# Patient Record
Sex: FEMALE | Race: WHITE | Employment: OTHER | URBAN - METROPOLITAN AREA
[De-identification: names, ages, dates, MRNs, and addresses within clinical notes are randomized per-mention and may not be internally consistent; named-entity substitution may affect disease eponyms.]

---

## 2022-11-09 ENCOUNTER — APPOINTMENT (OUTPATIENT)
Dept: NUCLEAR MEDICINE | Age: 72
DRG: 291 | End: 2022-11-09
Payer: MEDICARE

## 2022-11-09 ENCOUNTER — HOSPITAL ENCOUNTER (INPATIENT)
Age: 72
LOS: 6 days | Discharge: HOME | DRG: 291 | End: 2022-11-15
Attending: EMERGENCY MEDICINE | Admitting: HOSPITALIST
Payer: MEDICARE

## 2022-11-09 ENCOUNTER — OFFICE VISIT (OUTPATIENT)
Dept: FAMILY MEDICINE CLINIC | Age: 72
End: 2022-11-09
Payer: MEDICARE

## 2022-11-09 ENCOUNTER — APPOINTMENT (OUTPATIENT)
Dept: GENERAL RADIOLOGY | Age: 72
DRG: 291 | End: 2022-11-09
Payer: MEDICARE

## 2022-11-09 VITALS
OXYGEN SATURATION: 91 % | HEIGHT: 67 IN | SYSTOLIC BLOOD PRESSURE: 134 MMHG | DIASTOLIC BLOOD PRESSURE: 86 MMHG | WEIGHT: 235.6 LBS | BODY MASS INDEX: 36.98 KG/M2 | HEART RATE: 69 BPM | RESPIRATION RATE: 16 BRPM

## 2022-11-09 DIAGNOSIS — Z12.31 ENCOUNTER FOR SCREENING MAMMOGRAM FOR BREAST CANCER: ICD-10-CM

## 2022-11-09 DIAGNOSIS — E78.2 MIXED HYPERLIPIDEMIA: ICD-10-CM

## 2022-11-09 DIAGNOSIS — Z23 FLU VACCINE NEED: ICD-10-CM

## 2022-11-09 DIAGNOSIS — Z13.1 ENCOUNTER FOR SCREENING FOR DIABETES MELLITUS: ICD-10-CM

## 2022-11-09 DIAGNOSIS — I10 ESSENTIAL HYPERTENSION: Primary | ICD-10-CM

## 2022-11-09 DIAGNOSIS — Z12.11 COLON CANCER SCREENING: ICD-10-CM

## 2022-11-09 DIAGNOSIS — Z95.810 ICD (IMPLANTABLE CARDIOVERTER-DEFIBRILLATOR), DUAL, IN SITU: ICD-10-CM

## 2022-11-09 DIAGNOSIS — R79.89 ELEVATED D-DIMER: ICD-10-CM

## 2022-11-09 DIAGNOSIS — I49.1 PREMATURE ATRIAL CONTRACTION: ICD-10-CM

## 2022-11-09 DIAGNOSIS — I49.3 PREMATURE VENTRICULAR CONTRACTION: ICD-10-CM

## 2022-11-09 DIAGNOSIS — R79.89 ELEVATED BRAIN NATRIURETIC PEPTIDE (BNP) LEVEL: Primary | ICD-10-CM

## 2022-11-09 DIAGNOSIS — J44.9 CHRONIC OBSTRUCTIVE PULMONARY DISEASE, UNSPECIFIED COPD TYPE (HCC): ICD-10-CM

## 2022-11-09 DIAGNOSIS — I50.23 ACUTE ON CHRONIC SYSTOLIC CONGESTIVE HEART FAILURE (HCC): ICD-10-CM

## 2022-11-09 DIAGNOSIS — I48.11 LONGSTANDING PERSISTENT ATRIAL FIBRILLATION (HCC): ICD-10-CM

## 2022-11-09 PROBLEM — N95.1 SYMPTOMATIC MENOPAUSAL OR FEMALE CLIMACTERIC STATES: Status: ACTIVE | Noted: 2022-11-09

## 2022-11-09 PROBLEM — L81.4 SENILE LENTIGO: Status: ACTIVE | Noted: 2022-11-09

## 2022-11-09 PROBLEM — K20.90 ESOPHAGITIS: Status: ACTIVE | Noted: 2022-11-09

## 2022-11-09 PROBLEM — M77.8 FLEXOR HALLUCIS LONGUS TENDINITIS: Status: ACTIVE | Noted: 2022-11-09

## 2022-11-09 PROBLEM — J32.9 SINUSITIS: Status: ACTIVE | Noted: 2022-11-09

## 2022-11-09 PROBLEM — T75.3XXA MOTION SICKNESS: Status: ACTIVE | Noted: 2022-11-09

## 2022-11-09 PROBLEM — E78.5 HYPERLIPIDEMIA: Status: ACTIVE | Noted: 2022-11-09

## 2022-11-09 PROBLEM — J45.20 MILD INTERMITTENT ASTHMA: Status: ACTIVE | Noted: 2022-11-09

## 2022-11-09 PROBLEM — H52.209 ASTIGMATISM: Status: ACTIVE | Noted: 2022-11-09

## 2022-11-09 PROBLEM — J30.9 ALLERGIC RHINITIS: Status: ACTIVE | Noted: 2022-11-09

## 2022-11-09 PROBLEM — R00.2 PALPITATIONS: Status: ACTIVE | Noted: 2022-11-09

## 2022-11-09 PROBLEM — E66.3 OVERWEIGHT: Status: ACTIVE | Noted: 2022-11-09

## 2022-11-09 PROBLEM — H02.829 CYST OF EYELID: Status: ACTIVE | Noted: 2022-11-09

## 2022-11-09 PROBLEM — M77.10 LATERAL EPICONDYLITIS: Status: ACTIVE | Noted: 2022-11-09

## 2022-11-09 PROBLEM — Z87.891 FORMER SMOKER: Status: ACTIVE | Noted: 2022-11-09

## 2022-11-09 PROBLEM — F17.200 NICOTINE DEPENDENCE: Status: ACTIVE | Noted: 2022-11-09

## 2022-11-09 PROBLEM — R09.89 PULMONARY VASCULAR CONGESTION: Status: ACTIVE | Noted: 2022-11-09

## 2022-11-09 PROBLEM — L82.1 OTHER SEBORRHEIC KERATOSIS: Status: ACTIVE | Noted: 2022-11-09

## 2022-11-09 PROBLEM — M75.50 SUBACROMIAL BURSITIS: Status: ACTIVE | Noted: 2022-11-09

## 2022-11-09 PROBLEM — M94.0 COSTOCHONDRITIS: Status: ACTIVE | Noted: 2022-11-09

## 2022-11-09 PROBLEM — R05.9 COUGH: Status: ACTIVE | Noted: 2022-11-09

## 2022-11-09 PROBLEM — Z90.710 STATUS POST HYSTERECTOMY: Status: ACTIVE | Noted: 2022-11-09

## 2022-11-09 PROBLEM — I48.0 PAF (PAROXYSMAL ATRIAL FIBRILLATION) (HCC): Status: ACTIVE | Noted: 2022-11-09

## 2022-11-09 PROBLEM — I50.9 ACUTE DECOMPENSATED HEART FAILURE (HCC): Status: ACTIVE | Noted: 2022-11-09

## 2022-11-09 PROBLEM — F32.A DEPRESSION: Status: ACTIVE | Noted: 2022-11-09

## 2022-11-09 PROBLEM — M54.6 PAIN IN THORACIC SPINE: Status: ACTIVE | Noted: 2022-11-09

## 2022-11-09 PROBLEM — E66.9 OBESITY: Status: ACTIVE | Noted: 2022-11-09

## 2022-11-09 PROBLEM — M54.2 CERVICALGIA: Status: ACTIVE | Noted: 2022-11-09

## 2022-11-09 PROBLEM — H52.4 PRESBYOPIA: Status: ACTIVE | Noted: 2022-11-09

## 2022-11-09 PROBLEM — R10.12 LEFT UPPER QUADRANT ABDOMINAL PAIN: Status: ACTIVE | Noted: 2022-11-09

## 2022-11-09 PROBLEM — G47.00 PERSISTENT INSOMNIA: Status: ACTIVE | Noted: 2022-11-09

## 2022-11-09 PROBLEM — M25.569 ARTHRALGIA OF KNEE: Status: ACTIVE | Noted: 2022-11-09

## 2022-11-09 PROBLEM — R31.9 HEMATURIA: Status: ACTIVE | Noted: 2022-11-09

## 2022-11-09 PROBLEM — M54.9 BACKACHE: Status: ACTIVE | Noted: 2022-11-09

## 2022-11-09 LAB
A/G RATIO: 1.4 (ref 1.1–2.2)
ALBUMIN SERPL-MCNC: 4.1 G/DL (ref 3.4–5)
ALP BLD-CCNC: 107 U/L (ref 40–129)
ALT SERPL-CCNC: 24 U/L (ref 10–40)
ANION GAP SERPL CALCULATED.3IONS-SCNC: 12 MMOL/L (ref 3–16)
AST SERPL-CCNC: 30 U/L (ref 15–37)
BASE EXCESS VENOUS: 0.1 MMOL/L (ref -3–3)
BASOPHILS ABSOLUTE: 0.1 K/UL (ref 0–0.2)
BASOPHILS RELATIVE PERCENT: 0.7 %
BILIRUB SERPL-MCNC: 0.9 MG/DL (ref 0–1)
BUN BLDV-MCNC: 8 MG/DL (ref 7–20)
CALCIUM SERPL-MCNC: 9.1 MG/DL (ref 8.3–10.6)
CARBOXYHEMOGLOBIN: 1.5 % (ref 0–1.5)
CHLORIDE BLD-SCNC: 104 MMOL/L (ref 99–110)
CO2: 24 MMOL/L (ref 21–32)
CREAT SERPL-MCNC: 0.8 MG/DL (ref 0.6–1.2)
D DIMER: 1.51 UG/ML FEU (ref 0–0.6)
EKG ATRIAL RATE: 65 BPM
EKG DIAGNOSIS: NORMAL
EKG P-R INTERVAL: 160 MS
EKG Q-T INTERVAL: 434 MS
EKG QRS DURATION: 132 MS
EKG QTC CALCULATION (BAZETT): 509 MS
EKG R AXIS: -46 DEGREES
EKG T AXIS: 121 DEGREES
EKG VENTRICULAR RATE: 83 BPM
EOSINOPHILS ABSOLUTE: 0.1 K/UL (ref 0–0.6)
EOSINOPHILS RELATIVE PERCENT: 0.8 %
GFR SERPL CREATININE-BSD FRML MDRD: >60 ML/MIN/{1.73_M2}
GLUCOSE BLD-MCNC: 96 MG/DL (ref 70–99)
HCO3 VENOUS: 23.2 MMOL/L (ref 23–29)
HCT VFR BLD CALC: 38.9 % (ref 36–48)
HEMOGLOBIN: 12.8 G/DL (ref 12–16)
LYMPHOCYTES ABSOLUTE: 1.9 K/UL (ref 1–5.1)
LYMPHOCYTES RELATIVE PERCENT: 23.5 %
MCH RBC QN AUTO: 30.9 PG (ref 26–34)
MCHC RBC AUTO-ENTMCNC: 33 G/DL (ref 31–36)
MCV RBC AUTO: 93.7 FL (ref 80–100)
METHEMOGLOBIN VENOUS: 0.2 %
MONOCYTES ABSOLUTE: 0.6 K/UL (ref 0–1.3)
MONOCYTES RELATIVE PERCENT: 7.4 %
NEUTROPHILS ABSOLUTE: 5.5 K/UL (ref 1.7–7.7)
NEUTROPHILS RELATIVE PERCENT: 67.6 %
O2 SAT, VEN: 95 %
O2 THERAPY: ABNORMAL
PCO2, VEN: 33.1 MMHG (ref 40–50)
PDW BLD-RTO: 14 % (ref 12.4–15.4)
PH VENOUS: 7.46 (ref 7.35–7.45)
PLATELET # BLD: 237 K/UL (ref 135–450)
PMV BLD AUTO: 7.1 FL (ref 5–10.5)
PO2, VEN: 73.2 MMHG (ref 25–40)
POTASSIUM SERPL-SCNC: 3.9 MMOL/L (ref 3.5–5.1)
PRO-BNP: 7255 PG/ML (ref 0–124)
RBC # BLD: 4.16 M/UL (ref 4–5.2)
SARS-COV-2, NAAT: NOT DETECTED
SODIUM BLD-SCNC: 140 MMOL/L (ref 136–145)
TCO2 CALC VENOUS: 24 MMOL/L
TOTAL PROTEIN: 7.1 G/DL (ref 6.4–8.2)
TROPONIN: <0.01 NG/ML
TROPONIN: <0.01 NG/ML
WBC # BLD: 8.1 K/UL (ref 4–11)

## 2022-11-09 PROCEDURE — G8484 FLU IMMUNIZE NO ADMIN: HCPCS

## 2022-11-09 PROCEDURE — 6360000002 HC RX W HCPCS: Performed by: HOSPITALIST

## 2022-11-09 PROCEDURE — 6370000000 HC RX 637 (ALT 250 FOR IP): Performed by: HOSPITALIST

## 2022-11-09 PROCEDURE — 1090F PRES/ABSN URINE INCON ASSESS: CPT

## 2022-11-09 PROCEDURE — 84484 ASSAY OF TROPONIN QUANT: CPT

## 2022-11-09 PROCEDURE — 82803 BLOOD GASES ANY COMBINATION: CPT

## 2022-11-09 PROCEDURE — G8427 DOCREV CUR MEDS BY ELIG CLIN: HCPCS

## 2022-11-09 PROCEDURE — G8400 PT W/DXA NO RESULTS DOC: HCPCS

## 2022-11-09 PROCEDURE — 1200000000 HC SEMI PRIVATE

## 2022-11-09 PROCEDURE — 78582 LUNG VENTILAT&PERFUS IMAGING: CPT

## 2022-11-09 PROCEDURE — 93000 ELECTROCARDIOGRAM COMPLETE: CPT

## 2022-11-09 PROCEDURE — 3075F SYST BP GE 130 - 139MM HG: CPT

## 2022-11-09 PROCEDURE — 87635 SARS-COV-2 COVID-19 AMP PRB: CPT

## 2022-11-09 PROCEDURE — 2580000003 HC RX 258: Performed by: HOSPITALIST

## 2022-11-09 PROCEDURE — 93005 ELECTROCARDIOGRAM TRACING: CPT | Performed by: EMERGENCY MEDICINE

## 2022-11-09 PROCEDURE — 90694 VACC AIIV4 NO PRSRV 0.5ML IM: CPT

## 2022-11-09 PROCEDURE — A9558 XE133 XENON 10MCI: HCPCS | Performed by: HOSPITALIST

## 2022-11-09 PROCEDURE — 94640 AIRWAY INHALATION TREATMENT: CPT

## 2022-11-09 PROCEDURE — 99285 EMERGENCY DEPT VISIT HI MDM: CPT

## 2022-11-09 PROCEDURE — 3430000000 HC RX DIAGNOSTIC RADIOPHARMACEUTICAL: Performed by: HOSPITALIST

## 2022-11-09 PROCEDURE — 71045 X-RAY EXAM CHEST 1 VIEW: CPT

## 2022-11-09 PROCEDURE — 93010 ELECTROCARDIOGRAM REPORT: CPT | Performed by: INTERNAL MEDICINE

## 2022-11-09 PROCEDURE — 1123F ACP DISCUSS/DSCN MKR DOCD: CPT

## 2022-11-09 PROCEDURE — G0008 ADMIN INFLUENZA VIRUS VAC: HCPCS

## 2022-11-09 PROCEDURE — 85379 FIBRIN DEGRADATION QUANT: CPT

## 2022-11-09 PROCEDURE — 99204 OFFICE O/P NEW MOD 45 MIN: CPT

## 2022-11-09 PROCEDURE — 3079F DIAST BP 80-89 MM HG: CPT

## 2022-11-09 PROCEDURE — 3017F COLORECTAL CA SCREEN DOC REV: CPT

## 2022-11-09 PROCEDURE — 83880 ASSAY OF NATRIURETIC PEPTIDE: CPT

## 2022-11-09 PROCEDURE — 36415 COLL VENOUS BLD VENIPUNCTURE: CPT

## 2022-11-09 PROCEDURE — A9540 TC99M MAA: HCPCS | Performed by: HOSPITALIST

## 2022-11-09 PROCEDURE — 1036F TOBACCO NON-USER: CPT

## 2022-11-09 PROCEDURE — 96374 THER/PROPH/DIAG INJ IV PUSH: CPT

## 2022-11-09 PROCEDURE — 80053 COMPREHEN METABOLIC PANEL: CPT

## 2022-11-09 PROCEDURE — 3023F SPIROM DOC REV: CPT

## 2022-11-09 PROCEDURE — 85025 COMPLETE CBC W/AUTO DIFF WBC: CPT

## 2022-11-09 PROCEDURE — 6360000002 HC RX W HCPCS: Performed by: NURSE PRACTITIONER

## 2022-11-09 PROCEDURE — G8417 CALC BMI ABV UP PARAM F/U: HCPCS

## 2022-11-09 RX ORDER — LISINOPRIL 20 MG/1
TABLET ORAL
Status: ON HOLD | COMMUNITY
Start: 2021-06-08 | End: 2022-11-15 | Stop reason: HOSPADM

## 2022-11-09 RX ORDER — PANTOPRAZOLE SODIUM 40 MG/1
TABLET, DELAYED RELEASE ORAL
COMMUNITY
Start: 2021-02-04

## 2022-11-09 RX ORDER — BUPROPION HYDROCHLORIDE 300 MG/1
TABLET ORAL
COMMUNITY
Start: 2021-08-09 | End: 2022-11-09

## 2022-11-09 RX ORDER — POTASSIUM CHLORIDE 7.45 MG/ML
10 INJECTION INTRAVENOUS PRN
Status: DISCONTINUED | OUTPATIENT
Start: 2022-11-09 | End: 2022-11-10

## 2022-11-09 RX ORDER — LISINOPRIL 20 MG/1
20 TABLET ORAL DAILY
Status: DISCONTINUED | OUTPATIENT
Start: 2022-11-10 | End: 2022-11-11

## 2022-11-09 RX ORDER — ALBUTEROL SULFATE 2.5 MG/.5ML
SOLUTION RESPIRATORY (INHALATION)
COMMUNITY
Start: 2021-12-08 | End: 2022-12-08

## 2022-11-09 RX ORDER — ROPINIROLE 1 MG/1
TABLET, FILM COATED ORAL
COMMUNITY
Start: 2021-08-09 | End: 2022-11-09

## 2022-11-09 RX ORDER — ALBUTEROL SULFATE 2.5 MG/3ML
2.5 SOLUTION RESPIRATORY (INHALATION) EVERY 6 HOURS PRN
Status: DISCONTINUED | OUTPATIENT
Start: 2022-11-09 | End: 2022-11-15 | Stop reason: HOSPADM

## 2022-11-09 RX ORDER — ONDANSETRON 2 MG/ML
4 INJECTION INTRAMUSCULAR; INTRAVENOUS EVERY 6 HOURS PRN
Status: DISCONTINUED | OUTPATIENT
Start: 2022-11-09 | End: 2022-11-15 | Stop reason: HOSPADM

## 2022-11-09 RX ORDER — CARVEDILOL 25 MG/1
25 TABLET ORAL 2 TIMES DAILY
Status: DISCONTINUED | OUTPATIENT
Start: 2022-11-09 | End: 2022-11-13

## 2022-11-09 RX ORDER — FUROSEMIDE 40 MG/1
TABLET ORAL
COMMUNITY
Start: 2021-08-09 | End: 2022-11-09

## 2022-11-09 RX ORDER — OXYBUTYNIN CHLORIDE 5 MG/1
5 TABLET ORAL 2 TIMES DAILY
COMMUNITY

## 2022-11-09 RX ORDER — PANTOPRAZOLE SODIUM 40 MG/1
40 TABLET, DELAYED RELEASE ORAL
Status: DISCONTINUED | OUTPATIENT
Start: 2022-11-10 | End: 2022-11-15 | Stop reason: HOSPADM

## 2022-11-09 RX ORDER — SODIUM CHLORIDE 9 MG/ML
INJECTION, SOLUTION INTRAVENOUS PRN
Status: DISCONTINUED | OUTPATIENT
Start: 2022-11-09 | End: 2022-11-15 | Stop reason: HOSPADM

## 2022-11-09 RX ORDER — SODIUM CHLORIDE 0.9 % (FLUSH) 0.9 %
10 SYRINGE (ML) INJECTION PRN
Status: DISCONTINUED | OUTPATIENT
Start: 2022-11-09 | End: 2022-11-15 | Stop reason: HOSPADM

## 2022-11-09 RX ORDER — ALBUTEROL SULFATE 90 UG/1
2 AEROSOL, METERED RESPIRATORY (INHALATION) 4 TIMES DAILY PRN
Qty: 54 G | Refills: 1 | Status: SHIPPED | OUTPATIENT
Start: 2022-11-09

## 2022-11-09 RX ORDER — LISINOPRIL 10 MG/1
TABLET ORAL
COMMUNITY
Start: 2021-02-04 | End: 2022-11-09

## 2022-11-09 RX ORDER — ONDANSETRON 4 MG/1
4 TABLET, ORALLY DISINTEGRATING ORAL EVERY 8 HOURS PRN
Status: DISCONTINUED | OUTPATIENT
Start: 2022-11-09 | End: 2022-11-15 | Stop reason: HOSPADM

## 2022-11-09 RX ORDER — BUDESONIDE, GLYCOPYRROLATE, AND FORMOTEROL FUMARATE 160; 9; 4.8 UG/1; UG/1; UG/1
2 AEROSOL, METERED RESPIRATORY (INHALATION) DAILY
Qty: 1 EACH | Refills: 0 | Status: SHIPPED | OUTPATIENT
Start: 2022-11-09 | End: 2022-12-09

## 2022-11-09 RX ORDER — PREGABALIN 50 MG/1
50 CAPSULE ORAL 2 TIMES DAILY
COMMUNITY
End: 2022-11-09

## 2022-11-09 RX ORDER — LORATADINE 10 MG/1
10 TABLET ORAL DAILY
Qty: 30 TABLET | Refills: 0 | Status: SHIPPED | OUTPATIENT
Start: 2022-11-09 | End: 2022-12-09

## 2022-11-09 RX ORDER — ACETAMINOPHEN 650 MG/1
650 SUPPOSITORY RECTAL EVERY 6 HOURS PRN
Status: DISCONTINUED | OUTPATIENT
Start: 2022-11-09 | End: 2022-11-15 | Stop reason: HOSPADM

## 2022-11-09 RX ORDER — XENON XE-133 10 MCI/1
16 GAS RESPIRATORY (INHALATION)
Status: COMPLETED | OUTPATIENT
Start: 2022-11-09 | End: 2022-11-09

## 2022-11-09 RX ORDER — FLUTICASONE PROPIONATE AND SALMETEROL 250; 50 UG/1; UG/1
POWDER RESPIRATORY (INHALATION)
COMMUNITY
Start: 2021-12-08 | End: 2022-11-09

## 2022-11-09 RX ORDER — MAGNESIUM SULFATE IN WATER 40 MG/ML
2000 INJECTION, SOLUTION INTRAVENOUS PRN
Status: DISCONTINUED | OUTPATIENT
Start: 2022-11-09 | End: 2022-11-10

## 2022-11-09 RX ORDER — SODIUM CHLORIDE 0.9 % (FLUSH) 0.9 %
10 SYRINGE (ML) INJECTION EVERY 12 HOURS SCHEDULED
Status: DISCONTINUED | OUTPATIENT
Start: 2022-11-09 | End: 2022-11-15 | Stop reason: HOSPADM

## 2022-11-09 RX ORDER — HYDROXYZINE HYDROCHLORIDE 25 MG/1
TABLET, FILM COATED ORAL
COMMUNITY
Start: 2021-08-09

## 2022-11-09 RX ORDER — BUPROPION HYDROCHLORIDE 150 MG/1
300 TABLET ORAL
COMMUNITY
Start: 2022-03-01 | End: 2022-11-09

## 2022-11-09 RX ORDER — LISINOPRIL 20 MG/1
20 TABLET ORAL ONCE
Status: COMPLETED | OUTPATIENT
Start: 2022-11-09 | End: 2022-11-09

## 2022-11-09 RX ORDER — POTASSIUM CHLORIDE 20 MEQ/1
40 TABLET, EXTENDED RELEASE ORAL PRN
Status: DISCONTINUED | OUTPATIENT
Start: 2022-11-09 | End: 2022-11-10

## 2022-11-09 RX ORDER — CETIRIZINE HYDROCHLORIDE 10 MG/1
5 TABLET ORAL DAILY
Status: DISCONTINUED | OUTPATIENT
Start: 2022-11-09 | End: 2022-11-15 | Stop reason: HOSPADM

## 2022-11-09 RX ORDER — FUROSEMIDE 10 MG/ML
40 INJECTION INTRAMUSCULAR; INTRAVENOUS ONCE
Status: COMPLETED | OUTPATIENT
Start: 2022-11-09 | End: 2022-11-09

## 2022-11-09 RX ORDER — CARVEDILOL 25 MG/1
25 TABLET ORAL 2 TIMES DAILY
Status: ON HOLD | COMMUNITY
Start: 2022-03-01 | End: 2022-11-15 | Stop reason: HOSPADM

## 2022-11-09 RX ORDER — SENNA PLUS 8.6 MG/1
1 TABLET ORAL DAILY PRN
Status: DISCONTINUED | OUTPATIENT
Start: 2022-11-09 | End: 2022-11-15 | Stop reason: HOSPADM

## 2022-11-09 RX ORDER — ACETAMINOPHEN 325 MG/1
650 TABLET ORAL EVERY 6 HOURS PRN
Status: DISCONTINUED | OUTPATIENT
Start: 2022-11-09 | End: 2022-11-15 | Stop reason: HOSPADM

## 2022-11-09 RX ADMIN — Medication 6 MILLICURIE: at 17:26

## 2022-11-09 RX ADMIN — CARVEDILOL 25 MG: 25 TABLET, FILM COATED ORAL at 20:00

## 2022-11-09 RX ADMIN — SODIUM CHLORIDE, PRESERVATIVE FREE 10 ML: 5 INJECTION INTRAVENOUS at 20:02

## 2022-11-09 RX ADMIN — XENON XE-133 14 MILLICURIE: 10 GAS RESPIRATORY (INHALATION) at 17:26

## 2022-11-09 RX ADMIN — APIXABAN 2.5 MG: 2.5 TABLET, FILM COATED ORAL at 20:00

## 2022-11-09 RX ADMIN — CETIRIZINE HYDROCHLORIDE 5 MG: 5 TABLET ORAL at 20:00

## 2022-11-09 RX ADMIN — FUROSEMIDE 40 MG: 10 INJECTION, SOLUTION INTRAMUSCULAR; INTRAVENOUS at 13:40

## 2022-11-09 RX ADMIN — Medication 2 PUFF: at 19:41

## 2022-11-09 RX ADMIN — FUROSEMIDE 4 MG/HR: 10 INJECTION, SOLUTION INTRAMUSCULAR; INTRAVENOUS at 20:05

## 2022-11-09 RX ADMIN — LISINOPRIL 20 MG: 20 TABLET ORAL at 20:09

## 2022-11-09 ASSESSMENT — PAIN - FUNCTIONAL ASSESSMENT
PAIN_FUNCTIONAL_ASSESSMENT: NONE - DENIES PAIN
PAIN_FUNCTIONAL_ASSESSMENT: NONE - DENIES PAIN

## 2022-11-09 ASSESSMENT — PATIENT HEALTH QUESTIONNAIRE - PHQ9
6. FEELING BAD ABOUT YOURSELF - OR THAT YOU ARE A FAILURE OR HAVE LET YOURSELF OR YOUR FAMILY DOWN: 0
SUM OF ALL RESPONSES TO PHQ9 QUESTIONS 1 & 2: 0
2. FEELING DOWN, DEPRESSED OR HOPELESS: 0
10. IF YOU CHECKED OFF ANY PROBLEMS, HOW DIFFICULT HAVE THESE PROBLEMS MADE IT FOR YOU TO DO YOUR WORK, TAKE CARE OF THINGS AT HOME, OR GET ALONG WITH OTHER PEOPLE: 0
5. POOR APPETITE OR OVEREATING: 0
SUM OF ALL RESPONSES TO PHQ QUESTIONS 1-9: 0
4. FEELING TIRED OR HAVING LITTLE ENERGY: 0
SUM OF ALL RESPONSES TO PHQ QUESTIONS 1-9: 0
9. THOUGHTS THAT YOU WOULD BE BETTER OFF DEAD, OR OF HURTING YOURSELF: 0
7. TROUBLE CONCENTRATING ON THINGS, SUCH AS READING THE NEWSPAPER OR WATCHING TELEVISION: 0
8. MOVING OR SPEAKING SO SLOWLY THAT OTHER PEOPLE COULD HAVE NOTICED. OR THE OPPOSITE, BEING SO FIGETY OR RESTLESS THAT YOU HAVE BEEN MOVING AROUND A LOT MORE THAN USUAL: 0
3. TROUBLE FALLING OR STAYING ASLEEP: 0
SUM OF ALL RESPONSES TO PHQ QUESTIONS 1-9: 0
SUM OF ALL RESPONSES TO PHQ QUESTIONS 1-9: 0
1. LITTLE INTEREST OR PLEASURE IN DOING THINGS: 0

## 2022-11-09 ASSESSMENT — ENCOUNTER SYMPTOMS
SORE THROAT: 0
DIARRHEA: 0
BLOOD IN STOOL: 0
COUGH: 0
COLOR CHANGE: 0
SHORTNESS OF BREATH: 1
WHEEZING: 0
CONSTIPATION: 0
ABDOMINAL PAIN: 0

## 2022-11-09 ASSESSMENT — LIFESTYLE VARIABLES
HOW MANY STANDARD DRINKS CONTAINING ALCOHOL DO YOU HAVE ON A TYPICAL DAY: 1 OR 2
HOW OFTEN DO YOU HAVE A DRINK CONTAINING ALCOHOL: MONTHLY OR LESS

## 2022-11-09 NOTE — ED PROVIDER NOTES
Emergency Department Attending Provider Note  Location: [address] [place] [address]  ED  11/9/2022     Patient Identification  Sherine Mccord is a 67 y.o. female      HPI:Mary Walker was evaluated in the Emergency Department for shortness of breath. Patient reports previous history of atrial fibrillation. Patient states that she and her  recently traveled from [place]. She reports shortness of breath without fevers, chills, or sweats. No cough or congestion reported. No lower extremity swelling or edema. . Although initial history and physical exam information was obtained by SALIMA/NPP/MD/ (who also dictated a record of this visit), I personally saw the patient and performed a substantive portion of the visit including all aspects of the medical decision making. PHYSICAL EXAM:  No acute distress. Head: Normocephalic/atraumatic. Heart: Irregular rate and rhythm. No rubs, gallops, or murmurs. Lungs: Clear to auscultation bilaterally. No wheezes, rales, or rhonchi. Extremities: No swelling or edema. No calf tenderness. EKG Interpretation  Normal sinus rhythm with frequent PACs/PVCs with a rate of 83. Left axis deviation. Left bundle branch block noted. No previous EKG. Patient seen and evaluated. Relevant records reviewed. Patient will be admitted to hospitalist staff for what appears to be a CHF exacerbation. Given patient's recent travel from [place], D-dimer was obtained and was found to be elevated. VQ scan is pending at time of admission. CLINICAL IMPRESSION  1. Elevated brain natriuretic peptide (BNP) level    2. Elevated d-dimer    3. Premature atrial contraction    4. Premature ventricular contraction          Andrzej BRUNO DO, am the primary clinician of record. I personally saw the patient and independently provided 15 minutes of non-concurrent critical care out of the total shared critical care time provided.     This chart was generated in part by using [employer] system and may contain errors related to that system including errors in grammar, punctuation, and spelling, as well as words and phrases that may be inappropriate. If there are any questions or concerns please feel free to contact the dictating provider for clarification.      Trey Lamar, DO   [employer]       Trey Lamar, DO  11/09/22 5623 [facility], DO  11/10/22 7744

## 2022-11-09 NOTE — PLAN OF CARE
Patient's EF (Ejection Fraction) is UNKNOWN,per patient last 50%    Heart Failure Medications:  Diuretics[de-identified] lasix    (One of the following REQUIRED for EF </= 40%/SYSTOLIC FAILURE but MAY be used in EF% >40%/DIASTOLIC FAILURE)        ACE[de-identified] Lisinopril        ARB[de-identified] None         ARNI[de-identified] None    (Beta Blockers)  NON- Evidenced Based Beta Blocker (for EF% >40%/DIASTOLIC FAILURE):  carvedilol    Evidenced Based Beta Blocker::(REQUIRED for EF% <40%/SYSTOLIC FAILURE) Carvedilol- Coreg  . .................................................................................................................................................. Patient's weights and intake/output reviewed: Yes    Patient's Last Weight: 232 lbs obtained by   Intake/Output Summary (Last 24 hours) at 11/9/2022 1826  Last data filed at 11/9/2022 1506  Gross per 24 hour   Intake --   Output 1000 ml   Net -1000 ml       Education Booklet Provided: yes    Comorbidities Reviewed Yes    Patient has a past medical history of Atrial fibrillation ([facility] [address] >>For CHF and Comorbidity documentation on Education Time and Topics, please see Education Tab    Progressive Mobility Assessment:  What is this patient's Current Level of Mobility?: Ambulatory-Up Ad [place]i  How was this patient Mobilized today?: Edge of Bed, Up to Chair, Bedside Commode,  Up to Toilet/Shower, Up in Room, Up in [place], Unable to Mobilize, and Patient Refuses to Mobilize, ambulated 5   ft                 With Whom?  transport                 Level of Difficulty/Assistance: Independent     Pt resting in bed at this time on room air. Pt denies shortness of breath. Pt without lower extremity edema.      Patient and/or Family's stated Goal of Care this Admission: reduce shortness of breath, increase activity tolerance, better understand heart failure and disease management, be more comfortable, and reduce lower extremity edema prior to discharge        :

## 2022-11-09 NOTE — H&P
HOSPITALISTS HISTORY AND PHYSICAL    11/9/2022 4:37 PM    Patient Information:  Aby Grady is a 67 y.o. female [id number]  PCP:  DEMI Ochoa CNP (Tel: [phone] )    Chief complaint:    Chief Complaint   Patient presents with    Shortness of Breath     Patient states she has had difficulty breathing for the last two weeks. States she saw her PCP today who sent her to the ED for abnormal EKG. History of Present Illness:  Edwige Greco is a 67 y.o. female who presented to the ED to be evaluated for increased dyspnea ongoing for 2 weeks PTA. She recently relocated from [place], and endorses recent prolonged travel. Patient visited her newly assigned PCP today, who recommended urgent ED evaluation. She endorses concomitant intermittent chest pressure, nonproductive cough, and lightheadedness. Patient has an underlying history of CHF, however no echo results are available in [facility]. She reports that she has ran out of her Coreg 2 days PTA, but states that she has been compliant with her Eliquis anticoagulation. Patient is a former smoker. Upon arrival to the ED EKG was obtained revealing undetermined rhythm, LAD with LBBB, and QTC prolongation; no previous EKGs available for comparison STAT CXR demonstrates moderate cardiomegaly and pulmonary vascular congestion; left atrial appendage closure device noted. Patient with severe contrast allergy therefore NM VQ scan ordered and noted to be low probability for PE. Rapid COVID-19 testing performed and noted to be negative. Notable labs include: BNP 7255, elevated D-dimer 1.51, and serial troponin negative x3. Patient received a one-time bolus dosage of Lasix 40 mg per ED provider, prior to request for admission.     History obtained from patient and review of [facility] chart     REVIEW OF SYSTEMS:   Constitutional: Negative for fever,chills; positive generalized weakness  ENT: Negative for headache, rhinorrhea, and sore throat. Respiratory: Acute dyspnea without wheezing or cough; former smoker  Cardiovascular: Positive chest pressure with palpitations; mild BLE peripheral edema with orthopnea  Gastrointestinal: Negative for N/V/; acute diarrhea today; no hematemesis, hematochezia, or melena; no anorexia  Genitourinary: Negative for dysuria, frequency, retention; no incontinence  Hematologic/Lymphatic: Negative for bleeding tendency/excessive bruising  Musculoskeletal: Negative for myalgias and arthalgias; able to ambulate without difficulty  Neurologic: Negative for LOC, seizure activity, paresthesias, dysarthria, vertigo, and gait disturbance  Skin: Negative for itching,rash, decubitus  Psychiatric: Negative for depression,anxiety, and agitation; no hallucinations; denies SI/HI  Endocrine: Negative for polyuria/polydipsia/polyphagia; no heat/cold intolerance    Past Medical History:   has a past medical history of Atrial fibrillation ([facility] [address] Past Surgical History:   has a past surgical history that includes pacemaker placement; Hysterectomy, vaginal; and Stimulator Surgery. Medications:  No current facility-administered medications on file prior to encounter.      Current Outpatient Medications on File Prior to Encounter   Medication Sig Dispense Refill    albuterol (PROVENTIL) 2.5 MG/0.5ML NEBU nebulizer solution   See Rx Instructions, 0, # 375 mL, 5 total refill(s), Hard Stop, GERALD [Federal Rx: #375 last filled 04/04/22]      carvedilol (COREG) 25 MG tablet Take 25 mg by mouth 2 times daily      apixaban (ELIQUIS) 2.5 MG TABS tablet apixaban 2.5 mg oral tablet  Start Date: 8/9/21  Status: Ordered      lisinopril (PRINIVIL;ZESTRIL) 20 MG tablet lisinopril 20 mg oral tablet  Start Date: 6/8/21  Status: Ordered      pantoprazole (PROTONIX) 40 MG tablet pantoprazole 40 mg oral delayed release tablet  Start Date: 2/4/21  Status: Ordered hydrOXYzine HCl (ATARAX) 25 MG tablet hydrOXYzine hydrochloride 25 mg oral tablet  Start Date: 8/9/21  Status: Ordered      oxybutynin (DITROPAN) 5 MG tablet Take 5 mg by mouth 2 times daily      Budeson-Glycopyrrol-Formoterol (BREZTRI AEROSPHERE) 160-9-4.8 MCG/ACT AERO Inhale 2 puffs into the lungs daily 1 each 0    albuterol sulfate HFA (VENTOLIN HFA) 108 (90 Base) MCG/ACT inhaler Inhale 2 puffs into the lungs 4 times daily as needed for Wheezing 54 g 1    loratadine (CLARITIN) 10 MG tablet Take 1 tablet by mouth daily 30 tablet 0       Allergies: Allergies   Allergen Reactions    Iv Contrast [Iodides] Anaphylaxis     2002    Codeine Other (See Comments)    Penicillins Other (See Comments)    Sulfa Antibiotics      Other reaction(s): Unknown        Social History:   reports that she quit smoking about 3 years ago. Her smoking use included cigarettes. She has never used smokeless tobacco. She reports current alcohol use of about 2.0 - 3.0 standard drinks per week. She reports that she does not use drugs. Family History:  family history includes Heart Attack in her brother.      Physical Exam:  /78   Pulse 79   Temp 97.9 °F (36.6 °C) (Oral)   Resp 19   Wt 232 lb (105.2 kg)   SpO2 97%   BMI 36.34 kg/m²     General appearance: Pleasant elderly female resting comfortably in bed with  by her side  Eyes: Sclera clear without conjunctival injection; PERRLA; EOMI  ENT: Mucous membranes moist without thrush; normal dentition  Neck: Supple without meningismus; no goiter; no carotid bruit bilaterally  Cardiovascular: Regular rhythm with frequent ectopy; normal S1-S2 with no murmurs; 1+ BLE peripheral edema; no JVD  Respiratory: Mild tachypnea; diminished breath sounds with faint bibasilar rales   gastrointestinal: Abdomen soft, non-tender, not distended; bowel sounds normal; no masses/organomegaly appreciated  Musculoskeletal: FROM spine and extremities x4; no gross deformity  Neurology: A&O x3; cranial nerves 2-12 grossly intact; motor 5/5  BUE/BLE; no seizure activity  Psychiatry: Well-groomed with good eye contact; appropriate affect; no visual/auditory hallucination  Skin: Warm, dry, normal turgor, no rash  PV: 2/4 radial and dorsalis pedis bilaterally; brisk capillary refill; negative Homans' sign bilaterally    Labs:  CBC:   Lab Results   Component Value Date/Time    WBC 8.1 11/09/2022 12:16 PM    RBC 4.16 11/09/2022 12:16 PM    HGB 12.8 11/09/2022 12:16 PM    HCT 38.9 11/09/2022 12:16 PM    MCV 93.7 11/09/2022 12:16 PM    MCH 30.9 11/09/2022 12:16 PM    MCHC 33.0 11/09/2022 12:16 PM    RDW 14.0 11/09/2022 12:16 PM     11/09/2022 12:16 PM    MPV 7.1 11/09/2022 12:16 PM     BMP:    Lab Results   Component Value Date/Time     11/09/2022 12:16 PM    K 3.9 11/09/2022 12:16 PM     11/09/2022 12:16 PM    CO2 24 11/09/2022 12:16 PM    BUN 8 11/09/2022 12:16 PM    CREATININE 0.8 11/09/2022 12:16 PM    CALCIUM 9.1 11/09/2022 12:16 PM    LABGLOM >60 11/09/2022 12:16 PM    GLUCOSE 96 11/09/2022 12:16 PM     XR CHEST PORTABLE   Final Result      NM LUNG VENT/PERFUSION (VQ)    (Results Pending)         EKG:    Ventricular Rate 83 BPM QTc Calculation (Bazett) 509 ms   Atrial Rate 65 BPM R Axis -46 degrees   P-R Interval 160 ms T Axis 121 degrees   QRS Duration 132 ms Diagnosis Undetermined rhythmLeft axis deviationLeft bundle branch blockAbnormal      I visualized CXR images and EKG strips personally and agree with documented interpretation    Discussed case  with ED provider    Problem List:  Principal Problem:    Acute decompensated heart failure ([address]  Active Problems:    Pulmonary vascular congestion    Essential hypertension    Hyperlipidemia    Former smoker    COPD (chronic obstructive pulmonary disease) ([facility])    PAF (paroxysmal atrial fibrillation) ([facility])  Resolved Problems:    * No resolved hospital problems.  *        Consults:  IP CONSULT TO CARDIOLOGY  IP CONSULT TO CARDIOLOGY  IP CONSULT TO HEART FAILURE NURSE/COORDINATOR      Assessment/Plan:     Acute decompensated CHF with pulmonary edema  -Admit to floor for continuous telemetry monitoring and strict I's & O's; pure wick in place  -IV Lasix 40 mg x 1, followed by continuous infusion at 4 mg/kg overnight x15 hours  -Continue home doses of lisinopril and Coreg  -ECHO scheduled to further assess cardiac structure and function  -2G Na and fluid restriction dietary modifications in place  -Consult placed to Cardiology for further recommendations     COPD in former smoker  -Patient complains of severe dyspnea s/p recent travel therefore VQ scan ordered to rule out PE (contrast allergy); results interpreted as low probability  -Continuous pulse oximetry monitoring initiated with PRN supplemental O2   -Continue home maintenance MDIs/nebulizer treatment including Breztri  -Encourage aggressive pulmonary toilet including incentive spirometry every 4H while awake  -Albuterol nebs scheduled every 6 hours as needed    PAF  -EKG with undetermined rhythm, cardiology suggested PACs rather than A. Fib  -Patient s/p left atrial appendage clipping  -Continue beta-blocker for rate control  -Continue twice daily Eliquis anticoagulation      DVT prophylaxis-continue Eliquis twice daily  Code status-full code  Diet-cardiac 2 g sodium with 1800 cc fluid restriction  IV access-PIV established in ED      Admit as inpatient. I anticipate hospitalization spanning more than two midnights for investigation and treatment of the above medically necessary diagnoses. Comment: Please note this report has been produced using speech recognition software and may contain errors related to that system including errors in grammar, punctuation, and spelling, as well as words and phrases that may be inappropriate. If there are any questions or concerns please feel free to contact the dictating provider for clarification.          Pauly Kelsey MD    11/9/2022 4:37 PM

## 2022-11-09 NOTE — ED NOTES
Patient allergy list reviewed. Pt states she has severe allergy to IV contrast.  Pt states, \"last time, I went down and they had to bring in the crash carts\". TERESA Fraire notified. CT w contrast discontinued, Lung Scan ordered.      Juan Carlos Hardy RN  11/09/22 3540

## 2022-11-09 NOTE — ED NOTES
Attempted to call nuclear medicine to notify them of negative covid test. No answer.      Anna Ye RN  11/09/22 143       Anna Ye RN  11/09/22 1442

## 2022-11-09 NOTE — ED NOTES
[facility] cardiology consult @1106  Re: [email or web address]     Reford Gave Naegele  11/09/22 2915

## 2022-11-09 NOTE — ED NOTES
RN spoke to Selvin Dover in Nuclear Medicine who states patient needs a negative rapid COVID prior to having VQ scan.      Leobardo Bernal RN  11/09/22 0976

## 2022-11-09 NOTE — ED PROVIDER NOTES
[address] [place] [address]  ED  [address] [address]  Dept: [phone]  Dept Fax: [phone]  Loc: [facility]    I have seen and evaluated this patient with my supervising physician, Dr. Aurea Howell. CHIEF COMPLAINT    Chief Complaint   Patient presents with    Shortness of Breath     Patient states she has had difficulty breathing for the last two weeks. States she saw her PCP today who sent her to the ED for abnormal EKG. HPI    Avani Barragan is a 67 y.o. nontoxic, well-appearing, mildly distressed female who presents to the emergency department sent by her new PCP whom she saw today with shortness of breath. Onset was 10 days ago. The duration has been constant since the onset. The context was that the symptoms started after the patient had a recent car travel consisting of a 21-hour drive from [facility]. The patient has had associated lightheadedness, dry cough, and diarrhea x2 today. Denies nausea, vomiting, presyncope, sweats, change in ability to smell/taste, hemoptysis, leg/calf pain or swelling, headache, body aches, abdominal pain, urinary symptoms/retention, other concerns. No history of blood clots. No alleviating factors. Review of Systems   Constitutional:  Negative for chills, diaphoresis, fatigue and fever. HENT:  Negative for congestion and sore throat. Eyes:  Negative for pain and visual disturbance. Respiratory:  Positive for shortness of breath. Negative for cough. Cardiovascular:  Negative for chest pain and leg swelling. Gastrointestinal:  Positive for diarrhea. Negative for abdominal pain, anal bleeding, nausea and vomiting. Genitourinary:  Negative for difficulty urinating, dysuria, frequency and urgency. Musculoskeletal:  Negative for back pain and neck pain. Skin:  Negative for rash and wound. Neurological:  Positive for light-headedness. Negative for dizziness. PAST MEDICAL & SURGICAL HISTORY    Past Medical History:   Diagnosis Date    Atrial fibrillation [facility])      Past Surgical History:   Procedure Laterality Date    HYSTERECTOMY, VAGINAL      PACEMAKER PLACEMENT      STIMULATOR SURGERY         CURRENT MEDICATIONS  (may include discharge medications prescribed in the ED)  Current Outpatient Rx   Medication Sig Dispense Refill    albuterol (PROVENTIL) 2.5 MG/0.5ML NEBU nebulizer solution   See Rx Instructions, 0, # 375 mL, 5 total refill(s), Hard Stop, GERALD [Federal Rx: #375 last filled 04/04/22]      carvedilol (COREG) 25 MG tablet Take 25 mg by mouth 2 times daily      apixaban (ELIQUIS) 2.5 MG TABS tablet apixaban 2.5 mg oral tablet  Start Date: 8/9/21  Status: Ordered      lisinopril (PRINIVIL;ZESTRIL) 20 MG tablet lisinopril 20 mg oral tablet  Start Date: 6/8/21  Status: Ordered      pantoprazole (PROTONIX) 40 MG tablet pantoprazole 40 mg oral delayed release tablet  Start Date: 2/4/21  Status: Ordered      hydrOXYzine HCl (ATARAX) 25 MG tablet hydrOXYzine hydrochloride 25 mg oral tablet  Start Date: 8/9/21  Status: Ordered      oxybutynin (DITROPAN) 5 MG tablet Take 5 mg by mouth 2 times daily      Budeson-Glycopyrrol-Formoterol (BREZTRI AEROSPHERE) 160-9-4.8 MCG/ACT AERO Inhale 2 puffs into the lungs daily 1 each 0    albuterol sulfate HFA (VENTOLIN HFA) 108 (90 Base) MCG/ACT inhaler Inhale 2 puffs into the lungs 4 times daily as needed for Wheezing 54 g 1    loratadine (CLARITIN) 10 MG tablet Take 1 tablet by mouth daily 30 tablet 0       ALLERGIES    Allergies   Allergen Reactions    Codeine Other (See Comments)    Penicillins Other (See Comments)    Sulfa Antibiotics      Other reaction(s): Unknown       SOCIAL & FAMILY HISTORY    Social History     Socioeconomic History    Marital status:      Spouse name: None    Number of children: None    Years of education: None    Highest education level: None   Tobacco Use    Smoking status: Former     Years: 50. 00     Types: Cigarettes     Quit date: 10/28/2019     Years since quitting: 3.0    Smokeless tobacco: Never   Vaping Use    Vaping Use: Former   Substance and Sexual Activity    Alcohol use: Yes     Alcohol/week: 2.0 - 3.0 standard drinks     Types: 2 - 3 Glasses of wine per week    Drug use: Never     Family History   Problem Relation Age of Onset    Heart Attack Brother        PHYSICAL EXAM    VITAL SIGNS: BP (!) 162/88   Pulse 84   Temp 97.9 °F (36.6 °C) (Oral)   Resp 24   Wt 232 lb (105.2 kg)   SpO2 98%   BMI 36.34 kg/m²    Constitutional:  Well developed, well nourished, appearing in mild acute distress   HENT:  Atraumatic, moist mucus membranes  Neck: supple, no JVD   Respiratory:  Lungs CTAB, no wheezes, rales, rhonchi. No retractions. [email or web address] bpm  Cardiovascular: Regular rate, no murmurs  Vascular: Radial and DP pulses 2+ and equal bilaterally  GI:  Soft, nontender, normal bowel sounds  Musculoskeletal:no lower extremity edema, no lower extremity asymmetry, no calf tenderness, no thigh tenderness, no acute deformities  Integument:  Skin is warm and dry, no petechiae   Neurologic:  Alert & oriented, no slurred speech  Psych: Pleasant affect, no hallucinations    EKG looks  Please see the physician note for EKG interpretation. RADIOLOGY/PROCEDURES    No orders to display       ED COURSE & MEDICAL DECISION MAKING    Pertinent Labs & Imaging studies reviewed and interpreted. (See chart for details)    See chart for details of medications given during the ED stay. Vitals:    11/09/22 1205 11/09/22 1208   BP: (!) 162/88 (!) 162/88   Pulse:  84   Resp: 24    Temp:  97.9 °F (36.6 °C)   TempSrc:  Oral   SpO2:  98%   Weight: 232 lb (105.2 kg)        Differential Diagnosis: Acute Coronary Syndrome, Congestive Heart Failure, Myocardial Infarction, Pulmonary Embolus, Pneumonia, Pneumothorax, other        Patient presents to the emergency department sent by her new PCP.   She has new PCP as she just moved back to [place] from [facility] 10 days ago. This coincides with the patient's symptoms onset status post she and her  40 years drove back to [place] 21 hours by car. She does have a history of atrial fibrillation. Therefore we will obtain basic cardiac work-up including CBC, CMP, D-dimer, VBG, troponin, BNP, EKG, and CXR. Pending. Patient saturating at 90% on room air. She has no history of blood clots.     Labs reviewed:  I have reviewed and interpreted all of the currently available lab results from this visit:  Results for orders placed or performed during the hospital encounter of 11/09/22   COVID-19, Rapid    Specimen: Nasopharyngeal Swab   Result Value Ref Range    SARS-CoV-2, NAAT Not Detected Not Detected   CBC with Auto Differential   Result Value Ref Range    WBC 8.1 4.0 - 11.0 K/uL    RBC 4.16 4.00 - 5.20 M/uL    Hemoglobin 12.8 12.0 - 16.0 g/dL    Hematocrit 38.9 36.0 - 48.0 %    MCV 93.7 80.0 - 100.0 fL    MCH 30.9 26.0 - 34.0 pg    MCHC 33.0 31.0 - 36.0 g/dL    RDW 14.0 12.4 - 15.4 %    Platelets 914 560 - 478 K/uL    MPV 7.1 5.0 - 10.5 fL    Neutrophils % 67.6 %    Lymphocytes % 23.5 %    Monocytes % 7.4 %    Eosinophils % 0.8 %    Basophils % 0.7 %    Neutrophils Absolute 5.5 1.7 - 7.7 K/uL    Lymphocytes Absolute 1.9 1.0 - 5.1 K/uL    Monocytes Absolute 0.6 0.0 - 1.3 K/uL    Eosinophils Absolute 0.1 0.0 - 0.6 K/uL    Basophils Absolute 0.1 0.0 - 0.2 K/uL   Comprehensive Metabolic Panel   Result Value Ref Range    Sodium 140 136 - 145 mmol/L    Potassium 3.9 3.5 - 5.1 mmol/L    Chloride 104 99 - 110 mmol/L    CO2 24 21 - 32 mmol/L    Anion Gap 12 3 - 16    Glucose 96 70 - 99 mg/dL    BUN 8 7 - 20 mg/dL    Creatinine 0.8 0.6 - 1.2 mg/dL    Est, Glom Filt Rate >60 >60    Calcium 9.1 8.3 - 10.6 mg/dL    Total Protein 7.1 6.4 - 8.2 g/dL    Albumin 4.1 3.4 - 5.0 g/dL    Albumin/Globulin Ratio 1.4 1.1 - 2.2    Total Bilirubin 0.9 0.0 - 1.0 mg/dL    Alkaline Phosphatase 107 40 - 129 U/L ALT 24 10 - 40 U/L    AST 30 15 - 37 U/L   D-Dimer, Quantitative   Result Value Ref Range    D-Dimer, Quant 1.51 (H) 0.00 - 0.60 ug/mL FEU   Blood gas, venous   Result Value Ref Range    pH, Stevenson 7.464 (H) 7.350 - 7.450    pCO2, Stevenson 33.1 (L) 40.0 - 50.0 mmHg    pO2, Stevenson 73.2 (H) 25.0 - 40.0 mmHg    HCO3, Venous 23.2 23.0 - 29.0 mmol/L    Base Excess, Stevenson 0.1 -3.0 - 3.0 mmol/L    O2 Sat, Stevenson 95 Not Established %    Carboxyhemoglobin 1.5 0.0 - 1.5 %    MetHgb, Stevenson 0.2 <1.5 %    TC02 (Calc), Stevenson 24 Not Established mmol/L    O2 Therapy Unknown    Troponin   Result Value Ref Range    Troponin <0.01 <0.01 ng/mL   Brain Natriuretic Peptide   Result Value Ref Range    Pro-BNP 7,255 (H) 0 - 124 pg/mL   Troponin   Result Value Ref Range    Troponin <0.01 <0.01 ng/mL   EKG 12 Lead   Result Value Ref Range    Ventricular Rate 83 BPM    Atrial Rate 65 BPM    P-R Interval 160 ms    QRS Duration 132 ms    Q-T Interval 434 ms    QTc Calculation (Bazett) 509 ms    R Axis -46 degrees    T Axis 121 degrees    Diagnosis       Undetermined rhythmLeft axis deviationLeft bundle branch blockAbnormal ECGNo previous ECGs availableConfirmed by [address] [place] [address] ([facility]) on 11/9/2022 2:08:26 PM     Given that the patient has an elevated [email or web address] we will obtain CT PE study to rule out pulmonary embolism. I am informed that the patient has an elevated reaction to contrast dye. Therefore imaging was changed to VQ scan. Imaging reviewed:  NM LUNG VENT/PERFUSION (VQ)    Result Date: 11/9/2022  Low probability for pulmonary embolus.         Work-up reveals:  COVID-19: Not detected  VSK:2016  Troponin: <2.57  Metabolic panel negative for electrolyte derangement, renal dysfunction, elevation LFTs  D-dimer: [email or web address] as noted above and therefore we will obtain CT  VBG: pH elevated 7.46, CO2 reduced at 30.1, O2 elevated 70.2 but otherwise unremarkable  CBC: Negative for leukocytosis or anemia  EKG: As interpreted by EMD, see note for details  NM lung vent/perfusion: As noted above identified low probability for pulmonary embolus  Therefore, patient be admitted to the hospital for further evaluation treatment including diuresis. Consultation with hospitalist: I contacted Dr. Debi Salcedo via [employer], and the patient was accepted for admission. FINAL IMPRESSION      ICD-10-CM    1. Elevated brain natriuretic peptide (BNP) level  R79.89     @ 7255      2. Elevated d-dimer  R79.89       3. Premature atrial contraction  I49.1       4.  Premature ventricular contraction  I49.3             PLAN  Admission to the hospital    (Please note that this note was completed with a voice recognition program.  Every attempt was made to edit the dictations, but inevitably there remain words that are mis-transcribed.)        DEMI Mclain - CNP  11/10/22 8500

## 2022-11-09 NOTE — ED NOTES
Unable to round/obtain vital signs. Patient remains in nuclear medicine.       Matty Pereira RN  11/09/22 5048

## 2022-11-09 NOTE — PROGRESS NOTES
Laura Weathers (:  1950) is a 67 y.o. female,New patient, here for evaluation of the following chief complaint(s):  Establish Care (Pt is here to establish care, medication refills, having a hard time breathing this past week, )         ASSESSMENT/PLAN:  1. Essential hypertension  -     CBC; Future  -     Comprehensive Metabolic Panel; Future  -Patient's blood pressure in office is fairly well managed. Patient is currently on lisinopril 20 mg along with carvedilol 25 mg.  2. Mixed hyperlipidemia  -     Lipid Panel; Future  -     TSH with Reflex; Future  -We will plan to evaluate patient's mixed hyperlipidemia. Patient is currently not on any statin therapy. 3. Longstanding persistent atrial fibrillation (HCC)  -     Virginie Pandya MD, Cardiology, [facility]  -     EKG 12 Lead - Clinic Performed  -In office EKG did not show any A. fib however did show a right bundle dominick block. This can be contributing some of the patient's shortness of breath. Educated patient that if her shortness of breath persist or gets worse that she needs to be seen in the ER for further evaluation. Patient states that she recently had an echocardiogram that did show return of function of her EF. Patient had no physical exams findings of heart failure but this is a possibility given her history and her recent EKG finding. 4. Chronic obstructive pulmonary disease, unspecified COPD type [facility])  -     [facility]  -     Budeson-Glycopyrrol-Formoterol (BREZTRI AEROSPHERE) 160-9-4.8 MCG/ACT AERO; Inhale 2 puffs into the lungs daily, Disp-1 each, R-0Normal  -     albuterol sulfate HFA (VENTOLIN HFA) 108 (90 Base) MCG/ACT inhaler; Inhale 2 puffs into the lungs 4 times daily as needed for Wheezing, Disp-54 g, R-1Normal  -     loratadine (CLARITIN) 10 MG tablet; Take 1 tablet by mouth daily, Disp-30 tablet, R-0Normal  -Patient's lung sounds in office were fairly clear there was no audible wheezing.   Patient's medications were refilled at this time. Did place patient on Claritin given recent move to [place] to help reduce COPD exacerbations. Shortness of breath is not likely related to COPD exacerbation at this time. 5. Encounter for screening mammogram for breast cancer  -     SIXTO DIGITAL SCREENING AUGMENTED BILATERAL; Future  6. Colon cancer screening  -     Fecal DNA Colorectal cancer screening (Cologuard)  7. Flu vaccine need  -     Influenza, FLUAD, (age 72 y+), IM, Preservative Free, 0.5 mL  -Patient tolerated flu shot in office today with no concerns. 8. Encounter for screening for diabetes mellitus  -     Hemoglobin A1C; Future      Return in about 3 months (around 2/9/2023) for Hypertension. Subjective   SUBJECTIVE/OBJECTIVE:  HPI  Patient presents to the office today as a new patient here to establish care. Patient states that she recently moved to the area approximately 2 weeks ago. Patient states that she was leaving [facility] prior to moving to [place]. Patient states that she has lived in [place] but she has been living in [place] for the last 20 years. Patient states that she has had a number of chronic health issues. Patient does endorse having COPD. Patient also has a lengthy cardiac history patient states that she has had multiple cardiac procedures including an ablation for A. fib. Patient also states that she has a pacemaker. Patient's main complaint today at office is ongoing shortness of breath. Patient endorses having shortness of breath with any exertion. Patient does endorse that she wears oxygen at night. Patient states that she sometimes will wear it during the day. Patient states that she was following with a cardiologist and pulmonologist in [place]. Patient states that she does not endorse having any wheezing. Patient endorses that her heart feels like it does when she was in A. fib before the ablation.   Patient states that she recently had an echo which has returned to part utilizing [employer] speech recognition software. Occasionally, words are mistranscribed and despite editing, the text may contain inaccuracies due to incorrect word recognition. If further clarification is needed please contact the office at [phone]. An electronic signature was used to authenticate this note.     --DEMI Bob - CNP

## 2022-11-10 ENCOUNTER — TELEPHONE (OUTPATIENT)
Dept: CARDIOLOGY CLINIC | Age: 72
End: 2022-11-10

## 2022-11-10 PROBLEM — I49.1 PREMATURE ATRIAL CONTRACTION: Status: ACTIVE | Noted: 2022-11-10

## 2022-11-10 PROBLEM — R79.89 ELEVATED BRAIN NATRIURETIC PEPTIDE (BNP) LEVEL: Status: ACTIVE | Noted: 2022-11-10

## 2022-11-10 PROBLEM — E87.6 HYPOKALEMIA: Status: ACTIVE | Noted: 2022-11-10

## 2022-11-10 PROBLEM — I50.23 ACUTE ON CHRONIC SYSTOLIC CONGESTIVE HEART FAILURE (HCC): Status: ACTIVE | Noted: 2022-11-10

## 2022-11-10 LAB
ANION GAP SERPL CALCULATED.3IONS-SCNC: 11 MMOL/L (ref 3–16)
ANISOCYTOSIS: ABNORMAL
ATYPICAL LYMPHOCYTE RELATIVE PERCENT: 2 % (ref 0–6)
BANDED NEUTROPHILS RELATIVE PERCENT: 3 % (ref 0–7)
BASOPHILS ABSOLUTE: 0.1 K/UL (ref 0–0.2)
BASOPHILS RELATIVE PERCENT: 1 %
BUN BLDV-MCNC: 7 MG/DL (ref 7–20)
CALCIUM SERPL-MCNC: 8.6 MG/DL (ref 8.3–10.6)
CHLORIDE BLD-SCNC: 102 MMOL/L (ref 99–110)
CHOLESTEROL, TOTAL: 149 MG/DL (ref 0–199)
CO2: 28 MMOL/L (ref 21–32)
CREAT SERPL-MCNC: 0.9 MG/DL (ref 0.6–1.2)
EOSINOPHILS ABSOLUTE: 0 K/UL (ref 0–0.6)
EOSINOPHILS RELATIVE PERCENT: 0 %
GFR SERPL CREATININE-BSD FRML MDRD: >60 ML/MIN/{1.73_M2}
GLUCOSE BLD-MCNC: 102 MG/DL (ref 70–99)
HCT VFR BLD CALC: 37.7 % (ref 36–48)
HDLC SERPL-MCNC: 44 MG/DL (ref 40–60)
HEMOGLOBIN: 12.3 G/DL (ref 12–16)
LDL CHOLESTEROL CALCULATED: 85 MG/DL
LYMPHOCYTES ABSOLUTE: 1.2 K/UL (ref 1–5.1)
LYMPHOCYTES RELATIVE PERCENT: 15 %
MAGNESIUM: 2 MG/DL (ref 1.8–2.4)
MCH RBC QN AUTO: 30.6 PG (ref 26–34)
MCHC RBC AUTO-ENTMCNC: 32.6 G/DL (ref 31–36)
MCV RBC AUTO: 93.9 FL (ref 80–100)
MONOCYTES ABSOLUTE: 0.4 K/UL (ref 0–1.3)
MONOCYTES RELATIVE PERCENT: 6 %
NEUTROPHILS ABSOLUTE: 5.2 K/UL (ref 1.7–7.7)
NEUTROPHILS RELATIVE PERCENT: 73 %
PDW BLD-RTO: 14.2 % (ref 12.4–15.4)
PLATELET # BLD: 227 K/UL (ref 135–450)
PMV BLD AUTO: 8.3 FL (ref 5–10.5)
POTASSIUM SERPL-SCNC: 3.3 MMOL/L (ref 3.5–5.1)
RBC # BLD: 4.01 M/UL (ref 4–5.2)
SODIUM BLD-SCNC: 141 MMOL/L (ref 136–145)
TOXIC GRANULATION: PRESENT
TRIGL SERPL-MCNC: 98 MG/DL (ref 0–150)
VLDLC SERPL CALC-MCNC: 20 MG/DL
WBC # BLD: 6.8 K/UL (ref 4–11)

## 2022-11-10 PROCEDURE — 97110 THERAPEUTIC EXERCISES: CPT

## 2022-11-10 PROCEDURE — 80048 BASIC METABOLIC PNL TOTAL CA: CPT

## 2022-11-10 PROCEDURE — 99222 1ST HOSP IP/OBS MODERATE 55: CPT | Performed by: INTERNAL MEDICINE

## 2022-11-10 PROCEDURE — 80061 LIPID PANEL: CPT

## 2022-11-10 PROCEDURE — 6360000002 HC RX W HCPCS: Performed by: HOSPITALIST

## 2022-11-10 PROCEDURE — 83735 ASSAY OF MAGNESIUM: CPT

## 2022-11-10 PROCEDURE — 2580000003 HC RX 258: Performed by: INTERNAL MEDICINE

## 2022-11-10 PROCEDURE — 97535 SELF CARE MNGMENT TRAINING: CPT

## 2022-11-10 PROCEDURE — 36415 COLL VENOUS BLD VENIPUNCTURE: CPT

## 2022-11-10 PROCEDURE — 94761 N-INVAS EAR/PLS OXIMETRY MLT: CPT

## 2022-11-10 PROCEDURE — 6370000000 HC RX 637 (ALT 250 FOR IP): Performed by: HOSPITALIST

## 2022-11-10 PROCEDURE — 6360000002 HC RX W HCPCS: Performed by: INTERNAL MEDICINE

## 2022-11-10 PROCEDURE — 2580000003 HC RX 258: Performed by: HOSPITALIST

## 2022-11-10 PROCEDURE — 2700000000 HC OXYGEN THERAPY PER DAY

## 2022-11-10 PROCEDURE — 6370000000 HC RX 637 (ALT 250 FOR IP): Performed by: PHYSICIAN ASSISTANT

## 2022-11-10 PROCEDURE — 94640 AIRWAY INHALATION TREATMENT: CPT

## 2022-11-10 PROCEDURE — 85025 COMPLETE CBC W/AUTO DIFF WBC: CPT

## 2022-11-10 PROCEDURE — 1200000000 HC SEMI PRIVATE

## 2022-11-10 PROCEDURE — 97530 THERAPEUTIC ACTIVITIES: CPT

## 2022-11-10 PROCEDURE — 97166 OT EVAL MOD COMPLEX 45 MIN: CPT

## 2022-11-10 PROCEDURE — 93005 ELECTROCARDIOGRAM TRACING: CPT | Performed by: HOSPITALIST

## 2022-11-10 PROCEDURE — 97116 GAIT TRAINING THERAPY: CPT

## 2022-11-10 PROCEDURE — 97162 PT EVAL MOD COMPLEX 30 MIN: CPT

## 2022-11-10 RX ORDER — POTASSIUM CHLORIDE 750 MG/1
10 TABLET, EXTENDED RELEASE ORAL 2 TIMES DAILY
Status: DISCONTINUED | OUTPATIENT
Start: 2022-11-10 | End: 2022-11-11

## 2022-11-10 RX ADMIN — Medication 2 PUFF: at 08:18

## 2022-11-10 RX ADMIN — FUROSEMIDE 4 MG/HR: 10 INJECTION, SOLUTION INTRAMUSCULAR; INTRAVENOUS at 05:47

## 2022-11-10 RX ADMIN — LISINOPRIL 20 MG: 20 TABLET ORAL at 08:34

## 2022-11-10 RX ADMIN — Medication 2 PUFF: at 20:13

## 2022-11-10 RX ADMIN — CETIRIZINE HYDROCHLORIDE 5 MG: 5 TABLET ORAL at 08:34

## 2022-11-10 RX ADMIN — CARVEDILOL 25 MG: 25 TABLET, FILM COATED ORAL at 08:34

## 2022-11-10 RX ADMIN — CARVEDILOL 25 MG: 25 TABLET, FILM COATED ORAL at 20:53

## 2022-11-10 RX ADMIN — POTASSIUM CHLORIDE 10 MEQ: 750 TABLET, EXTENDED RELEASE ORAL at 20:53

## 2022-11-10 RX ADMIN — APIXABAN 2.5 MG: 2.5 TABLET, FILM COATED ORAL at 20:53

## 2022-11-10 RX ADMIN — FUROSEMIDE 4 MG/HR: 10 INJECTION, SOLUTION INTRAMUSCULAR; INTRAVENOUS at 20:56

## 2022-11-10 RX ADMIN — TIOTROPIUM BROMIDE INHALATION SPRAY 2 PUFF: 3.12 SPRAY, METERED RESPIRATORY (INHALATION) at 08:19

## 2022-11-10 RX ADMIN — POTASSIUM CHLORIDE 10 MEQ: 750 TABLET, EXTENDED RELEASE ORAL at 12:56

## 2022-11-10 ASSESSMENT — ENCOUNTER SYMPTOMS
SHORTNESS OF BREATH: 1
ABDOMINAL PAIN: 0
COUGH: 0
NAUSEA: 0
ANAL BLEEDING: 0
VOMITING: 0
BACK PAIN: 0
DIARRHEA: 1
EYE PAIN: 0
SORE THROAT: 0

## 2022-11-10 NOTE — PLAN OF CARE
Problem: Chronic Conditions and Co-morbidities  Goal: Patient's chronic conditions and co-morbidity symptoms are monitored and maintained or improved  Outcome: Progressing     Problem: Safety - Adult  Goal: Free from fall injury  11/10/2022 1125 by Mukesh Smith RN  Outcome: Progressing     Problem: Skin/Tissue Integrity  Goal: Absence of new skin breakdown  Description: 1. Monitor for areas of redness and/or skin breakdown  2. Assess vascular access sites hourly  3. Every 4-6 hours minimum:  Change oxygen saturation probe site  4. Every 4-6 hours:  If on nasal continuous positive airway pressure, respiratory therapy assess nares and determine need for appliance change or resting period.   11/10/2022 1125 by Mukesh Smith RN  Outcome: Progressing     Problem: Pain  Goal: Verbalizes/displays adequate comfort level or baseline comfort level  11/10/2022 1125 by Mukesh Smith RN  Outcome: Progressing

## 2022-11-10 NOTE — CONSULTS
[address] FAILURE PROGRAM      Dion Lord 1950    History:  Past Medical History:   Diagnosis Date    Atrial fibrillation ([address]        ECHO: ordered here, pt states EF in [place] most recent was 50%  HgA1C:   Iron Saturation:  Ferritin:     Sleep evaluation:none to my knowlege  Pulmonologist:  Cpap/bipap:    ACE/ARB/ARNI: Lisinopril 20mg daily  BB:  Carvedilol 25mg BID   Spironolactone:  SGLT2:    Last Hospital Admission: none to note  Discharge plans: return home with spouse    Family Present: spouse  Advanced Directives: patient  pt states full code  Patient's stated goal of care: be more comfortable prior to discharge; long term goals include better understanding of CHF  Lifestyle goal discussed: diet and fluid restrictions, daily weights, s/s of CHF    Patient resting in bed at this time on room air. Pt denies shortness of breath; no complaints of chest pain. Pt states she lives at home with spouse. Mentioned her diet largely consists of home cooked foods. Pt states she drinks unknown of fluid per day. States she takes all her home medications as prescribed. Patient has a scale at home. Patient denies concerns paying for medications. Spoke to patient at bedside for CHF education. Pt states she has recently moved here from [place] to be closer to family. States she has been told about CHF however to her knowledge has not been treated for it. Pt went on to state that at one point her EF was 25% but is now 50%. New Echo ordered for updates while here. Discussed diet and fluid restrictions as well as monitoring for s/s of CHF. PT able to verbalize understanding and teach back.  only concern is making sure she is able to ambulate without shortness of breath at discharge. Pt did states she wears 2 liters oxygen at night while sleeps. None during day time. Provided CHF education booklet.       Patient recent weights and intake/output reviewed:    Patient Vitals for the past 96 hrs (Last 3 readings):   Weight   11/10/22 0542 225 lb 6.4 oz (102.2 kg)   11/09/22 2313 232 lb (105.2 kg)   11/09/22 1205 232 lb (105.2 kg)         Intake/Output Summary (Last 24 hours) at 11/10/2022 1046  Last data filed at 11/10/2022 0603  Gross per 24 hour   Intake 240 ml   Output 1850 ml   Net -1610 ml       Notified patient of scheduled hospital follow-up appointment with DEMI Meyer CNP for follow up at 1030am on 11/14/22. Notified patient to call the doctor post discharge if she experiences shortness of breath, chest pain, swelling, cough, or weight gain of 2-3 pounds in a day / 5 pounds in a week. Also notified patient to call the doctor if she feels dizzy, increased fatigue, decreased or difficulty urinating. Reviewed the red, yellow, green zones of heart failure self management. Encouraged patient to call the MD with early signs of an acute heart failure exacerbation or when in the yellow zone. Patient provided with both written and verbal education on CHF signs/symptoms, causes, discharge medications, daily weights, low sodium diet, activity, fluid restriction, and follow-up. Pt verbalized understanding. No additional questions at this time. Stated she will alert her nurse with any questions. PATIENT/CAREGIVER TEACHING:    Level of patient/caregiver understanding able to:   [ [place] Else Verbalize understanding [ ] Demonstrate understanding [ ] Teach back   [ ] Needs reinforcement [ ] Other:     Education Time: time spent 10 min    Recommendations:   1. Encourage follow-up appointment compliance. Next appointment: 11/14/22  2. Educate further on fluid restriction 48 oz - 64 oz during inpatient stay so he can understand how to measure intake at home. 3. Review sodium restrictions. Encourage patient to not add table salt to her foods and avoid foods that are high in sodium. 4. Continue to educate on S/S.  Stress the importance of calling the MD with the earliest signs of an acute exacerbation. 5. Emphasize daily weights - instruct patient to call the MD if she gains 2-3 lb in a day or 5 lb in a week. 6. Provided patient with CHF Resource Line for questions and concerns.        Quintin Hernandes RN   11/10/2022 10:46 AM

## 2022-11-10 NOTE — PROGRESS NOTES
Physical Therapy  Facility/Department: [facility] A2 CARD TELEMETRY  Physical Therapy Initial Assessment/Discharge Summary (1x only)    Name: John Paul Duran  : 1950  MRN: [id number]  Date of Service: 11/10/2022    Discharge Recommendations:  Home with assist PRN   PT Equipment Recommendations  Equipment Needed: No      Patient Diagnosis(es): The primary encounter diagnosis was Elevated brain natriuretic peptide (BNP) level. Diagnoses of Elevated d-dimer, Premature atrial contraction, and Premature ventricular contraction were also pertinent to this visit. Past Medical History:  has a past medical history of Atrial fibrillation ([facility] [address] Past Surgical History:  has a past surgical history that includes pacemaker placement; Hysterectomy, vaginal; and Stimulator Surgery. Assessment   Assessment: Pt referred for PT evaluation during current hospital stay with dx of acute decompensated CHF. Pt currently functioning at her baseline from a functional standpoint, performing all functional mobility tasks with supervision/(I) without AD. Pt educated on all PT-related CHF items with good understanding noted. Pt able to perform HEP for UE/LE ROM/strengthening without assist.  Recommend pt return home with assist PRN from family. No further acute PT needs at this time. Therapy Prognosis: Good  Decision Making: Medium Complexity  No Skilled PT: Independent with functional mobility   Requires PT Follow-Up: No  Activity Tolerance: Patient tolerated evaluation without incident;Patient tolerated treatment well     Plan   General Plan: Discharge with evaluation only  Safety Devices:  All fall risk precautions in place, Call light within reach, Gait belt, Left in chair, Nurse notified (chair alarm not needed per A2 RN)     Restrictions  Restrictions/Precautions  Restrictions/Precautions: General Precautions  Position Activity Restriction  Other position/activity restrictions: Low fall risk per nursing assessment, elevate LE's Subjective   Chart Reviewed: Yes  Patient assessed for rehabilitation services?: Yes  Family / Caregiver Present: No  Referring Practitioner: Tom Cruz MD  Referral Date : 11/09/22  Diagnosis: Acute decompensated CHF with pulmonary edema  Follows Commands: Within Functional Limits  General Comment: Pt resting in bed upon entry of therapy staff  Subjective: Pt agreeable to work with PT this morning. Pain: States she's just having a little \"aggravating\" HA at rest - rates pain 2/10. Social/Functional History  Social/Functional History  Lives With: Spouse ()  Type of Home: House  Home Layout: One level  Home Access: Stairs to enter without rails  Entrance Stairs - Number of Steps: 1 BONIFACIO (threshold)  Bathroom Shower/Tub: Walk-in shower  Bathroom Toilet: Standard  Bathroom Equipment: Grab bars in shower, Shower chair  Home Equipment:  (no home DME)  Has the patient had two or more falls in the past year or any fall with injury in the past year?: No  ADL Assistance: Independent  Homemaking Assistance: Independent  Ambulation Assistance: Independent (without AD)  Transfer Assistance: Independent  Active : Yes  Occupation: Retired  Type of Occupation:   Leisure & Hobbies: Reading, thi painting    Vision/Hearing  Vision  Vision: Impaired  Vision Exceptions: Wears glasses for reading  Hearing  Hearing: Within functional limits      Cognition   Orientation  Overall Orientation Status: Within Normal Limits     Objective   Vitals  Heart Rate: 62  Heart Rate Source: Monitor  BP: 110/68  BP Location: Right upper arm  BP Method: Automatic  Patient Position: Semi fowlers  MAP (Calculated): 82  Resp: 18  SpO2: 98 %  O2 Device: None (Room air)    Observation/Palpation  Posture: Good    Gross Assessment  AROM: Within functional limits  Strength:  Within functional limits  Coordination: Within functional limits  Tone: Normal     Bed Mobility Training  Supine to Sit: Independent  Scooting: Independent    Balance  Sitting: Intact  Standing: Intact    Transfer Training  Sit to Stand: Independent  Stand to Sit: Independent  Bed to Chair: Independent    Gait Training  Overall Level of Assistance: Independent  Gait Abnormalities:  (no major gait deviations noted, pt amb with steady krishna and step-through pattern with good standing balance, mild c/o SOB toward very end of amb)  Distance (ft): 200 Feet  Assistive Device: Gait belt; Other (comment) (no AD)    Stair Training  Rail Use: Right (ascending)  Stairs - Level of Assistance: Supervision (pt amb up/down stairs safely using step-over-step pattern)  Number of Stairs Trained: 3    Exercise Treatment: x 10 reps: Ankle pumps, LAQ, seated marching, clamshells, diaphragmatic breathing, shoulder shrugs, elbow flex/ext, finger flex/ext. Issued CHF HEP handout for UE/LE/breathing exercises with pt demonstrating understanding. AM-PAC Score  AM-PAC Inpatient Mobility Raw Score : 24 (11/10/22 0859)  AM-PAC Inpatient T-Scale Score : 61.14 (11/10/22 0859)  Mobility Inpatient CMS 0-100% Score: 0 (11/10/22 0859)  Mobility Inpatient CMS G-Code Modifier : [address] (11/10/22 0859)    Goals  Short Term Goals  Time Frame for Short Term Goals: 1 visit - 11/10/22  Short Term Goal 1: Pt will transfer supine <-> sit and sit <-> stand with (I) - MET 11/10/22  Short Term Goal 2: Pt will ambulate x 150 feet without AD with (I) - MET 11/10/22  Short Term Goal 3: Pt will ambulate up/down 1 step using handrail(s) PRN with supervision or less - MET 11/10/22  Short Term Goal 4: Pt will meet at least 3/5 CHF-related PT goals with teach back noted - MET 11/10/22  Patient Goals   Patient Goals :  \"To get better & go home\"    Education  Patient Education  Education Given To: Patient  Education Provided: Role of Therapy;Plan of Care;Home Exercise Program;Precautions;Transfer Training;Energy Conservation  Education Provided Comments: Educated pt on PT-related CHF areas including red/yellow/green CHF zones, Ramon scale, daily weights, and UE/LE exercises. Pt demonstrates & verbalizes understanding. Education Method: Demonstration;Verbal;Teach Back;Printed Information/Hand-outs (CHF PT packet with HEP provided to pt)  Barriers to Learning: None  Education Outcome: Verbalized understanding;Demonstrated understanding     PHYSICAL THERAPY HEART FAILURE EDUCATION:  PHYSICAL THERAPY HEART FAILURE EDUCATION GOALS:  1. Identifying HF Activity Zone with the Self Check Plan prior to exercises or walking    Patient educated in and demonstrated/verbalized understanding Identifying HF activity zone with the Self Check Plan referencing Red/Yellow/Green Light Symbol prior to exercises or walking  to appropriately determine daily activity level. 2.Rating self on RAMON scale of perceived exertion   Patient educated in and demonstrated and/or verbalized understanding Rating self on RAMON scale of perceived exertion  3. HF Therapeutic Exercises  Pt educated in and completed Therapeutic exercises (Supine, Seated ,Standing, walking) as indicated below for 1 set) to promote circulation and prevent complications of bedrest with patient verbalizes understanding of employing green zone, yellow zone and red zone to seek provider input and evaluation. 4. Daily Weight check/Stepping on Scale  Pt educated in importance of checking body weight daily. Barriers to completion of Daily weight check are identified with recommendations made or Patient demonstrates and/or verbalizes safety in:stepping up to weight self and complete downward gaze/head nod to read scale on standard scale  and safety stepping off scale.    5. Teach back of Elements of PT HF Education  Pt voices and demonstrates appropriate teach back of elements of Physical Therapy Heart Failure Education Program                        1)Heart Failure Zones Self Check Plan referencing Red/Yellow/Green Light Symbol with:  []Green Zone/All Clear: physical activity is normal for you,   No new swelling in feet, ankles, legs or stomach,   No weight gain of more than 2-3 pounds,   No chest pain or worsening of shortness of breath. (Continue daily: weight check, meds as directed, low salt eating, monitoring of fluid intake, balance activity, follow up visits)  [x]Yellow Zone/Caution:   Increased cough or shortness of breath with activity  Increased swelling in your feet, ankles, legs or stomach from baseline  Weight gain or loss of more than 2-3 pounds in 1 day. Increase in the number of pillows needed while sleeping  (Check In!: You need to contact your doctor or provider as soon as possible)  []Red Zone/Medical Alert:  Unrelieved chest pain or shortness of breath, especially while resting  Increased Discomfort or swelling in the abdomen or lower body  Sudden weight gain of more than 5 pounds in a week  Increased cough with bubbly and/or pink sputum  (Warning: You need to be seen right away . If you cannot reach your physician, call 911)    2)Ramon Rating of Perceived Exertion (RPE) Scale     The Ramon rating scale ranges from 6 to 20, where 6 means \"no exertion at all\" and 20 means \"maximal exertion. \" The patient chooses the number that best describes their level of exertion. This will objectify the intensity level of the patient's activity, and the therapist can use this information to accelerate or decelerate activity levels to reach the desired range. The patient should appraise their feeling of exertion as honestly as possible, without thinking about what the actual physical load is. Their feeling of effort and exertion is important, and it should not be compared to the level of others. Patient's should target exercises for very light to moderate (9-11/20) for this phase of their rehab.      Ramon RPE Scale    Activity Completed:  Ambulation x 200 feet without AD    []6  No exertion at all  [x]7  Extremely light (at start of amb)  []8  [] 9  Very light (For a healthy person, it is like walking slowly at his or her own pace for some minutes)  []10  []11  Light  [x]12 (at end of amb)  []13  Somewhat hard (exercise, but it still feels OK to continue)  []14  []15  Hard (heavy)  []16  []17  Very hard (can still go on, but really has to push himself. It feels very heavy, and the person is very tired. )[]18  []19  Extremely hard (For most people this is the most strenuous exercise they have ever experienced.)  []20  Maximal exertion. 3) Pt educated in and completed Therapeutic exercise (as indicated below for 1 set) to promote circulation and prevent complications of bedrest with patient verbalizes understanding of employing green zone, yellow zone and red zone to seek provider input and evaluation. Educated patient in :  []Supine    Level 1: Bed Exercise 10-15 reps, 2x/day  []Ankle Pumps  []Heel Slides  []Hip Abduction  []Buttocks Squeeze  []Diaphragmatic Breathing with TA set  []Shoulder Shrugs  []Bicep Curl  []Hands open/close                   [x]Sitting    Level 2: Seated Exercises 10-15 reps, 2x/day  [x]Toe raise/heel raise  [x]Long Arc Quad  [x]Seated March  [x]Seated Clamshell  [x]Diaphragmatic Breathing with TA set and BUE ER and IR  [x]Shoulder Shrugs  [x]Bicep Curls  [x]Hands open/close                         []Standing   Level 3: Standing Exercises 10-15 reps, 2x/day     []Sit to/from stand  []Standing march  []Standing side steps  []Standing bilateral heel raise  []Diaphragmatic breathing with TA set and BUE flex/ext  []Shoulder Shrugs  []Bicep Curls  []Hands open/close                        [x]WalkingLevel 1: Educated patient in initiating walking when in the Green zone and to Start with 2-5 minutes daily and work up to 10 minutes per day. Walk at a slow, comfortable pace with your exertion level Very Light (ANDREW 9) to Light (ANDREW 11)   You should be able to comfortably hold a conversation while walking.       4)Daily Weight check/Stepping on Scale  [x]Pt educated in importance of checking body weight daily and [x]demonstrate/[x]verbalizes safety in:stepping up to weigh self and complete downward gaze/head nod to read scale on standard scale  and safety stepping off scale. Current weight: 224.7 lbs  []Barriers to completion of Daily weight check:       5)Pt voices and demonstrates appropriate teach back of Heart Failure Education Program with : use of the   [x]1. Identifying Appropriate Zone from HF Zone Self-Check Plan                          [x]2. Rating self on ANDREW. [x]3. Therapeutic exercise/walking program      [x]4.  Importance of taking daily weight measurement             [x]5. Safely stepping on and off scale    []Pt will benefit from reinforcement of education due to   []Readiness to learn                               []Decreased cognition  []Language barrier                                  []Decreased vision/hearing  []First introduction to new information    []Requires intermittent cues  []Other:    Education provided via:  [x]Oral instruction  [x]Demonstration  [x]Written handout    Therapy Time   Individual Concurrent Group Co-treatment   Time In 0815         Time Out 0855         Minutes 40         Timed Code Treatment Minutes: [address] [place], [place], [address]

## 2022-11-10 NOTE — PLAN OF CARE
Patient's EF (Ejection Fraction) is unknown    Heart Failure Medications:  Diuretics[de-identified] Furosemide    (One of the following REQUIRED for EF </= 40%/SYSTOLIC FAILURE but MAY be used in EF% >40%/DIASTOLIC FAILURE)        ACE[de-identified] Lisinopril        ARB[de-identified] None         ARNI[de-identified] None    (Beta Blockers)  NON- Evidenced Based Beta Blocker (for EF% >40%/DIASTOLIC FAILURE): None    Evidenced Based Beta Blocker::(REQUIRED for EF% <40%/SYSTOLIC FAILURE) Carvedilol- Coreg  . .................................................................................................................................................. Patient's weights and intake/output reviewed: Yes    Patient's Last Weight: 225 lbs obtained by standing scale. Difference of 7 lbs less than last documented weight. Intake/Output Summary (Last 24 hours) at 11/10/2022 1540  Last data filed at 11/10/2022 1122  Gross per 24 hour   Intake 240 ml   Output 1150 ml   Net -910 ml       Education Booklet Provided: yes    Comorbidities Reviewed Yes    Patient has a past medical history of Atrial fibrillation ([facility] [address] >>For CHF and Comorbidity documentation on Education Time and Topics, please see Education Tab    Progressive Mobility Assessment:  What is this patient's Current Level of Mobility?: Ambulatory-Up [place]  How was this patient Mobilized today?: Edge of Bed, Up to Chair,  Up to Toilet/Shower, Up in Room, and Up in Hallway, ambulated 50 ft                 With Whom? PT, OT, and Self                 Level of Difficulty/Assistance: Independent     Pt resting in bed at this time on  2 L O2. Pt denies shortness of breath. Pt without lower extremity edema.      Patient and/or Family's stated Goal of Care this Admission: reduce shortness of breath, increase activity tolerance, better understand heart failure and disease management, be more comfortable, and reduce lower extremity edema prior to discharge        :

## 2022-11-10 NOTE — RT PROTOCOL NOTE
RT Inhaler-Nebulizer Bronchodilator Protocol Note    There is a bronchodilator order in the chart from a provider indicating to follow the RT Bronchodilator Protocol and there is an Initiate RT Inhaler-Nebulizer Bronchodilator Protocol order as well (see protocol at bottom of note). CXR Findings:  No results found. The findings from the last RT Protocol Assessment were as follows:   History Pulmonary Disease: Chronic pulmonary disease  Respiratory Pattern: Regular pattern and RR 12-20 bpm  Breath Sounds: Slightly diminished and/or crackles  Cough: Strong, spontaneous, non-productive  Indication for Bronchodilator Therapy: On home bronchodilators, Decreased or absent breath sounds  Bronchodilator Assessment Score: 4    Aerosolized bronchodilator medication orders have been revised according to the RT Inhaler-Nebulizer Bronchodilator Protocol below. Respiratory Therapist to perform RT Therapy Protocol Assessment initially then follow the protocol. Repeat RT Therapy Protocol Assessment PRN for score 0-3 or on second treatment, BID, and PRN for scores above 3. No Indications - adjust the frequency to every 6 hours PRN wheezing or bronchospasm, if no treatments needed after 48 hours then discontinue using Per Protocol order mode. If indication present, adjust the RT bronchodilator orders based on the Bronchodilator Assessment Score as indicated below. Use Inhaler orders unless patient has one or more of the following: on home nebulizer, not able to hold breath for 10 seconds, is not alert and oriented, cannot activate and use MDI correctly, or respiratory rate 25 breaths per minute or more, then use the equivalent nebulizer order(s) with same Frequency and PRN reasons based on the score. If a patient is on this medication at home then do not decrease Frequency below that used at home.     0-3 - enter or revise RT bronchodilator order(s) to equivalent RT Bronchodilator order with Frequency of every 4 hours PRN for wheezing or increased work of breathing using Per Protocol order mode. 4-6 - enter or revise RT Bronchodilator order(s) to two equivalent RT bronchodilator orders with one order with BID Frequency and one order with Frequency of every 4 hours PRN wheezing or increased work of breathing using Per Protocol order mode. 7-10 - enter or revise RT Bronchodilator order(s) to two equivalent RT bronchodilator orders with one order with TID Frequency and one order with Frequency of every 4 hours PRN wheezing or increased work of breathing using Per Protocol order mode. 11-13 - enter or revise RT Bronchodilator order(s) to one equivalent RT bronchodilator order with QID Frequency and an Albuterol order with Frequency of every 4 hours PRN wheezing or increased work of breathing using Per Protocol order mode. Greater than 13 - enter or revise RT Bronchodilator order(s) to one equivalent RT bronchodilator order with every 4 hours Frequency and an Albuterol order with Frequency of every 2 hours PRN wheezing or increased work of breathing using Per Protocol order mode. RT to enter RT Home Evaluation for COPD & MDI Assessment order using Per Protocol order mode.     Electronically signed by Isabella Galvez RCP on 11/9/2022 at 8:50 PM

## 2022-11-10 NOTE — PROGRESS NOTES
RN call to the cardiology office to see which physician would see patient today. Information given, awaiting call back.

## 2022-11-10 NOTE — PLAN OF CARE
Problem: Discharge Planning  Goal: Discharge to home or other facility with appropriate resources  Outcome: Progressing     Problem: Safety - Adult  Goal: Free from fall injury  Outcome: Progressing     Problem: Skin/Tissue Integrity  Goal: Absence of new skin breakdown  Description: 1. Monitor for areas of redness and/or skin breakdown  2. Assess vascular access sites hourly  3. Every 4-6 hours minimum:  Change oxygen saturation probe site  4. Every 4-6 hours:  If on nasal continuous positive airway pressure, respiratory therapy assess nares and determine need for appliance change or resting period. Outcome: Progressing     Problem: Pain  Goal: Verbalizes/displays adequate comfort level or baseline comfort level  Outcome: Progressing       Patient's EF (Ejection Fraction) is unknown. Heart Failure Medications:  Diuretics[de-identified] Furosemide    (One of the following REQUIRED for EF </= 40%/SYSTOLIC FAILURE but MAY be used in EF% >40%/DIASTOLIC FAILURE)        ACE[de-identified] Lisinopril        ARB[de-identified] None         ARNI[de-identified] None    (Beta Blockers)  NON- Evidenced Based Beta Blocker (for EF% >40%/DIASTOLIC FAILURE): None    Evidenced Based Beta Blocker::(REQUIRED for EF% <40%/SYSTOLIC FAILURE) Carvedilol- Coreg  . .................................................................................................................................................. Patient's weights and intake/output reviewed: Yes    Patient's Last Weight: 232  lbs  unknown . Difference of 0 lbs less than last documented weight. Intake/Output Summary (Last 24 hours) at 11/9/2022 2330  Last data filed at 11/9/2022 1945  Gross per 24 hour   Intake 240 ml   Output 1000 ml   Net -760 ml       Education Booklet Provided: yes    Comorbidities Reviewed Yes    Patient has a past medical history of Atrial fibrillation ([address]      >>For CHF and Comorbidity documentation on Education Time and Topics, please see Education Tab    Progressive Mobility Assessment:  What is this patient's Current Level of Mobility?: Ambulatory-Up [place]  How was this patient Mobilized today?: Edge of Bed, Up to Chair,  Up to Toilet/Shower, and Up in Room, ambulated 20 ft                 With Whom? Nurse and PCA                 Level of Difficulty/Assistance: Independent     Pt resting in bed at this time on  2 L O2. Pt with complaints of shortness of breath. Pt with nonpitting lower extremity edema.      Patient and/or Family's stated Goal of Care this Admission: reduce shortness of breath, increase activity tolerance, better understand heart failure and disease management, be more comfortable, and reduce lower extremity edema prior to discharge        :

## 2022-11-10 NOTE — PROGRESS NOTES
Occupational Therapy  Facility/Department: [facility] A2 CARD TELEMETRY  Occupational Therapy Initial Assessment, Treatment and Discharge    Name: Dion Lord  : 1950  MRN: [id number]  Date of Service: 11/10/2022    Discharge Recommendations:  Home with assist PRN  OT Equipment Recommendations  Equipment Needed: No     Patient Diagnosis(es): The primary encounter diagnosis was Elevated brain natriuretic peptide (BNP) level. Diagnoses of Elevated d-dimer, Premature atrial contraction, and Premature ventricular contraction were also pertinent to this visit. Past Medical History:  has a past medical history of Atrial fibrillation ([address] Past Surgical History:  has a past surgical history that includes pacemaker placement; Hysterectomy, vaginal; and Stimulator Surgery. Assessment   Performance deficits / Impairments:  (pt at baseline functional status)  Assessment: Pt is a 68 yo F who presented to [facility] for dyspenea and chest pressure. Pt recently relocated from [place], lives w/  and IND w/ amb and ADLs at baseline. Upon eval, pt IND for func mob/transfers and SPV for ADLs. Pt denies any concerns w/ returning home at d/c; pt does not display any acute deficits to be addressed by OT at this time. Rec home w/ PRN assist to maximize pt's functional status and safety upon return to home envrionment.   Prognosis: Good  Decision Making: Low Complexity  No Skilled OT: At baseline function  REQUIRES OT FOLLOW-UP: No  Activity Tolerance  Activity Tolerance: Patient Tolerated treatment well        AM-PAC Daily Activity Inpatient   How much help for putting on and taking off regular lower body clothing?: None  How much help for Bathing?: None  How much help for Toileting?: None  How much help for putting on and taking off regular upper body clothing?: None  How much help for taking care of personal grooming?: None  How much help for eating meals?: None  AM-PAC Inpatient Daily Activity Raw Score: 24  AM-PAC Inpatient ADL T-Scale Score : 57.54  ADL Inpatient CMS 0-100% Score: 0  ADL Inpatient CMS G-Code Modifier : [facility]     Plan   Occupational Therapy Plan  Times Per Week: 1x visit     Restrictions  Restrictions/Precautions  Restrictions/Precautions: General Precautions  Position Activity Restriction  Other position/activity restrictions: Low fall risk per nursing assessment, elevate LE's    Subjective   General  Chart Reviewed: Yes, Orders, Progress Notes, History and Physical  Patient assessed for rehabilitation services?: Yes  Subjective  Subjective: pt resting in bed upon arrival, agreeable to OT eval  Pain: pt states she has no pain    Social/Functional History  Social/Functional History  Lives With: Spouse ()  Type of Home: House  Home Layout: One level  Home Access: Stairs to enter without rails  Entrance Stairs - Number of Steps: 1 BONIFACIO (threshold)  Bathroom Shower/Tub: Walk-in shower, Shower chair without back  Bathroom Toilet: Standard  Bathroom Equipment: Grab bars in shower, Shower chair, Grab bars around toilet  Home Equipment: Oxygen (2L O2 nightly)  Has the patient had two or more falls in the past year or any fall with injury in the past year?: No  ADL Assistance: Independent  Homemaking Assistance: Independent  Ambulation Assistance: Independent (without AD)  Transfer Assistance: Independent  Active : Yes  Occupation: Retired  Type of Occupation:   Leisure & Hobbies: Reading, thi painting     Objective   Heart Rate: 74  [address]: Monitor  BP: 138/69  BP Location: Right upper arm  BP Method: Automatic  Patient Position: Semi fowlers  MAP (Calculated): 92  Resp: 18  SpO2: 93 %  O2 Device: None (Room air)       Safety Devices  Type of Devices:  All fall risk precautions in place;Call light within reach;Gait belt;Left in chair;Nurse notified (chair alarm not needed per A2 RN)  Restraints  Restraints Initially in Place: No    Bed Mobility Training  Bed Mobility Training: Yes  Supine to Sit: Independent  Sit to Supine: Independent  Scooting: Independent  Balance  Sitting: Intact  Standing: Intact  Transfer Training  Transfer Training: Yes  Sit to Stand: Independent  Stand to Sit: Independent  Toilet Transfer: Independent  Gait  Overall Level of Assistance: Independent     AROM: Within functional limits  PROM: Within functional limits  Strength: Within functional limits  Coordination: Within functional limits    ADL  Feeding: Independent; Beverage management  UE Dressing Skilled Clinical Factors: don socks  LE Dressing: Supervision (seated at EOB)  Toileting: Supervision (seated at toilet)      Cognition  Overall Cognitive Status: WNL  Orientation  Overall Orientation Status: Within Normal Limits  Orientation Level: Oriented X4      Education Given To: Patient  Education Provided: Role of Therapy;Plan of Care;Precautions;IADL Safety;Transfer Training;ADL Adaptive Strategies; Fall Prevention Strategies  Education Provided Comments: disease specific: OT CHF ed (see below)  Education Method: Demonstration;Verbal  Barriers to Learning: None  Education Outcome: Verbalized understanding;Demonstrated understanding    OCCUPATIONAL THERAPY HEART FAILURE EDUCATION    OT Heart Failure Education Goals    Patient educated and demonstrates /verbalizes appropriate teach back with ability to self rate on ANDREW and identify HF activity zone, prior to initiation of ADLs to appropriately determine need for daily activity level/ energy conservation/pacing. [x] Goal Met, [] Goal Not Met    Patient demonstrates /CDOSCLKWAT understanding of appropriate employment of Energy Conservation with every day activity. [x] Goal Met, [] Goal Not Met    Patient demonstrates/verbalizes ability to correctly identify mL to cups conversion, what constitutes FLUID in diet, and knowledge of when to communicate changes in weight to physician consistent with patient orders and HF education.      [] Goal Met, [] Goal Not Met, [x] Goal not appropriate for pt     Pt voices and demonstrates appropriate teach back of Heart Failure Education Program with the use of:  [x] Energy Conservation techniques                              [x] Choosing daily activity level  [x] Importance of fluid restriction               Pt will benefit from reinforcement of education due to   [] Readiness to learn                               [] Decreased cognition  [] Language barrier                                  [] Decreased vision/hearing  [] First introduction to new information      [] Current Living (LTC, SNF, etc)  []Other: pt met CHF goals this date    Education provided via:  [x] Oral instruction  [x] Demonstration  [x] Written handout  ------------------------------------------------------------------------------------------------------------------------------------                    1)Heart Failure Zones Self Check Plan referencing Red/Yellow/Green Light Symbol with:  [x] Green Zone/All Clear:   physical activity is normal for you,   No new swelling in feet, ankles, legs or stomach,   No weight gain of more than 2-3 pounds,   No chest pain or worsening of shortness of breath. (Continue daily: weight check, meds as directed, low salt eating, monitoring of fluid intake, balance activity, follow up visits)  [] Yellow Zone/Caution:   Increased cough or shortness of breath with activity  Increased swelling in your feet, ankles, legs or stomach from baseline  Weight gain or loss of more than 2-3 pounds in 1 day. Increase in the number of pillows needed while sleeping  (Check In!: You need to contact your doctor or provider as soon as possible)  [] Red Zone/Medical Alert:  Unrelieved chest pain or shortness of breath, especially while resting  Increased Discomfort or swelling in the abdomen or lower body  Sudden weight gain of more than 5 pounds in a week  Increased cough with bubbly and/or pink sputum  (Warning: You need to be seen right away .   If you cannot reach your physician, call 911)    Patient educated in and [x]demonstrated/[x]verbalized understanding of identifying HF activity zone with the Self Check Plan referencing Red/Yellow/Green Light Symbol. *prior to ADL's/activity  to appropriately determine daily activity level*  ------------------------------------------------------------------------------------------------------------------------------------         2)Ramon Rating of Perceived Exertion (RPE) Scale     The Ramon rating scale ranges from 6 to 20, where 6 means \"no exertion at all\" and 20 means \"maximal exertion. \" The patient chooses the number that best describes their level of exertion. This will objectify the intensity level of the patient's activity, and the therapist can use this information to accelerate or decelerate activity levels to reach the desired range. The patient should appraise their feeling of exertion as honestly as possible, without thinking about what the actual physical load is. Their feeling of effort and exertion is important, and it should not be compared to the level of others. Ramon RPE Scale  Activity Completed: toileting and ambulation    [] 6  No exertion at all  [] 7  Extremely light  [] 8  [] 9  Very light (For a healthy person, it is like walking slowly at his or her own pace for some minutes)  []10  [x]11  Light  []12  []13  Somewhat hard (exercise, but it still feels OK to continue)  []14  []15  Hard (heavy)  []16  []17  Very hard (can still go on, but really has to push himself. It feels very heavy, and the person is very tired.)  []18  []19  Extremely hard (For most people this is the most strenuous exercise they have ever experienced.)  []20  Maximal exertion.     Patient educated in and [x]demonstrated/[x]verbalized understanding [x]teach back  [x]Rating self on RAMON and   [x]Identifying HF activity zone with the Self Check Plan referencing Red/Yellow/Green Light Symbol *:prior to ADls/activity to appropriately determine daily activity level.    ------------------------------------------------------------------------------------------------------------------------------------         3) 5 P's of Energy Conservation    Pt was educated on the following aspects of Energy Conservation techniques. Prioritize/Plan: Decide what needs to be done today, and what can wait for a later date, write to do lists, Plan ahead to avoid extra trips, Gather supplies and equipment needed before starting an activity. Position: Avoid tiring and awkward posture that may impair breathing  Pace: Slow and steady pace, never rushing! Pursed lip breathing. Pursed Lip Breathing: \"Smell the roses, blow out the candles\"    Patient [x]demonstrates / [x]verbalizes understanding of appropriate employment of Energy Conservation with every day activity.    ------------------------------------------------------------------------------------------------------------------------------------         5) Fluid intake tracking    Patient  []demonstrates/[]verbalizes ability to correctly identify mL to cups conversion, what constitutes FLUID in diet, and  knowledge of when to  communicate changes in weight to physician consistent with patient orders and HF education.       Pt participated in the following fluid tracking management   [] Identifying 4, 8, and 12 ounces of fluid size  [] Identify mL to cup conversion  [] Understanding Jello, watermelon and Ice cream contributing to fluid intake   [] Acknowledge current fluid restriction limits/orders  Pt required assistance or correction of error in the following: N/A    [x] Not appropriate for patient  ------------------------------------------------------------------------------------------------------------------------------------            Goals  Short Term Goals  Time Frame for Short Term Goals: 1x visit  Short Term Goal 1: Pt will complete func mob/transfers w/ IND - goal met 11/10  Short Term Goal 2: Pt will complete LB dressing w/ SPV - goal met 11/10  Short Term Goal 3: Pt will complete toileting w/ SPV - goal met 11/10  Short Term Goal 4: Pt will meet 2/3 OT CHF goals - goal met 11/10  Patient Goals   Patient goals : \"I want to breathe better\"       Therapy Time   Individual Concurrent Group Co-treatment   Time In 1342         Time Out 1415         Minutes 33         Timed Code Treatment Minutes: 23 Minutes (10 min eval)     If pt is unable to be seen after this session, please let this note serve as discharge summary. Please see case management note for discharge disposition. Thank you.      Michelle Tanner OTR/L

## 2022-11-10 NOTE — CONSULTS
[address] [facility]  [phone]      Attending Physician: Imani Cr MD  Reason for Consultation/Chief Complaint: SOB    Subjective   History of Present Illness:  Babar Contreras is a 67 y.o. patient who presented to the hospital with complaints of SOB. Stated SOB for last 7-days. Moved in [facility], and moved to [place] oct 2022 (move back permanently). States progressive -edema. SOb at rest and worse with activity. Could not sleep due to SOb for last 7 days, +orthopnea, sleeps in chair and props self up in bed. + PND. No CP/angina. States her heart was racing like afib. Afib dx in 2019- on Eliquis for that. Pt seen in care [facility] at [facility], update \"unsuccessful\" no CAD ? CABG hx. + afib ablation 6/2019 and no known afib since. States LVEF 25-->50%, no valve issues         Past Medical History:   has a past medical history of Atrial fibrillation ([address] Surgical History:   has a past surgical history that includes pacemaker placement; Hysterectomy, vaginal; and Stimulator Surgery. Social History:   reports that she quit smoking about 3 years ago. Her smoking use included cigarettes. She has never used smokeless tobacco. She reports current alcohol use of about 2.0 - 3.0 standard drinks per week. She reports that she does not use drugs. Family History:  family history includes Heart Attack in her brother. Home Medications:  Were reviewed and are listed in nursing record and/or below  Prior to Admission medications    Medication Sig Start Date End Date Taking?  Authorizing Provider   albuterol (PROVENTIL) 2.5 MG/0.5ML NEBU nebulizer solution   See Rx Instructions, 0, # 375 mL, 5 total refill(s), Hard Stop, GERALD [Federal Rx: #375 last filled 04/04/22] 12/8/21 12/8/22  Historical Provider, MD   carvedilol (COREG) 25 MG tablet Take 25 mg by mouth 2 times daily 3/1/22 3/1/23  Historical Provider, MD   apixaban (ELIQUIS) 2.5 MG TABS tablet apixaban 2.5 mg oral tablet  Start Date: 8/9/21  Status: Ordered 8/9/21   Historical Provider, MD   lisinopril (PRINIVIL;ZESTRIL) 20 MG tablet lisinopril 20 mg oral tablet  Start Date: 6/8/21  Status: Ordered 6/8/21   Historical Provider, MD   pantoprazole (PROTONIX) 40 MG tablet pantoprazole 40 mg oral delayed release tablet  Start Date: 2/4/21  Status: Ordered 2/4/21   Historical Provider, MD   hydrOXYzine HCl (ATARAX) 25 MG tablet hydrOXYzine hydrochloride 25 mg oral tablet  Start Date: 8/9/21  Status: Ordered 8/9/21   Historical Provider, MD   oxybutynin (DITROPAN) 5 MG tablet Take 5 mg by mouth 2 times daily    Historical Provider, MD Brandon-Glycopyrrol-Formoterol (BREZTRI AEROSPHERE) 160-9-4.8 MCG/ACT AERO Inhale 2 puffs into the lungs daily 11/9/22 12/9/22  DEMI Levy CNP   albuterol sulfate HFA (VENTOLIN HFA) 108 (90 Base) MCG/ACT inhaler Inhale 2 puffs into the lungs 4 times daily as needed for Wheezing 11/9/22   DEMI Levy CNP   loratadine (CLARITIN) 10 MG tablet Take 1 tablet by mouth daily 11/9/22 12/9/22  DEMI Levy CNP        CURRENT Medications:  potassium chloride (KLOR-CON M) extended release tablet 10 mEq, BID  carvedilol (COREG) tablet 25 mg, BID  lisinopril (PRINIVIL;ZESTRIL) tablet 20 mg, Daily  cetirizine (ZYRTEC) tablet 5 mg, Daily  pantoprazole (PROTONIX) tablet 40 mg, QAM AC  apixaban (ELIQUIS) tablet 2.5 mg, BID  albuterol (PROVENTIL) nebulizer solution 2.5 mg, Q6H PRN  perflutren lipid microspheres (DEFINITY) injection 1.5 mL, ONCE PRN  sodium chloride flush 0.9 % injection 10 mL, 2 times per day  sodium chloride flush 0.9 % injection 10 mL, PRN  0.9 % sodium chloride infusion, PRN  senna (SENOKOT) tablet 8.6 mg, Daily PRN  acetaminophen (TYLENOL) tablet 650 mg, Q6H PRN   Or  acetaminophen (TYLENOL) suppository 650 mg, Q6H PRN  ondansetron (ZOFRAN-ODT) disintegrating tablet 4 mg, Q8H PRN   Or  ondansetron (ZOFRAN) injection 4 mg, Q6H PRN  mometasone-formoterol (DULERA) 200-5 MCG/ACT inhaler 2 puff, BID  tiotropium (SPIRIVA RESPIMAT) 2.5 MCG/ACT inhaler 2 puff, Daily        Allergies: Iv contrast [iodides], Codeine, Penicillins, and Sulfa antibiotics     Review of Systems:   A 14 point review of symptoms completed. Pertinent positives identified in the HPI, all other review of symptoms negative as below.       Objective   PHYSICAL EXAM:    Vitals:    11/10/22 1219   BP: 104/71   Pulse: 81   Resp: 18   Temp: 97.7 °F (36.5 °C)   SpO2: 92%    Weight: 225 lb 6.4 oz (102.2 kg)    Vitals:    11/10/22 0802 11/10/22 0818 11/10/22 0827 11/10/22 1219   BP: 121/78  110/68 104/71   Pulse: 67  62 81   Resp: 18   18   Temp: 98.4 °F (36.9 °C)   97.7 °F (36.5 °C)   TempSrc: Oral   Oral   SpO2: 94% 98% 98% 92%   Weight:       Height:              General Appearance:  Alert, cooperative, no distress, appears stated age   Head:  Normocephalic, without obvious abnormality, atraumatic   Eyes:  PERRL, conjunctiva/corneas clear   Nose: Nares normal, no drainage or sinus tenderness   Throat: Lips, mucosa, and tongue normal   Neck: Supple, symmetrical, trachea midline, no adenopathy, thyroid: not enlarged, symmetric, no tenderness/mass/nodules, no carotid bruit or +8-10cmJVD   Lungs:   Clear to auscultation bilaterally, respirations unlabored   Chest Wall:  No deformity or tenderness   Heart:  Regular rate and rhythm, S1, S2 normal, no murmur, rub or gallop   Abdomen:   Soft, non-tender, bowel sounds active all four quadrants,  no masses, no organomegaly   Extremities: Extremities normal, atraumatic, no cyanosis or edema   Pulses: 2+ and symmetric   Skin: Skin color, texture, turgor normal, no rashes or lesions   Pysch: Normal mood and affect   Neurologic: Normal gross motor and sensory exam.         Labs   CBC:   Lab Results   Component Value Date/Time    WBC 6.8 11/10/2022 05:46 AM    RBC 4.01 11/10/2022 05:46 AM    HGB 12.3 11/10/2022 05:46 AM    HCT 37.7 11/10/2022 05:46 AM    MCV 93.9 11/10/2022 05:46 AM    RDW 14.2 11/10/2022 05:46 AM     11/10/2022 05:46 AM     CMP:  Lab Results   Component Value Date/Time     11/10/2022 05:46 AM    K 3.3 11/10/2022 05:46 AM     11/10/2022 05:46 AM    CO2 28 11/10/2022 05:46 AM    BUN 7 11/10/2022 05:46 AM    CREATININE 0.9 11/10/2022 05:46 AM    AGRATIO 1.4 2022 12:16 PM    LABGLOM >60 11/10/2022 05:46 AM    GLUCOSE 102 11/10/2022 05:46 AM    PROT 7.1 2022 12:16 PM    CALCIUM 8.6 11/10/2022 05:46 AM    BILITOT 0.9 2022 12:16 PM    ALKPHOS 107 2022 12:16 PM    AST 30 2022 12:16 PM    ALT 24 2022 12:16 PM     PT/INR:  No results found for: PTINR  HgBA1c:No results found for: LABA1C  Lab Results   Component Value Date    TROPONINI <0.01 2022         Cardiac Data     Last EK2022 at 1048 hrs. Normal sinus rhythm with borderline first-degree heart block and left bundle branch block. PVC. Left axis deviation  11/10/2022. 1019 possible atrial ventricular pacing    Echo:    Stress Test:    Cath:    Studies:   FINDINGS:   Moderate cardiomegaly. A left atrial appendage closure device noted. Mild-to-moderate congestive changes       Assessment and Plan      1. Acute on chronic systolic CHF  -1.8U net negativePACs   - s/p dual chamber medtronic ICD  2. PAF  3. PACs  4. Hypokalemia      PLAN  1. PT r/o for AMI  2. Information from [facility] but [facility] keeps showing unsuccessful update.   -May need to attempt to call and get faxed  3. Continue cautious diuresis. Does not appear to be grossly fluid overloaded on exam   -Potassium being repeated  4. Cont coreg, eliquis  5. Interrogate ICD, Medtronic.   Further recommendations once records are obtained from [place]        Patient Active Problem List   Diagnosis    Allergic rhinitis    Arthralgia of knee    Astigmatism    Backache    Cervicalgia    Costochondritis    Cough    Cyst of eyelid    Depression    Esophagitis    Flexor hallucis longus tendinitis    Essential hypertension    Hyperlipidemia    Hematuria    Lateral epicondylitis    Left upper quadrant abdominal pain    Mild intermittent asthma    Motion sickness    Obesity    Nicotine dependence    Other seborrheic keratosis    Overweight    Pain in thoracic spine    Palpitations    Persistent insomnia    Sinusitis    Senile lentigo    Presbyopia    Symptomatic menopausal or female climacteric states    Status post hysterectomy    Subacromial bursitis    Former smoker    Acute decompensated heart failure (HCC)    COPD (chronic obstructive pulmonary disease) (HCC)    PAF (paroxysmal atrial fibrillation) ([facility] [address]    Pulmonary vascular congestion           Thank you for allowing us to participate in the care of [employer]. Please call me with any questions [phone]. Mihaela Fitch MD, [address] Cardiologist  [place] [address]  [phone] [facility]  [phone] [address] [place] [address]  11/10/2022 3:28 PM    I will address the patient's cardiac risk factors and adjusted pharmacologic treatment as needed. In addition, I have reinforced the need for patient directed risk factor modification. All questions and concerns were addressed to the patient/family. Alternatives to my treatment were discussed. The note was completed using EMR. Every effort was made to ensure accuracy; however, inadvertent computerized transcription errors may be present.

## 2022-11-10 NOTE — PROGRESS NOTES
11/09/22 2049   RT Protocol   History Pulmonary Disease 2   Respiratory pattern 0   Breath sounds 2   Cough 0   Indications for Bronchodilator Therapy On home bronchodilators;Decreased or absent breath sounds   Bronchodilator Assessment Score 4

## 2022-11-10 NOTE — CARE COORDINATION
Spoke with patient at bedside. She lives in a house with her . She is independent at home. She denies any services at home. She does have oxygen for night use but does not remember who provides it. Likely no needs from CM.

## 2022-11-10 NOTE — PROGRESS NOTES
Hospitalist Progress Note      PCP: DEMI Mancera CNP    Date of Admission: 11/9/2022    Chief Complaint:   Shortness of breath    Hospital Course:   Floresita Pickard is a 67 y.o. female who presented to the ED to be evaluated for increased dyspnea ongoing for 2 weeks PTA. She recently relocated from [place], and endorses recent prolonged travel. Patient visited her newly assigned PCP today, who recommended urgent ED evaluation. She endorses concomitant intermittent chest pressure, nonproductive cough, and lightheadedness. Patient has an underlying history of CHF, however no echo results are available in [facility]. She reports that she has ran out of her Coreg 2 days PTA, but states that she has been compliant with her Eliquis anticoagulation. Patient is a former smoker. Upon arrival to the ED EKG was obtained revealing undetermined rhythm, LAD with LBBB, and QTC prolongation; no previous EKGs available for comparison STAT CXR demonstrates moderate cardiomegaly and pulmonary vascular congestion; left atrial appendage closure device noted. Patient with severe contrast allergy therefore NM VQ scan ordered and noted to be low probability for PE. Rapid COVID-19 testing performed and noted to be negative. Notable labs include: BNP 7255, elevated D-dimer 1.51, and serial troponin negative x3. Patient received a one-time bolus dosage of Lasix 40 mg per ED provider, prior to request for admission. Subjective:   Seen resting in chair at bedside with  present, states breathing much improved today, no other complaints at this time.       Medications:  Reviewed    Infusion Medications    sodium chloride       Scheduled Medications    potassium chloride  10 mEq Oral BID    carvedilol  25 mg Oral BID    lisinopril  20 mg Oral Daily    cetirizine  5 mg Oral Daily    pantoprazole  40 mg Oral QAM AC    apixaban  2.5 mg Oral BID    sodium chloride flush  10 mL IntraVENous 2 times per day mometasone-formoterol  2 puff Inhalation BID    tiotropium  2 puff Inhalation Daily     PRN Meds: perflutren lipid microspheres, albuterol, perflutren lipid microspheres, sodium chloride flush, sodium chloride, senna, acetaminophen **OR** acetaminophen, ondansetron **OR** ondansetron      Intake/Output Summary (Last 24 hours) at 11/10/2022 1815  Last data filed at 11/10/2022 1543  Gross per 24 hour   Intake 240 ml   Output 1150 ml   Net -910 ml       Physical Exam Performed:    /76   Pulse 62   Temp 97.4 °F (36.3 °C) (Oral)   Resp 18   Ht 5' 7\" (1.702 m)   Wt 225 lb 6.4 oz (102.2 kg)   SpO2 94%   BMI 35.30 kg/m²     General appearance: No apparent distress, appears stated age and cooperative. HEENT: Pupils equal, round, and reactive to light. Conjunctivae/corneas clear. Neck: Supple, with full range of motion. No jugular venous distention. Trachea midline. Respiratory:  Normal respiratory effort. Clear to auscultation, bilaterally without Rales/Wheezes/Rhonchi. Cardiovascular: Regular rate and rhythm with normal S1/S2 without murmurs, rubs or gallops. Abdomen: Soft, non-tender, non-distended with normal bowel sounds. Musculoskeletal: No clubbing, cyanosis or edema bilaterally. Full range of motion without deformity. Skin: Skin color, texture, turgor normal.  No rashes or lesions. Neurologic:  Neurovascularly intact without any focal sensory/motor deficits.  Cranial nerves: II-XII intact, grossly non-focal.  Psychiatric: Alert and oriented, thought content appropriate, normal insight  Capillary Refill: Brisk, 3 seconds, normal   Peripheral Pulses: +2 palpable, equal bilaterally       Labs:   Recent Labs     11/09/22  1216 11/10/22  0546   WBC 8.1 6.8   HGB 12.8 12.3   HCT 38.9 37.7    227     Recent Labs     11/09/22  1216 11/10/22  0546    141   K 3.9 3.3*    102   CO2 24 28   BUN 8 7   CREATININE 0.8 0.9   CALCIUM 9.1 8.6     Recent Labs     11/09/22  1216   AST 30   ALT 24 BILITOT 0.9   ALKPHOS 107     No results for input(s): INR in the last 72 hours. Recent Labs     11/09/22  1216 11/09/22  1939   TROPONINI <0.01 <0.01       Urinalysis:    No results found for: Darene [place], BACTERIA, RBCUA, BLOODU, SPECGRAV, GLUCOSEU    Radiology:  NM LUNG VENT/PERFUSION (VQ)   Final Result   Low probability for pulmonary embolus.          XR CHEST PORTABLE   Final Result              Assessment/Plan:    Active Hospital Problems    Diagnosis     Elevated brain natriuretic peptide (BNP) level [R79.89]      Priority: Medium    Premature atrial contraction [I49.1]      Priority: Medium    Acute on chronic systolic congestive heart failure (HCC) [I50.23]      Priority: Medium    Hypokalemia [E87.6]      Priority: Medium    Essential hypertension [I10]      Priority: Medium    Hyperlipidemia [E78.5]      Priority: Medium    Former smoker [Z87.891]      Priority: Medium    Acute decompensated heart failure (HCC) [I50.9]      Priority: Medium    COPD (chronic obstructive pulmonary disease) ([facility] [J44.9]      Priority: Medium    PAF (paroxysmal atrial fibrillation) ([facility] [I48.0]      Priority: Medium    Pulmonary vascular congestion [R09.89]      Priority: Medium        Acute decompensated CHF with pulmonary edema  -Admit to floor for continuous telemetry monitoring and strict I's & O's; pure wick in place  -IV Lasix 40 mg x 1, followed by continuous infusion at 4 mg/kg for 15 hours, completed  -Continue home doses of lisinopril and Coreg  -ECHO scheduled to further assess cardiac structure and function  -2G Na and fluid restriction dietary modifications in place  - Cardiology following     COPD in former smoker  -Patient complains of severe dyspnea s/p recent travel therefore VQ scan ordered to rule out PE (contrast allergy); results interpreted as low probability  -Continuous pulse oximetry monitoring initiated with PRN supplemental O2   -Continue home maintenance MDIs/nebulizer treatment including Breztri  -Encourage aggressive pulmonary toilet including incentive spirometry every 4H while awake  -Albuterol nebs scheduled every 6 hours as needed     PAF  -EKG with undetermined rhythm, cardiology suggested PACs rather than A. Fib  -Patient s/p left atrial appendage clipping  -Continue beta-blocker for rate control  -Continue twice daily Eliquis anticoagulation    DVT Prophylaxis: Eliquis  Diet: ADULT DIET;  Regular; Low Fat/Low Chol/High Fiber/ISIS  Code Status: Full Code  PT/OT Eval Status: Recommending Home with assist as needed    Dispo -1 to 2 days pending clinical course, echocardiogram    Appropriate for A1 Discharge Unit: PAVITHRA Capellan

## 2022-11-11 ENCOUNTER — NURSE ONLY (OUTPATIENT)
Dept: CARDIOLOGY CLINIC | Age: 72
End: 2022-11-11

## 2022-11-11 PROBLEM — Z95.810 ICD (IMPLANTABLE CARDIOVERTER-DEFIBRILLATOR), DUAL, IN SITU: Status: ACTIVE | Noted: 2022-11-11

## 2022-11-11 LAB
ANION GAP SERPL CALCULATED.3IONS-SCNC: 12 MMOL/L (ref 3–16)
BUN BLDV-MCNC: 11 MG/DL (ref 7–20)
CALCIUM SERPL-MCNC: 8.7 MG/DL (ref 8.3–10.6)
CHLORIDE BLD-SCNC: 102 MMOL/L (ref 99–110)
CO2: 27 MMOL/L (ref 21–32)
CREAT SERPL-MCNC: 1.2 MG/DL (ref 0.6–1.2)
GFR SERPL CREATININE-BSD FRML MDRD: 48 ML/MIN/{1.73_M2}
GLUCOSE BLD-MCNC: 88 MG/DL (ref 70–99)
LV EF: 25 %
LVEF MODALITY: NORMAL
MAGNESIUM: 1.9 MG/DL (ref 1.8–2.4)
POTASSIUM SERPL-SCNC: 3.6 MMOL/L (ref 3.5–5.1)
SODIUM BLD-SCNC: 141 MMOL/L (ref 136–145)

## 2022-11-11 PROCEDURE — 36415 COLL VENOUS BLD VENIPUNCTURE: CPT

## 2022-11-11 PROCEDURE — 94640 AIRWAY INHALATION TREATMENT: CPT

## 2022-11-11 PROCEDURE — 99233 SBSQ HOSP IP/OBS HIGH 50: CPT | Performed by: NURSE PRACTITIONER

## 2022-11-11 PROCEDURE — 1200000000 HC SEMI PRIVATE

## 2022-11-11 PROCEDURE — 93290 INTERROG DEV EVAL ICPMS IP: CPT | Performed by: NURSE PRACTITIONER

## 2022-11-11 PROCEDURE — 6370000000 HC RX 637 (ALT 250 FOR IP): Performed by: PHYSICIAN ASSISTANT

## 2022-11-11 PROCEDURE — 2580000003 HC RX 258: Performed by: HOSPITALIST

## 2022-11-11 PROCEDURE — 6360000004 HC RX CONTRAST MEDICATION: Performed by: INTERNAL MEDICINE

## 2022-11-11 PROCEDURE — 6370000000 HC RX 637 (ALT 250 FOR IP): Performed by: NURSE PRACTITIONER

## 2022-11-11 PROCEDURE — 93283 PRGRMG EVAL IMPLANTABLE DFB: CPT | Performed by: INTERNAL MEDICINE

## 2022-11-11 PROCEDURE — 80048 BASIC METABOLIC PNL TOTAL CA: CPT

## 2022-11-11 PROCEDURE — 83735 ASSAY OF MAGNESIUM: CPT

## 2022-11-11 PROCEDURE — 6360000002 HC RX W HCPCS: Performed by: HOSPITALIST

## 2022-11-11 PROCEDURE — 6370000000 HC RX 637 (ALT 250 FOR IP): Performed by: HOSPITALIST

## 2022-11-11 PROCEDURE — 6360000002 HC RX W HCPCS: Performed by: INTERNAL MEDICINE

## 2022-11-11 PROCEDURE — C8929 TTE W OR WO FOL WCON,DOPPLER: HCPCS

## 2022-11-11 PROCEDURE — 2580000003 HC RX 258: Performed by: INTERNAL MEDICINE

## 2022-11-11 RX ORDER — SPIRONOLACTONE 25 MG/1
25 TABLET ORAL DAILY
Status: DISCONTINUED | OUTPATIENT
Start: 2022-11-11 | End: 2022-11-15 | Stop reason: HOSPADM

## 2022-11-11 RX ADMIN — APIXABAN 2.5 MG: 2.5 TABLET, FILM COATED ORAL at 09:26

## 2022-11-11 RX ADMIN — POTASSIUM CHLORIDE 10 MEQ: 750 TABLET, EXTENDED RELEASE ORAL at 09:26

## 2022-11-11 RX ADMIN — ONDANSETRON 4 MG: 2 INJECTION INTRAMUSCULAR; INTRAVENOUS at 11:44

## 2022-11-11 RX ADMIN — APIXABAN 5 MG: 5 TABLET, FILM COATED ORAL at 22:03

## 2022-11-11 RX ADMIN — CETIRIZINE HYDROCHLORIDE 5 MG: 5 TABLET ORAL at 09:26

## 2022-11-11 RX ADMIN — CARVEDILOL 25 MG: 25 TABLET, FILM COATED ORAL at 09:26

## 2022-11-11 RX ADMIN — FUROSEMIDE 4 MG/HR: 10 INJECTION, SOLUTION INTRAMUSCULAR; INTRAVENOUS at 06:22

## 2022-11-11 RX ADMIN — TIOTROPIUM BROMIDE INHALATION SPRAY 2 PUFF: 3.12 SPRAY, METERED RESPIRATORY (INHALATION) at 08:38

## 2022-11-11 RX ADMIN — PANTOPRAZOLE SODIUM 40 MG: 40 TABLET, DELAYED RELEASE ORAL at 06:21

## 2022-11-11 RX ADMIN — SODIUM CHLORIDE, PRESERVATIVE FREE 10 ML: 5 INJECTION INTRAVENOUS at 22:10

## 2022-11-11 RX ADMIN — Medication 2 PUFF: at 08:38

## 2022-11-11 RX ADMIN — Medication 2 PUFF: at 21:03

## 2022-11-11 RX ADMIN — PERFLUTREN 1.5 ML: 6.52 INJECTION, SUSPENSION INTRAVENOUS at 11:43

## 2022-11-11 RX ADMIN — SPIRONOLACTONE 25 MG: 25 TABLET ORAL at 16:32

## 2022-11-11 RX ADMIN — LISINOPRIL 20 MG: 20 TABLET ORAL at 09:26

## 2022-11-11 NOTE — PROGRESS NOTES
Hospitalist Progress Note      PCP: DEMI Greer - CNP    Date of Admission: 11/9/2022    Chief Complaint:   Shortness of breath    Hospital Course:   Saturnino Garcia is a 67 y.o. female who presented to the ED to be evaluated for increased dyspnea ongoing for 2 weeks PTA. She recently relocated from [place], and endorses recent prolonged travel. Patient visited her newly assigned PCP today, who recommended urgent ED evaluation. She endorses concomitant intermittent chest pressure, nonproductive cough, and lightheadedness. Patient has an underlying history of CHF, however no echo results are available in [facility]. She reports that she has ran out of her Coreg 2 days PTA, but states that she has been compliant with her Eliquis anticoagulation. Patient is a former smoker. Upon arrival to the ED EKG was obtained revealing undetermined rhythm, LAD with LBBB, and QTC prolongation; no previous EKGs available for comparison STAT CXR demonstrates moderate cardiomegaly and pulmonary vascular congestion; left atrial appendage closure device noted. Patient with severe contrast allergy therefore NM VQ scan ordered and noted to be low probability for PE. Rapid COVID-19 testing performed and noted to be negative. Notable labs include: BNP 7255, elevated D-dimer 1.51, and serial troponin negative x3. Patient received a one-time bolus dosage of Lasix 40 mg per ED provider, prior to request for admission.      Subjective:   Seen resting in bed, reports spasmodic abdominal pain, reports 1 episode of emesis, updated on plan of care      Medications:  Reviewed    Infusion Medications    sodium chloride       Scheduled Medications    GI cocktail   Oral Once    apixaban  5 mg Oral BID    spironolactone  25 mg Oral Daily    carvedilol  25 mg Oral BID    cetirizine  5 mg Oral Daily    pantoprazole  40 mg Oral QAM AC    sodium chloride flush  10 mL IntraVENous 2 times per day    mometasone-formoterol  2 puff Inhalation BID    tiotropium  2 puff Inhalation Daily     PRN Meds: albuterol, perflutren lipid microspheres, sodium chloride flush, sodium chloride, senna, acetaminophen **OR** acetaminophen, ondansetron **OR** ondansetron      Intake/Output Summary (Last 24 hours) at 11/11/2022 1716  Last data filed at 11/11/2022 6143  Gross per 24 hour   Intake 600 ml   Output 800 ml   Net -200 ml       Physical Exam Performed:    /61   Pulse 72   Temp 98.4 °F (36.9 °C) (Oral)   Resp 18   Ht 5' 7\" (1.702 m)   Wt 223 lb 1.6 oz (101.2 kg)   SpO2 96%   BMI 34.94 kg/m²     General appearance: No apparent distress, appears stated age and cooperative. HEENT: Pupils equal, round, and reactive to light. Conjunctivae/corneas clear. Neck: Supple, with full range of motion. No jugular venous distention. Trachea midline. Respiratory:  Normal respiratory effort. Clear to auscultation, bilaterally without Rales/Wheezes/Rhonchi. Cardiovascular: Regular rate and rhythm with normal S1/S2 without murmurs, rubs or gallops. Abdomen: Soft, non-tender, non-distended with normal bowel sounds. Musculoskeletal: No clubbing, cyanosis or edema bilaterally. Full range of motion without deformity. Skin: Skin color, texture, turgor normal.  No rashes or lesions. Neurologic:  Neurovascularly intact without any focal sensory/motor deficits.  Cranial nerves: II-XII intact, grossly non-focal.  Psychiatric: Alert and oriented, thought content appropriate, normal insight  Capillary Refill: Brisk, 3 seconds, normal   Peripheral Pulses: +2 palpable, equal bilaterally       Labs:   Recent Labs     11/09/22  1216 11/10/22  0546   WBC 8.1 6.8   HGB 12.8 12.3   HCT 38.9 37.7    227     Recent Labs     11/09/22  1216 11/10/22  0546 11/11/22  0752    141 141   K 3.9 3.3* 3.6    102 102   CO2 24 28 27   BUN 8 7 11   CREATININE 0.8 0.9 1.2   CALCIUM 9.1 8.6 8.7     Recent Labs     11/09/22  1216   AST 30   ALT 24   BILITOT 0.9 ALKPHOS 107     No results for input(s): INR in the last 72 hours. Recent Labs     11/09/22  1216 11/09/22  1939   TROPONINI <0.01 <0.01       Urinalysis:    No results found for: Leet [place], BACTERIA, RBCUA, BLOODU, SPECGRAV, GLUCOSEU    Radiology:  NM LUNG VENT/PERFUSION (VQ)   Final Result   Low probability for pulmonary embolus.          XR CHEST PORTABLE   Final Result              Assessment/Plan:    Active Hospital Problems    Diagnosis     Elevated brain natriuretic peptide (BNP) level [R79.89]      Priority: Medium    Premature atrial contraction [I49.1]      Priority: Medium    Acute on chronic systolic congestive heart failure (HCC) [I50.23]      Priority: Medium    Hypokalemia [E87.6]      Priority: Medium    Essential hypertension [I10]      Priority: Medium    Hyperlipidemia [E78.5]      Priority: Medium    Former smoker [Z87.891]      Priority: Medium    Acute decompensated heart failure (HCC) [I50.9]      Priority: Medium    COPD (chronic obstructive pulmonary disease) ([facility] [address] [facility] [J44.9]      Priority: Medium    PAF (paroxysmal atrial fibrillation) ([facility] [address] [facility] [I48.0]      Priority: Medium    Pulmonary vascular congestion [R09.89]      Priority: Medium        Acute decompensated CHF with pulmonary edema  - continuous telemetry monitoring and strict I's & O's; pure wick in place  - Continue Lasix drip for another 24 hours per cardiology  -Continue home Coreg  - Echo 11/11/2022 demonstrates LVEF 25%  -2G Na and fluid restriction dietary modifications in place  - Cardiology following, planning for Entresto given low EF, spironolactone 25 mg added as well     COPD in former smoker  -Patient complains of severe dyspnea s/p recent travel therefore VQ scan ordered to rule out PE (contrast allergy); results interpreted as low probability  -Continuous pulse oximetry monitoring initiated with PRN supplemental O2   -Continue home maintenance MDIs/nebulizer treatment including Breztri  -Encourage aggressive pulmonary toilet including incentive spirometry every 4H while awake  -Albuterol nebs scheduled every 6 hours as needed     PAF  -EKG with undetermined rhythm, cardiology suggested PACs rather than A. Fib  -Patient s/p left atrial appendage clipping  -Continue beta-blocker for rate control  -Continue twice daily Eliquis anticoagulation    DVT Prophylaxis: Eliquis  Diet: ADULT DIET;  Regular; Low Fat/Low Chol/High Fiber/ISIS  Code Status: Full Code  PT/OT Eval Status: Recommending Home with assist as needed    Dispo - possibly tomorrow pending clinical course, subspecialty recommendations    Appropriate for A1 Discharge Unit: PAVITHRA Carney

## 2022-11-11 NOTE — PROGRESS NOTES
[address]   Daily Progress Note    Admit Date:  11/9/2022  HPI:    Chief Complaint   Patient presents with    Shortness of Breath     Patient states she has had difficulty breathing for the last two weeks. States she saw her PCP today who sent her to the ED for abnormal EKG. Interval history: Duarte Burns is being followed for CHF. Moved from [place] to [place], [place] in the last 1 week. Records from [place] not available for review at the time of this note. S/P Dual chamber MEdtronic ICD implanted 6/26/2019 by Dr. Sergio Goyal [facility]); S/P ablation 2019 and TONY clipping-  details unknown. She had progressively worsening shortness of breath. Describes it as when she 1st went into afib. She reports a history of HFrEF 25% with improved EF up to 50% on recent echo about 1 month ago- report not available. Subjective:  Ms. Deedee Quan is not feeling well this afternoon. Episode of chest pain with nausea and emesis, and jaw pain; which is now resolved. Remains on lasix infusion   No edema. Family at the bedside.    Objective:   /75   Pulse 64   Temp 97.7 °F (36.5 °C)   Resp 18   Ht 5' 7\" (1.702 m)   Wt 223 lb 1.6 oz (101.2 kg)   SpO2 94%   BMI 34.94 kg/m²     Intake/Output Summary (Last 24 hours) at 11/11/2022 1310  Last data filed at 11/11/2022 1511  Gross per 24 hour   Intake 600 ml   Output 800 ml   Net -200 ml       NYHA: IV    Physical Exam:  General:  Awake, alert, NAD, pale, ill appearing   Skin:  Warm and dry  Neck:  JVD<8  Chest:  Clear to auscultation, no wheezes/rhonchi/rales  Telemetry: paced, few beats NSVT  Cardiovascular:  RRR S1S2, no m/r/g   Abdomen:  Soft, nontender, +bowel sounds  Extremities:  no bilateral lower extremity edema    Medications:    GI cocktail   Oral Once    potassium chloride  10 mEq Oral BID    carvedilol  25 mg Oral BID    lisinopril  20 mg Oral Daily    cetirizine  5 mg Oral Daily    pantoprazole  40 mg Oral QAM AC    apixaban  2.5 mg Oral BID    sodium chloride flush  10 mL IntraVENous 2 times per day    mometasone-formoterol  2 puff Inhalation BID    tiotropium  2 puff Inhalation Daily      sodium chloride         Lab Data:  CBC:   Recent Labs     11/09/22  1216 11/10/22  0546   WBC 8.1 6.8   HGB 12.8 12.3    227     BMP:    Recent Labs     11/09/22  1216 11/10/22  0546 11/11/22  0752    141 141   K 3.9 3.3* 3.6   CO2 24 28 27   BUN 8 7 11   CREATININE 0.8 0.9 1.2     INR:  No results for input(s): INR in the last 72 hours. BNP:    Recent Labs     11/09/22  1216   PROBNP 7,255*     No results found for: LVEF, LVEFMODE    Testing:    Echo 11/11/22   Summary   The left ventricular systolic function is severely reduced with an ejection   fraction of 25%. Left ventricular cavity size is moderately dilated with mild concentric left   ventricular hypertrophy. Grade I diastolic dysfunction with normal filling pressure. Mild mitral and pulmonic regurgitation. The left atrium is mildly dilated. Bi-atrial enlargement. Moderate aortic regurgitation. The aortic root is mildly dilated. The ascending aorta is moderately dilated. The right ventricle is moderately enlarged. Right ventricular systolic function is moderately reduced . Tricuspid valve is structurally normal.   Trivial tricuspid regurgitation. Systolic pulmonic artery pressure (SPAP) is normal estimated at 30 mmHg   (Right atrial pressure of 3 mmHg). The right atrium is moderately dilated. Principal Problem:    Acute decompensated heart failure (HCC)  Active Problems:    Essential hypertension    Hyperlipidemia    Former smoker    COPD (chronic obstructive pulmonary disease) (HCC)    PAF (paroxysmal atrial fibrillation) ([facility])    Pulmonary vascular congestion    Elevated brain natriuretic peptide (BNP) level    Premature atrial contraction    Acute on chronic systolic congestive heart failure (HCC)    Hypokalemia  Resolved Problems:    * No resolved hospital problems.  * 11/11/22 0600 223 lb 1.6 oz (101.2 kg) Standing scale     11/10/22 0542 225 lb 6.4 oz (102.2 kg) Standing scale     11/09/22 2313 232 lb (105.2 kg) --     11/09/22 1205 232 lb (105.2 kg) --         Assessment:  Acute on chronic systolic heart failure, with right heart failure   -Reported by Patient was 20%-->50%   - current echo showed LVEF 25%  PAF, TONY clip, s/p ablation 2019   -patient reports clip not in right position for complete closure- remains on eliquis  S/P dual chamber ICD 2019  Moderate aortic regurg   NSVT  HTN  Hypokalemia- due to diuresis  improved     Plan: Chelsea Ahsan shows decreasing thoracic impedence consistent with increased fluid index.      Requesting prior cardiac medical records- currently not available    Eliquis is under dosed based on her age, weight and renal function; increase to 5mg twice a day  Coreg 25mg BID  Lasix infusion 4mg/hr for the next 24 hours (she was not on any diuretic prior to admission)   Hold lisinopril while diuresing and would recommend starting Entresto in 36 hours given LVEF 25%  Add Spironolactone (aldactone) 25mg     Repeat EKG and troponin with re-occurance of chest pain- discussed with nursing     Core measures for HFrEF (EF <40%):  EF: 25%   ICD: Implanted 2019  ACEi/ARB/ARNI: holding while diuresing  BB: Coreg- max dose   Frank: 25 mg Spironolactone (aldactone)  SGLT2: consider adding at discharge     Total time spent coordinating care 30 minutes (also called to get records)    DEMI Whatley - TERESA,  11/11/2022, 2:45 PM

## 2022-11-11 NOTE — DISCHARGE INSTRUCTIONS
FOLLOW-UP APPOINTMENTS    [place] OFFICE - Follow-up appointment on December 23rd at 10:30am with Florentin Mckeon CNP. Please arrive 15 minutes early to complete necessary paperwork. [facility] Office [address]:  [phone]. If you are unable to make this appointment, please call to reschedule [phone]. To get to the office go to the side of the hospital at [facility], enter at the [facility], entrance that faces [address]. Take the elevator to the 3rd floor turn right off elevator then take hallway to the right. Go down the nguyen and office will be on your right [address]. [place] OFFICE - Follow-up appointment on February 1st at [facility] with Roshan Pedroza MD, electrophysiologist, [address]. [facility],  [facility], [address] [place] [address] [address], [address] [facility], [address] [place] [address]. Office #: [phone]. If you are unable to make this appointment, please call to reschedule. Directions to [facility]  [address] towards [place]. [address] [facility] exit. Right off exit. Cross over [employer]. Right on [address] Left into hospital. Follow the signs to the emergency room ( turn left toward the Emergency room). Go right at the first stop sign. Just past the Emergency room at the second stop sign turn right and go up the ramp and park on the top level if possible. Go in the glass doors of the [facility] on the top level of the garage [address]. As soon as you get in the door turn left and our office is the one with the glass doors. DAILY SELF CARE WITH HEART FAILURE:  ~ Weigh yourself every morning and call your doctor if you gain 3 lb gain in a day -or- 5 lb gain in a week.    ~Follow a No Added Salt/Low Sodium diet of 3000 mg sodium daily  ~Follow a Fluid Limitation of 2 liters (64 ounces daily) on consistent basis  ~Call your doctor if you notice : worsening shortness of breath especially with usual activities or when lying down flat, increased swelling, decreased urination, weight gain of 3 lb overnight or 5 lb gain in a week, increased coughing, or chest pressure or dizziness upon discharge from hospital.   ~Go to all scheduled follow up appointments after hospital stay  ~Take all prescribed medications. Do not stop any-without calling your doctor first.  ~Heart Failure Non Urgent Resource phone number [phone]      Your provider has ordered lab work for further evaluation. The order/prescription is included in your paper work. You may have your labs completed at any of the [facility] locations including:    [facility] associated with the [facility] located on the [employer]. The address is [address] on the first floor. It is the building directly across from the Emergency Room. The main entrance to this building faces [employer]. You will have to drive around the building to locate the main entrance. You may also use [facility] lab located inside the hospital, [facility] lab located inside the hospital, or [facility] [address] [facility] located off [facility]. You may use any other [facility] facilities or your [facility] office. Labs may also be completed at any other facility of your choice, however, please allow 72 hours to receive your labs for review. If you do not receive your lab results 72-96 hours after completing, please call the office.

## 2022-11-11 NOTE — PROGRESS NOTES
RN requested patient's cardiology team in [place]: Jimbo Martell. Trevor Pack RN notified. Cards- Dr. Jasmin Madsen [phone]. EP- Dr. Malina De La Cruz [phone].

## 2022-11-11 NOTE — PLAN OF CARE
Problem: Chronic Conditions and Co-morbidities  Goal: Patient's chronic conditions and co-morbidity symptoms are monitored and maintained or improved  11/10/2022 2146 by Shon Sandoval RN  Outcome: Progressing     Problem: Discharge Planning  Goal: Discharge to home or other facility with appropriate resources  Outcome: Progressing     Problem: Safety - Adult  Goal: Free from fall injury  11/10/2022 2146 by Shon Sandoval RN  Outcome: Progressing     Problem: Skin/Tissue Integrity  Goal: Absence of new skin breakdown  Description: 1. Monitor for areas of redness and/or skin breakdown  2. Assess vascular access sites hourly  3. Every 4-6 hours minimum:  Change oxygen saturation probe site  4. Every 4-6 hours:  If on nasal continuous positive airway pressure, respiratory therapy assess nares and determine need for appliance change or resting period. 11/10/2022 2146 by Shon Sandoval RN  Outcome: Progressing     Problem: Pain  Goal: Verbalizes/displays adequate comfort level or baseline comfort level  11/10/2022 2146 by Shon Sandoval RN  Outcome: Progressing      Patient's EF (Ejection Fraction) is unknown     Heart Failure Medications:  Diuretics[de-identified] Furosemide    (One of the following REQUIRED for EF </= 40%/SYSTOLIC FAILURE but MAY be used in EF% >40%/DIASTOLIC FAILURE)        ACE[de-identified] Lisinopril        ARB[de-identified] None         ARNI[de-identified] None    (Beta Blockers)  NON- Evidenced Based Beta Blocker (for EF% >40%/DIASTOLIC FAILURE): None    Evidenced Based Beta Blocker::(REQUIRED for EF% <40%/SYSTOLIC FAILURE) Carvedilol- Coreg  . .................................................................................................................................................. Patient's weights and intake/output reviewed: Yes    Patient's Last Weight: 225 lbs obtained by standing scale. Difference of 7 lbs less than last documented weight.       Intake/Output Summary (Last 24 hours) at 11/10/2022 2201  Last data filed at 11/10/2022 1543  Gross per 24 hour   Intake 0 ml   Output 1150 ml   Net -1150 ml       Education Booklet Provided: yes    Comorbidities Reviewed Yes    Patient has a past medical history of Atrial fibrillation ([facility] [address] >>For CHF and Comorbidity documentation on Education Time and Topics, please see Education Tab    Progressive Mobility Assessment:  What is this patient's Current Level of Mobility?: Ambulatory-Up [place]  How was this patient Mobilized today?: Edge of Bed, Up to Chair, Bedside Commode,  Up to Toilet/Shower, and Up in Room, ambulated 20 ft                 With Whom? Nurse and PCA                 Level of Difficulty/Assistance: Independent     Pt resting in bed at this time on room air. Pt denies shortness of breath. Pt with nonpitting lower extremity edema.      Patient and/or Family's stated Goal of Care this Admission: reduce shortness of breath, increase activity tolerance, better understand heart failure and disease management, be more comfortable, and reduce lower extremity edema prior to discharge        :

## 2022-11-11 NOTE — PROGRESS NOTES
I sent request to   Dr. Kim Cruz / Dr. Lee [place] records for last OV notes. [facility]. Left a message for Dr. Corina Jiménez office medical records to return call regarding records. No call from Dr. Corina Jiménez office and no records have been faxed.

## 2022-11-12 LAB
ANION GAP SERPL CALCULATED.3IONS-SCNC: 8 MMOL/L (ref 3–16)
BUN BLDV-MCNC: 19 MG/DL (ref 7–20)
CALCIUM SERPL-MCNC: 9.1 MG/DL (ref 8.3–10.6)
CHLORIDE BLD-SCNC: 101 MMOL/L (ref 99–110)
CO2: 33 MMOL/L (ref 21–32)
CREAT SERPL-MCNC: 1.8 MG/DL (ref 0.6–1.2)
EKG ATRIAL RATE: 65 BPM
EKG DIAGNOSIS: NORMAL
EKG P AXIS: 29 DEGREES
EKG P-R INTERVAL: 142 MS
EKG Q-T INTERVAL: 542 MS
EKG QRS DURATION: 138 MS
EKG QTC CALCULATION (BAZETT): 563 MS
EKG R AXIS: -41 DEGREES
EKG T AXIS: 129 DEGREES
EKG VENTRICULAR RATE: 65 BPM
GFR SERPL CREATININE-BSD FRML MDRD: 29 ML/MIN/{1.73_M2}
GLUCOSE BLD-MCNC: 94 MG/DL (ref 70–99)
MAGNESIUM: 2 MG/DL (ref 1.8–2.4)
POTASSIUM SERPL-SCNC: 4.2 MMOL/L (ref 3.5–5.1)
PRO-BNP: 1205 PG/ML (ref 0–124)
SODIUM BLD-SCNC: 142 MMOL/L (ref 136–145)

## 2022-11-12 PROCEDURE — 6370000000 HC RX 637 (ALT 250 FOR IP): Performed by: NURSE PRACTITIONER

## 2022-11-12 PROCEDURE — 6370000000 HC RX 637 (ALT 250 FOR IP): Performed by: HOSPITALIST

## 2022-11-12 PROCEDURE — 83735 ASSAY OF MAGNESIUM: CPT

## 2022-11-12 PROCEDURE — 2700000000 HC OXYGEN THERAPY PER DAY

## 2022-11-12 PROCEDURE — 1200000000 HC SEMI PRIVATE

## 2022-11-12 PROCEDURE — 36415 COLL VENOUS BLD VENIPUNCTURE: CPT

## 2022-11-12 PROCEDURE — 93010 ELECTROCARDIOGRAM REPORT: CPT | Performed by: INTERNAL MEDICINE

## 2022-11-12 PROCEDURE — 2580000003 HC RX 258: Performed by: HOSPITALIST

## 2022-11-12 PROCEDURE — 94640 AIRWAY INHALATION TREATMENT: CPT

## 2022-11-12 PROCEDURE — 83880 ASSAY OF NATRIURETIC PEPTIDE: CPT

## 2022-11-12 PROCEDURE — 99233 SBSQ HOSP IP/OBS HIGH 50: CPT | Performed by: NURSE PRACTITIONER

## 2022-11-12 PROCEDURE — 80048 BASIC METABOLIC PNL TOTAL CA: CPT

## 2022-11-12 PROCEDURE — 2580000003 HC RX 258: Performed by: PHYSICIAN ASSISTANT

## 2022-11-12 PROCEDURE — 94761 N-INVAS EAR/PLS OXIMETRY MLT: CPT

## 2022-11-12 RX ORDER — 0.9 % SODIUM CHLORIDE 0.9 %
250 INTRAVENOUS SOLUTION INTRAVENOUS ONCE
Status: COMPLETED | OUTPATIENT
Start: 2022-11-12 | End: 2022-11-12

## 2022-11-12 RX ADMIN — CETIRIZINE HYDROCHLORIDE 5 MG: 5 TABLET ORAL at 08:29

## 2022-11-12 RX ADMIN — Medication 2 PUFF: at 08:51

## 2022-11-12 RX ADMIN — Medication 2 PUFF: at 19:40

## 2022-11-12 RX ADMIN — APIXABAN 5 MG: 5 TABLET, FILM COATED ORAL at 20:50

## 2022-11-12 RX ADMIN — CARVEDILOL 25 MG: 25 TABLET, FILM COATED ORAL at 08:29

## 2022-11-12 RX ADMIN — TIOTROPIUM BROMIDE INHALATION SPRAY 2 PUFF: 3.12 SPRAY, METERED RESPIRATORY (INHALATION) at 08:51

## 2022-11-12 RX ADMIN — APIXABAN 5 MG: 5 TABLET, FILM COATED ORAL at 08:29

## 2022-11-12 RX ADMIN — SODIUM CHLORIDE 250 ML: 9 INJECTION, SOLUTION INTRAVENOUS at 13:11

## 2022-11-12 RX ADMIN — SODIUM CHLORIDE, PRESERVATIVE FREE 10 ML: 5 INJECTION INTRAVENOUS at 08:31

## 2022-11-12 RX ADMIN — SPIRONOLACTONE 25 MG: 25 TABLET ORAL at 08:29

## 2022-11-12 RX ADMIN — PANTOPRAZOLE SODIUM 40 MG: 40 TABLET, DELAYED RELEASE ORAL at 08:29

## 2022-11-12 RX ADMIN — SODIUM CHLORIDE, PRESERVATIVE FREE 10 ML: 5 INJECTION INTRAVENOUS at 20:51

## 2022-11-12 NOTE — PLAN OF CARE
Patient's EF (Ejection Fraction) is less than 40%    Heart Failure Medications:  Diuretics[de-identified] Furosemide    (One of the following REQUIRED for EF </= 40%/SYSTOLIC FAILURE but MAY be used in EF% >40%/DIASTOLIC FAILURE)        ACE[de-identified] None        ARB[de-identified] None         ARNI[de-identified] None    (Beta Blockers)  NON- Evidenced Based Beta Blocker (for EF% >40%/DIASTOLIC FAILURE): None    Evidenced Based Beta Blocker::(REQUIRED for EF% <40%/SYSTOLIC FAILURE) Carvedilol- Coreg  . .................................................................................................................................................. Patient's weights and intake/output reviewed: Yes    Patient's Last Weight: 222 lbs obtained by standing scale. Difference of 1 lbs less than last documented weight. Intake/Output Summary (Last 24 hours) at 11/12/2022 2053  Last data filed at 11/11/2022 1841  Gross per 24 hour   Intake 360 ml   Output --   Net 360 ml       Education Booklet Provided: yes    Comorbidities Reviewed Yes    Patient has a past medical history of Atrial fibrillation ([facility] [address] >>For CHF and Comorbidity documentation on Education Time and Topics, please see Education Tab    Progressive Mobility Assessment:  What is this patient's Current Level of Mobility?: Ambulatory-Up [place]  How was this patient Mobilized today?: Edge of Bed, Up to Chair, Bedside Commode,  Up to Toilet/Shower, Up in Room, Up in [place], Unable to Mobilize, and Patient Refuses to Mobilize, ambulated 15 ft                 With Whom? Self                 Level of Difficulty/Assistance: Independent     Pt resting in bed at this time on  2 L O2. Pt with complaints of shortness of breath. Pt without lower extremity edema.      Patient and/or Family's stated Goal of Care this Admission: reduce shortness of breath, increase activity tolerance, better understand heart failure and disease management, be more comfortable, and reduce lower extremity edema prior to discharge        :

## 2022-11-12 NOTE — PROGRESS NOTES
[address]     Cardiology                                     Progress Note    Admission date:  2022    Reason for follow up visit: AF, CHF, CM     HPI/CC: John Paul Duran was admitted on 2022 with shortness of breath. Cardiology following for CHF. She has a previous cardiac history and recently moved to [place] from [place]. Previous records have not been available. On 2022, creatinine lupe to 1.8. Lasix has been held. Rhythm has been sinus with intermittent AP VS and PVC's. Subjective: She reports feeling better. Denies chest pain, palpitations, shortness of breath, and dizziness. Vitals:  Blood pressure 96/72, pulse 65, temperature 98.6 °F (37 °C), temperature source Oral, resp. rate 18, height 5' 7\" (1.702 m), weight 222 lb 9.6 oz (101 kg), SpO2 94 %.   Temp  Av.3 °F (36.8 °C)  Min: 97.7 °F (36.5 °C)  Max: 98.6 °F (37 °C)  Pulse  Av.1  Min: 62  Max: 72  BP  Min: 96/72  Max: 117/75  SpO2  Av.9 %  Min: 92 %  Max: 98 %    24 hour I/O    Intake/Output Summary (Last 24 hours) at 2022 0841  Last data filed at 2022 0758  Gross per 24 hour   Intake 360 ml   Output 300 ml   Net 60 ml     Current Facility-Administered Medications   Medication Dose Route Frequency Provider Last Rate Last Admin    aluminum & magnesium hydroxide-simethicone (MAALOX) 30 mL, lidocaine viscous hcl (XYLOCAINE) 5 mL (GI COCKTAIL)   Oral Once PAVITHRA Shahid        apixaban (ELIQUIS) tablet 5 mg  5 mg Oral BID DEMI Lyles CNP   5 mg at 22 8219    spironolactone (ALDACTONE) tablet 25 mg  25 mg Oral Daily DEMI Lyles CNP   25 mg at 22 0829    carvedilol (COREG) tablet 25 mg  25 mg Oral BID Odell Santiago MD   25 mg at 22 0829    cetirizine (ZYRTEC) tablet 5 mg  5 mg Oral Daily Odell Santiago MD   5 mg at 22 0829    pantoprazole (PROTONIX) tablet 40 mg  40 mg Oral QAM AC Odell Santiago MD   40 mg at 22 0829    albuterol (PROVENTIL) nebulizer solution 2.5 mg  2.5 mg Nebulization Q6H PRN Kyle Powell MD        perflutren lipid microspheres (DEFINITY) injection 1.5 mL  1.5 mL IntraVENous ONCE PRN Kyle Powell MD        sodium chloride flush 0.9 % injection 10 mL  10 mL IntraVENous 2 times per day Kyle Powell MD   10 mL at 11/12/22 0831    sodium chloride flush 0.9 % injection 10 mL  10 mL IntraVENous PRN Kyle Powell MD        0.9 % sodium chloride infusion   IntraVENous PRN Kyle Powell MD        senna (SENOKOT) tablet 8.6 mg  1 tablet Oral Daily PRN Kyle Powell MD        acetaminophen (TYLENOL) tablet 650 mg  650 mg Oral Q6H PRN Kyle Powell MD        Or    acetaminophen (TYLENOL) suppository 650 mg  650 mg Rectal Q6H PRN Kyle Powell MD        ondansetron (ZOFRAN-ODT) disintegrating tablet 4 mg  4 mg Oral Q8H PRN Kyle Powell MD        Or    ondansetron [facility]) injection 4 mg  4 mg IntraVENous Q6H PRN Kyle Powell MD   4 mg at 11/11/22 1144    mometasone-formoterol (DULERA) 200-5 MCG/ACT inhaler 2 puff  2 puff Inhalation BID Kyle Powell MD   2 puff at 11/11/22 2103    tiotropium (Baldemar Cobb) 2.5 MCG/ACT inhaler 2 puff  2 puff Inhalation Daily Kyle Powell MD   2 puff at 11/11/22 6899       Objective:     Telemetry monitor: SR, AP VS, PVC's     Physical Exam:  Constitutional and general appearance: alert, cooperative, no distress, and appears stated age  [de-identified]: PERRL, no cervical lymphadenopathy. No masses palpable. Normal oral mucosa  Respiratory:  Normal excursion and expansion without use of accessory muscles  Resp auscultation: Normal breath sounds without wheezing, rhonchi, and rales  Cardiovascular:   The apical impulse is not displaced  Heart tones are crisp and normal. regular S1 and S2.  Jugular venous pulsation Normal  The carotid upstroke is normal in amplitude and contour without delay or bruit  Peripheral pulses are symmetrical and full   Abdomen:  No masses or tenderness  Bowel sounds present  Extremities:   No cyanosis or clubbing   No lower extremity edema   Skin: warm and dry  Neurological:  Alert and oriented  Moves all extremities well  No abnormalities of mood, affect, memory, mentation, or behavior are noted    Data    Echo 11/11/2022:      Summary   The left ventricular systolic function is severely reduced with an ejection   fraction of 25%. Left ventricular cavity size is moderately dilated with mild concentric left   ventricular hypertrophy. Grade I diastolic dysfunction with normal filling pressure. Mild mitral and pulmonic regurgitation. The left atrium is mildly dilated. Bi-atrial enlargement. Moderate aortic regurgitation. The aortic root is mildly dilated. The ascending aorta is moderately dilated. The right ventricle is moderately enlarged. Right ventricular systolic function is moderately reduced . Tricuspid valve is structurally normal.   Trivial tricuspid regurgitation. Systolic pulmonic artery pressure (SPAP) is normal estimated at 30 mmHg   (Right atrial pressure of 3 mmHg). The right atrium is moderately dilated. All labs and testing reviewed.   Lab Review     Renal Profile:   Lab Results   Component Value Date/Time    CREATININE 1.8 11/12/2022 05:12 AM    BUN 19 11/12/2022 05:12 AM     11/12/2022 05:12 AM    K 4.2 11/12/2022 05:12 AM     11/12/2022 05:12 AM    CO2 33 11/12/2022 05:12 AM     CBC:    Lab Results   Component Value Date/Time    WBC 6.8 11/10/2022 05:46 AM    RBC 4.01 11/10/2022 05:46 AM    HGB 12.3 11/10/2022 05:46 AM    HCT 37.7 11/10/2022 05:46 AM    MCV 93.9 11/10/2022 05:46 AM    RDW 14.2 11/10/2022 05:46 AM     11/10/2022 05:46 AM     BNP:  No results found for: BNP  Fasting Lipid Panel:    Lab Results   Component Value Date/Time    CHOL 149 11/10/2022 05:46 AM    HDL 44 11/10/2022 05:46 AM    TRIG 98 11/10/2022 05:46 AM     Cardiac Enzymes:  CK/MbTroponin  Lab Results   Component Value Date/Time    TROPONINI <0.01 11/09/2022 07:39 PM     PT/ INR No results found for: INR, PROTIME  PTT No results found for: PTT   Lab Results   Component Value Date/Time    MG 2.00 11/12/2022 05:12 AM    No results found for: TSH    Assessment:  Acute on chronic systolic CHF: improved    -weight on admission 232 lb (unclear if accurate), weight today 222 lb   -has diuresed 2 L   Paroxysmal atrial fibrillation: stable    -[id number] score > 2   -s/p ablation and TONY clip 2019 (in [place])   Nonischemic cardiomyopathy: ongoing, unclear etiology    -s/p dual chamber ICD implant 2019 [facility])   -EF 25% on echo 11/2022  HLD   COPD   Hypokalemia: resolved  NELIA    Plan:   1. Continue Eliquis, Coreg and spironolactone   2. Holding lasix given creatinine bump   3. Maintain normal electrolytes  4. Strict I&O, daily weight, fluid and sodium restriction  5.  Continue to monitor on telemetry       Chloe GONZALES [facility], APRN-CNP  [address]  [phone]

## 2022-11-12 NOTE — PLAN OF CARE

## 2022-11-12 NOTE — PROGRESS NOTES
Hospitalist Progress Note      PCP: Manjeet Hui APRN - CNP    Date of Admission: 11/9/2022    Chief Complaint:   Shortness of breath    Hospital Course:   Dion Lord is a 67 y.o. female who presented to the ED to be evaluated for increased dyspnea ongoing for 2 weeks PTA. She recently relocated from [place], and endorses recent prolonged travel. Patient visited her newly assigned PCP today, who recommended urgent ED evaluation. She endorses concomitant intermittent chest pressure, nonproductive cough, and lightheadedness. Patient has an underlying history of CHF, however no echo results are available in [facility]. She reports that she has ran out of her Coreg 2 days PTA, but states that she has been compliant with her Eliquis anticoagulation. Patient is a former smoker. Upon arrival to the ED EKG was obtained revealing undetermined rhythm, LAD with LBBB, and QTC prolongation; no previous EKGs available for comparison STAT CXR demonstrates moderate cardiomegaly and pulmonary vascular congestion; left atrial appendage closure device noted. Patient with severe contrast allergy therefore NM VQ scan ordered and noted to be low probability for PE. Rapid COVID-19 testing performed and noted to be negative. Notable labs include: BNP 7255, elevated D-dimer 1.51, and serial troponin negative x3. Patient received a one-time bolus dosage of Lasix 40 mg per ED provider, prior to request for admission. Subjective:   Seen resting in bed, no new complaints, denies abdominal pain, updated on plan of care.        Medications:  Reviewed    Infusion Medications    sodium chloride       Scheduled Medications    sodium chloride  250 mL IntraVENous Once    GI cocktail   Oral Once    apixaban  5 mg Oral BID    spironolactone  25 mg Oral Daily    carvedilol  25 mg Oral BID    cetirizine  5 mg Oral Daily    pantoprazole  40 mg Oral QAM AC    sodium chloride flush  10 mL IntraVENous 2 times per day mometasone-formoterol  2 puff Inhalation BID    tiotropium  2 puff Inhalation Daily     PRN Meds: albuterol, perflutren lipid microspheres, sodium chloride flush, sodium chloride, senna, acetaminophen **OR** acetaminophen, ondansetron **OR** ondansetron      Intake/Output Summary (Last 24 hours) at 11/12/2022 1400  Last data filed at 11/12/2022 1315  Gross per 24 hour   Intake 420 ml   Output 300 ml   Net 120 ml       Physical Exam Performed:    BP 86/72   Pulse 66   Temp 98.1 °F (36.7 °C) (Oral)   Resp 18   Ht 5' 7\" (1.702 m)   Wt 222 lb 9.6 oz (101 kg)   SpO2 91%   BMI 34.86 kg/m²     General appearance: No apparent distress, appears stated age and cooperative. HEENT: Pupils equal, round, and reactive to light. Conjunctivae/corneas clear. Neck: Supple, with full range of motion. No jugular venous distention. Trachea midline. Respiratory:  Normal respiratory effort. Clear to auscultation, bilaterally without Rales/Wheezes/Rhonchi. Cardiovascular: Regular rate and rhythm with normal S1/S2 without murmurs, rubs or gallops. Abdomen: Soft, non-tender, non-distended with normal bowel sounds. Musculoskeletal: No clubbing, cyanosis or edema bilaterally. Full range of motion without deformity. Skin: Skin color, texture, turgor normal.  No rashes or lesions. Neurologic:  Neurovascularly intact without any focal sensory/motor deficits.  Cranial nerves: II-XII intact, grossly non-focal.  Psychiatric: Alert and oriented, thought content appropriate, normal insight  Capillary Refill: Brisk, 3 seconds, normal   Peripheral Pulses: +2 palpable, equal bilaterally       Labs:   Recent Labs     11/10/22  0546   WBC 6.8   HGB 12.3   HCT 37.7        Recent Labs     11/10/22  0546 11/11/22  0752 11/12/22  0512    141 142   K 3.3* 3.6 4.2    102 101   CO2 28 27 33*   BUN 7 11 19   CREATININE 0.9 1.2 1.8*   CALCIUM 8.6 8.7 9.1     No results for input(s): AST, ALT, BILIDIR, BILITOT, ALKPHOS in the last 72 hours. No results for input(s): INR in the last 72 hours. Recent Labs     11/09/22 1939   TROPONINI <0.01       Urinalysis:    No results found for: Kailey Rachel, BACTERIA, RBCUA, BLOODU, SPECGRAV, GLUCOSEU    Radiology:  NM LUNG VENT/PERFUSION (VQ)   Final Result   Low probability for pulmonary embolus.          XR CHEST PORTABLE   Final Result              Assessment/Plan:    Active Hospital Problems    Diagnosis     Elevated brain natriuretic peptide (BNP) level [R79.89]      Priority: Medium    Premature atrial contraction [I49.1]      Priority: Medium    Acute on chronic systolic congestive heart failure (HCC) [I50.23]      Priority: Medium    Hypokalemia [E87.6]      Priority: Medium    Essential hypertension [I10]      Priority: Medium    Hyperlipidemia [E78.5]      Priority: Medium    Former smoker [Z87.891]      Priority: Medium    Acute decompensated heart failure (HCC) [I50.9]      Priority: Medium    COPD (chronic obstructive pulmonary disease) ([facility] [address] [J44.9]      Priority: Medium    PA[facility]ox[facility] fibrillation) [facility] [I48.0]      Priority: Medium    Pulmonary vascular congestion [R09.89]      Priority: Medium        Acute decompensated CHF with pulmonary edema  - continuous telemetry monitoring and strict I's & O's; pure wick in place  - Diuresed adequately, slight creatinine bump today and hypotension, 250 mL NS bolus ordered.   -Continue home Coreg  - Echo 11/11/2022 demonstrates LVEF 25%  -2G Na and fluid restriction dietary modifications in place  - Cardiology following, planning for Entresto given low EF, spironolactone 25 mg added as well     COPD in former smoker  -Patient complains of severe dyspnea s/p recent travel therefore VQ scan ordered to rule out PE (contrast allergy); results interpreted as low probability  -Continuous pulse oximetry monitoring initiated with PRN supplemental O2   -Continue home maintenance MDIs/nebulizer treatment including Breztri  -Encourage aggressive pulmonary toilet including incentive spirometry every 4H while awake  -Albuterol nebs scheduled every 6 hours as needed     PAF  -EKG with undetermined rhythm, cardiology suggested PACs rather than A. Fib  -Patient s/p left atrial appendage clipping  -Continue beta-blocker for rate control  -Continue twice daily Eliquis anticoagulation    DVT Prophylaxis: Eliquis  Diet: ADULT DIET;  Regular; Low Fat/Low Chol/High Fiber/ISIS  Code Status: Full Code  PT/OT Eval Status: Recommending Home with assist as needed    Dispo - 1-2 days pending clinical course    Appropriate for A1 Discharge Unit: PAVITHRA Jacobs

## 2022-11-12 NOTE — PROGRESS NOTES
Device programming evaluation for patient's DC ICD by company representative shows normal device function. EP physician will review. See interrogation under cardiology tab in the [address] [place] [address] [address] field for more details. Hx PAF-S/P ablation 2019 and TONY clipping. patient reports clip not in right position for complete closure- remains on eliquis. No parameters have been changed during the current session. Criss Rossi shows decreasing thoracic impedence consistent with increased fluid index-NPRB reviewed. Transfer request placed in [place] for release to [facility].   Jw [employer]  Device Serial Number  [id number] ICD  [facility]  [id number]  [facility] , [address]  [place]  Phone Number  [phone]

## 2022-11-12 NOTE — PLAN OF CARE
Problem: Chronic Conditions and Co-morbidities  Goal: Patient's chronic conditions and co-morbidity symptoms are monitored and maintained or improved  Outcome: Progressing     Problem: Safety - Adult  Goal: Free from fall injury  Outcome: Progressing     Problem: Skin/Tissue Integrity  Goal: Absence of new skin breakdown  Description: 1. Monitor for areas of redness and/or skin breakdown  2. Assess vascular access sites hourly  3. Every 4-6 hours minimum:  Change oxygen saturation probe site  4. Every 4-6 hours:  If on nasal continuous positive airway pressure, respiratory therapy assess nares and determine need for appliance change or resting period. Outcome: Progressing       Patient's EF (Ejection Fraction) is less than 40%    Heart Failure Medications:  Diuretics[de-identified] Spironolactone    (One of the following REQUIRED for EF </= 40%/SYSTOLIC FAILURE but MAY be used in EF% >40%/DIASTOLIC FAILURE)        ACE[de-identified] None        ARB[de-identified] None         ARNI[de-identified] None    (Beta Blockers)  NON- Evidenced Based Beta Blocker (for EF% >40%/DIASTOLIC FAILURE): None    Evidenced Based Beta Blocker::(REQUIRED for EF% <40%/SYSTOLIC FAILURE) Carvedilol- Coreg  . .................................................................................................................................................. Patient's weights and intake/output reviewed: Yes    Patient's Last Weight: 222 lbs obtained by standing scale. Difference of 1 lbs less than last documented weight. Intake/Output Summary (Last 24 hours) at 11/12/2022 1330  Last data filed at 11/12/2022 1315  Gross per 24 hour   Intake 420 ml   Output 300 ml   Net 120 ml       Education Booklet Provided: yes    Comorbidities Reviewed Yes    Patient has a past medical history of Atrial fibrillation ([facility] [address]      >>For CHF and Comorbidity documentation on Education Time and Topics, please see Education Tab    Progressive Mobility Assessment:  What is this patient's Current Level of Mobility?: Ambulatory-Up [place]  How was this patient Mobilized today?: Edge of Bed, Up to Chair,  Up to Toilet/Shower, and Up in Room, ambulated 10 ft                 With Whom? Self                 Level of Difficulty/Assistance: Independent     Pt up in chair at this time on room air. Pt denies shortness of breath. Pt with nonpitting lower extremity edema.      Patient and/or Family's stated Goal of Care this Admission: reduce shortness of breath, increase activity tolerance, better understand heart failure and disease management, be more comfortable, and reduce lower extremity edema prior to discharge        :

## 2022-11-13 LAB
ANION GAP SERPL CALCULATED.3IONS-SCNC: 9 MMOL/L (ref 3–16)
BUN BLDV-MCNC: 26 MG/DL (ref 7–20)
CALCIUM SERPL-MCNC: 9.1 MG/DL (ref 8.3–10.6)
CHLORIDE BLD-SCNC: 101 MMOL/L (ref 99–110)
CO2: 30 MMOL/L (ref 21–32)
CREAT SERPL-MCNC: 1.6 MG/DL (ref 0.6–1.2)
GFR SERPL CREATININE-BSD FRML MDRD: 34 ML/MIN/{1.73_M2}
GLUCOSE BLD-MCNC: 93 MG/DL (ref 70–99)
MAGNESIUM: 2.1 MG/DL (ref 1.8–2.4)
POTASSIUM SERPL-SCNC: 3.9 MMOL/L (ref 3.5–5.1)
SODIUM BLD-SCNC: 140 MMOL/L (ref 136–145)

## 2022-11-13 PROCEDURE — 6370000000 HC RX 637 (ALT 250 FOR IP): Performed by: HOSPITALIST

## 2022-11-13 PROCEDURE — 80048 BASIC METABOLIC PNL TOTAL CA: CPT

## 2022-11-13 PROCEDURE — 1200000000 HC SEMI PRIVATE

## 2022-11-13 PROCEDURE — 2580000003 HC RX 258: Performed by: HOSPITALIST

## 2022-11-13 PROCEDURE — 6370000000 HC RX 637 (ALT 250 FOR IP): Performed by: NURSE PRACTITIONER

## 2022-11-13 PROCEDURE — 2700000000 HC OXYGEN THERAPY PER DAY

## 2022-11-13 PROCEDURE — 94640 AIRWAY INHALATION TREATMENT: CPT

## 2022-11-13 PROCEDURE — 94761 N-INVAS EAR/PLS OXIMETRY MLT: CPT

## 2022-11-13 PROCEDURE — 83735 ASSAY OF MAGNESIUM: CPT

## 2022-11-13 PROCEDURE — 36415 COLL VENOUS BLD VENIPUNCTURE: CPT

## 2022-11-13 PROCEDURE — 99233 SBSQ HOSP IP/OBS HIGH 50: CPT | Performed by: NURSE PRACTITIONER

## 2022-11-13 RX ORDER — CARVEDILOL 6.25 MG/1
12.5 TABLET ORAL 2 TIMES DAILY
Status: DISCONTINUED | OUTPATIENT
Start: 2022-11-13 | End: 2022-11-15 | Stop reason: HOSPADM

## 2022-11-13 RX ADMIN — Medication 2 PUFF: at 20:39

## 2022-11-13 RX ADMIN — PANTOPRAZOLE SODIUM 40 MG: 40 TABLET, DELAYED RELEASE ORAL at 09:02

## 2022-11-13 RX ADMIN — SODIUM CHLORIDE, PRESERVATIVE FREE 10 ML: 5 INJECTION INTRAVENOUS at 10:11

## 2022-11-13 RX ADMIN — CARVEDILOL 25 MG: 25 TABLET, FILM COATED ORAL at 10:10

## 2022-11-13 RX ADMIN — Medication 2 PUFF: at 08:56

## 2022-11-13 RX ADMIN — SPIRONOLACTONE 25 MG: 25 TABLET ORAL at 09:03

## 2022-11-13 RX ADMIN — CARVEDILOL 12.5 MG: 6.25 TABLET, FILM COATED ORAL at 21:07

## 2022-11-13 RX ADMIN — SODIUM CHLORIDE, PRESERVATIVE FREE 10 ML: 5 INJECTION INTRAVENOUS at 21:07

## 2022-11-13 RX ADMIN — CETIRIZINE HYDROCHLORIDE 5 MG: 5 TABLET ORAL at 09:03

## 2022-11-13 RX ADMIN — APIXABAN 5 MG: 5 TABLET, FILM COATED ORAL at 09:03

## 2022-11-13 RX ADMIN — APIXABAN 5 MG: 5 TABLET, FILM COATED ORAL at 21:07

## 2022-11-13 RX ADMIN — TIOTROPIUM BROMIDE INHALATION SPRAY 2 PUFF: 3.12 SPRAY, METERED RESPIRATORY (INHALATION) at 08:56

## 2022-11-13 ASSESSMENT — PAIN SCALES - GENERAL
PAINLEVEL_OUTOF10: 0

## 2022-11-13 NOTE — PLAN OF CARE
Problem: Safety - Adult  Goal: Free from fall injury  Outcome: Progressing  Pt high low risk. Instructed to use call light before getting out of bed. Call light within reach. Bed in low position. Bed alarm on. Will continue to monitor. Problem: Chronic Conditions and Co-morbidities  Goal: Patient's chronic conditions and co-morbidity symptoms are monitored and maintained or improved  Outcome: Progressing     Patient's EF (Ejection Fraction) is less than 40%    Heart Failure Medications:  Diuretics[de-identified] None    (One of the following REQUIRED for EF </= 40%/SYSTOLIC FAILURE but MAY be used in EF% >40%/DIASTOLIC FAILURE)        ACE[de-identified] None        ARB[de-identified] None         ARNI[de-identified] None    (Beta Blockers)  NON- Evidenced Based Beta Blocker (for EF% >40%/DIASTOLIC FAILURE): None    Evidenced Based Beta Blocker::(REQUIRED for EF% <40%/SYSTOLIC FAILURE) Carvedilol- Coreg  . .................................................................................................................................................. Patient's weights and intake/output reviewed: Yes    Patient's Last Weight: 222 lbs obtained by standing scale. Difference of 0 lbs  than last documented weight. Intake/Output Summary (Last 24 hours) at 11/13/2022 1547  Last data filed at 11/13/2022 1509  Gross per 24 hour   Intake 1340 ml   Output 1500 ml   Net -160 ml       Education Booklet Provided: yes    Comorbidities Reviewed Yes    Patient has a past medical history of Atrial fibrillation ([facility] [address] >>For CHF and Comorbidity documentation on Education Time and Topics, please see Education Tab    Progressive Mobility Assessment:  What is this patient's Current Level of Mobility?: Ambulatory-Up Ad Flori  How was this patient Mobilized today?: Edge of Bed, Up to Chair,  Up to Toilet/Shower, and Up in Room, ambulated 10 ft                 With Whom? Self                 Level of Difficulty/Assistance: Independent     Pt up in chair at this time on room air. Pt denies shortness of breath. Pt without lower extremity edema.      Patient and/or Family's stated Goal of Care this Admission: reduce shortness of breath, increase activity tolerance, better understand heart failure and disease management, and be more comfortable prior to discharge        :

## 2022-11-13 NOTE — PROGRESS NOTES
Hospitalist Progress Note      PCP: Kavin Harrison, APRN - CNP    Date of Admission: 11/9/2022    Chief Complaint:   Shortness of breath    Hospital Course:   April Pack is a 67 y.o. female who presented to the ED to be evaluated for increased dyspnea ongoing for 2 weeks PTA. She recently relocated from [place], and endorses recent prolonged travel. Patient visited her newly assigned PCP today, who recommended urgent ED evaluation. She endorses concomitant intermittent chest pressure, nonproductive cough, and lightheadedness. Patient has an underlying history of CHF, however no echo results are available in [facility]. She reports that she has ran out of her Coreg 2 days PTA, but states that she has been compliant with her Eliquis anticoagulation. Patient is a former smoker. Upon arrival to the ED EKG was obtained revealing undetermined rhythm, LAD with LBBB, and QTC prolongation; no previous EKGs available for comparison STAT CXR demonstrates moderate cardiomegaly and pulmonary vascular congestion; left atrial appendage closure device noted. Patient with severe contrast allergy therefore NM VQ scan ordered and noted to be low probability for PE. Rapid COVID-19 testing performed and noted to be negative. Notable labs include: BNP 7255, elevated D-dimer 1.51, and serial troponin negative x3. Patient received a one-time bolus dosage of Lasix 40 mg per ED provider, prior to request for admission. Subjective:   Seen resting in bed with  at bedside, denies chest pain or shortness of breath, updated on plan of care.      Medications:  Reviewed    Infusion Medications    sodium chloride       Scheduled Medications    carvedilol  12.5 mg Oral BID    GI cocktail   Oral Once    apixaban  5 mg Oral BID    [Held by provider] spironolactone  25 mg Oral Daily    cetirizine  5 mg Oral Daily    pantoprazole  40 mg Oral QAM AC    sodium chloride flush  10 mL IntraVENous 2 times per day mometasone-formoterol  2 puff Inhalation BID    tiotropium  2 puff Inhalation Daily     PRN Meds: albuterol, perflutren lipid microspheres, sodium chloride flush, sodium chloride, senna, acetaminophen **OR** acetaminophen, ondansetron **OR** ondansetron      Intake/Output Summary (Last 24 hours) at 11/13/2022 1338  Last data filed at 11/13/2022 1013  Gross per 24 hour   Intake 970 ml   Output 1500 ml   Net -530 ml       Physical Exam Performed:    /65   Pulse 62   Temp 97.9 °F (36.6 °C) (Oral)   Resp 16   Ht 5' 7\" (1.702 m)   Wt 222 lb 14.4 oz (101.1 kg)   SpO2 94%   BMI 34.91 kg/m²     General appearance: No apparent distress, appears stated age and cooperative. HEENT: Pupils equal, round, and reactive to light. Conjunctivae/corneas clear. Neck: Supple, with full range of motion. No jugular venous distention. Trachea midline. Respiratory:  Normal respiratory effort. Clear to auscultation, bilaterally without Rales/Wheezes/Rhonchi. Cardiovascular: Regular rate and rhythm with normal S1/S2 without murmurs, rubs or gallops. Abdomen: Soft, non-tender, non-distended with normal bowel sounds. Musculoskeletal: No clubbing, cyanosis or edema bilaterally. Full range of motion without deformity. Skin: Skin color, texture, turgor normal.  No rashes or lesions. Neurologic:  Neurovascularly intact without any focal sensory/motor deficits. Cranial nerves: II-XII intact, grossly non-focal.  Psychiatric: Alert and oriented, thought content appropriate, normal insight  Capillary Refill: Brisk, 3 seconds, normal   Peripheral Pulses: +2 palpable, equal bilaterally       Labs:   No results for input(s): WBC, HGB, HCT, PLT in the last 72 hours.     Recent Labs     11/11/22  0752 11/12/22  0512 11/13/22  0521    142 140   K 3.6 4.2 3.9    101 101   CO2 27 33* 30   BUN 11 19 26*   CREATININE 1.2 1.8* 1.6*   CALCIUM 8.7 9.1 9.1     No results for input(s): AST, ALT, BILIDIR, BILITOT, ALKPHOS in the last 72 hours. No results for input(s): INR in the last 72 hours. No results for input(s): Kristin Bigness in the last 72 hours. Urinalysis:    No results found for: Clarise Leaver, BACTERIA, RBCUA, BLOODU, [address], [address]    Radiology:  NM LUNG VENT/PERFUSION (VQ)   Final Result   Low probability for pulmonary embolus.          XR CHEST PORTABLE   Final Result              Assessment/Plan:    Active Hospital Problems    Diagnosis     Elevated brain natriuretic peptide (BNP) level [R79.89]      Priority: Medium    Premature atrial contraction [I49.1]      Priority: Medium    Acute on chronic systolic congestive heart failure (HCC) [I50.23]      Priority: Medium    Hypokalemia [E87.6]      Priority: Medium    Essential hypertension [I10]      Priority: Medium    Hyperlipidemia [E78.5]      Priority: Medium    Former smoker [Z87.891]      Priority: Medium    Acute decompensated heart failure (HCC) [I50.9]      Priority: Medium    COPD (chronic obstructive pulmonary disease) ([facility] [address] [J44.9]      Priority: Medium    PAF (paroxysmal atrial fibrillation) ([facility] [address] [I48.0]      Priority: Medium    Pulmonary vascular congestion [R09.89]      Priority: Medium        Acute decompensated CHF with pulmonary edema  - continuous telemetry monitoring and strict I's & O's; pure wick in place  - Diuresed adequately, slight creatinine bump today and hypotension, 250 mL NS bolus ordered on 11/12/2022, Ashley Barb decreased to hypotension  - Echo 11/11/2022 demonstrates LVEF 25%  -2G Na and fluid restriction dietary modifications in place  - Cardiology following,  spironolactone 25 mg added as well     COPD in former smoker  -Patient complains of severe dyspnea s/p recent travel therefore VQ scan ordered to rule out PE (contrast allergy); results interpreted as low probability  -Continuous pulse oximetry monitoring initiated with PRN supplemental O2   -Continue home maintenance MDIs/nebulizer treatment including Breztri  -Encourage aggressive pulmonary toilet including incentive spirometry every 4H while awake  -Albuterol nebs scheduled every 6 hours as needed     PAF  -EKG with undetermined rhythm, cardiology suggested PACs rather than A. Fib  -Patient s/p left atrial appendage clipping  -Continue beta-blocker for rate control  -Continue twice daily Eliquis anticoagulation  -EP to seen tomorrow morning     DVT Prophylaxis: Eliquis  Diet: ADULT DIET;  Regular; Low Fat/Low Chol/High Fiber/ISIS  Code Status: Full Code  PT/OT Eval Status: Recommending Home with assist as needed    Dispo - Likely tomorrow pending cards recs and improvement in renal function    Appropriate for A1 Discharge Unit: No      PAVITHRA Mandel

## 2022-11-13 NOTE — PLAN OF CARE
Problem: Chronic Conditions and Co-morbidities  Goal: Patient's chronic conditions and co-morbidity symptoms are monitored and maintained or improved  11/13/2022 0121 by Shanika Brown RN  Outcome: Progressing     Problem: Safety - Adult  Goal: Free from fall injury  11/13/2022 0121 by Shanika Brown RN  Outcome: Progressing       Patient's EF (Ejection Fraction) is less than 40%    Heart Failure Medications:  Diuretics[de-identified] Spironolactone    (One of the following REQUIRED for EF </= 40%/SYSTOLIC FAILURE but MAY be used in EF% >40%/DIASTOLIC FAILURE)        ACE[de-identified] None        ARB[de-identified] None         ARNI[de-identified] None    (Beta Blockers)  NON- Evidenced Based Beta Blocker (for EF% >40%/DIASTOLIC FAILURE): None    Evidenced Based Beta Blocker::(REQUIRED for EF% <40%/SYSTOLIC FAILURE) Carvedilol- Coreg  . .................................................................................................................................................. Patient's weights and intake/output reviewed: Yes    Patient's Last Weight: 222 lbs obtained by standing scale. Difference of 1 lbs less than last documented weight. Intake/Output Summary (Last 24 hours) at 11/13/2022 0123  Last data filed at 11/12/2022 1839  Gross per 24 hour   Intake 1030 ml   Output 700 ml   Net 330 ml       Education Booklet Provided: yes    Comorbidities Reviewed Yes    Patient has a past medical history of Atrial fibrillation ([facility] [address] >>For CHF and Comorbidity documentation on Education Time and Topics, please see Education Tab    Progressive Mobility Assessment:  What is this patient's Current Level of Mobility?: Ambulatory-Up Ad Flori  How was this patient Mobilized today?: Edge of Bed, Up to Chair,  Up to Toilet/Shower, and Up in Room, ambulated 10 ft                 With Whom? Nurse, PCA, and Self                 Level of Difficulty/Assistance: Independent     Pt resting in bed at this time on  2 L O2. Pt with complaints of shortness of breath.  Pt with nonpitting lower extremity edema.      Patient and/or Family's stated Goal of Care this Admission: reduce shortness of breath, increase activity tolerance, better understand heart failure and disease management, be more comfortable, and reduce lower extremity edema prior to discharge        :

## 2022-11-13 NOTE — PROGRESS NOTES
[facility]     Cardiology                                     Progress Note    Admission date:  2022    Reason for follow up visit: AF, CHF, CM     HPI/CC: Santana Mcintyre was admitted on 2022 with shortness of breath. Cardiology following for CHF. She has a previous cardiac history and recently moved to [place] from [place]. Previous records have not been available. On 2022, creatinine lupe to 1.8. Lasix has been held. Rhythm has been sinus with intermittent AP VS and PVC's. Subjective: She reports feeling better. Denies chest pain, palpitations, shortness of breath, and dizziness. Vitals:  Blood pressure 115/77, pulse 73, temperature 97.9 °F (36.6 °C), temperature source Oral, resp. rate 16, height 5' 7\" (1.702 m), weight 222 lb 14.4 oz (101.1 kg), SpO2 97 %.   Temp  Av.2 °F (36.8 °C)  Min: 97.3 °F (36.3 °C)  Max: 98.6 °F (37 °C)  Pulse  Av.7  Min: 58  Max: 73  BP  Min: 86/72  Max: 115/77  SpO2  Av.7 %  Min: 91 %  Max: 98 %    24 hour I/O    Intake/Output Summary (Last 24 hours) at 2022 0950  Last data filed at 2022 0827  Gross per 24 hour   Intake 970 ml   Output 1200 ml   Net -230 ml       Current Facility-Administered Medications   Medication Dose Route Frequency Provider Last Rate Last Admin    aluminum & magnesium hydroxide-simethicone (MAALOX) 30 mL, lidocaine viscous hcl (XYLOCAINE) 5 mL (GI COCKTAIL)   Oral Once PAVITHRA Trent        apixaban (ELIQUIS) tablet 5 mg  5 mg Oral BID Keanu Quant, APRN - CNP   5 mg at 22 9921    spironolactone (ALDACTONE) tablet 25 mg  25 mg Oral Daily Keanu Quant, APRN - CNP   25 mg at 22 0903    carvedilol (COREG) tablet 25 mg  25 mg Oral BID Lauryn Mai MD   25 mg at 22 0829    cetirizine (ZYRTEC) tablet 5 mg  5 mg Oral Daily Lauryn Mai MD   5 mg at 22 0903    pantoprazole (PROTONIX) tablet 40 mg  40 mg Oral QAM  Lauryn Mai MD   40 mg at 22 4674 albuterol (PROVENTIL) nebulizer solution 2.5 mg  2.5 mg Nebulization Q6H PRN Odell Santiago MD        perflutren lipid microspheres (DEFINITY) injection 1.5 mL  1.5 mL IntraVENous ONCE PRN Odell Santiago MD        sodium chloride flush 0.9 % injection 10 mL  10 mL IntraVENous 2 times per day Odell Santiago MD   10 mL at 11/12/22 2051    sodium chloride flush 0.9 % injection 10 mL  10 mL IntraVENous PRN Odell Santiago MD        0.9 % sodium chloride infusion   IntraVENous PRN Odell Santiago MD        senna (SENOKOT) tablet 8.6 mg  1 tablet Oral Daily PRN Odell Santiago MD        acetaminophen (TYLENOL) tablet 650 mg  650 mg Oral Q6H PRN Odell Santiago MD        Or    acetaminophen (TYLENOL) suppository 650 mg  650 mg Rectal Q6H PRN Odell Santiago MD        ondansetron (ZOFRAN-ODT) disintegrating tablet 4 mg  4 mg Oral Q8H PRN Odell Santiago MD        Or    ondansetron [facility]) injection 4 mg  4 mg IntraVENous Q6H PRN Odell Santiago MD   4 mg at 11/11/22 1144    mometasone-formoterol (DULERA) 200-5 MCG/ACT inhaler 2 puff  2 puff Inhalation BID Odell Santiago MD   2 puff at 11/13/22 0856    tiotropium (SPIRIVA RESPIMAT) 2.5 MCG/ACT inhaler 2 puff  2 puff Inhalation Daily Odell Santiago MD   2 puff at 11/13/22 0856       Objective:     Telemetry monitor: SR, AP VS, PVC's     Physical Exam:  Constitutional and general appearance: alert, cooperative, no distress, and appears stated age  [de-identified]: PERRL, no cervical lymphadenopathy. No masses palpable. Normal oral mucosa  Respiratory:  Normal excursion and expansion without use of accessory muscles  Resp auscultation: Normal breath sounds without wheezing, rhonchi, and rales  Cardiovascular:   The apical impulse is not displaced  Heart tones are crisp and normal. regular S1 and S2.  Jugular venous pulsation Normal  The carotid upstroke is normal in amplitude and contour without delay or bruit  Peripheral pulses are symmetrical and full   Abdomen:  No masses or tenderness  Bowel sounds present  Extremities:   No cyanosis or clubbing   No lower extremity edema   Skin: warm and dry  Neurological:  Alert and oriented  Moves all extremities well  No abnormalities of mood, affect, memory, mentation, or behavior are noted    Data    Echo 11/11/2022:      Summary   The left ventricular systolic function is severely reduced with an ejection   fraction of 25%. Left ventricular cavity size is moderately dilated with mild concentric left   ventricular hypertrophy. Grade I diastolic dysfunction with normal filling pressure. Mild mitral and pulmonic regurgitation. The left atrium is mildly dilated. Bi-atrial enlargement. Moderate aortic regurgitation. The aortic root is mildly dilated. The ascending aorta is moderately dilated. The right ventricle is moderately enlarged. Right ventricular systolic function is moderately reduced . Tricuspid valve is structurally normal.   Trivial tricuspid regurgitation. Systolic pulmonic artery pressure (SPAP) is normal estimated at 30 mmHg   (Right atrial pressure of 3 mmHg). The right atrium is moderately dilated. All labs and testing reviewed.   Lab Review     Renal Profile:   Lab Results   Component Value Date/Time    CREATININE 1.6 11/13/2022 05:21 AM    BUN 26 11/13/2022 05:21 AM     11/13/2022 05:21 AM    K 3.9 11/13/2022 05:21 AM     11/13/2022 05:21 AM    CO2 30 11/13/2022 05:21 AM     CBC:    Lab Results   Component Value Date/Time    WBC 6.8 11/10/2022 05:46 AM    RBC 4.01 11/10/2022 05:46 AM    HGB 12.3 11/10/2022 05:46 AM    HCT 37.7 11/10/2022 05:46 AM    MCV 93.9 11/10/2022 05:46 AM    RDW 14.2 11/10/2022 05:46 AM     11/10/2022 05:46 AM     BNP:  No results found for: BNP  Fasting Lipid Panel:    Lab Results   Component Value Date/Time    CHOL 149 11/10/2022 05:46 AM    HDL 44 11/10/2022 05:46 AM    TRIG 98 11/10/2022 05:46 AM     Cardiac Enzymes:  CK/MbTroponin  Lab Results Component Value Date/Time    TROPONINI <0.01 11/09/2022 07:39 PM     PT/ INR No results found for: INR, PROTIME  PTT No results found for: PTT   Lab Results   Component Value Date/Time    MG 2.10 11/13/2022 05:21 AM    No results found for: TSH    Assessment:  Acute on chronic systolic CHF: improved    -weight on admission 232 lb (unclear if accurate), weight today 222 lb   -has diuresed 2 L   Paroxysmal atrial fibrillation: stable    -[id number] score > 2   -s/p ablation and TONY clip 2019 (in [place])   Nonischemic cardiomyopathy: ongoing, unclear etiology    -s/p dual chamber ICD implant 2019 [facility])   -EF 25% on echo 11/2022  HLD   COPD   Hypokalemia: resolved  NELIA    Plan:   1. Continue Eliquis   2. Holding lasix and spironolactone given elevated creatinine and borderline hypotension  3. Decrease Coreg to 12.5 mg PO BID given lower BP   4. Maintain normal electrolytes  5. Strict I&O, daily weight, fluid and sodium restriction  6.  Continue to monitor on telemetry       [facility], APRN-CNP  [address]  [phone]

## 2022-11-14 ENCOUNTER — NURSE ONLY (OUTPATIENT)
Dept: CARDIOLOGY CLINIC | Age: 72
End: 2022-11-14
Payer: MEDICARE

## 2022-11-14 DIAGNOSIS — Z95.810 ICD (IMPLANTABLE CARDIOVERTER-DEFIBRILLATOR), DUAL, IN SITU: ICD-10-CM

## 2022-11-14 DIAGNOSIS — I42.8 NICM (NONISCHEMIC CARDIOMYOPATHY) (HCC): Primary | ICD-10-CM

## 2022-11-14 DIAGNOSIS — I50.22 CHRONIC SYSTOLIC CONGESTIVE HEART FAILURE (HCC): ICD-10-CM

## 2022-11-14 LAB
ANION GAP SERPL CALCULATED.3IONS-SCNC: 8 MMOL/L (ref 3–16)
BUN BLDV-MCNC: 20 MG/DL (ref 7–20)
CALCIUM SERPL-MCNC: 9.3 MG/DL (ref 8.3–10.6)
CHLORIDE BLD-SCNC: 105 MMOL/L (ref 99–110)
CO2: 29 MMOL/L (ref 21–32)
CREAT SERPL-MCNC: 1 MG/DL (ref 0.6–1.2)
GFR SERPL CREATININE-BSD FRML MDRD: 59 ML/MIN/{1.73_M2}
GLUCOSE BLD-MCNC: 93 MG/DL (ref 70–99)
MAGNESIUM: 2.2 MG/DL (ref 1.8–2.4)
POTASSIUM SERPL-SCNC: 4.5 MMOL/L (ref 3.5–5.1)
SODIUM BLD-SCNC: 142 MMOL/L (ref 136–145)
T4 FREE: 1.4 NG/DL (ref 0.9–1.8)
TSH SERPL DL<=0.05 MIU/L-ACNC: 0.6 UIU/ML (ref 0.27–4.2)

## 2022-11-14 PROCEDURE — 6370000000 HC RX 637 (ALT 250 FOR IP): Performed by: HOSPITALIST

## 2022-11-14 PROCEDURE — 83735 ASSAY OF MAGNESIUM: CPT

## 2022-11-14 PROCEDURE — 80048 BASIC METABOLIC PNL TOTAL CA: CPT

## 2022-11-14 PROCEDURE — 2580000003 HC RX 258: Performed by: HOSPITALIST

## 2022-11-14 PROCEDURE — 36415 COLL VENOUS BLD VENIPUNCTURE: CPT

## 2022-11-14 PROCEDURE — 1200000000 HC SEMI PRIVATE

## 2022-11-14 PROCEDURE — 84439 ASSAY OF FREE THYROXINE: CPT

## 2022-11-14 PROCEDURE — 6370000000 HC RX 637 (ALT 250 FOR IP): Performed by: NURSE PRACTITIONER

## 2022-11-14 PROCEDURE — 84443 ASSAY THYROID STIM HORMONE: CPT

## 2022-11-14 PROCEDURE — 99232 SBSQ HOSP IP/OBS MODERATE 35: CPT | Performed by: NURSE PRACTITIONER

## 2022-11-14 PROCEDURE — 94640 AIRWAY INHALATION TREATMENT: CPT

## 2022-11-14 RX ADMIN — CETIRIZINE HYDROCHLORIDE 5 MG: 5 TABLET ORAL at 08:55

## 2022-11-14 RX ADMIN — Medication 2 PUFF: at 20:06

## 2022-11-14 RX ADMIN — CARVEDILOL 12.5 MG: 6.25 TABLET, FILM COATED ORAL at 20:28

## 2022-11-14 RX ADMIN — SODIUM CHLORIDE, PRESERVATIVE FREE 10 ML: 5 INJECTION INTRAVENOUS at 20:28

## 2022-11-14 RX ADMIN — PANTOPRAZOLE SODIUM 40 MG: 40 TABLET, DELAYED RELEASE ORAL at 07:19

## 2022-11-14 RX ADMIN — SODIUM CHLORIDE, PRESERVATIVE FREE 10 ML: 5 INJECTION INTRAVENOUS at 08:54

## 2022-11-14 RX ADMIN — TIOTROPIUM BROMIDE INHALATION SPRAY 2 PUFF: 3.12 SPRAY, METERED RESPIRATORY (INHALATION) at 08:26

## 2022-11-14 RX ADMIN — CARVEDILOL 12.5 MG: 6.25 TABLET, FILM COATED ORAL at 08:55

## 2022-11-14 RX ADMIN — APIXABAN 5 MG: 5 TABLET, FILM COATED ORAL at 20:28

## 2022-11-14 RX ADMIN — APIXABAN 5 MG: 5 TABLET, FILM COATED ORAL at 08:55

## 2022-11-14 RX ADMIN — Medication 2 PUFF: at 08:26

## 2022-11-14 ASSESSMENT — PAIN SCALES - GENERAL
PAINLEVEL_OUTOF10: 0

## 2022-11-14 NOTE — PROGRESS NOTES
[facility]   Daily Progress Note    Admit Date:  11/9/2022  HPI:    Chief Complaint   Patient presents with    Shortness of Breath     Patient states she has had difficulty breathing for the last two weeks. States she saw her PCP today who sent her to the ED for abnormal EKG. Interval history: Fercho Camacho is being followed for CHF. Moved from [place] to [place], [place] in the last 1 week. Records from [place] not available for review at the time of this note. S/P Dual chamber MEdtronic ICD implanted 6/26/2019 by Dr. [facility]); S/P ablation 2019 and TONY clipping-  details unknown. She had progressively worsening shortness of breath. Describes it as when she 1st went into afib. She reports a history of HFrEF 25% with improved EF up to 50% on recent echo about 1 month ago- report not available. Subjective:  Ms. Byron Baptiste feels better today. Was able to walk the hallways without having much shortness of breath. Still no records from prior hospital for review despite many attempts. No further chest pain.      Objective:   BP (!) 143/83   Pulse 61   Temp 98.2 °F (36.8 °C) (Oral)   Resp 19   Ht 5' 7\" (1.702 m)   Wt 222 lb 11.2 oz (101 kg)   SpO2 99%   BMI 34.88 kg/m²     Intake/Output Summary (Last 24 hours) at 11/14/2022 1256  Last data filed at 11/14/2022 9718  Gross per 24 hour   Intake 1110 ml   Output 0 ml   Net 1110 ml       NYHA: II    Physical Exam:  General:  Awake, alert, NAD, up in the chair   Skin:  Warm and dry  Neck:  JVD<8  Chest:  Clear to auscultation, no wheezes/rhonchi/rales  Telemetry: paced  Cardiovascular:  RRR S1S2, no m/r/g   Abdomen:  Soft, nontender, +bowel sounds  Extremities:  no bilateral lower extremity edema    Medications:    carvedilol  12.5 mg Oral BID    GI cocktail   Oral Once    apixaban  5 mg Oral BID    [Held by provider] spironolactone  25 mg Oral Daily    cetirizine  5 mg Oral Daily    pantoprazole  40 mg Oral QAM AC    sodium chloride flush  10 mL IntraVENous 2 times per day    mometasone-formoterol  2 puff Inhalation BID    tiotropium  2 puff Inhalation Daily      sodium chloride         Lab Data:  CBC:   No results for input(s): WBC, HGB, PLT in the last 72 hours. BMP:    Recent Labs     11/12/22  0512 11/13/22  0521 11/14/22  0651    140 142   K 4.2 3.9 4.5   CO2 33* 30 29   BUN 19 26* 20   CREATININE 1.8* 1.6* 1.0     INR:  No results for input(s): INR in the last 72 hours. BNP:    Recent Labs     11/12/22  0512   PROBNP 1,205*     No results found for: LVEF, LVEFMODE    Testing:    Echo 11/11/22   Summary   The left ventricular systolic function is severely reduced with an ejection   fraction of 25%. Left ventricular cavity size is moderately dilated with mild concentric left   ventricular hypertrophy. Grade I diastolic dysfunction with normal filling pressure. Mild mitral and pulmonic regurgitation. The left atrium is mildly dilated. Bi-atrial enlargement. Moderate aortic regurgitation. The aortic root is mildly dilated. The ascending aorta is moderately dilated. The right ventricle is moderately enlarged. Right ventricular systolic function is moderately reduced . Tricuspid valve is structurally normal.   Trivial tricuspid regurgitation. Systolic pulmonic artery pressure (SPAP) is normal estimated at 30 mmHg   (Right atrial pressure of 3 mmHg). The right atrium is moderately dilated. Principal Problem:    Acute decompensated heart failure (HCC)  Active Problems:    Essential hypertension    Hyperlipidemia    Former smoker    COPD (chronic obstructive pulmonary disease) (HCC)    PAF (paroxysmal atrial fibrillation) (HCC)    Pulmonary vascular congestion    Elevated brain natriuretic peptide (BNP) level    Premature atrial contraction    Acute on chronic systolic congestive heart failure (HCC)    Hypokalemia  Resolved Problems:    * No resolved hospital problems.  *      11/14/22 0715 222 lb 11.2 oz (101 kg) Stated;Standing scale     11/13/22 0445 222 lb 14.4 oz (101.1 kg) Stated;Standing scale     11/12/22 0503 222 lb 9.6 oz (101 kg) Standing scale     11/11/22 0600 223 lb 1.6 oz (101.2 kg) Standing scale     11/10/22 0542 225 lb 6.4 oz (102.2 kg) Standing scale     11/09/22 2313 232 lb (105.2 kg) --     11/09/22 1205 232 lb (105.2 kg) --         Assessment:  Acute on chronic systolic heart failure, with right heart failure   -Reported by Patient was 20%-->50%   - current echo showed LVEF 25%  PAF, TONY clip, s/p ablation 2019   -patient reports clip not in right position for complete closure- remains on eliquis  S/P dual chamber ICD 2019  Moderate aortic regurg   NSVT  HTN  NELIA due to diuresis- improved today     Plan:    Requesting prior cardiac medical records- currently not available despite multiple request.   Continue Eliquis 5mg twice a day  Coreg 12.5mg BID  Will plan to start Entresto tomorrow- low dose once am labs reviewed   Spironolactone (aldactone) on hold until am labs reviewed. Discussed echo results with patient. Recommend ongoing medical management of heart failure, given her reports of prior cath from 2019 was \"without blockages\" with reported EF of 25%. No current records to review. Patient is a good historian. Patient agreeable to plan.      Core measures for HFrEF (EF <40%):  EF: 25%   ICD: Implanted 2019  ACEi/ARB/ARNI: holding while diuresing  BB: Coreg- max dose   Frank: 25 mg Spironolactone (aldactone)  SGLT2: consider adding at discharge     Discharge planning for tomorrow    DEMI Blackwell - CNP,  11/14/2022, 3:20 PM

## 2022-11-14 NOTE — PLAN OF CARE
Problem: Discharge Planning  Goal: Discharge to home or other facility with appropriate resources  Outcome: Progressing  Note:   Patient's EF (Ejection Fraction) is less than 40%    Heart Failure Medications:  Diuretics[de-identified] Spironolactone    (One of the following REQUIRED for EF </= 40%/SYSTOLIC FAILURE but MAY be used in EF% >40%/DIASTOLIC FAILURE)        ACE[de-identified] None        ARB[de-identified] None         ARNI[de-identified] None    (Beta Blockers)  NON- Evidenced Based Beta Blocker (for EF% >40%/DIASTOLIC FAILURE): None    Evidenced Based Beta Blocker::(REQUIRED for EF% <40%/SYSTOLIC FAILURE) Carvedilol- Coreg  . .................................................................................................................................................. Patient's weights and intake/output reviewed: Yes    Patient's Last Weight: 222 lbs obtained by standing scale. Difference of 0 lbs less than last documented weight. Intake/Output Summary (Last 24 hours) at 11/14/2022 0947  Last data filed at 11/14/2022 7789  Gross per 24 hour   Intake 1110 ml   Output 300 ml   Net 810 ml       Education Booklet Provided: yes    Comorbidities Reviewed Yes    Patient has a past medical history of Atrial fibrillation ([facility] [address] >>For CHF and Comorbidity documentation on Education Time and Topics, please see Education Tab    Progressive Mobility Assessment:  What is this patient's Current Level of Mobility?: Ambulatory-Up [place]  How was this patient Mobilized today?: Edge of Bed,  Up to Toilet/Shower, and Up in Room, ambulated 20 ft                 With Whom? Self                 Level of Difficulty/Assistance: Independent     Pt resting in bed at this time on room air. Pt denies shortness of breath. Pt with nonpitting lower extremity edema.      Patient and/or Family's stated Goal of Care this Admission: increase activity tolerance, better understand heart failure and disease management, be more comfortable, and reduce lower extremity edema prior to discharge        :      Problem: Chronic Conditions and Co-morbidities  Goal: Patient's chronic conditions and co-morbidity symptoms are monitored and maintained or improved  11/14/2022 0947 by Lizz Del Angel RN  Outcome: Progressing  Note: Pt remains on telemetry for continuous monitoring of heart rate and rhythm. Cardiac medications administered as ordered. Problem: Safety - Adult  Goal: Free from fall injury  11/14/2022 0948 by Lizz Del Angel RN  Outcome: Progressing  Note: Pt will remain free from falls throughout hospital stay. Fall precautions in place, bed in lowest position with wheels locked and side rails 2/4 up. Room door open and hourly rounding completed. Will continue to monitor throughout shift. Problem: Pain  Goal: Verbalizes/displays adequate comfort level or baseline comfort level  Outcome: Progressing  Note: Pt will be satisfied with pain control. Pt uses numeric pain rating scale with reassessments after pain med administration. Will continue to monitor progression throughout shift.

## 2022-11-14 NOTE — PROGRESS NOTES
Nadege Mtz CNP requested records from [place]. I had faxed a request to Dr. Gilma Carrasco office [phone]. Dr. Agus Raymond [phone]. Fax number [phone]. Received this number from the patient today. [address] [place] [address]  [phone]    Received records on 11/14/22. Given to Nadege Mtz CNP.

## 2022-11-14 NOTE — PROGRESS NOTES
Pt provided additional contact information for her cardiologists in [place]. Dr. Rico Cintron (electrophysiologist)- [facility]  [phone]    Notified Bobo Mcrae.

## 2022-11-14 NOTE — PLAN OF CARE
Problem: Chronic Conditions and Co-morbidities  Goal: Patient's chronic conditions and co-morbidity symptoms are monitored and maintained or improved  Outcome: Progressing     Problem: Safety - Adult  Goal: Free from fall injury  Outcome: Progressing       Patient's EF (Ejection Fraction) is less than 40%    Heart Failure Medications:  Diuretics[de-identified] Spironolactone    (One of the following REQUIRED for EF </= 40%/SYSTOLIC FAILURE but MAY be used in EF% >40%/DIASTOLIC FAILURE)        ACE[de-identified] None        ARB[de-identified] None         ARNI[de-identified] None    (Beta Blockers)  NON- Evidenced Based Beta Blocker (for EF% >40%/DIASTOLIC FAILURE): None    Evidenced Based Beta Blocker::(REQUIRED for EF% <40%/SYSTOLIC FAILURE) Carvedilol- Coreg  . .................................................................................................................................................. Patient's weights and intake/output reviewed: Yes    Patient's Last Weight: 222 lbs obtained by standing scale. Difference of 0 lbs more than last documented weight. Intake/Output Summary (Last 24 hours) at 11/14/2022 0606  Last data filed at 11/13/2022 1812  Gross per 24 hour   Intake 1340 ml   Output 1100 ml   Net 240 ml       Education Booklet Provided: yes    Comorbidities Reviewed Yes    Patient has a past medical history of Atrial fibrillation ([facility] [address] >>For CHF and Comorbidity documentation on Education Time and Topics, please see Education Tab    Progressive Mobility Assessment:  What is this patient's Current Level of Mobility?: Ambulatory-Up [place]  How was this patient Mobilized today?: Edge of Bed, Up to Chair,  Up to Toilet/Shower, and Up in Room, ambulated 10 ft                 With Whom? Nurse and PCA                 Level of Difficulty/Assistance: Independent     Pt resting in bed at this time on  2 L O2. Pt denies shortness of breath. Pt with nonpitting lower extremity edema.      Patient and/or Family's stated Goal of Care this Admission: reduce shortness of breath, increase activity tolerance, better understand heart failure and disease management, and be more comfortable prior to discharge        :

## 2022-11-14 NOTE — PROGRESS NOTES
Hospitalist Progress Note      PCP: Sari Anderson APRN - CNP    Date of Admission: 11/9/2022    Chief Complaint:   Shortness of breath    Hospital Course:   Baldev Hein is a 67 y.o. female who presented to the ED to be evaluated for increased dyspnea ongoing for 2 weeks PTA. She recently relocated from [place], and endorses recent prolonged travel. Patient visited her newly assigned PCP today, who recommended urgent ED evaluation. She endorses concomitant intermittent chest pressure, nonproductive cough, and lightheadedness. Patient has an underlying history of CHF, however no echo results are available in [facility]. She reports that she has ran out of her Coreg 2 days PTA, but states that she has been compliant with her Eliquis anticoagulation. Patient is a former smoker. Upon arrival to the ED EKG was obtained revealing undetermined rhythm, LAD with LBBB, and QTC prolongation; no previous EKGs available for comparison STAT CXR demonstrates moderate cardiomegaly and pulmonary vascular congestion; left atrial appendage closure device noted. Patient with severe contrast allergy therefore NM VQ scan ordered and noted to be low probability for PE. Rapid COVID-19 testing performed and noted to be negative. Notable labs include: BNP 7255, elevated D-dimer 1.51, and serial troponin negative x3. Patient received a one-time bolus dosage of Lasix 40 mg per ED provider, prior to request for admission. Subjective: EMR and notes reviewed pt seen and examined. Reports she is feeling much better overall.  Currently sitting up in chair w/o complaints   Medications:  Reviewed    Infusion Medications    sodium chloride       Scheduled Medications    carvedilol  12.5 mg Oral BID    GI cocktail   Oral Once    apixaban  5 mg Oral BID    [Held by provider] spironolactone  25 mg Oral Daily    cetirizine  5 mg Oral Daily    pantoprazole  40 mg Oral QAM AC    sodium chloride flush  10 mL IntraVENous 2 times per day    mometasone-formoterol  2 puff Inhalation BID    tiotropium  2 puff Inhalation Daily     PRN Meds: albuterol, perflutren lipid microspheres, sodium chloride flush, sodium chloride, senna, acetaminophen **OR** acetaminophen, ondansetron **OR** ondansetron      Intake/Output Summary (Last 24 hours) at 11/14/2022 1305  Last data filed at 11/14/2022 0925  Gross per 24 hour   Intake 1110 ml   Output 0 ml   Net 1110 ml         Physical Exam Performed:    BP (!) 143/83   Pulse 61   Temp 98.2 °F (36.8 °C) (Oral)   Resp 19   Ht 5' 7\" (1.702 m)   Wt 222 lb 11.2 oz (101 kg)   SpO2 99%   BMI 34.88 kg/m²     General appearance: No apparent distress, appears stated age and cooperative. HEENT: Pupils equal, round, and reactive to light. Conjunctivae/corneas clear. Neck: Supple, with full range of motion. No jugular venous distention. Trachea midline. Respiratory:  Normal respiratory effort. Clear to auscultation, bilaterally without Rales/Wheezes/Rhonchi. Cardiovascular: Regular rate and rhythm with normal S1/S2 without murmurs, rubs or gallops. Abdomen: Soft, non-tender, non-distended with normal bowel sounds. Musculoskeletal: No clubbing, cyanosis or edema bilaterally. Full range of motion without deformity. Skin: Skin color, texture, turgor normal.  No rashes or lesions. Neurologic:  Neurovascularly intact without any focal sensory/motor deficits. Cranial nerves: II-XII intact, grossly non-focal.  Psychiatric: Alert and oriented, thought content appropriate, normal insight  Capillary Refill: Brisk, 3 seconds, normal   Peripheral Pulses: +2 palpable, equal bilaterally       Labs:   No results for input(s): WBC, HGB, HCT, PLT in the last 72 hours.     Recent Labs     11/12/22  0512 11/13/22  0521 11/14/22  0651    140 142   K 4.2 3.9 4.5    101 105   CO2 33* 30 29   BUN 19 26* 20   CREATININE 1.8* 1.6* 1.0   CALCIUM 9.1 9.1 9.3       No results for input(s): AST, ALT, BILIDIR, BILITOT, ALKPHOS in the last 72 hours. No results for input(s): INR in the last 72 hours. No results for input(s): Cleatrice Lawler in the last 72 hours. Urinalysis:    No results found for: Jose Enrique Moore, BACTERIA, RBCUA, BLOODU, [address]    Radiology:  NM LUNG VENT/PERFUSION (VQ)   Final Result   Low probability for pulmonary embolus. XR CHEST PORTABLE   Final Result              Assessment/Plan:    Active Hospital Problems    Diagnosis     Elevated brain natriuretic peptide (BNP) level [R79.89]      Priority: Medium    Premature atrial contraction [I49.1]      Priority: Medium    Acute on chronic systolic congestive heart failure (HCC) [I50.23]      Priority: Medium    Hypokalemia [E87.6]      Priority: Medium    Essential hypertension [I10]      Priority: Medium    Hyperlipidemia [E78.5]      Priority: Medium    Former smoker [Z87.891]      Priority: Medium    Acute decompensated heart failure (HCC) [I50.9]      Priority: Medium    COPD (chronic obstructive pulmonary disease) ([facility] [J44.9]      Priority: Medium    PAF (paroxysmal atrial fibrillation) ([facility] [I48.0]      Priority: Medium    Pulmonary vascular congestion [R09.89]      Priority: Medium     Acute on chronic systolic heart failure with right heart failure    - continuous telemetry monitoring and strict I's & O's; pure wick in place  -Was Diuresed adequately, slight creatinine bump and hypotension, 250 mL NS bolus ordered on 11/12/2022, Coreg decreased to hypotension- diuretic holiday for last 24 hrs.  Crt WNL as of 11/14   - Echo 11/11/2022 demonstrates LVEF 25%  -2G Na and fluid restriction dietary modifications in place  - Cardiology following,  spironolactone 25 mg added as well  - 11/14 diuretics held last 24 hours for crt bump, today has normalized and WNL Will defer to Cardiology for home diuretic mgt   - further mgt per Cards, dicussed with Ulisses Albrecht NP at bedside today 11/14, plans to start [facility] in am cont eliquis and coreg 12.5 BID, holding spironolactone. Repeat labs      COPD in former smoker  -Patient complains of severe dyspnea s/p recent travel therefore VQ scan ordered to rule out PE (contrast allergy); results interpreted as low probability  -Continuous pulse oximetry monitoring initiated with PRN supplemental O2   -Continue home maintenance MDIs/nebulizer treatment including Breztri  -Encourage aggressive pulmonary toilet including incentive spirometry every 4H while awake  -Albuterol nebs scheduled every 6 hours as needed     PAF  -EKG with undetermined rhythm, cardiology suggested PACs rather than A. Fib  -Patient s/p left atrial appendage clipping  -Continue beta-blocker for rate control defer to cards   -Continue twice daily Eliquis anticoagulation  -EP consulted and following     DVT Prophylaxis: Eliquis  Diet: ADULT DIET;  Regular; Low Fat/Low Chol/High Fiber/ISIS  Code Status: Full Code  PT/OT Eval Status: Recommending Home with assist as needed    Dispo - 1-2 days   Appropriate for A1 Discharge Unit: No      DEMI Laboy - CNP

## 2022-11-15 VITALS
BODY MASS INDEX: 35.17 KG/M2 | RESPIRATION RATE: 16 BRPM | WEIGHT: 224.1 LBS | SYSTOLIC BLOOD PRESSURE: 138 MMHG | OXYGEN SATURATION: 93 % | HEIGHT: 67 IN | HEART RATE: 55 BPM | TEMPERATURE: 97.4 F | DIASTOLIC BLOOD PRESSURE: 83 MMHG

## 2022-11-15 LAB
ANION GAP SERPL CALCULATED.3IONS-SCNC: 14 MMOL/L (ref 3–16)
BUN BLDV-MCNC: 18 MG/DL (ref 7–20)
CALCIUM SERPL-MCNC: 9.4 MG/DL (ref 8.3–10.6)
CHLORIDE BLD-SCNC: 104 MMOL/L (ref 99–110)
CO2: 19 MMOL/L (ref 21–32)
CREAT SERPL-MCNC: 1 MG/DL (ref 0.6–1.2)
GFR SERPL CREATININE-BSD FRML MDRD: 59 ML/MIN/{1.73_M2}
GLUCOSE BLD-MCNC: 144 MG/DL (ref 70–99)
HCT VFR BLD CALC: 43.1 % (ref 36–48)
HEMOGLOBIN: 14.2 G/DL (ref 12–16)
MCH RBC QN AUTO: 30.7 PG (ref 26–34)
MCHC RBC AUTO-ENTMCNC: 32.9 G/DL (ref 31–36)
MCV RBC AUTO: 93.2 FL (ref 80–100)
PDW BLD-RTO: 13.9 % (ref 12.4–15.4)
PLATELET # BLD: 267 K/UL (ref 135–450)
PMV BLD AUTO: 7.3 FL (ref 5–10.5)
POTASSIUM REFLEX MAGNESIUM: 4 MMOL/L (ref 3.5–5.1)
PRO-BNP: 3059 PG/ML (ref 0–124)
RBC # BLD: 4.63 M/UL (ref 4–5.2)
SODIUM BLD-SCNC: 137 MMOL/L (ref 136–145)
WBC # BLD: 7.2 K/UL (ref 4–11)

## 2022-11-15 PROCEDURE — 94640 AIRWAY INHALATION TREATMENT: CPT

## 2022-11-15 PROCEDURE — 6370000000 HC RX 637 (ALT 250 FOR IP): Performed by: HOSPITALIST

## 2022-11-15 PROCEDURE — 2580000003 HC RX 258: Performed by: HOSPITALIST

## 2022-11-15 PROCEDURE — 36415 COLL VENOUS BLD VENIPUNCTURE: CPT

## 2022-11-15 PROCEDURE — 83880 ASSAY OF NATRIURETIC PEPTIDE: CPT

## 2022-11-15 PROCEDURE — 99232 SBSQ HOSP IP/OBS MODERATE 35: CPT | Performed by: NURSE PRACTITIONER

## 2022-11-15 PROCEDURE — 80048 BASIC METABOLIC PNL TOTAL CA: CPT

## 2022-11-15 PROCEDURE — 85027 COMPLETE CBC AUTOMATED: CPT

## 2022-11-15 PROCEDURE — 6370000000 HC RX 637 (ALT 250 FOR IP): Performed by: NURSE PRACTITIONER

## 2022-11-15 RX ORDER — CARVEDILOL 12.5 MG/1
12.5 TABLET ORAL 2 TIMES DAILY
Qty: 60 TABLET | Refills: 3 | Status: SHIPPED | OUTPATIENT
Start: 2022-11-15

## 2022-11-15 RX ORDER — SPIRONOLACTONE 25 MG/1
25 TABLET ORAL DAILY
Qty: 30 TABLET | Refills: 3 | Status: SHIPPED | OUTPATIENT
Start: 2022-11-15

## 2022-11-15 RX ORDER — FUROSEMIDE 40 MG/1
40 TABLET ORAL SEE ADMIN INSTRUCTIONS
Qty: 30 TABLET | Refills: 3 | Status: SHIPPED | OUTPATIENT
Start: 2022-11-15

## 2022-11-15 RX ADMIN — APIXABAN 5 MG: 5 TABLET, FILM COATED ORAL at 09:06

## 2022-11-15 RX ADMIN — SODIUM CHLORIDE, PRESERVATIVE FREE 10 ML: 5 INJECTION INTRAVENOUS at 09:06

## 2022-11-15 RX ADMIN — TIOTROPIUM BROMIDE INHALATION SPRAY 2 PUFF: 3.12 SPRAY, METERED RESPIRATORY (INHALATION) at 08:26

## 2022-11-15 RX ADMIN — CARVEDILOL 12.5 MG: 6.25 TABLET, FILM COATED ORAL at 09:06

## 2022-11-15 RX ADMIN — SACUBITRIL AND VALSARTAN 1 TABLET: 24; 26 TABLET, FILM COATED ORAL at 11:28

## 2022-11-15 RX ADMIN — Medication 2 PUFF: at 08:26

## 2022-11-15 RX ADMIN — CETIRIZINE HYDROCHLORIDE 5 MG: 5 TABLET ORAL at 09:06

## 2022-11-15 RX ADMIN — PANTOPRAZOLE SODIUM 40 MG: 40 TABLET, DELAYED RELEASE ORAL at 05:15

## 2022-11-15 NOTE — CARE COORDINATION
CASE MANAGEMENT DISCHARGE SUMMARY      Discharge to: home with , no needs    Precertification completed: NA  Hospital Exemption Notification (HENS) completed: NA    IMM given: 11/15/22     New Durable Medical Equipment ordered/agency: NA    Transportation:    Family/car:        Confirmed discharge plan with:     Patient: yes     Family:  yes         RN, name: Joanna    Note: Discharging nurse to complete DONTRELL, reconcile AVS, and place final copy with patient's discharge packet. RN to ensure that written prescriptions for  Level II medications are sent with patient to the facility as per protocol.

## 2022-11-15 NOTE — PLAN OF CARE
Problem: Chronic Conditions and Co-morbidities  Goal: Patient's chronic conditions and co-morbidity symptoms are monitored and maintained or improved  Outcome: Progressing  Flowsheets (Taken 11/15/2022 0733)  Care Plan - Patient's Chronic Conditions and Co-Morbidity Symptoms are Monitored and Maintained or Improved: Monitor and assess patient's chronic conditions and comorbid symptoms for stability, deterioration, or improvement     Problem: Discharge Planning  Goal: Discharge to home or other facility with appropriate resources  Outcome: Progressing  Flowsheets (Taken 11/15/2022 0733)  Discharge to home or other facility with appropriate resources:   Identify barriers to discharge with patient and caregiver   Arrange for needed discharge resources and transportation as appropriate     Problem: Safety - Adult  Goal: Free from fall injury  Outcome: Progressing     Problem: Skin/Tissue Integrity  Goal: Absence of new skin breakdown  Description: 1. Monitor for areas of redness and/or skin breakdown  2. Assess vascular access sites hourly  3. Every 4-6 hours minimum:  Change oxygen saturation probe site  4. Every 4-6 hours:  If on nasal continuous positive airway pressure, respiratory therapy assess nares and determine need for appliance change or resting period.   Outcome: Progressing

## 2022-11-15 NOTE — PROGRESS NOTES
Hospitalist Progress Note      PCP: Colin Todd APRN - CNP    Date of Admission: 11/9/2022    Chief Complaint:   Shortness of breath    Hospital Course:   John Paul Duran is a 67 y.o. female who presented to the ED to be evaluated for increased dyspnea ongoing for 2 weeks PTA. She recently relocated from [place], and endorses recent prolonged travel. Patient visited her newly assigned PCP today, who recommended urgent ED evaluation. She endorses concomitant intermittent chest pressure, nonproductive cough, and lightheadedness. Patient has an underlying history of CHF, however no echo results are available in [facility]. She reports that she has ran out of her Coreg 2 days PTA, but states that she has been compliant with her Eliquis anticoagulation. Patient is a former smoker. Upon arrival to the ED EKG was obtained revealing undetermined rhythm, LAD with LBBB, and QTC prolongation; no previous EKGs available for comparison STAT CXR demonstrates moderate cardiomegaly and pulmonary vascular congestion; left atrial appendage closure device noted. Patient with severe contrast allergy therefore NM VQ scan ordered and noted to be low probability for PE. Rapid COVID-19 testing performed and noted to be negative. Notable labs include: BNP 7255, elevated D-dimer 1.51, and serial troponin negative x3. Patient received a one-time bolus dosage of Lasix 40 mg per ED provider, prior to request for admission. Subjective: EMR and notes reviewed pt seen and examined. No new complaints. Sitting at bedside with  in room.  Was able to get int ouch with MD/tony in [place] and has some records       Medications:  Reviewed    Infusion Medications    sodium chloride       Scheduled Medications    carvedilol  12.5 mg Oral BID    GI cocktail   Oral Once    apixaban  5 mg Oral BID    [Held by provider] spironolactone  25 mg Oral Daily    cetirizine  5 mg Oral Daily    pantoprazole  40 mg Oral QAM AC    sodium chloride flush  10 mL IntraVENous 2 times per day    mometasone-formoterol  2 puff Inhalation BID    tiotropium  2 puff Inhalation Daily     PRN Meds: albuterol, perflutren lipid microspheres, sodium chloride flush, sodium chloride, senna, acetaminophen **OR** acetaminophen, ondansetron **OR** ondansetron      Intake/Output Summary (Last 24 hours) at 11/15/2022 0857  Last data filed at 11/15/2022 0029  Gross per 24 hour   Intake 585 ml   Output 900 ml   Net -315 ml         Physical Exam Performed:    /83   Pulse 55   Temp 97.4 °F (36.3 °C) (Oral)   Resp 16   Ht 5' 7\" (1.702 m)   Wt 224 lb 1.6 oz (101.7 kg)   SpO2 93%   BMI 35.10 kg/m²     General appearance: No apparent distress, appears stated age and cooperative. HEENT: Pupils equal, round, and reactive to light. Conjunctivae/corneas clear. Neck: Supple, with full range of motion. No jugular venous distention. Trachea midline. Respiratory:  Normal respiratory effort. Clear to auscultation, bilaterally without Rales/Wheezes/Rhonchi. Cardiovascular: Regular rate and rhythm with normal S1/S2 without murmurs, rubs or gallops. Abdomen: Soft, non-tender, non-distended with normal bowel sounds. Musculoskeletal: No clubbing, cyanosis or edema bilaterally. Full range of motion without deformity. Skin: Skin color, texture, turgor normal.  No rashes or lesions. Neurologic:  Neurovascularly intact without any focal sensory/motor deficits. Cranial nerves: II-XII intact, grossly non-focal.  Psychiatric: Alert and oriented, thought content appropriate, normal insight  Capillary Refill: Brisk, 3 seconds, normal   Peripheral Pulses: +2 palpable, equal bilaterally       Labs:   No results for input(s): WBC, HGB, HCT, PLT in the last 72 hours.     Recent Labs     11/13/22  0521 11/14/22  0651    142   K 3.9 4.5    105   CO2 30 29   BUN 26* 20   CREATININE 1.6* 1.0   CALCIUM 9.1 9.3       No results for input(s): AST, ALT, BILIDIR, BILITOT, ALKPHOS in the last 72 hours. No results for input(s): INR in the last 72 hours. No results for input(s): Anum Farhana in the last 72 hours. Urinalysis:    No results found for: Amber Crick, BACTERIA, RBCUA, BLOODU, [address], [address]    Radiology:  NM LUNG VENT/PERFUSION (VQ)   Final Result   Low probability for pulmonary embolus. XR CHEST PORTABLE   Final Result              Assessment/Plan:    Active Hospital Problems    Diagnosis     Elevated brain natriuretic peptide (BNP) level [R79.89]      Priority: Medium    Premature atrial contraction [I49.1]      Priority: Medium    Acute on chronic systolic congestive heart failure (HCC) [I50.23]      Priority: Medium    Hypokalemia [E87.6]      Priority: Medium    Essential hypertension [I10]      Priority: Medium    Hyperlipidemia [E78.5]      Priority: Medium    Former smoker [Z87.891]      Priority: Medium    Acute decompensated heart failure (HCC) [I50.9]      Priority: Medium    COPD (chronic obstructive pulmonary disease) ([facility] [address] [J44.9]      Priority: Medium    PAF (paroxysmal atrial fibrillation) ([facility] [address] [I48.0]      Priority: Medium    Pulmonary vascular congestion [R09.89]      Priority: Medium     Acute on chronic systolic heart failure with right heart failure    - continuous telemetry monitoring and strict I's & O's; pure wick in place  -Was Diuresed adequately, slight creatinine bump and hypotension, 250 mL NS bolus ordered on 11/12/2022, Coreg decreased to hypotension- diuretic holiday for last 24 hrs.  Crt WNL as of 11/14   - Echo 11/11/2022 demonstrates LVEF 25%  -2G Na and fluid restriction dietary modifications in place  - Cardiology following,  spironolactone 25 mg added as well  - 11/14 diuretics held last 24 hours for crt bump, today has normalized and WNL Will defer to Cardiology for home diuretic mgt   - further mgt per Cards, dicussed with Bethany Walsh NP at bedside today 11/14, plans to start [facility] in am cont eliquis and coreg 12.5 BID, holding spironolactone. Repeat labs   -11/15 labs pending today      COPD in former smoker  -Patient complains of severe dyspnea s/p recent travel therefore VQ scan ordered to rule out PE (contrast allergy); results interpreted as low probability  -Continuous pulse oximetry monitoring initiated with PRN supplemental O2   -Continue home maintenance MDIs/nebulizer treatment including Breztri  -Encourage aggressive pulmonary toilet including incentive spirometry every 4H while awake  -Albuterol nebs scheduled every 6 hours as needed     PAF  -EKG with undetermined rhythm, cardiology suggested PACs rather than A. Fib  -Patient s/p left atrial appendage clipping  -Continue beta-blocker for rate control defer to cards   -Continue twice daily Eliquis anticoagulation  -EP consulted and following     DVT Prophylaxis: Eliquis  Diet: ADULT DIET;  Regular; Low Fat/Low Chol/High Fiber/ISIS  Code Status: Full Code  PT/OT Eval Status: Recommending Home with assist as needed    Dispo - 1-2 days - pending Cardiology plan   Appropriate for A1 Discharge Unit: DEMI Jeff - CNP

## 2022-11-15 NOTE — DISCHARGE SUMMARY
Hospital Medicine Discharge Summary    Patient ID: Duarte Burns      Patient's PCP: DEMI Vasquez CNP    Admit Date: 11/9/2022     Discharge Date:   11/15/2022     Admitting Provider: Sloan Bailon MD     Discharge Provider: DEMI Lopes CNP     Discharge Diagnoses: Active Hospital Problems    Diagnosis     Elevated brain natriuretic peptide (BNP) level [R79.89]      Priority: Medium    Premature atrial contraction [I49.1]      Priority: Medium    Acute on chronic systolic congestive heart failure (HCC) [I50.23]      Priority: Medium    Hypokalemia [E87.6]      Priority: Medium    Essential hypertension [I10]      Priority: Medium    Hyperlipidemia [E78.5]      Priority: Medium    Former smoker [Z87.891]      Priority: Medium    Acute decompensated heart failure (HCC) [I50.9]      Priority: Medium    COPD (chronic obstructive pulmonary disease) ([facility] [J44.9]      Priority: Medium    PAF [facility]paroxysmal atrial fibrillation) ([facility] [I48.0]      Priority: Medium    Pulmonary vascular congestion [R09.89]      Priority: Medium       The patient was seen and examined on day of discharge and this discharge summary is in conjunction with any daily progress note from day of discharge. Hospital Course:   Duarte Burns is a 67 y.o. female who presented to the ED to be evaluated for increased dyspnea ongoing for 2 weeks PTA. She recently relocated from [address] [place] [address], and endorses recent prolonged travel. Patient visited her newly assigned PCP today, who recommended urgent ED evaluation. She endorses concomitant intermittent chest pressure, nonproductive cough, and lightheadedness. Patient has an underlying history of CHF, however no echo results are available in [facility]. She reports that she has ran out of her Coreg 2 days PTA, but states that she has been compliant with her Eliquis anticoagulation. Patient is a former smoker.      Upon arrival to the ED EKG was obtained revealing undetermined rhythm, LAD with LBBB, and QTC prolongation; no previous EKGs available for comparison STAT CXR demonstrates moderate cardiomegaly and pulmonary vascular congestion; left atrial appendage closure device noted. Patient with severe contrast allergy therefore NM VQ scan ordered and noted to be low probability for PE. Rapid COVID-19 testing performed and noted to be negative. Notable labs include: BNP 7255, elevated D-dimer 1.51, and serial troponin negative x3. Patient received a one-time bolus dosage of Lasix 40 mg per ED provider, prior to request for admission. Acute on chronic systolic heart failure with right heart failure    - continuous telemetry monitoring and strict I's & O's; pure wick in place  -Was Diuresed adequately, slight creatinine bump and hypotension, 250 mL NS bolus ordered on 11/12/2022, Coreg decreased to hypotension- diuretic holiday for last 24 hrs. Crt WNL as of 11/14   - Echo 11/11/2022 demonstrates LVEF 25%  -2G Na and fluid restriction dietary modifications in place  - Cardiology following,  spironolactone 25 mg added as well  - 11/14 diuretics held last 24 hours for crt bump, today has normalized and WNL Will defer to Cardiology for home diuretic mgt   - further mgt per Cards, dicussed with Black Ireland NP at bedside today 11/14, plans to start ENtresto in am cont eliquis and coreg 12.5 BID, holding spironolactone.  Repeat labs   -11/15 labs reviewed   Requesting prior cardiac medical records- currently not available despite multiple request.   Continue Eliquis 5mg twice a day  Coreg 12.5mg BID  Start entresto 24/26mg BID today  Resume Spironolactone (aldactone) 25mg daily   Will send home with PRN lasix 40mg for weight gain 3lb in a day or 5lbs in a week with shortness of breath      COPD in former smoker  -Patient complains of severe dyspnea s/p recent travel therefore VQ scan ordered to rule out PE (contrast allergy); results interpreted as low probability  -Continuous pulse oximetry monitoring initiated with PRN supplemental O2   -Continue home maintenance MDIs/nebulizer treatment including Breztri  -Encourage aggressive pulmonary toilet including incentive spirometry every 4H while awake  -Albuterol nebs scheduled every 6 hours as needed     PAF  -EKG with undetermined rhythm, cardiology suggested PACs rather than A. Fib  -Patient s/p left atrial appendage clipping  -Continue beta-blocker for rate control defer to cards   -Continue twice daily Eliquis anticoagulation  -EP consulted and following      DVT Prophylaxis: Eliquis  Diet: ADULT DIET; Regular; Low Fat/Low Chol/High Fiber/ISIS  Code Status: Full Code  PT/OT Eval Status: Recommending Home with assist as needed           Labs: For convenience and continuity at follow-up the following most recent labs are provided:      CBC:    Lab Results   Component Value Date/Time    WBC 7.2 11/15/2022 10:08 AM    HGB 14.2 11/15/2022 10:08 AM    HCT 43.1 11/15/2022 10:08 AM     11/15/2022 10:08 AM       Renal:    Lab Results   Component Value Date/Time     11/15/2022 10:08 AM    K 4.0 11/15/2022 10:08 AM     11/15/2022 10:08 AM    CO2 19 11/15/2022 10:08 AM    BUN 18 11/15/2022 10:08 AM    CREATININE 1.0 11/15/2022 10:08 AM    CALCIUM 9.4 11/15/2022 10:08 AM         Significant Diagnostic Studies    Radiology:   NM LUNG VENT/PERFUSION (VQ)   Final Result   Low probability for pulmonary embolus.          XR CHEST PORTABLE   Final Result             Consults:     IP CONSULT TO CARDIOLOGY  IP CONSULT TO CARDIOLOGY  IP CONSULT TO HEART FAILURE NURSE/COORDINATOR    Disposition:  home      Condition at Discharge: Stable    Discharge Instructions/Follow-up:  CArdiology     Code Status:  Full Code     Activity: activity as tolerated    Diet: cardiac diet      Discharge Medications:     Current Discharge Medication List             Details   sacubitril-valsartan (ENTRESTO) 24-26 MG per tablet Take 1 tablet by mouth 2 times daily  Qty: 60 tablet, Refills: 4      spironolactone (ALDACTONE) 25 MG tablet Take 1 tablet by mouth daily  Qty: 30 tablet, Refills: 3      furosemide (LASIX) 40 MG tablet Take 1 tablet by mouth See Admin Instructions Daily as needed for weight gain 3lbs in a day or 5lbs in a week with shortness of breath  Qty: 30 tablet, Refills: 3                Details   apixaban (ELIQUIS) 5 MG TABS tablet Take 1 tablet by mouth 2 times daily  Qty: 60 tablet, Refills: 4      carvedilol (COREG) 12.5 MG tablet Take 1 tablet by mouth 2 times daily  Qty: 60 tablet, Refills: 3                Details   albuterol (PROVENTIL) 2.5 MG/0.5ML NEBU nebulizer solution   See Rx Instructions, 0, # 375 mL, 5 total refill(s), Hard Stop, GERALD [Federal Rx: #375 last filled 04/04/22]      pantoprazole (PROTONIX) 40 MG tablet pantoprazole 40 mg oral delayed release tablet  Start Date: 2/4/21  Status: Ordered      hydrOXYzine HCl (ATARAX) 25 MG tablet hydrOXYzine hydrochloride 25 mg oral tablet  Start Date: 8/9/21  Status: Ordered      oxybutynin (DITROPAN) 5 MG tablet Take 5 mg by mouth 2 times daily      Budeson-Glycopyrrol-Formoterol (BREZTRI AEROSPHERE) 160-9-4.8 MCG/ACT AERO Inhale 2 puffs into the lungs daily  Qty: 1 each, Refills: 0    Associated Diagnoses: Chronic obstructive pulmonary disease, unspecified COPD type (HCC)      albuterol sulfate HFA (VENTOLIN HFA) 108 (90 Base) MCG/ACT inhaler Inhale 2 puffs into the lungs 4 times daily as needed for Wheezing  Qty: 54 g, Refills: 1    Associated Diagnoses: Chronic obstructive pulmonary disease, unspecified COPD type (HCC)      loratadine (CLARITIN) 10 MG tablet Take 1 tablet by mouth daily  Qty: 30 tablet, Refills: 0    Associated Diagnoses: Chronic obstructive pulmonary disease, unspecified COPD type ([facility]             Time Spent on discharge is more than 30 minutes in the examination, evaluation, counseling and review of medications and discharge plan.       Signed:    DEMI Mayorga CNP   11/15/2022      Thank you DEMI Edge CNP for the opportunity to be involved in this patient's care. If you have any questions or concerns, please feel free to contact me at [phone].

## 2022-11-15 NOTE — PLAN OF CARE
Patient's EF (Ejection Fraction) is less than 40%    Heart Failure Medications:  Diuretics[de-identified] Spironolactone    (One of the following REQUIRED for EF </= 40%/SYSTOLIC FAILURE but MAY be used in EF% >40%/DIASTOLIC FAILURE)        ACE[de-identified] None        ARB[de-identified] None         ARNI[de-identified] None    (Beta Blockers)  NON- Evidenced Based Beta Blocker (for EF% >40%/DIASTOLIC FAILURE): None    Evidenced Based Beta Blocker::(REQUIRED for EF% <40%/SYSTOLIC FAILURE) Carvedilol- Coreg  . .................................................................................................................................................. Patient's weights and intake/output reviewed: Yes    Patient's Last Weight: 222 lbs obtained by standing scale. Difference of 0 lbs less than last documented weight. Intake/Output Summary (Last 24 hours) at 11/14/2022 2337  Last data filed at 11/14/2022 2028  Gross per 24 hour   Intake 595 ml   Output 0 ml   Net 595 ml       Education Booklet Provided: yes    Comorbidities Reviewed Yes    Patient has a past medical history of Atrial fibrillation ([facility] [address] >>For CHF and Comorbidity documentation on Education Time and Topics, please see Education Tab    Progressive Mobility Assessment:  What is this patient's Current Level of Mobility?: Ambulatory-Up Ad Flori  How was this patient Mobilized today?: Edge of Bed, Up to Chair, Bedside Commode,  Up to Toilet/Shower, and Up in Room, ambulated 100 ft                 With Whom? Self                 Level of Difficulty/Assistance: Independent     Pt sitting in bed at this time on room air. Pt denies shortness of breath. Pt without lower extremity edema.      Patient and/or Family's stated Goal of Care this Admission: reduce shortness of breath, increase activity tolerance, better understand heart failure and disease management, be more comfortable, and reduce lower extremity edema prior to discharge        :

## 2022-11-15 NOTE — PROGRESS NOTES
[facility] [place]   Daily Progress Note    Admit Date:  11/9/2022  HPI:    Chief Complaint   Patient presents with    Shortness of Breath     Patient states she has had difficulty breathing for the last two weeks. States she saw her PCP today who sent her to the ED for abnormal EKG. Interval history: Jossie Pastrana is being followed for CHF. Moved from [place] to [facility] in the last 1 week. Records from [place] not available for review at the time of this note. S/P Dual chamber MEdtronic ICD implanted 6/26/2019 by Dr. Tamera Eldridge [facility]); S/P ablation 2019 and TONY clipping-  details unknown. She had progressively worsening shortness of breath. Describes it as when she 1st went into afib. She reports a history of HFrEF 25% with improved EF up to 50% on recent echo about 1 month ago- report not available. Subjective:  Ms. Luis Aldridge feels ready to go home. Some palpitations. No chest pain. Breathing is stable. Records reviewed.      Objective:   /83   Pulse 55   Temp 97.4 °F (36.3 °C) (Oral)   Resp 16   Ht 5' 7\" (1.702 m)   Wt 224 lb 1.6 oz (101.7 kg)   SpO2 93%   BMI 35.10 kg/m²     Intake/Output Summary (Last 24 hours) at 11/15/2022 0349  Last data filed at 11/15/2022 0029  Gross per 24 hour   Intake 585 ml   Output 900 ml   Net -315 ml       NYHA: II    Physical Exam:  General:  Awake, alert, NAD, up sitting on the side of the bed   Skin:  Warm and dry  Neck:  JVD<8  Chest:  Clear to auscultation, no wheezes/rhonchi/rales  Telemetry: paced  Cardiovascular:  RRR S1S2, no m/r/g   Abdomen:  Soft, nontender, +bowel sounds  Extremities:  no bilateral lower extremity edema    Medications:    carvedilol  12.5 mg Oral BID    GI cocktail   Oral Once    apixaban  5 mg Oral BID    [Held by provider] spironolactone  25 mg Oral Daily    cetirizine  5 mg Oral Daily    pantoprazole  40 mg Oral QAM AC    sodium chloride flush  10 mL IntraVENous 2 times per day    mometasone-formoterol  2 puff Inhalation BID tiotropium  2 puff Inhalation Daily      sodium chloride         Lab Data:  CBC:   No results for input(s): WBC, HGB, PLT in the last 72 hours. BMP:    Recent Labs     11/13/22  0521 11/14/22  0651    142   K 3.9 4.5   CO2 30 29   BUN 26* 20   CREATININE 1.6* 1.0     INR:  No results for input(s): INR in the last 72 hours. BNP:    No results for input(s): PROBNP in the last 72 hours. No results found for: LVEF, LVEFMODE    Testing:    Echo 11/11/22   Summary   The left ventricular systolic function is severely reduced with an ejection   fraction of 25%. Left ventricular cavity size is moderately dilated with mild concentric left   ventricular hypertrophy. Grade I diastolic dysfunction with normal filling pressure. Mild mitral and pulmonic regurgitation. The left atrium is mildly dilated. Bi-atrial enlargement. Moderate aortic regurgitation. The aortic root is mildly dilated. The ascending aorta is moderately dilated. The right ventricle is moderately enlarged. Right ventricular systolic function is moderately reduced . Tricuspid valve is structurally normal.   Trivial tricuspid regurgitation. Systolic pulmonic artery pressure (SPAP) is normal estimated at 30 mmHg   (Right atrial pressure of 3 mmHg). The right atrium is moderately dilated.     Cardiac cath 7/12/2018  Right heart cath  RA  7/10/5  RV 25/8/8  PA 26/15 mean 20  PCWP 16    Left main coronary artery patent  LAD patent with luminal irregularities and normal diagonal branching pattern  Ramus intermedius was patent and large and minimal luminal irregularities  Left circumflex artery coronary artery patent and small with minimal luminal irregularities  Right coronary artery patent with minimal luminal irregularities  EDP 15 to 18 mmHg    Principal Problem:    Acute decompensated heart failure (HCC)  Active Problems:    Essential hypertension    Hyperlipidemia    Former smoker    COPD (chronic obstructive pulmonary disease) ([facility] [address]    PAF (paroxysmal atrial fibrillation) (HCC)    Pulmonary vascular congestion    Elevated brain natriuretic peptide (BNP) level    Premature atrial contraction    Acute on chronic systolic congestive heart failure (HCC)    Hypokalemia  Resolved Problems:    * No resolved hospital problems. *      11/14/22 0715 222 lb 11.2 oz (101 kg) Stated;Standing scale     11/13/22 0445 222 lb 14.4 oz (101.1 kg) Stated;Standing scale     11/12/22 0503 222 lb 9.6 oz (101 kg) Standing scale     11/11/22 0600 223 lb 1.6 oz (101.2 kg) Standing scale     11/10/22 0542 225 lb 6.4 oz (102.2 kg) Standing scale     11/09/22 2313 232 lb (105.2 kg) --     11/09/22 1205 232 lb (105.2 kg) --     Assessment:  Acute on chronic systolic heart failure, with right heart failure   -Reported by Patient was 20%-->50% (2021)   - current echo showed LVEF 25%  Nonischemic cardiomyopathy (normal cors 2018)   PAF, TONY clip, s/p ablation 2019   -patient reports clip not in right position for complete closure- remains on eliquis  Hx of ASD  S/P dual chamber ICD 2019; (failed attempts at CS lead placement due to limited anatomy)   Moderate aortic regurg   NSVT  HTN  NELIA - resolved     Plan:    Requesting prior cardiac medical records- currently not available despite multiple request.   Continue Eliquis 5mg twice a day  Coreg 12.5mg BID  Start entresto 24/26mg BID today  Resume Spironolactone (aldactone) 25mg daily   Will send home with PRN lasix 40mg for weight gain 3lb in a day or 5lbs in a week with shortness of breath       Core measures for HFrEF (EF <40%):  EF: 25%   ICD: Implanted 2019  ACEi/ARB/ARNI: adding   BB: Coreg- 12.5mg BID   Frank: 25 mg Spironolactone (aldactone)  SGLT2: consider adding at follow up pending labs      Okay for discharge from cardiology perspective. With repeat labs in 1 week. Will arrange EP follow up as well.    Cardiac meds address on med rec     DEMI Lyles - TERESA,  11/15/2022, 10:56 AM

## 2022-11-15 NOTE — PROGRESS NOTES
Pt ok for discharge per MD. Discharge instructions given. Prescriptions filled by outpatient pharmacy. IV removed. No questions or concerns at this time. Transported by wheelchair to personal car.

## 2022-11-16 ENCOUNTER — FOLLOWUP TELEPHONE ENCOUNTER (OUTPATIENT)
Dept: TELEMETRY | Age: 72
End: 2022-11-16

## 2022-11-16 ENCOUNTER — CARE COORDINATION (OUTPATIENT)
Dept: CASE MANAGEMENT | Age: 72
End: 2022-11-16

## 2022-11-16 DIAGNOSIS — I50.23 ACUTE ON CHRONIC SYSTOLIC CONGESTIVE HEART FAILURE (HCC): Primary | ICD-10-CM

## 2022-11-16 PROCEDURE — 1111F DSCHRG MED/CURRENT MED MERGE: CPT

## 2022-11-16 NOTE — TELEPHONE ENCOUNTER
1st Attempt; No Answer- Left HIPAA compliant voicemail with Non-Urgent Heart Failure Resource Line number for call back.      Shruthi Huitron RN

## 2022-11-16 NOTE — CARE COORDINATION
[facility] Care Transitions Initial Follow Up Call    Call within 2 business days of discharge: Yes    Care Transition Nurse contacted the patient by telephone to perform post hospital discharge assessment. Verified name and  with patient as identifiers. Provided introduction to self, and explanation of the Care Transition Nurse role. Patient: Dion Lord Patient : 1950   MRN: [id number]  Reason for Admission: elevated BNP, CHF  Discharge Date: 11/15/22 RARS: Readmission Risk Score: 7.8      Last Discharge  Street       Date Complaint Diagnosis Description Type Department Provider    22 Shortness of Breath Elevated brain natriuretic peptide (BNP) level . .. ED to Hosp-Admission (Discharged) (ADMITTED) Mana Darby MD; Mo Levy. .. Was this an external facility discharge? No Discharge Facility:     Challenges to be reviewed by the provider   Additional needs identified to be addressed with provider: No  none               Method of communication with provider: none. Patient answered call and verified . Patient pleasant and agreeable to transition call. Discussed with CTN recent move back to [place] from [place]. Patient was being seen by new PCP and referred to ER from visit. Patient with PMH of CHF. Aware of daily weights and today was stable at 222lbs. Full medication reconciliation completed and noted in system. Confirmed that she is taking as directed. Continues to have SOB and took furosemide this morning. Follow up with PCP scheduled and noted in system. Confirmed transportation to visit. Denies any acute needs at present time. Agreeable to f/u calls. Educated on the use of urgent care or physicians 24 hr access line if assistance is needed after hours. Care Transition Nurse reviewed discharge instructions with patient who verbalized understanding.  The patient was given an opportunity to ask questions and does not have any further questions or concerns at this time. Were discharge instructions available to patient? Yes. Reviewed appropriate site of care based on symptoms and resources available to patient including: PCP  Specialist. The patient agrees to contact the PCP office for questions related to their healthcare. Advance Care Planning:   Does patient have an Advance Directive: not on file. Medication reconciliation was performed with patient, who verbalizes understanding of administration of home medications. Medications reviewed, 1111F entered: yes    Was patient discharged with a pulse oximeter? no    Non-face-to-face services provided:  Obtained and reviewed discharge summary and/or continuity of care documents    Offered patient enrollment in the Remote Patient Monitoring (RPM) program for in-home monitoring: Yes, but did not enroll at this time. Care Transitions 24 Hour Call    Do you have a copy of your discharge instructions?: Yes  Do you have all of your prescriptions and are they filled?: Yes  Have you been contacted by a [address]?: No  Have you scheduled your follow up appointment?: Yes  How are you going to get to your appointment?: Car - family or friend to transport  Do you have support at home?: Partner/Spouse/SO  Do you feel like you have everything you need to keep you well at home?: Yes  Are you an active caregiver in your home?: No  Care Transitions Interventions         Follow Up  Future Appointments   Date Time Provider Kristy Conley   11/18/2022 10:30 AM Erica Levy 28 kristofer MARIN   12/5/2022  7:35 AM [facility], [facility] REMOTE Foster Goldberg MMA   12/23/2022 10:30 AM DEMI Hong - CNP P CLER [facility]   2/1/2023 10:00 AM MD Royce Araiza 52 Transition Nurse provided contact information. Plan for follow-up call in 5-7 days based on severity of symptoms and risk factors.   Plan for next call:  discuss RPM and enroll if agreeable    Natalio Cruz RN

## 2022-11-17 ENCOUNTER — FOLLOWUP TELEPHONE ENCOUNTER (OUTPATIENT)
Dept: TELEMETRY | Age: 72
End: 2022-11-17

## 2022-11-22 ENCOUNTER — TELEPHONE (OUTPATIENT)
Dept: FAMILY MEDICINE CLINIC | Age: 72
End: 2022-11-22

## 2022-11-23 ENCOUNTER — CARE COORDINATION (OUTPATIENT)
Dept: CASE MANAGEMENT | Age: 72
End: 2022-11-23

## 2022-11-23 NOTE — CARE COORDINATION
[facility] Care Transitions Follow Up Call    Patient: Bud Mora  Patient : 1950   MRN: [id number]  Reason for Admission: elevated BNP, CHF  Discharge Date: 11/15/22 RARS: Readmission Risk Score: 7.8    Attempted to reach patient via phone for transition call. VM left stating purpose of call along with my contact information requesting a return call.       Follow Up  Future Appointments   Date Time Provider Kristy Conley   2022  7:35 AM SCHEDULE, [facility] Ad [facility] SAM   2022 10:30 AM DEMI Henning CNP P CLER CAR [facility]   2023 10:00 AM MD Mehul Pérez      Care Transitions Subsequent and Final Call    Subsequent and Final Calls  Care Transitions Interventions  Other Interventions:             Kalina Zavala RN

## 2022-11-30 ENCOUNTER — CARE COORDINATION (OUTPATIENT)
Dept: CASE MANAGEMENT | Age: 72
End: 2022-11-30

## 2022-11-30 NOTE — CARE COORDINATION
[facility] Care Transitions Follow Up Call    Care Transition Nurse contacted the patient by telephone to follow up after admission. Verified name and  with patient as identifiers. Patient: Sinai Guan  Patient : 1950   MRN: [id number]  Reason for Admission:  elevated BNP, CHF  Discharge Date: 11/15/22 RARS: Readmission Risk Score: 7.8      Needs to be reviewed by the provider   Additional needs identified to be addressed with provider: No  none             Method of communication with provider: none. Patient answered call and verified . Patient pleasant and agreeable to transition call. Patient feeling about the same as last contact. Weight is stable, but hasn't checked in the past couple days. Last check was 222lbs. Patient continues to have episodes of shortness of breath, but takes furosemide with good results. Denied any chest pain. Scheduled with PCP this week for post hospitalization visit. Denies any acute needs at present time. Agreeable to f/u calls. Educated on the use of urgent care or physicians 24 hr access line if assistance is needed after hours. Addressed changes since last contact:  none  Discussed follow-up appointments. If no appointment was previously scheduled, appointment scheduling offered: Yes. Is follow up appointment scheduled within 7 days of discharge?  No.    Follow Up  Future Appointments   Date Time Provider Kristy Yael   2022 11:00 AM Micaela Rosas, [facility]   2022  7:35 AM SCHEDULE, Braxton Gutierrez [facility]   2022  1:00 PM DEMI Edge - CNP [place] [facility]   2022 10:30 AM DEMI Paul - CNP [facility] CLER CAR [facility]   2023 10:00 AM MD Stefanie Hummel [facility]   3/7/2023 10:30 AM [facility]ito Darden [facility]     Non[facility] follow up appointment(s):     Care Transition Nurse reviewed medical action plan with patient and discussed any barriers to care and/or understanding of plan of care after discharge. Discussed appropriate site of care based on symptoms and resources available to patient including: PCP  Specialist. The patient agrees to contact the PCP office for questions related to their healthcare. Advance Care Planning:   not on file. Patients top risk factors for readmission: functional physical ability  Interventions to address risk factors: Education of patient/family/caregiver/guardian to support self-management-     Offered patient enrollment in the Remote Patient Monitoring (RPM) program for in-home monitoring: Patient is not eligible for RPM program.     Care Transitions Subsequent and Final Call    Subsequent and Final Calls  Do you have any ongoing symptoms?: No  Have your medications changed?: No  Do you have any questions related to your medications?: No  Do you currently have any active services?: No  Do you have any needs or concerns that I can assist you with?: No  Identified Barriers: None  Care Transitions Interventions  Other Interventions:             Care Transition Nurse provided contact information for future needs. Plan for follow-up call in 7-10 days based on severity of symptoms and risk factors.   Plan for next call: follow-up appointment-     Elie Hunter RN

## 2022-12-02 ENCOUNTER — OFFICE VISIT (OUTPATIENT)
Dept: FAMILY MEDICINE CLINIC | Age: 72
End: 2022-12-02

## 2022-12-02 VITALS
SYSTOLIC BLOOD PRESSURE: 100 MMHG | BODY MASS INDEX: 35.41 KG/M2 | OXYGEN SATURATION: 98 % | HEIGHT: 67 IN | WEIGHT: 225.6 LBS | HEART RATE: 65 BPM | DIASTOLIC BLOOD PRESSURE: 68 MMHG | RESPIRATION RATE: 16 BRPM

## 2022-12-02 DIAGNOSIS — F51.01 PRIMARY INSOMNIA: ICD-10-CM

## 2022-12-02 DIAGNOSIS — E66.01 SEVERE OBESITY (BMI 35.0-39.9) WITH COMORBIDITY (HCC): ICD-10-CM

## 2022-12-02 DIAGNOSIS — F33.0 MILD EPISODE OF RECURRENT MAJOR DEPRESSIVE DISORDER (HCC): ICD-10-CM

## 2022-12-02 DIAGNOSIS — Z09 HOSPITAL DISCHARGE FOLLOW-UP: Primary | ICD-10-CM

## 2022-12-02 DIAGNOSIS — G25.81 RESTLESS LEG SYNDROME: ICD-10-CM

## 2022-12-02 DIAGNOSIS — I50.9 ACUTE DECOMPENSATED HEART FAILURE (HCC): ICD-10-CM

## 2022-12-02 DIAGNOSIS — K21.9 GASTROESOPHAGEAL REFLUX DISEASE WITHOUT ESOPHAGITIS: ICD-10-CM

## 2022-12-02 RX ORDER — OXYBUTYNIN CHLORIDE 5 MG/1
5 TABLET ORAL 2 TIMES DAILY
Qty: 90 TABLET | Refills: 1 | Status: CANCELLED | OUTPATIENT
Start: 2022-12-02

## 2022-12-02 RX ORDER — ROPINIROLE 1 MG/1
1 TABLET, FILM COATED ORAL 3 TIMES DAILY
Qty: 90 TABLET | Refills: 3 | Status: SHIPPED | OUTPATIENT
Start: 2022-12-02

## 2022-12-02 RX ORDER — PREGABALIN 50 MG/1
50 CAPSULE ORAL 2 TIMES DAILY
COMMUNITY
End: 2022-12-02

## 2022-12-02 RX ORDER — PANTOPRAZOLE SODIUM 40 MG/1
TABLET, DELAYED RELEASE ORAL
Qty: 90 TABLET | Refills: 1 | Status: SHIPPED | OUTPATIENT
Start: 2022-12-02

## 2022-12-02 RX ORDER — SPIRONOLACTONE 25 MG/1
25 TABLET ORAL DAILY
Qty: 30 TABLET | Refills: 3 | Status: SHIPPED | OUTPATIENT
Start: 2022-12-02

## 2022-12-02 RX ORDER — FUROSEMIDE 40 MG/1
40 TABLET ORAL SEE ADMIN INSTRUCTIONS
Qty: 30 TABLET | Refills: 3 | Status: SHIPPED | OUTPATIENT
Start: 2022-12-02

## 2022-12-02 RX ORDER — BUPROPION HYDROCHLORIDE 300 MG/1
300 TABLET ORAL EVERY MORNING
Qty: 90 TABLET | Refills: 1 | Status: SHIPPED | OUTPATIENT
Start: 2022-12-02

## 2022-12-02 RX ORDER — HYDROXYZINE HYDROCHLORIDE 25 MG/1
TABLET, FILM COATED ORAL
Qty: 90 TABLET | Refills: 0 | Status: SHIPPED | OUTPATIENT
Start: 2022-12-02

## 2022-12-02 RX ORDER — PREGABALIN 50 MG/1
50 CAPSULE ORAL 2 TIMES DAILY
Qty: 90 CAPSULE | Status: CANCELLED | OUTPATIENT
Start: 2022-12-02

## 2022-12-02 RX ORDER — BUPROPION HYDROCHLORIDE 300 MG/1
300 TABLET ORAL EVERY MORNING
COMMUNITY
End: 2022-12-02 | Stop reason: SDUPTHER

## 2022-12-02 NOTE — PROGRESS NOTES
Post-Discharge Transitional Care  Follow Up      Sinai Guan   YOB: 1950    Date of Office Visit:  12/2/2022  Date of Hospital Admission: 11/9/22  Date of Hospital Discharge: 11/15/22  Risk of hospital readmission (high >=14%. Medium >=10%) :Readmission Risk Score: 7.8      Care management risk score Rising risk (score 2-5) and Complex Care (Scores >=6): No Risk Score On File     Non face to face  following discharge, date last encounter closed (first attempt may have been earlier): *No documented post hospital discharge outreach found in the last 14 days    Call initiated 2 business days of discharge: *No response recorded in the last 14 days    ASSESSMENT/PLAN:   Hospital discharge follow-up  -     UT DISCHARGE MEDS RECONCILED W/ CURRENT OUTPATIENT MED LIST  Acute decompensated heart failure ([facility] [address]  -Overall patient states that she is doing well today. Patient denies any shortness of breath or difficulty breathing. Blood pressure and weight have been stable for patient over the last few weeks since discharge. Patient will continue to follow with cardiology  Severe obesity (BMI 35.0-39. 9) with comorbidity ([address]  -Did recommend diet and exercise to patient at this time. Patient is agreeable and willing to make changes to diet and exercise. Medical Decision Making: moderate complexity  No follow-ups on file. Subjective:   HPI:  Follow up of Hospital problems/diagnosis(es): Decompensated heart failure    Inpatient course: Discharge summary reviewed- see chart. Patient originally presented to the ER after being seen in the office per my recommendation. Patient was found to have elevated BNP of 7255. She also did have an elevated D-dimer. Patient did have negative troponin as well hospitalized. Patient did receive diuresis during her admission.   Patient did have some extenuating factors while being in the hospital she did have a slight creatinine bump and some hypotension while being diuresed. Echo did show an LVEF of 25%. Patient did see cardiology well admitted plan at that time was to interrogate her ICD. They did the plan to further gather cardiology recommendations and records from providers in [place]. Patient will routinely follow-up with cardiology outpatient. Patient Active Problem List   Diagnosis    Allergic rhinitis    Arthralgia of knee    Astigmatism    Backache    Cervicalgia    Costochondritis    Cough    Cyst of eyelid    Depression    Esophagitis    Flexor hallucis longus tendinitis    Essential hypertension    Hyperlipidemia    Hematuria    Lateral epicondylitis    Left upper quadrant abdominal pain    Mild intermittent asthma    Motion sickness    Obesity    Nicotine dependence    Other seborrheic keratosis    Overweight    Pain in thoracic spine    Palpitations    Persistent insomnia    Sinusitis    Senile lentigo    Presbyopia    Symptomatic menopausal or female climacteric states    Status post hysterectomy    Subacromial bursitis    Former smoker    Acute decompensated heart failure (HCC)    COPD (chronic obstructive pulmonary disease) (HCC)    PAF (paroxysmal atrial fibrillation) (HCC)    Pulmonary vascular congestion    Elevated brain natriuretic peptide (BNP) level    Premature atrial contraction    Acute on chronic systolic congestive heart failure (HCC)    Hypokalemia    ICD (implantable cardioverter-defibrillator), dual, in situ       Medications listed as ordered at the time of discharge from hospital     Medication List            Accurate as of December 2, 2022 12:18 PM. If you have any questions, ask your nurse or doctor.                 CONTINUE taking these medications      albuterol sulfate  (90 Base) MCG/ACT inhaler  Commonly known as: Ventolin HFA  Inhale 2 puffs into the lungs 4 times daily as needed for Wheezing     apixaban 5 MG Tabs tablet  Commonly known as: ELIQUIS  Take 1 tablet by mouth 2 times daily     [facility] 160-9-4.8 MCG/ACT Aero  Generic drug: Budeson-Glycopyrrol-Formoterol  Inhale 2 puffs into the lungs daily     buPROPion 300 MG extended release tablet  Commonly known as: WELLBUTRIN XL     carvedilol 12.5 MG tablet  Commonly known as: COREG  Take 1 tablet by mouth 2 times daily     furosemide 40 MG tablet  Commonly known as: Lasix  Take 1 tablet by mouth See Admin Instructions Daily as needed for weight gain 3lbs in a day or 5lbs in a week with shortness of breath     hydrOXYzine HCl 25 MG tablet  Commonly known as: ATARAX     loratadine 10 MG tablet  Commonly known as: Claritin  Take 1 tablet by mouth daily     pantoprazole 40 MG tablet  Commonly known as: PROTONIX     sacubitril-valsartan 24-26 MG per tablet  Commonly known as: ENTRESTO  Take 1 tablet by mouth 2 times daily     spironolactone 25 MG tablet  Commonly known as: ALDACTONE  Take 1 tablet by mouth daily                Medications marked \"taking\" at this time  Outpatient Medications Marked as Taking for the 12/2/22 encounter (Office Visit) with DEMI Estrada CNP   Medication Sig Dispense Refill    buPROPion (WELLBUTRIN XL) 300 MG extended release tablet Take 300 mg by mouth every morning      apixaban (ELIQUIS) 5 MG TABS tablet Take 1 tablet by mouth 2 times daily 60 tablet 4    carvedilol (COREG) 12.5 MG tablet Take 1 tablet by mouth 2 times daily 60 tablet 3    sacubitril-valsartan (ENTRESTO) 24-26 MG per tablet Take 1 tablet by mouth 2 times daily 60 tablet 4    spironolactone (ALDACTONE) 25 MG tablet Take 1 tablet by mouth daily 30 tablet 3    furosemide (LASIX) 40 MG tablet Take 1 tablet by mouth See Admin Instructions Daily as needed for weight gain 3lbs in a day or 5lbs in a week with shortness of breath 30 tablet 3    pantoprazole (PROTONIX) 40 MG tablet pantoprazole 40 mg oral delayed release tablet  Start Date: 2/4/21  Status: Ordered      hydrOXYzine HCl (ATARAX) 25 MG tablet hydrOXYzine hydrochloride 25 mg oral tablet  Start Date: 8/9/21  Status: Ordered      Budeson-Glycopyrrol-Formoterol (Jovie) 160-9-4.8 MCG/ACT AERO Inhale 2 puffs into the lungs daily 1 each 0        Medications patient taking as of now reconciled against medications ordered at time of hospital discharge: Yes        Objective:    /68 (Site: Right Upper Arm, Position: Sitting)   Pulse 65   Resp 16   Ht 5' 7\" (1.702 m)   Wt 225 lb 9.6 oz (102.3 kg)   SpO2 98%   BMI 35.33 kg/m²   General Appearance: alert and oriented to person, place and time, well developed and well- nourished, in no acute distress  Skin: warm and dry, no rash or erythema  Head: normocephalic and atraumatic  Eyes: pupils equal, round, and reactive to light, extraocular eye movements intact, conjunctivae normal  ENT: tympanic membrane, external ear and ear canal normal bilaterally, nose without deformity, nasal mucosa and turbinates normal without polyps  Neck: supple and non-tender without mass, no thyromegaly or thyroid nodules, no cervical lymphadenopathy  Pulmonary/Chest: clear to auscultation bilaterally- no wheezes, rales or rhonchi, normal air movement, no respiratory distress  Cardiovascular: normal rate, regular rhythm, normal S1 and S2, no murmurs, rubs, clicks, or gallops, distal pulses intact, no carotid bruits  Abdomen: soft, non-tender, non-distended, normal bowel sounds, no masses or organomegaly  Extremities: no cyanosis, clubbing or edema  Musculoskeletal: normal range of motion, no joint swelling, deformity or tenderness  Neurologic: reflexes normal and symmetric, no cranial nerve deficit, gait, coordination and speech normal  General Appearance: alert and oriented to person, place and time, well developed and well- nourished, in no acute distress  Skin: warm and dry, no rash or erythema  Head: normocephalic and atraumatic  Eyes: pupils equal, round, and reactive to light, extraocular eye movements intact, conjunctivae normal  ENT: tympanic membrane, external ear and ear canal normal bilaterally, nose without deformity, nasal mucosa and turbinates normal without polyps  Neck: supple and non-tender without mass, no thyromegaly or thyroid nodules, no cervical lymphadenopathy  Pulmonary/Chest: clear to auscultation bilaterally- no wheezes, rales or rhonchi, normal air movement, no respiratory distress  Cardiovascular: normal rate, regular rhythm, normal S1 and S2, no murmurs, rubs, clicks, or gallops, distal pulses intact, no carotid bruits  Abdomen: soft, non-tender, non-distended, normal bowel sounds, no masses or organomegaly  Extremities: no cyanosis, clubbing or edema  Musculoskeletal: normal range of motion, no joint swelling, deformity or tenderness  Neurologic: reflexes normal and symmetric, no cranial nerve deficit, gait, coordination and speech normal  General Appearance: alert and oriented to person, place and time, well-developed and well-nourished, in no acute distress  Skin: warm and dry, no rash or erythema  Head: normocephalic and atraumatic  Eyes: pupils equal, round, and reactive to light, extraocular eye movements intact, conjunctivae normal  ENT: tympanic membrane, external ear and ear canal normal bilaterally, oropharynx clear and moist with normal mucous membranes  Neck: neck supple and non tender without mass, no thyromegaly or thyroid nodules, no cervical lymphadenopathy   Pulmonary/Chest: clear to auscultation bilaterally- no wheezes, rales or rhonchi, normal air movement, no respiratory distress  Cardiovascular: normal rate, normal S1 and S2, no gallops, intact distal pulses, and no carotid bruits  Abdomen: soft, non-tender, non-distended, normal bowel sounds, no masses or organomegaly  Pelvic: normal external genitalia, vulva, vagina, cervix, uterus and adnexa  Genitourinary: genitals normal without hernia or inguinal adenopathy  Extremities: no cyanosis and no clubbing  Musculoskeletal: normal range of motion, no joint swelling, deformity or tenderness  Neurologic: gait and coordination normal and speech normal      An electronic signature was used to authenticate this note.   --DEMI Smith - CNP

## 2022-12-05 ENCOUNTER — NURSE ONLY (OUTPATIENT)
Dept: CARDIOLOGY CLINIC | Age: 72
End: 2022-12-05

## 2022-12-05 ENCOUNTER — TELEPHONE (OUTPATIENT)
Dept: FAMILY MEDICINE CLINIC | Age: 72
End: 2022-12-05

## 2022-12-05 DIAGNOSIS — Z95.810 ICD (IMPLANTABLE CARDIOVERTER-DEFIBRILLATOR), DUAL, IN SITU: ICD-10-CM

## 2022-12-05 DIAGNOSIS — F51.01 PRIMARY INSOMNIA: ICD-10-CM

## 2022-12-05 DIAGNOSIS — I42.8 NICM (NONISCHEMIC CARDIOMYOPATHY) (HCC): ICD-10-CM

## 2022-12-05 DIAGNOSIS — K21.9 GASTROESOPHAGEAL REFLUX DISEASE WITHOUT ESOPHAGITIS: ICD-10-CM

## 2022-12-05 DIAGNOSIS — I50.22 CHRONIC SYSTOLIC CONGESTIVE HEART FAILURE (HCC): ICD-10-CM

## 2022-12-05 RX ORDER — HYDROXYZINE HYDROCHLORIDE 25 MG/1
25 TABLET, FILM COATED ORAL NIGHTLY
Qty: 90 TABLET | Refills: 0 | Status: SHIPPED | OUTPATIENT
Start: 2022-12-05 | End: 2023-03-05

## 2022-12-05 RX ORDER — PANTOPRAZOLE SODIUM 40 MG/1
40 TABLET, DELAYED RELEASE ORAL DAILY
Qty: 90 TABLET | Refills: 1
Start: 2022-12-05 | End: 2023-06-03

## 2022-12-05 NOTE — PROGRESS NOTES
Remote transmission of pacemaker and/or ICD, or implanted heart monitor shows normal cardiac device function. Patient's last device interrogation was on 11/14. Estimated device longevity is 6.8 years. Patient has a history of palpitations, pAF, and PACs. Takes Eliquis and Coreg. AP 74.4%   <0.1%    P wave 2.4 mV  R wave 16.9 mV    Since last device interrogation, no arrhythmias recorded. TI near baseline. EP MD to review. Patient is to follow up in 3 months either remotely or in clinic. Please see the [employer] report under the Cardiology tab for more detail.

## 2022-12-05 NOTE — TELEPHONE ENCOUNTER
Received 2 faxes from [employer], they need directions for medication pantoprazole and hydroxyzine

## 2022-12-06 ENCOUNTER — TELEMEDICINE (OUTPATIENT)
Dept: FAMILY MEDICINE CLINIC | Age: 72
End: 2022-12-06
Payer: MEDICARE

## 2022-12-06 DIAGNOSIS — Z00.00 INITIAL MEDICARE ANNUAL WELLNESS VISIT: Primary | ICD-10-CM

## 2022-12-06 DIAGNOSIS — Z71.89 ACP (ADVANCE CARE PLANNING): ICD-10-CM

## 2022-12-06 PROCEDURE — 1123F ACP DISCUSS/DSCN MKR DOCD: CPT

## 2022-12-06 PROCEDURE — G0438 PPPS, INITIAL VISIT: HCPCS

## 2022-12-06 PROCEDURE — G8484 FLU IMMUNIZE NO ADMIN: HCPCS

## 2022-12-06 PROCEDURE — 3017F COLORECTAL CA SCREEN DOC REV: CPT

## 2022-12-06 ASSESSMENT — PATIENT HEALTH QUESTIONNAIRE - PHQ9
6. FEELING BAD ABOUT YOURSELF - OR THAT YOU ARE A FAILURE OR HAVE LET YOURSELF OR YOUR FAMILY DOWN: 0
SUM OF ALL RESPONSES TO PHQ QUESTIONS 1-9: 0
5. POOR APPETITE OR OVEREATING: 0
4. FEELING TIRED OR HAVING LITTLE ENERGY: 0
9. THOUGHTS THAT YOU WOULD BE BETTER OFF DEAD, OR OF HURTING YOURSELF: 0
2. FEELING DOWN, DEPRESSED OR HOPELESS: 0
10. IF YOU CHECKED OFF ANY PROBLEMS, HOW DIFFICULT HAVE THESE PROBLEMS MADE IT FOR YOU TO DO YOUR WORK, TAKE CARE OF THINGS AT HOME, OR GET ALONG WITH OTHER PEOPLE: 0
8. MOVING OR SPEAKING SO SLOWLY THAT OTHER PEOPLE COULD HAVE NOTICED. OR THE OPPOSITE, BEING SO FIGETY OR RESTLESS THAT YOU HAVE BEEN MOVING AROUND A LOT MORE THAN USUAL: 0
SUM OF ALL RESPONSES TO PHQ QUESTIONS 1-9: 0
SUM OF ALL RESPONSES TO PHQ9 QUESTIONS 1 & 2: 0
SUM OF ALL RESPONSES TO PHQ QUESTIONS 1-9: 0
3. TROUBLE FALLING OR STAYING ASLEEP: 0
1. LITTLE INTEREST OR PLEASURE IN DOING THINGS: 0
SUM OF ALL RESPONSES TO PHQ QUESTIONS 1-9: 0
7. TROUBLE CONCENTRATING ON THINGS, SUCH AS READING THE NEWSPAPER OR WATCHING TELEVISION: 0

## 2022-12-06 ASSESSMENT — LIFESTYLE VARIABLES
HOW MANY STANDARD DRINKS CONTAINING ALCOHOL DO YOU HAVE ON A TYPICAL DAY: PATIENT DOES NOT DRINK
HOW OFTEN DO YOU HAVE A DRINK CONTAINING ALCOHOL: NEVER

## 2022-12-06 NOTE — PROGRESS NOTES
Medicare Annual Wellness Visit    Valentino Clayman is here for Medicare AWV (Pt is here for Medicare AWV )    Assessment & Plan   Initial Medicare annual wellness visit  ACP (advance care planning)    Recommendations for Preventive Services Due: see orders and patient instructions/AVS.  Recommended screening schedule for the next 5-10 years is provided to the patient in written form: see Patient Instructions/AVS.     Return for Medicare Annual Wellness Visit in 1 year. Subjective       Patient's complete Health Risk Assessment and screening values have been reviewed and are found in Flowsheets. The following problems were reviewed today and where indicated follow up appointments were made and/or referrals ordered. Positive Risk Factor Screenings with Interventions:     Cognitive:  Clock Drawing Test (CDT): Normal (spell world backwards)  Total Score Interpretation: Abnormal Mini-Cog  Cognitive Impairment Interventions:  No abnormal findings were noted. Was able to do the clock drawing test because it was not completed in the office. Alcohol Screening:  Alcohol Use: Not At Risk    Frequency of Alcohol Consumption: Never    Average Number of Drinks: Patient does not drink    Frequency of Binge Drinking: Never     AUDIT-C Score: -1     An AUDIT-C score of 3-7 indicates potential alcohol risk. An AUDIT Total score of 8 or more is associated with harmful or hazardous drinking. A score of 13 or more in women, and 15 or more in men, is likely to indicate alcohol dependence.   Substance Use - Alcohol Interventions:  Reviewed recommended alcohol consumption guidelines with the patient        General Health and ACP:  General  In general, how would you say your health is?: Fair  In the past 7 days, have you experienced any of the following: New or Increased Pain, New or Increased Fatigue, Loneliness, Social Isolation, Stress or Anger?: No  Do you get the social and emotional support that you need?: Yes  Do you have a Living Will?: (!) No    Advance Directives       Power of  Living Will ACP-Advance Directive ACP-Power of     Not on File Not on File Not on File Not on File        General Health Risk Interventions:  No Living Will: Advance Care Planning addressed with patient today    Health Habits/Nutrition:  Physical Activity: Inactive    Days of Exercise per Week: 0 days    Minutes of Exercise per Session: 0 min     Have you lost any weight without trying in the past 3 months?: No     Have you seen the dentist within the past year?: Yes  Health Habits/Nutrition Interventions:  Inadequate physical activity:  patient agrees to exercise for at least 150 minutes/week, patient agrees to wear a pedometer and walk at least 10,000 steps/day    Hearing/Vision:  Do you or your family notice any trouble with your hearing that hasn't been managed with hearing aids?: No  Do you have difficulty driving, watching TV, or doing any of your daily activities because of your eyesight?: No  Have you had an eye exam within the past year?: (!) No  No results found.   Hearing/Vision Interventions:  Vision concerns:  patient encouraged to make appointment with his/her eye specialist    Safety:  Do you have working smoke detectors?: Yes  Do you have any tripping hazards - loose or unsecured carpets or rugs?: No  Do you have any tripping hazards - clutter in doorways, halls, or stairs?: No  Do you have either shower bars, grab bars, non-slip mats or non-slip surfaces in your shower or bathtub?: (!) No  Do all of your stairways have a railing or banister?: Not Applicable  Do you always fasten your seatbelt when you are in a car?: Yes  Safety Interventions:  Home safety tips provided           Objective      Patient-Reported Vitals  BP Observations: No, remote/electronic monitoring device was not used or able to be verified  Patient-Reported Weight: 225lb  Patient-Reported Height: 5'7            Allergies   Allergen Reactions    Iv Contrast [Iodides] Anaphylaxis     2002    Codeine Other (See Comments)    Penicillins Other (See Comments)    Sulfa Antibiotics      Other reaction(s): Unknown     Prior to Visit Medications    Medication Sig Taking?  Authorizing Provider   pantoprazole (PROTONIX) 40 MG tablet Take 1 tablet by mouth daily Yes DEMI Baldwin CNP   hydrOXYzine HCl (ATARAX) 25 MG tablet Take 1 tablet by mouth nightly Yes DEMI Baldwin CNP   buPROPion (WELLBUTRIN XL) 300 MG extended release tablet Take 1 tablet by mouth every morning Yes DEMI Baldwin CNP   spironolactone (ALDACTONE) 25 MG tablet Take 1 tablet by mouth daily Yes DEMI Baldwin CNP   furosemide (LASIX) 40 MG tablet Take 1 tablet by mouth See Admin Instructions Daily as needed for weight gain 3lbs in a day or 5lbs in a week with shortness of breath Yes DEMI Baldwin CNP   rOPINIRole (REQUIP) 1 MG tablet Take 1 tablet by mouth 3 times daily Yes DEMI Baldwin CNP   apixaban (ELIQUIS) 5 MG TABS tablet Take 1 tablet by mouth 2 times daily Yes DEMI Chi CNP   carvedilol (COREG) 12.5 MG tablet Take 1 tablet by mouth 2 times daily Yes DEMI Chi CNP   sacubitril-valsartan (ENTRESTO) 24-26 MG per tablet Take 1 tablet by mouth 2 times daily Yes DEMI Chi CNP   Budeson-Glycopyrrol-Formoterol (BREZTRI AEROSPHERE) 160-9-4.8 MCG/ACT AERO Inhale 2 puffs into the lungs daily Yes DEMI Baldwin CNP   albuterol sulfate HFA (VENTOLIN HFA) 108 (90 Base) MCG/ACT inhaler Inhale 2 puffs into the lungs 4 times daily as needed for Wheezing Yes DEMI Baldwin CNP   loratadine (CLARITIN) 10 MG tablet Take 1 tablet by mouth daily Yes DEMI Baldwin CNP       CareTeam (Including outside providers/suppliers regularly involved in providing care):   Patient Care Team:  DEMI Baldwin CNP as PCP - General (Nurse Practitioner)  DEMI Baldwin CNP as PCP - [facility] Provider  Andrew Charnley, RN as Care Transitions Nurse     Reviewed and updated this visit:  Derick Burns, was evaluated through a synchronous (real-time) audio-video encounter. The patient (or guardian if applicable) is aware that this is a billable service, which includes applicable co-pays. This Virtual Visit was conducted with patient's (and/or legal guardian's) consent. The visit was conducted pursuant to the emergency declaration under the [facility], [address] [place] [address] and the [employer] and [employer] Act. Patient identification was verified, and a caregiver was present when appropriate. The patient was located at Home: [place] [id number]. Provider was located at [facility] ([place] [address]): [facility] [address]   [address]  [id number]  [facility]. Cardiovascular Disease Risk Counseling: Assessed the patient's risk to develop cardiovascular disease and reviewed main risk factors. Reviewed steps to reduce disease risk including:   Quitting tobacco use, reducing amount smoked, or not starting the habit  Making healthy food choices  Being physically active and gradualy increasing activity levels   Reduce weight and determine a healthy BMI goal  Monitor blood pressure and treat if higher than 140/90 mmHg  Maintain blood total cholesterol levels under 5 mmol/l or 190 mg/dl  Maintain LDL cholesterol levels under 3.0 mmol/l or 115 mg/dl   Control blood glucose levels  Consider taking aspirin (75 mg daily), once blood pressure is controlled   Provided a follow up plan. Time spent (minutes): 3    Obesity Counseling: Assessed behavioral health risks and factors affecting choice of behavior. Suggested weight control approaches, including dietary changes behavioral modification and follow up plan. Provided educational and support documentation.   Time spent (minutes): 4  Advance Care Planning Advanced Care Planning: Discussed the patients choices for care and treatment in case of a health event that adversely affects decision-making abilities. Also discussed the patients long-term treatment options. Reviewed with the patient the appropriate state-specific advance directive documents. Reviewed the process of designating a competent adult as an Agent (or -in-fact) that could take make health care decisions for the patient if incompetent. Patient was asked to complete the declaration forms, either acknowledge the forms by a public notary or an eligible witness and provide a signed copy to the practice office.   Time spent (minutes): 6

## 2022-12-06 NOTE — PATIENT INSTRUCTIONS
Body Mass Index: Care Instructions  Your Care Instructions     Body mass index (BMI) can help you see if your weight is raising your risk for health problems. It uses a formula to compare how much you weigh with how tall you are. A BMI lower than 18.5 is considered underweight. A BMI between 18.5 and 24.9 is considered healthy. A BMI between 25 and 29.9 is considered overweight. A BMI of 30 or higher is considered obese. If your BMI is in the normal range, it means that you have a lower risk for weight-related health problems. If your BMI is in the overweight or obese range, you may be at increased risk for weight-related health problems, such as high blood pressure, heart disease, stroke, arthritis or joint pain, and diabetes. If your BMI is in the underweight range, you may be at increased risk for health problems such as fatigue, lower protection (immunity) against illness, muscle loss, bone loss, hair loss, and hormone problems. BMI is just one measure of your risk for weight-related health problems. You may be at higher risk for health problems if you are not active, you eat an unhealthy diet, or you drink too much alcohol or use tobacco products. Follow-up care is a key part of your treatment and safety. Be sure to make and go to all appointments, and call your doctor if you are having problems. It's also a good idea to know your test results and keep a list of the medicines you take. How can you care for yourself at home? Practice healthy eating habits. This includes eating plenty of fruits, vegetables, whole grains, lean protein, and low-fat dairy. If your doctor recommends it, get more exercise. Walking is a good choice. Bit by bit, increase the amount you walk every day. Try for at least 30 minutes on most days of the week. Do not smoke. Smoking can increase your risk for health problems. If you need help quitting, talk to your doctor about stop-smoking programs and medicines.  These can increase your chances of quitting for good. Limit alcohol to 2 drinks a day for men and 1 drink a day for women. Too much alcohol can cause health problems. If you have a BMI higher than 25  Your doctor may do other tests to check your risk for weight-related health problems. This may include measuring the distance around your waist. A waist measurement of more than 40 inches in men or 35 inches in women can increase the risk of weight-related health problems. Talk with your doctor about steps you can take to stay healthy or improve your health. You may need to make lifestyle changes to lose weight and stay healthy, such as changing your diet and getting regular exercise. If you have a BMI lower than 18.5  Your doctor may do other tests to check your risk for health problems. Talk with your doctor about steps you can take to stay healthy or improve your health. You may need to make lifestyle changes to gain or maintain weight and stay healthy, such as getting more healthy foods in your diet and doing exercises to build muscle. Where can you learn more? Go to https://Ambrxneha.Zyncd. org and sign in to your Twitter account. Enter S176 in the [facility] box to learn more about \"Body Mass Index: Care Instructions. \"     If you do not have an account, please click on the \"Sign Up Now\" link. Current as of: December 27, 2021               Content Version: 13.4  © 2006-2022 Healthwise, Incorporated. Care instructions adapted under license by [facility] ([facility]). If you have questions about a medical condition or this instruction, always ask your healthcare professional. [place] [address] any warranty or liability for your use of this information. DASH Diet: Care Instructions  Your Care Instructions     The DASH diet is an eating plan that can help lower your blood pressure. DASH stands for Dietary Approaches to Stop Hypertension.  Hypertension is high blood pressure. The DASH diet focuses on eating foods that are high in calcium, potassium, and magnesium. These nutrients can lower blood pressure. The foods that are highest in these nutrients are fruits, vegetables, low-fat dairy products, nuts, seeds, and legumes. But taking calcium, potassium, and magnesium supplements instead of eating foods that are high in those nutrients does not have the same effect. The DASH diet also includes whole grains, fish, and poultry. The DASH diet is one of several lifestyle changes your doctor may recommend to lower your high blood pressure. Your doctor may also want you to decrease the amount of sodium in your diet. Lowering sodium while following the DASH diet can lower blood pressure even further than just the DASH diet alone. Follow-up care is a key part of your treatment and safety. Be sure to make and go to all appointments, and call your doctor if you are having problems. It's also a good idea to know your test results and keep a list of the medicines you take. How can you care for yourself at home? Following the DASH diet  Eat 4 to 5 servings of fruit each day. A serving is 1 medium-sized piece of fruit, ½ cup chopped or canned fruit, 1/4 cup dried fruit, or 4 ounces (½ cup) of fruit juice. Choose fruit more often than fruit juice. Eat 4 to 5 servings of vegetables each day. A serving is 1 cup of lettuce or raw leafy vegetables, ½ cup of chopped or cooked vegetables, or 4 ounces (½ cup) of vegetable juice. Choose vegetables more often than vegetable juice. Get 2 to 3 servings of low-fat and fat-free dairy each day. A serving is 8 ounces of milk, 1 cup of yogurt, or 1 ½ ounces of cheese. Eat 6 to 8 servings of grains each day. A serving is 1 slice of bread, 1 ounce of dry cereal, or ½ cup of cooked rice, pasta, or cooked cereal. Try to choose whole-grain products as much as possible. Limit lean meat, poultry, and fish to 2 servings each day.  A serving is 3 ounces, about the size of a deck of cards. Eat 4 to 5 servings of nuts, seeds, and legumes (cooked dried beans, lentils, and split peas) each week. A serving is 1/3 cup of nuts, 2 tablespoons of seeds, or ½ cup of cooked beans or peas. Limit fats and oils to 2 to 3 servings each day. A serving is 1 teaspoon of vegetable oil or 2 tablespoons of salad dressing. Limit sweets and added sugars to 5 servings or less a week. A serving is 1 tablespoon jelly or jam, ½ cup sorbet, or 1 cup of lemonade. Eat less than 2,300 milligrams (mg) of sodium a day. If you limit your sodium to 1,500 mg a day, you can lower your blood pressure even more. Be aware that all of these are the suggested number of servings for people who eat 1,800 to 2,000 calories a day. Your recommended number of servings may be different if you need more or fewer calories. Tips for success  Start small. Do not try to make dramatic changes to your diet all at once. You might feel that you are missing out on your favorite foods and then be more likely to not follow the plan. Make small changes, and stick with them. Once those changes become habit, add a few more changes. Try some of the following:  Make it a goal to eat a fruit or vegetable at every meal and at snacks. This will make it easy to get the recommended amount of fruits and vegetables each day. Try yogurt topped with fruit and nuts for a snack or healthy dessert. Add lettuce, tomato, cucumber, and onion to sandwiches. Combine a ready-made pizza crust with low-fat mozzarella cheese and lots of vegetable toppings. Try using tomatoes, squash, spinach, broccoli, carrots, cauliflower, and onions. Have a variety of cut-up vegetables with a low-fat dip as an appetizer instead of chips and dip. Sprinkle sunflower seeds or chopped almonds over salads. Or try adding chopped walnuts or almonds to cooked vegetables. Try some vegetarian meals using beans and peas. Add garbanzo or kidney beans to salads.  Make burritos and tacos with mashed rivero beans or black beans. Where can you learn more? Go to https://chpepiceweb.[email or web address] org and sign in to your [employer] account. Enter V515 in the [facility] box to learn more about \"DASH Diet: Care Instructions. \"     If you do not have an account, please click on the \"Sign Up Now\" link. Current as of: January 10, 2022               Content Version: 13.4  © 2006-2022 Marakana. Care instructions adapted under license by [facility] [address] If you have questions about a medical condition or this instruction, always ask your healthcare professional. [address] any warranty or liability for your use of this information. Learning About Cutting Calories  How do calories affect your weight? Food gives your body energy. Energy from the food you eat is measured in calories. This energy keeps your heart beating, your brain active, and your muscles working. Your body needs a certain number of calories each day. After your body uses the calories it needs, it stores extra calories as fat. To lose weight safely, you have to eat fewer calories while eating in a healthy way. How many calories do you need each day? The more active you are, the more calories you need. When you are less active, you need fewer calories. How many calories you need each day also depends on several things, including your age and whether you are male or female. Here are some general guidelines for adults:  Less active women and older adults need 1,600 to 2,000 calories each day. Active women and less active men need 2,000 to 2,400 calories each day. Active men need 2,400 to 3,000 calories each day. How can you cut calories and eat healthy meals? Whole grains, vegetables and fruits, and dried beans are good lower-calorie foods. They give you lots of nutrients and fiber. And they fill you up.   Sweets, energy drinks, and soda pop are high in calories. They give you few nutrients and no fiber. Try to limit soda pop, fruit juice, and energy drinks. Drink water instead. Some fats can be part of a healthy diet. But cutting back on fats from highly processed foods like fast foods and many snack foods is a good way to lower the calories in your diet. Also, use smaller amounts of fats like butter, margarine, salad dressing, and mayonnaise. Add fresh garlic, lemon, or herbs to your meals to add flavor without adding fat. Meats and dairy products can be a big source of hidden fats. Try to choose lean or low-fat versions of these products. Fat-free cookies, candies, chips, and frozen treats can still be high in sugar and calories. Some fat-free foods have more calories than regular ones. Eat fat-free treats in moderation, as you would other foods. If your favorite foods are high in fat, salt, sugar, or calories, limit how often you eat them. Eat smaller servings, or look for healthy substitutes. Fill up on fruits, vegetables, and whole grains. Eating at home  Use meat as a side dish instead of as the main part of your meal.  Try main dishes that use whole wheat pasta, brown rice, dried beans, or vegetables. Find ways to cook with little or no fat, such as broiling, steaming, or grilling. Use cooking spray instead of oil. If you use oil, use a monounsaturated oil, such as canola or olive oil. Trim fat from meats before you cook them. Drain off fat after you brown the meat or while you roast it. Chill soups and stews after you cook them. Then skim the fat off the top after it hardens. Eating out  Order foods that are broiled or poached rather than fried or breaded. Cut back on the amount of butter or margarine that you use on bread. Order sauces, gravies, and salad dressings on the side, and use only a little. When you order pasta, choose tomato sauce rather than cream sauce.   Ask for salsa with your baked potato instead of sour cream, butter, cheese, or schuler. Order meals in a small size instead of upgrading to a large. Share an entree, or take part of your food home to eat as another meal.  Share appetizers and desserts. Where can you learn more? Go to https://chpejuventino.Mantex. org and sign in to your SustainX account. Enter A930 in the [employer] box to learn more about \"Learning About Cutting Calories. \"     If you do not have an account, please click on the \"Sign Up Now\" link. Current as of: May 9, 2022               Content Version: 13.4  © 7036-3705 Healthwise, Makoo. Care instructions adapted under license by [facility] ([facility]). If you have questions about a medical condition or this instruction, always ask your healthcare professional. [address] any warranty or liability for your use of this information. Learning About Low-Carbohydrate Diets  What is a low-carbohydrate diet? A low-carbohydrate (or \"low-carb\") diet limits foods and drinks that have carbohydrates. This includes grains, fruits, milk and yogurt, and starchy vegetables like potatoes, beans, and corn. It also avoids foods and drinks that have added sugar. Instead, low-carb diets include foods that are high in protein and fat. Why might you follow a low-carb diet? Low-carb diets may be used for a variety of reasons, such as for weight loss. People who have diabetes may use a low-carb diet to help manage their blood sugar levels. What should you do before you start the diet? Talk to your doctor before you try any diet. This is even more important if you have health problems like kidney disease, heart disease, or diabetes. Your doctor may suggest that you meet with a registered dietitian. A dietitian can help you make an eating plan that works for you. What foods do you eat on a low-carb diet? On a low-carb diet, you choose foods that are high in protein and fat.  Examples of these are:  Meat, poultry, and fish.  Eggs. Nuts, such as walnuts, pecans, almonds, and peanuts. Peanut butter and other nut butters. Tofu. Avocado. [place] Halt. Non-starchy vegetables like broccoli, cauliflower, green beans, mushrooms, peppers, lettuce, and spinach. Unsweetened non-dairy milks like almond milk and coconut milk. Cheese, cottage cheese, and cream cheese. Where can you learn more? Go to https://Yard Clubpebreteweb.[employer] Corporation. org and sign in to your SpeakWorks account. Enter C335 in the grabHalo box to learn more about \"Learning About Low-Carbohydrate Diets. \"     If you do not have an account, please click on the \"Sign Up Now\" link. Current as of: May 9, 2022               Content Version: 13.4  © 7372-2686 Chloe [employer] Isabel. Care instructions adapted under license by [facility] [address] . If you have questions about a medical condition or this instruction, always ask your healthcare professional. [place] [address] any warranty or liability for your use of this information. Personalized Preventive Plan for Lisa Brooks - 12/6/2022  Medicare offers a range of preventive health benefits. Some of the tests and screenings are paid in full while other may be subject to a deductible, co-insurance, and/or copay. Some of these benefits include a comprehensive review of your medical history including lifestyle, illnesses that may run in your family, and various assessments and screenings as appropriate. After reviewing your medical record and screening and assessments performed today your provider may have ordered immunizations, labs, imaging, and/or referrals for you. A list of these orders (if applicable) as well as your Preventive Care list are included within your After Visit Summary for your review.     Other Preventive Recommendations:    A preventive eye exam performed by an eye specialist is recommended every 1-2 years to screen for glaucoma; cataracts, macular degeneration, and other eye disorders. A preventive dental visit is recommended every 6 months. Try to get at least 150 minutes of exercise per week or 10,000 steps per day on a pedometer . Order or download the FREE \"Exercise & Physical Activity: Your Everyday Guide\" from The [employer] Management software [employer]. Call 5-686.598.6656 or search The [employer] software [employer] online. You need 8379-5345 mg of calcium and 3076-4676 IU of vitamin D per day. It is possible to meet your calcium requirement with diet alone, but a vitamin D supplement is usually necessary to meet this goal.  When exposed to the sun, use a sunscreen that protects against both UVA and UVB radiation with an SPF of 30 or greater. Reapply every 2 to 3 hours or after sweating, drying off with a towel, or swimming. Always wear a seat belt when traveling in a car. Always wear a helmet when riding a bicycle or motorcycle.

## 2022-12-07 ENCOUNTER — TELEPHONE (OUTPATIENT)
Dept: FAMILY MEDICINE CLINIC | Age: 72
End: 2022-12-07

## 2022-12-07 NOTE — TELEPHONE ENCOUNTER
Called and spoke to [facility], they did not receive the order for the pantoprazole on 12/05, verbal order given

## 2022-12-07 NOTE — TELEPHONE ENCOUNTER
[facility] called and stated that they did not receive the directions for the Pantoprazole 40mg. They stated they only received the script that was sent over on 12/2/22.

## 2022-12-09 ENCOUNTER — CARE COORDINATION (OUTPATIENT)
Dept: CASE MANAGEMENT | Age: 72
End: 2022-12-09

## 2022-12-09 PROBLEM — J32.9 SINUSITIS: Status: RESOLVED | Noted: 2022-11-09 | Resolved: 2022-12-09

## 2022-12-09 PROBLEM — R05.9 COUGH: Status: RESOLVED | Noted: 2022-11-09 | Resolved: 2022-12-09

## 2022-12-09 NOTE — CARE COORDINATION
[facility] Care Transitions Follow Up Call    Patient: Camden Verde  Patient : 1950   MRN: [id number]  Reason for Admission:  elevated BNP, CHF  Discharge Date: 11/15/22 RARS: Readmission Risk Score: 7.8    Attempted to reach patient via phone for transition call. VM left stating purpose of call along with my contact information requesting a return call.         Follow Up  Future Appointments   Date Time Provider Kristy Conley   2022 10:30 AM DEMI Irvin CNP P CLER CAR [facility]   2023 10:00 AM Mick Avila MD [facility]   3/7/2023 10:30 AM MD Christian Vaughn      Care Transitions Subsequent and Final Call    Subsequent and Final Calls  Care Transitions Interventions  Other Interventions:             Angie Olivier RN

## 2022-12-16 ENCOUNTER — CARE COORDINATION (OUTPATIENT)
Dept: CASE MANAGEMENT | Age: 72
End: 2022-12-16

## 2022-12-16 NOTE — CARE COORDINATION
[facility] Care Transitions Follow Up Call    Patient: Sally Iglesias  Patient : 1950   MRN: [id number]  Reason for Admission: elevated BNP, CHF  Discharge Date: 11/15/22 RARS: Readmission Risk Score: 7.8    Second and final attempt made to reach patient for transition call. VM left stating purpose of call along with my contact information requesting a return call.           Follow Up  Future Appointments   Date Time Provider Kristy Conley   2022 10:30 AM DEMI Thomas - CNP P CLER CAR [facility]   2023 10:00 AM Lamin Lyman MD [facility]   3/7/2023 10:30 AM Agapito Bejarano MD [facility]     Care Transitions Subsequent and Final Call    Subsequent and Final Calls  Care Transitions Interventions  Other Interventions:           Karen Beach RN

## 2022-12-22 NOTE — TELEPHONE ENCOUNTER
Refill Request       Last Seen: Last Seen Department: 12/6/2022  Last Seen by PCP: 12/6/2022    Last Written: 11/15/2022    Next Appointment:   Future Appointments   Date Time Provider Kristy Conley   12/23/2022 10:30 AM DEMI Carpio CNP P CLER [facility]   2/1/2023 10:00 AM MD Darlene Gonzaleznino  [facility]   3/7/2023 10:30 [facility]           Requested Prescriptions     Pending Prescriptions Disp Refills    sacubitril-valsartan (ENTRESTO) 24-26 MG per tablet 60 tablet 4     Sig: Take 1 tablet by mouth 2 times daily

## 2023-01-09 RX ORDER — CARVEDILOL 12.5 MG/1
12.5 TABLET ORAL 2 TIMES DAILY
Qty: 60 TABLET | Refills: 0 | Status: SHIPPED | OUTPATIENT
Start: 2023-01-09 | End: 2023-01-13 | Stop reason: SDUPTHER

## 2023-01-09 NOTE — TELEPHONE ENCOUNTER
Refill Request       Last Seen: Last Seen Department: 12/6/2022  Last Seen by PCP: 12/6/2022    Last Written: 11/15/2022    Patient states that she is taking 25mg 2 times a day, she states that it was upped in [place] before she came here.        Next Appointment:   Future Appointments   Date Time Provider Kristy Conley   2/1/2023 10:00 AM MD Amy Segura   2/3/2023  9:00 AM DEMI Dumont CNP P CLER CAR [facility]   3/6/2023  8:35 AM SCHEDULE, Kimmie Khanna REMOTE TRANSMISSION Amy [facility]   3/7/2023 10:30 AM MD GISELL Suárez [facility]           Requested Prescriptions     Pending Prescriptions Disp Refills    carvedilol (COREG) 25 MG tablet 180 tablet 0     Sig: Take 1 tablet by mouth 2 times daily

## 2023-01-10 DIAGNOSIS — J44.9 CHRONIC OBSTRUCTIVE PULMONARY DISEASE, UNSPECIFIED COPD TYPE (HCC): ICD-10-CM

## 2023-01-10 RX ORDER — BUDESONIDE, GLYCOPYRROLATE, AND FORMOTEROL FUMARATE 160; 9; 4.8 UG/1; UG/1; UG/1
2 AEROSOL, METERED RESPIRATORY (INHALATION) DAILY
Qty: 1 EACH | Refills: 0 | Status: SHIPPED | OUTPATIENT
Start: 2023-01-10 | End: 2023-01-12 | Stop reason: SDUPTHER

## 2023-01-10 RX ORDER — LORATADINE 10 MG/1
10 TABLET ORAL DAILY
Qty: 30 TABLET | Refills: 0 | Status: SHIPPED | OUTPATIENT
Start: 2023-01-10 | End: 2023-02-09

## 2023-01-12 DIAGNOSIS — J44.9 CHRONIC OBSTRUCTIVE PULMONARY DISEASE, UNSPECIFIED COPD TYPE (HCC): ICD-10-CM

## 2023-01-12 RX ORDER — BUDESONIDE, GLYCOPYRROLATE, AND FORMOTEROL FUMARATE 160; 9; 4.8 UG/1; UG/1; UG/1
2 AEROSOL, METERED RESPIRATORY (INHALATION) DAILY
Qty: 1 EACH | Refills: 0 | Status: SHIPPED | OUTPATIENT
Start: 2023-01-12 | End: 2023-04-12

## 2023-01-12 NOTE — TELEPHONE ENCOUNTER
You wrote for a breztri inhaler on 01/10/23, when e-scribing to mail order they require a 3 month supply. Can we change the quantity please or we can send it to a local pharmacy.

## 2023-01-13 ENCOUNTER — TELEPHONE (OUTPATIENT)
Dept: FAMILY MEDICINE CLINIC | Age: 73
End: 2023-01-13

## 2023-01-13 RX ORDER — CARVEDILOL 12.5 MG/1
12.5 TABLET ORAL 2 TIMES DAILY
Qty: 60 TABLET | Refills: 0 | Status: SHIPPED | OUTPATIENT
Start: 2023-01-13

## 2023-01-13 NOTE — TELEPHONE ENCOUNTER
Patient called and stated that she received notification that her medication listed below was cancelled through [address] Patient stated that she thought this medication should have been increased to 25mg twice a day but wanted to verify that.      Rich Hermilo [phone] (home)    is requesting refill(s) of medication Carvedilol 12.5mg [facility] [address] 12/2/2022 (pertaining to medication)   Last refill 01/09/2023? (per medication requested)  Next office visit scheduled or attempted No

## 2023-01-16 DIAGNOSIS — I10 ESSENTIAL HYPERTENSION: Primary | ICD-10-CM

## 2023-01-16 RX ORDER — CARVEDILOL 12.5 MG/1
12.5 TABLET ORAL 2 TIMES DAILY
Qty: 60 TABLET | Refills: 0 | Status: SHIPPED | OUTPATIENT
Start: 2023-01-16

## 2023-01-25 DIAGNOSIS — I10 ESSENTIAL HYPERTENSION: ICD-10-CM

## 2023-01-25 RX ORDER — CARVEDILOL 12.5 MG/1
TABLET ORAL
Qty: 60 TABLET | Refills: 11 | Status: SHIPPED | OUTPATIENT
Start: 2023-01-25 | End: 2023-01-27 | Stop reason: SDUPTHER

## 2023-01-25 NOTE — TELEPHONE ENCOUNTER
Refill Request       Last Seen: Last Seen Department: 12/6/2022  Last Seen by PCP: 12/6/2022    Last Written: 01/16/2023        Next Appointment:   Future Appointments   Date Time Provider Kristy Blackburni   2/1/2023 10:00 AM SCHEDULE, [facility]   2/1/2023 10:00 AM Clara Johns MD Jozef Leaver [facility]   2/3/2023  9:00 AM Lorenzo Sheridan, APRN - CNP [facility] CLER [facility]   3/6/2023  8:35 AM SCHEDULE, [place] REMOTE TRANSMISSION [facility]   3/7/2023 10:30 AM Agapito Sylvester MD CLERM PUL[facility]           Requested Prescriptions     Pending Prescriptions Disp Refills    carvedilol (COREG) 12.5 MG tablet [Pharmacy Med Name: CARVEDILOL TABS 12.5MG] 60 tablet 11     Sig: TAKE 1 TABLET TWICE A DAY

## 2023-01-26 DIAGNOSIS — I10 ESSENTIAL HYPERTENSION: ICD-10-CM

## 2023-01-27 RX ORDER — CARVEDILOL 12.5 MG/1
TABLET ORAL
Qty: 180 TABLET | Refills: 1 | Status: SHIPPED | OUTPATIENT
Start: 2023-01-27

## 2023-02-01 ENCOUNTER — NURSE ONLY (OUTPATIENT)
Dept: CARDIOLOGY CLINIC | Age: 73
End: 2023-02-01
Payer: MEDICARE

## 2023-02-01 ENCOUNTER — OFFICE VISIT (OUTPATIENT)
Dept: CARDIOLOGY CLINIC | Age: 73
End: 2023-02-01
Payer: MEDICARE

## 2023-02-01 VITALS
WEIGHT: 230 LBS | HEART RATE: 73 BPM | SYSTOLIC BLOOD PRESSURE: 106 MMHG | OXYGEN SATURATION: 96 % | DIASTOLIC BLOOD PRESSURE: 62 MMHG | BODY MASS INDEX: 36.1 KG/M2 | HEIGHT: 67 IN

## 2023-02-01 DIAGNOSIS — I42.8 NICM (NONISCHEMIC CARDIOMYOPATHY) (HCC): ICD-10-CM

## 2023-02-01 DIAGNOSIS — R00.2 PALPITATIONS: ICD-10-CM

## 2023-02-01 DIAGNOSIS — Z95.810 ICD (IMPLANTABLE CARDIOVERTER-DEFIBRILLATOR), DUAL, IN SITU: ICD-10-CM

## 2023-02-01 DIAGNOSIS — I48.0 PAF (PAROXYSMAL ATRIAL FIBRILLATION) (HCC): Primary | ICD-10-CM

## 2023-02-01 DIAGNOSIS — I49.1 PREMATURE ATRIAL CONTRACTION: ICD-10-CM

## 2023-02-01 DIAGNOSIS — E66.01 SEVERE OBESITY (BMI 35.0-39.9) WITH COMORBIDITY (HCC): ICD-10-CM

## 2023-02-01 DIAGNOSIS — I10 PRIMARY HYPERTENSION: ICD-10-CM

## 2023-02-01 DIAGNOSIS — I50.22 CHRONIC SYSTOLIC CONGESTIVE HEART FAILURE (HCC): ICD-10-CM

## 2023-02-01 DIAGNOSIS — J43.9 PULMONARY EMPHYSEMA, UNSPECIFIED EMPHYSEMA TYPE (HCC): ICD-10-CM

## 2023-02-01 PROCEDURE — 3023F SPIROM DOC REV: CPT | Performed by: INTERNAL MEDICINE

## 2023-02-01 PROCEDURE — 3074F SYST BP LT 130 MM HG: CPT | Performed by: INTERNAL MEDICINE

## 2023-02-01 PROCEDURE — 1090F PRES/ABSN URINE INCON ASSESS: CPT | Performed by: INTERNAL MEDICINE

## 2023-02-01 PROCEDURE — G8427 DOCREV CUR MEDS BY ELIG CLIN: HCPCS | Performed by: INTERNAL MEDICINE

## 2023-02-01 PROCEDURE — 99204 OFFICE O/P NEW MOD 45 MIN: CPT | Performed by: INTERNAL MEDICINE

## 2023-02-01 PROCEDURE — 3078F DIAST BP <80 MM HG: CPT | Performed by: INTERNAL MEDICINE

## 2023-02-01 PROCEDURE — G8484 FLU IMMUNIZE NO ADMIN: HCPCS | Performed by: INTERNAL MEDICINE

## 2023-02-01 PROCEDURE — 93280 PM DEVICE PROGR EVAL DUAL: CPT | Performed by: INTERNAL MEDICINE

## 2023-02-01 PROCEDURE — G8417 CALC BMI ABV UP PARAM F/U: HCPCS | Performed by: INTERNAL MEDICINE

## 2023-02-01 RX ORDER — OXYCODONE HYDROCHLORIDE AND ACETAMINOPHEN 5; 325 MG/1; MG/1
1 TABLET ORAL EVERY 6 HOURS PRN
COMMUNITY

## 2023-02-01 RX ORDER — LISINOPRIL 20 MG/1
20 TABLET ORAL DAILY
COMMUNITY
End: 2023-02-03 | Stop reason: ALTCHOICE

## 2023-02-01 RX ORDER — OXYBUTYNIN CHLORIDE 5 MG/1
5 TABLET ORAL 2 TIMES DAILY
COMMUNITY

## 2023-02-01 ASSESSMENT — ENCOUNTER SYMPTOMS
HEMATEMESIS: 0
HEMATOCHEZIA: 0
LEFT EYE: 0
WHEEZING: 0
RIGHT EYE: 0
STRIDOR: 0
SHORTNESS OF BREATH: 0

## 2023-02-01 NOTE — PROGRESS NOTES
Patient presents to the device clinic today for a programming evaluation for her defibrillator. Patient has a history of pAF and PACs. Takes Eliquis and Coreg. Last device interrogation was on 12/5. 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 TI above baseline. P wave: 3.6 mV  R wave: >20 mV    AP 44.4%  <0.1%    All sensing and pacing parameters are within normal range. No changes need to be made at this time. Patient will see Dr. Karrie Cartagena today in clinic. Patient education was provided about device functionality, in home monitoring, and any other patient questions and/or concerns were addressed. Patient voices understanding. Patient will follow up in 3 months in office or remotely. Please see interrogation for more detail - Paceart report located under the Cardiology tab.

## 2023-02-01 NOTE — PATIENT INSTRUCTIONS
Plan:     Remote device checks every three months  The current medical regimen is effective;  continue present plan and medications.   Follow up with me in 6 months

## 2023-02-01 NOTE — PROGRESS NOTES
Assessment:     1. Atrial fibrillation: patient has paroxysmal atrial fibrillation. Associated symptoms: Fatigue     History of cardioversion: Unknown  History of AF ablation: Had atrial fibrillation ablation in the past (2019)  History of heart surgery/procedure: yes, AF ablation 2019, TONY occlusion 2019    Current use of anti-arrhythmic drugs: Not currently on anti-arrhythmic drugs  Previous use of anti-arrhythmic drugs: Unknown    Overall LV function: Mild left ventricular systolic dysfunction   Size of left atrium:  Dilated  Significant cardiac valvular disease: No significant valve disease  Family history of atrial fibrillation: Unknown    Alcohol consumption: No alcohol consumption  Caffeine consumption: No/minimal caffeine intake  Smoking status: former smoker, quit many years ago  Obstructive sleep apnea: Not on CPAP/BiPAP therapy  Exercise status: Does not exercise regularly (sedentary lifestyle)    I had a detailed discussion with the patient about issues related to atrial fibrillation (including etiology, disease progression patterns, stroke risk and rate control issues). Patient had her questions answered to her satisfaction. Patient has a [id number] score of at least 3 [Age over 72 (1 point), Female gender (1 point), and Heart failure (1 point)]  Longterm anticoagulation is: recommended  Current anticoagulation: Apixaban  Bleeding issues reported: no  Renal function: Normal  Thyroid function: Normal    Following AF ablation in 2019, it appears patient has had minimal AF burden with only short atrial runs on most recent ICD check. Not currently on anti-arrhythmic drug therapy. Unclear what exact TONY occlusion mechanism was used but sounds, from patient description, to possibly be Lariat type device. Would continue anticoagulation for now. 2. Good blood pressure control.     3. Non-ischemic cardiomyopathy: seems patient had an LVEF of near 35% and underwent implant of dual-chamber ICD in 2019. LVEF improved to low normal/mildly reduced with medical therapy + AF ablation. No evidence of volume overload. Device checked today. No significant arrhythmias or therapy. Normal device parameters. Good battery longevity. Remote monitoring established. Plan:     Remote device checks every three months  The current medical regimen is effective;  continue present plan and medications. Follow up with me in 6 months    Subjective:       Patient ID: Freeman Olmstead is a 67 y.o. female. Chief Complaint:  Chief Complaint   Patient presents with    New Patient    Device Check     HPI    Patient is a pleasant 67 y.o. female who presents for evaluation of atrial fibrillation. The patient has a past medical history of CHF, COPD, GERD, cardiomyopathy, and atrial fibrillation. She underwent a LHC on 07/12/2018 that revealed no CAD and depressed LVEF of 40%. In 04/2019 she underwent an EPS with cryoablation, RFCA, and TONY clip in [place]. On 06/26/2019 she underwent dual chamber ICD placement for an EF of 35 - 40%. On 11/09/2022 she was admitted with shortness of breath. She was treated for CHF. On 11/12/2022, creatinine lupe to 1.8 and her Lasix was held. Office Visit ([address], 02/01/2023): Today she presents to establish care. She states that she just recently moved from [place], [place]. She reports that her energy level is lower than it used to be (attributes it to cold weather). She reports that she is trying to lose weight. Weight fluctuates but around similar values overall past few years. She reports that she was tested for sleep apnea but it was not recommended that she use a CPAP. She reports that she uses oxygen at night. Patient denies current edema, chest pain, shortness of breath, palpitations, dizziness or syncope. Patient is taking all cardiac medications as prescribed and tolerates them well. She denies any recent issues with bleeding or bruising. She reports minimal caffeine usage.  She denies alcohol consumption. She denies tobacco usage. Review of Systems  Review of Systems   Constitutional: Positive for weight gain. Negative for malaise/fatigue and weight loss. HENT:  Negative for nosebleeds and stridor. Eyes:  Negative for vision loss in left eye and vision loss in right eye. Cardiovascular:  Negative for chest pain, dyspnea on exertion, leg swelling and syncope. Respiratory:  Negative for shortness of breath and wheezing. Hematologic/Lymphatic: Negative for bleeding problem. Does not bruise/bleed easily. Skin:  Negative for itching and rash. Musculoskeletal:  Negative for joint pain and joint swelling. Gastrointestinal:  Negative for hematemesis and hematochezia. Genitourinary:  Negative for dysuria and hematuria. Neurological:  Negative for dizziness and light-headedness. Psychiatric/Behavioral:  Negative for altered mental status. The patient is not nervous/anxious.         Past Medical History:   Diagnosis Date    Atrial fibrillation (HCC)     CHF (congestive heart failure) (HCC)     Congenital heart disease     COPD (chronic obstructive pulmonary disease) (HCC)     GERD (gastroesophageal reflux disease)     Irritable bowel syndrome     Obesity     Restless legs syndrome          Social History     Socioeconomic History    Marital status:      Spouse name: Not on file    Number of children: Not on file    Years of education: Not on file    Highest education level: Not on file   Occupational History    Not on file   Tobacco Use    Smoking status: Former     Years: 44.00     Types: Cigarettes     Quit date: 10/28/2019     Years since quitting: 3.2    Smokeless tobacco: Never   Vaping Use    Vaping Use: Former   Substance and Sexual Activity    Alcohol use: Not Currently    Drug use: Never    Sexual activity: Not on file   Other Topics Concern    Not on file   Social History Narrative    Not on file     Social Determinants of Health     Financial Resource Strain: Not on file   Food Insecurity: Not on file   Transportation Needs: Not on file   Physical Activity: Inactive    Days of Exercise per Week: 0 days    Minutes of Exercise per Session: 0 min   Stress: Not on file   Social Connections: Not on file   Intimate Partner Violence: Not on file   Housing Stability: Not on file         Family History   Problem Relation Age of Onset    Heart Attack Brother          Objective:     /62   Pulse 73   Ht 5' 7\" (1.702 m)   Wt 230 lb (104.3 kg)   SpO2 96%   BMI 36.02 kg/m²     Physical Exam  Constitutional:       Appearance: Normal appearance. HENT:      Right Ear: External ear normal.      Left Ear: External ear normal.      Nose: Nose normal. No rhinorrhea. Eyes:      General: No scleral icterus. Conjunctiva/sclera: Conjunctivae normal.   Cardiovascular:      Rate and Rhythm: Normal rate and regular rhythm. Pulmonary:      Effort: Pulmonary effort is normal.      Breath sounds: Normal breath sounds. Abdominal:      General: There is no distension. Tenderness: There is no abdominal tenderness. Musculoskeletal:         General: No swelling or deformity. Cervical back: Normal range of motion and neck supple. Skin:     General: Skin is warm and dry. Neurological:      General: No focal deficit present. Mental Status: She is alert and oriented to person, place, and time. Psychiatric:         Mood and Affect: Mood normal.         Behavior: Behavior normal.       Echocardiogram  (Date: 11/11/2022)  Summary   The left ventricular systolic function is severely reduced with an ejection   fraction of 25%. Left ventricular cavity size is moderately dilated with mild concentric left   ventricular hypertrophy. Grade I diastolic dysfunction with normal filling pressure. Mild mitral and pulmonic regurgitation. The left atrium is mildly dilated. Bi-atrial enlargement. Moderate aortic regurgitation.    The aortic root is mildly dilated. The ascending aorta is moderately dilated. The right ventricle is moderately enlarged. Right ventricular systolic function is moderately reduced . Tricuspid valve is structurally normal.   Trivial tricuspid regurgitation. Systolic pulmonic artery pressure (SPAP) is normal estimated at 30 mmHg   (Right atrial pressure of 3 mmHg). The right atrium is moderately dilated. Stress Test  N/A      Current Medications     Current Outpatient Medications   Medication Sig Dispense Refill    oxyCODONE-acetaminophen (PERCOCET) 5-325 MG per tablet Take 1 tablet by mouth every 6 hours as needed for Pain.       lisinopril (PRINIVIL;ZESTRIL) 20 MG tablet Take 20 mg by mouth daily      oxybutynin (DITROPAN) 5 MG tablet Take 5 mg by mouth 2 times daily      PREGABALIN PO Take by mouth in the morning and at bedtime      carvedilol (COREG) 12.5 MG tablet TAKE 1 TABLET TWICE A DAY (Patient taking differently: Take 25 mg by mouth 2 times daily) 180 tablet 1    Budeson-Glycopyrrol-Formoterol (BREZTRI AEROSPHERE) 160-9-4.8 MCG/ACT AERO Inhale 2 puffs into the lungs daily 1 each 0    sacubitril-valsartan (ENTRESTO) 24-26 MG per tablet Take 1 tablet by mouth 2 times daily 60 tablet 4    pantoprazole (PROTONIX) 40 MG tablet Take 1 tablet by mouth daily 90 tablet 1    hydrOXYzine HCl (ATARAX) 25 MG tablet Take 1 tablet by mouth nightly 90 tablet 0    buPROPion (WELLBUTRIN XL) 300 MG extended release tablet Take 1 tablet by mouth every morning 90 tablet 1    furosemide (LASIX) 40 MG tablet Take 1 tablet by mouth See Admin Instructions Daily as needed for weight gain 3lbs in a day or 5lbs in a week with shortness of breath 30 tablet 3    rOPINIRole (REQUIP) 1 MG tablet Take 1 tablet by mouth 3 times daily 90 tablet 3    apixaban (ELIQUIS) 5 MG TABS tablet Take 1 tablet by mouth 2 times daily (Patient taking differently: Take 2.5 mg by mouth 2 times daily) 60 tablet 4    loratadine (CLARITIN) 10 MG tablet Take 1 tablet by mouth daily (Patient not taking: Reported on 2/1/2023) 30 tablet 0    spironolactone (ALDACTONE) 25 MG tablet Take 1 tablet by mouth daily (Patient not taking: Reported on 2/1/2023) 30 tablet 3    albuterol sulfate HFA (VENTOLIN HFA) 108 (90 Base) MCG/ACT inhaler Inhale 2 puffs into the lungs 4 times daily as needed for Wheezing (Patient not taking: Reported on 2/1/2023) 54 g 1     No current facility-administered medications for this visit. Lab Review     Lab Results   Component Value Date/Time     11/15/2022 10:08 AM    K 4.0 11/15/2022 10:08 AM     11/15/2022 10:08 AM    CO2 19 11/15/2022 10:08 AM    BUN 18 11/15/2022 10:08 AM    CREATININE 1.0 11/15/2022 10:08 AM    GLUCOSE 144 11/15/2022 10:08 AM    CALCIUM 9.4 11/15/2022 10:08 AM        Lab Results   Component Value Date    WBC 7.2 11/15/2022    HGB 14.2 11/15/2022    HCT 43.1 11/15/2022    MCV 93.2 11/15/2022     11/15/2022       Lab Results   Component Value Date    TSH 0.60 11/14/2022       No results found for: BNP    I, Mike Fuchs RN, am scribing for and in the presence of Dr. Veronica Ramirez.  02/01/23 10:16 AM   Mike Fuchs RN

## 2023-02-02 NOTE — PROGRESS NOTES
[facility], [place]  Cardiac Follow-up    Primary Care Doctor:  DEMI Mason - CNP    Chief Complaint   Patient presents with    Hypertension    Hyperlipidemia    Shortness of Breath    Cardiomyopathy        History of Present Illness:  I had the pleasure of seeing Stephie Sevilla in follow up for Cardiomyopathy. Moved from [place] to [facility], [address] [facility]. Hx of non-ischemic cardiomyopathy. S/P Dual chamber MEdtronic ICD implanted 6/26/2019 by Dr. Ariela Reddy [facility]); S/P ablation 2019 and TONY clipping-  unable to place LV lead due to coronary sinus anatomy. She was discharged with [facility]. Admission weight was 232 pounds. Discharge weight was down to 222 pounds. She gained weight over the holidays  Since the holidays losing weight. Less carbs, more veggies. Small portions. Breathing is good. Some shortnes of breath with activity. She  is using lasix as needed when she feels like she is getting more shortness of breath or with weight gain. No chest pain. Some cramping legs, reports RLS. Stephie Sevilla describes symptoms including dyspnea, fatigue but denies chest pain, edema, syncope. Home weights: 235-238lbs    Past Medical History:   has a past medical history of Atrial fibrillation ([address] CHF (congestive heart failure) ([address] Congenital heart disease, COPD (chronic obstructive pulmonary disease) ([address] GERD (gastroesophageal reflux disease), Irritable bowel syndrome, Obesity, and Restless legs syndrome. Surgical History:   has a past surgical history that includes pacemaker placement; Hysterectomy, vaginal; and Stimulator Surgery. Social History:   reports that she quit smoking about 3 years ago. Her smoking use included cigarettes. She has never used smokeless tobacco. She reports that she does not currently use alcohol. She reports that she does not use drugs.    Family History:   Family History   Problem Relation Age of Onset    Heart Attack Brother      Home Medications:  Prior to Admission medications    Medication Sig Start Date End Date Taking? Authorizing Provider   carvedilol (COREG) 12.5 MG tablet TAKE 1 TABLET TWICE A DAY  Patient taking differently: Take 12.5 mg by mouth 2 times daily 1/27/23  Yes DEMI Cheatham CNP   Budeson-Glycopyrrol-Formoterol (BREZTRI AEROSPHERE) 160-9-4.8 MCG/ACT AERO Inhale 2 puffs into the lungs daily 1/12/23 4/12/23 Yes DEMI Cheatham CNP   loratadine (CLARITIN) 10 MG tablet Take 1 tablet by mouth daily 1/10/23 2/9/23 Yes DEMI Cheatham CNP   sacubitril-valsartan (ENTRESTO) 24-26 MG per tablet Take 1 tablet by mouth 2 times daily 12/22/22  Yes DEMI Cheatham CNP   pantoprazole (PROTONIX) 40 MG tablet Take 1 tablet by mouth daily 12/5/22 6/3/23 Yes DEMI Cheatham CNP   hydrOXYzine HCl (ATARAX) 25 MG tablet Take 1 tablet by mouth nightly 12/5/22 3/5/23 Yes DEMI Cheatham CNP   buPROPion (WELLBUTRIN XL) 300 MG extended release tablet Take 1 tablet by mouth every morning 12/2/22  Yes DEMI Cheatham CNP   spironolactone (ALDACTONE) 25 MG tablet Take 1 tablet by mouth daily 12/2/22  Yes DEMI Cheatham CNP   furosemide (LASIX) 40 MG tablet Take 1 tablet by mouth See Admin Instructions Daily as needed for weight gain 3lbs in a day or 5lbs in a week with shortness of breath 12/2/22  Yes DEMI Cheatham CNP   rOPINIRole (REQUIP) 1 MG tablet Take 1 tablet by mouth 3 times daily 12/2/22  Yes DEMI Cheatham CNP   apixaban (ELIQUIS) 5 MG TABS tablet Take 1 tablet by mouth 2 times daily 11/15/22  Yes DEMI Thomas CNP   oxyCODONE-acetaminophen (PERCOCET) 5-325 MG per tablet Take 1 tablet by mouth every 6 hours as needed for Pain.   Patient not taking: Reported on 2/3/2023    Historical Provider, MD   oxybutynin (DITROPAN) 5 MG tablet Take 5 mg by mouth 2 times daily  Patient not taking: Reported on 2/3/2023    Historical Provider, MD   PREGABALIN PO Take by mouth in the morning and at bedtime  Patient not taking: Reported on 2/3/2023    Historical Provider, MD   albuterol sulfate HFA (VENTOLIN HFA) 108 (90 Base) MCG/ACT inhaler Inhale 2 puffs into the lungs 4 times daily as needed for Wheezing  Patient not taking: No sig reported 11/9/22   DEMI Reed CNP      Allergies: Iv contrast [iodides], Codeine, Penicillins, and Sulfa antibiotics     Physical Examination:    Vitals:    02/03/23 0836   BP: 137/74   Pulse: 67   SpO2: 98%   Weight: 228 lb 12.8 oz (103.8 kg)   Height: 5' 7\" (1.702 m)        Constitutional and General Appearance: no apparent distress  HEENT: non-icteric sclera,mask in place   Neck: JVP less than 8 cm H20  Respiratory:  No use of accessory muscles  Clear breath sounds throughout, no wheezing, no crackles, no rhonchi  Cardiovascular:   The apical impulses not displaced  Heart tones are crisp and normal, no murmur/rub/gallop  Regular rate and rhythm, S1,S2 normal  Radial pulses 2+ and equal bilaterally  No edema  Pedal Pulses: 2+ and equal   Abdomen:  No masses or tenderness  Liver: No Abnormalities Noted  Musculoskeletal/Skin:  Exhibits normal gait balance and coordination  There is no clubbing, cyanosis of the extremities  Skin is warm and dry  Moves all extremities well  Neurological/Psychiatric:  Alert and oriented in all spheres  No abnormalities of mood, affect, memory, mentation, or behavior are noted    Lab Data reviewed and analyzed   CBC:   WBC   Date Value Ref Range Status   11/15/2022 7.2 4.0 - 11.0 K/uL Final   11/10/2022 6.8 4.0 - 11.0 K/uL Final   11/09/2022 8.1 4.0 - 11.0 K/uL Final     RBC   Date Value Ref Range Status   11/15/2022 4.63 4.00 - 5.20 M/uL Final   11/10/2022 4.01 4.00 - 5.20 M/uL Final   11/09/2022 4.16 4.00 - 5.20 M/uL Final     Hemoglobin   Date Value Ref Range Status   11/15/2022 14.2 12.0 - 16.0 g/dL Final   11/10/2022 12.3 12.0 - 16.0 g/dL Final   11/09/2022 12.8 12.0 - 16.0 g/dL Final     Hematocrit   Date Value Ref Range Status   11/15/2022 43.1 36.0 - 48.0 % Final   11/10/2022 37.7 36.0 - 48.0 % Final   11/09/2022 38.9 36.0 - 48.0 % Final     MCV   Date Value Ref Range Status   11/15/2022 93.2 80.0 - 100.0 fL Final   11/10/2022 93.9 80.0 - 100.0 fL Final   11/09/2022 93.7 80.0 - 100.0 fL Final     RDW   Date Value Ref Range Status   11/15/2022 13.9 12.4 - 15.4 % Final   11/10/2022 14.2 12.4 - 15.4 % Final   11/09/2022 14.0 12.4 - 15.4 % Final     Platelets   Date Value Ref Range Status   11/15/2022 267 135 - 450 K/uL Final   11/10/2022 227 135 - 450 K/uL Final   11/09/2022 237 135 - 450 K/uL Final     Iron:  No results found for: IRON, TIBC, FERRITIN  BMP:   Sodium   Date Value Ref Range Status   11/15/2022 137 136 - 145 mmol/L Final   11/14/2022 142 136 - 145 mmol/L Final   11/13/2022 140 136 - 145 mmol/L Final     Potassium   Date Value Ref Range Status   11/14/2022 4.5 3.5 - 5.1 mmol/L Final   11/13/2022 3.9 3.5 - 5.1 mmol/L Final     Potassium reflex Magnesium   Date Value Ref Range Status   11/15/2022 4.0 3.5 - 5.1 mmol/L Final     Chloride   Date Value Ref Range Status   11/15/2022 104 99 - 110 mmol/L Final   11/14/2022 105 99 - 110 mmol/L Final   11/13/2022 101 99 - 110 mmol/L Final     CO2   Date Value Ref Range Status   11/15/2022 19 (L) 21 - 32 mmol/L Final   11/14/2022 29 21 - 32 mmol/L Final   11/13/2022 30 21 - 32 mmol/L Final     BUN   Date Value Ref Range Status   11/15/2022 18 7 - 20 mg/dL Final   11/14/2022 20 7 - 20 mg/dL Final   11/13/2022 26 (H) 7 - 20 mg/dL Final     Creatinine   Date Value Ref Range Status   11/15/2022 1.0 0.6 - 1.2 mg/dL Final   11/14/2022 1.0 0.6 - 1.2 mg/dL Final   11/13/2022 1.6 (H) 0.6 - 1.2 mg/dL Final     BNP:   Lab Results   Component Value Date    PROBNP 3,059 (H) 11/15/2022    PROBNP 1,205 (H) 11/12/2022    PROBNP 7,255 (H) 11/09/2022     Lipids: No components found for: T                Triglycerides   Date Value Ref Range Status   11/10/2022 98 0 - 150 mg/dL Final                   HDL  Date Value Ref Range Status   11/10/2022 44 40 - 60 mg/dL Final     Comment:     An HDL cholesterol less than 40 mg/dL is low and  constitutes a coronary heart disease risk factor. An HDL cholesterol greater than 60 mg/dL is a  negative risk factor for coronary heart disease. LDL Calculated   Date Value Ref Range Status   11/10/2022 85 <100 mg/dL Final                   VLDL Cholesterol Calculated   Date Value Ref Range Status   11/10/2022 20 Not Established mg/dL Final                 No results found for: CHOLHDLRATIO    EF:   Lab Results   Component Value Date/Time    LVEF 25 11/11/2022 06:42 AM       Testing reviewed:     Echo 11/11/22   Summary   The left ventricular systolic function is severely reduced with an ejection   fraction of 25%. Left ventricular cavity size is moderately dilated with mild concentric left   ventricular hypertrophy. Grade I diastolic dysfunction with normal filling pressure. Mild mitral and pulmonic regurgitation. The left atrium is mildly dilated. Bi-atrial enlargement. Moderate aortic regurgitation. The aortic root is mildly dilated. The ascending aorta is moderately dilated. The right ventricle is moderately enlarged. Right ventricular systolic function is moderately reduced . Tricuspid valve is structurally normal.   Trivial tricuspid regurgitation. Systolic pulmonic artery pressure (SPAP) is normal estimated at 30 mmHg   (Right atrial pressure of 3 mmHg). The right atrium is moderately dilated.      Cardiac cath 7/12/2018  Right heart cath  RA  7/10/5  RV 25/8/8  PA 26/15 mean 20  PCWP 16     Left main coronary artery patent  LAD patent with luminal irregularities and normal diagonal branching pattern  Ramus intermedius was patent and large and minimal luminal irregularities  Left circumflex artery coronary artery patent and small with minimal luminal irregularities  Right coronary artery patent with minimal luminal irregularities  EDP 15 to 18 mmHg    Core measures for HFrEF:  EF: 25%   ICD: implanted in [address]  ACEi/ARB/ARNI: sacubitril/valsartan   BB: Carvedilol  Frank: Spironolactone   SGLT2:  pending lab   Hydralazine/nitrates:    Iron Deficiency Anemia:  Yes, hx  IV Iron Therapy:  No  2022 ACC/AHA HF Guidelines: In patients with HFrEF and iron deficiency with or without anemia, intravenous iron replacement is reasonable to improve functional status and QOL(ferritin <100 ng/ml or 100-300 ng/ml if transferrin saturation <20%). NYHA:                        Class II:  Slight limitation of physical activity. ACC/ AHA Stage:      Stage C:  Structural heart disease with prior or current symptoms of HF    Assessment:  HFrEF LVEF <= 38%  chronic systolic heart failure, with right heart failure              -Reported by Patient was 20%-->50% (2021)              - echo 11/2022 showed LVEF 25%  Nonischemic cardiomyopathy (normal cors 2018)   PAF, TONY clip, s/p ablation 2019              -patient reports clip not in right position for complete closure- remains on eliquis  Hx of ASD  S/P dual chamber ICD 2019; (failed attempts at CS lead placement due to limited anatomy)   Moderate aortic regurg   NSVT  HTN    Visit Diagnosis:    1. Chronic systolic congestive heart failure ([address]    2. NICM (nonischemic cardiomyopathy) ([address]    3. ICD (implantable cardioverter-defibrillator), dual, in situ    4. Anemia, unspecified type       Plan:   Continue daily weights  Check labs- CMP, BNP, CBC and iron studies  Continue current meds   Will consider adding SGLT2 inhibitor once labs resulted  Will call with results  Follow up in 3 months       I appreciate the opportunity for caring for this patient.      Dariela Bryan, DEMI - CNP, 2/3/2023, 9:16 AM

## 2023-02-03 ENCOUNTER — HOSPITAL ENCOUNTER (OUTPATIENT)
Age: 73
Discharge: HOME | End: 2023-02-03
Payer: MEDICARE

## 2023-02-03 ENCOUNTER — OFFICE VISIT (OUTPATIENT)
Dept: CARDIOLOGY CLINIC | Age: 73
End: 2023-02-03
Payer: MEDICARE

## 2023-02-03 VITALS
HEART RATE: 67 BPM | OXYGEN SATURATION: 98 % | DIASTOLIC BLOOD PRESSURE: 74 MMHG | BODY MASS INDEX: 35.91 KG/M2 | SYSTOLIC BLOOD PRESSURE: 137 MMHG | WEIGHT: 228.8 LBS | HEIGHT: 67 IN

## 2023-02-03 DIAGNOSIS — I50.22 CHRONIC SYSTOLIC CONGESTIVE HEART FAILURE (HCC): Primary | ICD-10-CM

## 2023-02-03 DIAGNOSIS — D64.9 ANEMIA, UNSPECIFIED TYPE: ICD-10-CM

## 2023-02-03 DIAGNOSIS — I42.8 NICM (NONISCHEMIC CARDIOMYOPATHY) (HCC): ICD-10-CM

## 2023-02-03 DIAGNOSIS — Z95.810 ICD (IMPLANTABLE CARDIOVERTER-DEFIBRILLATOR), DUAL, IN SITU: ICD-10-CM

## 2023-02-03 DIAGNOSIS — I50.22 CHRONIC SYSTOLIC CONGESTIVE HEART FAILURE (HCC): ICD-10-CM

## 2023-02-03 LAB
A/G RATIO: 1.2 (ref 1.1–2.2)
ALBUMIN SERPL-MCNC: 4 G/DL (ref 3.4–5)
ALP BLD-CCNC: 90 U/L (ref 40–129)
ALT SERPL-CCNC: 21 U/L (ref 10–40)
ANION GAP SERPL CALCULATED.3IONS-SCNC: 15 MMOL/L (ref 3–16)
AST SERPL-CCNC: 31 U/L (ref 15–37)
BILIRUB SERPL-MCNC: 0.5 MG/DL (ref 0–1)
BUN BLDV-MCNC: 22 MG/DL (ref 7–20)
CALCIUM SERPL-MCNC: 9.8 MG/DL (ref 8.3–10.6)
CHLORIDE BLD-SCNC: 103 MMOL/L (ref 99–110)
CO2: 20 MMOL/L (ref 21–32)
CREAT SERPL-MCNC: 1.2 MG/DL (ref 0.6–1.2)
FERRITIN: 155.3 NG/ML (ref 15–150)
GFR SERPL CREATININE-BSD FRML MDRD: 48 ML/MIN/{1.73_M2}
GLUCOSE BLD-MCNC: 96 MG/DL (ref 70–99)
HCT VFR BLD CALC: 41.2 % (ref 36–48)
HEMOGLOBIN: 13.7 G/DL (ref 12–16)
IRON SATURATION: 42 % (ref 15–50)
IRON: 137 UG/DL (ref 37–145)
MCH RBC QN AUTO: 30 PG (ref 26–34)
MCHC RBC AUTO-ENTMCNC: 33.2 G/DL (ref 31–36)
MCV RBC AUTO: 90.5 FL (ref 80–100)
PDW BLD-RTO: 15.4 % (ref 12.4–15.4)
PLATELET # BLD: 253 K/UL (ref 135–450)
PMV BLD AUTO: 7.3 FL (ref 5–10.5)
POTASSIUM SERPL-SCNC: 4.7 MMOL/L (ref 3.5–5.1)
PRO-BNP: 277 PG/ML (ref 0–124)
RBC # BLD: 4.56 M/UL (ref 4–5.2)
SODIUM BLD-SCNC: 138 MMOL/L (ref 136–145)
TOTAL IRON BINDING CAPACITY: 325 UG/DL (ref 260–445)
TOTAL PROTEIN: 7.3 G/DL (ref 6.4–8.2)
WBC # BLD: 7.7 K/UL (ref 4–11)

## 2023-02-03 PROCEDURE — 99214 OFFICE O/P EST MOD 30 MIN: CPT | Performed by: NURSE PRACTITIONER

## 2023-02-03 PROCEDURE — 83550 IRON BINDING TEST: CPT

## 2023-02-03 PROCEDURE — 83880 ASSAY OF NATRIURETIC PEPTIDE: CPT

## 2023-02-03 PROCEDURE — 36415 COLL VENOUS BLD VENIPUNCTURE: CPT

## 2023-02-03 PROCEDURE — G8417 CALC BMI ABV UP PARAM F/U: HCPCS | Performed by: NURSE PRACTITIONER

## 2023-02-03 PROCEDURE — 85027 COMPLETE CBC AUTOMATED: CPT

## 2023-02-03 PROCEDURE — 3017F COLORECTAL CA SCREEN DOC REV: CPT | Performed by: NURSE PRACTITIONER

## 2023-02-03 PROCEDURE — 1123F ACP DISCUSS/DSCN MKR DOCD: CPT | Performed by: NURSE PRACTITIONER

## 2023-02-03 PROCEDURE — 3078F DIAST BP <80 MM HG: CPT | Performed by: NURSE PRACTITIONER

## 2023-02-03 PROCEDURE — 1090F PRES/ABSN URINE INCON ASSESS: CPT | Performed by: NURSE PRACTITIONER

## 2023-02-03 PROCEDURE — G8427 DOCREV CUR MEDS BY ELIG CLIN: HCPCS | Performed by: NURSE PRACTITIONER

## 2023-02-03 PROCEDURE — 82728 ASSAY OF FERRITIN: CPT

## 2023-02-03 PROCEDURE — 1036F TOBACCO NON-USER: CPT | Performed by: NURSE PRACTITIONER

## 2023-02-03 PROCEDURE — 80053 COMPREHEN METABOLIC PANEL: CPT

## 2023-02-03 PROCEDURE — G8484 FLU IMMUNIZE NO ADMIN: HCPCS | Performed by: NURSE PRACTITIONER

## 2023-02-03 PROCEDURE — 83540 ASSAY OF IRON: CPT

## 2023-02-03 PROCEDURE — 3075F SYST BP GE 130 - 139MM HG: CPT | Performed by: NURSE PRACTITIONER

## 2023-02-03 PROCEDURE — G8400 PT W/DXA NO RESULTS DOC: HCPCS | Performed by: NURSE PRACTITIONER

## 2023-02-03 NOTE — LETTER
Saturnino Garcia  1950  [address]  Cardiac Follow-up    Primary Care Doctor:  DEMI Greer - CNP    Chief Complaint   Patient presents with    Hypertension    Hyperlipidemia    Shortness of Breath    Cardiomyopathy        History of Present Illness:  I had the pleasure of seeing Saturnino Garcia in follow up for Cardiomyopathy. Moved from [place] to [facility], [address] [facility] Hx of non-ischemic cardiomyopathy. S/P Dual chamber MEdtronic ICD implanted 6/26/2019 by Dr. Vinnie Garcia [facility]); S/P ablation 2019 and TONY clipping-  unable to place LV lead due to coronary sinus anatomy. She was discharged with Nicki Vogel. Admission weight was 232 pounds. Discharge weight was down to 222 pounds. She gained weight over the holidays  Since the holidays losing weight. Less carbs, more veggies. Small portions. Breathing is good. Some shortnes of breath with activity. She  is using lasix as needed when she feels like she is getting more shortness of breath or with weight gain. No chest pain. Some cramping legs, reports RLS. Saturnino Garcia describes symptoms including dyspnea, fatigue but denies chest pain, edema, syncope. Home weights: 235-238lbs    Past Medical History:   has a past medical history of Atrial fibrillation ([address] CHF (congestive heart failure) ([address] Congenital heart disease, COPD (chronic obstructive pulmonary disease) ([address] GERD (gastroesophageal reflux disease), Irritable bowel syndrome, Obesity, and Restless legs syndrome. Surgical History:   has a past surgical history that includes pacemaker placement; Hysterectomy, vaginal; and Stimulator Surgery. Social History:   reports that she quit smoking about 3 years ago. Her smoking use included cigarettes. She has never used smokeless tobacco. She reports that she does not currently use alcohol. She reports that she does not use drugs.    Family History:   Family History   Problem Relation Age of Onset    Heart Attack Brother Home Medications:  Prior to Admission medications    Medication Sig Start Date End Date Taking? Authorizing Provider   carvedilol (COREG) 12.5 MG tablet TAKE 1 TABLET TWICE A DAY  Patient taking differently: Take 12.5 mg by mouth 2 times daily 1/27/23  Yes DEMI Kapadia CNP   Budeson-Glycopyrrol-Formoterol (BREZTRI AEROSPHERE) 160-9-4.8 MCG/ACT AERO Inhale 2 puffs into the lungs daily 1/12/23 4/12/23 Yes DEMI Kapadia CNP   loratadine (CLARITIN) 10 MG tablet Take 1 tablet by mouth daily 1/10/23 2/9/23 Yes DEMI Kapadia CNP   sacubitril-valsartan (ENTRESTO) 24-26 MG per tablet Take 1 tablet by mouth 2 times daily 12/22/22  Yes DEMI Kapadia CNP   pantoprazole (PROTONIX) 40 MG tablet Take 1 tablet by mouth daily 12/5/22 6/3/23 Yes DEMI Kapadia CNP   hydrOXYzine HCl (ATARAX) 25 MG tablet Take 1 tablet by mouth nightly 12/5/22 3/5/23 Yes DEMI Kapadia CNP   buPROPion (WELLBUTRIN XL) 300 MG extended release tablet Take 1 tablet by mouth every morning 12/2/22  Yes DEMI Kapadia CNP   spironolactone (ALDACTONE) 25 MG tablet Take 1 tablet by mouth daily 12/2/22  Yes DEMI Kapadia CNP   furosemide (LASIX) 40 MG tablet Take 1 tablet by mouth See Admin Instructions Daily as needed for weight gain 3lbs in a day or 5lbs in a week with shortness of breath 12/2/22  Yes DEMI Kapadia CNP   rOPINIRole (REQUIP) 1 MG tablet Take 1 tablet by mouth 3 times daily 12/2/22  Yes DEMI Kapadia CNP   apixaban (ELIQUIS) 5 MG TABS tablet Take 1 tablet by mouth 2 times daily 11/15/22  Yes DEMI Alvarez CNP   oxyCODONE-acetaminophen (PERCOCET) 5-325 MG per tablet Take 1 tablet by mouth every 6 hours as needed for Pain.   Patient not taking: Reported on 2/3/2023    Historical Provider, MD   oxybutynin (DITROPAN) 5 MG tablet Take 5 mg by mouth 2 times daily  Patient not taking: Reported on 2/3/2023    Historical Provider, MD   PREGABALIN PO Take by mouth in the morning and at bedtime  Patient not taking: Reported on 2/3/2023    Historical Provider, MD   albuterol sulfate HFA (VENTOLIN HFA) 108 (90 Base) MCG/ACT inhaler Inhale 2 puffs into the lungs 4 times daily as needed for Wheezing  Patient not taking: No sig reported 11/9/22   DEMI Ochoa CNP      Allergies:   Iv contrast [iodides], Codeine, Penicillins, and Sulfa antibiotics     Physical Examination:    Vitals:    02/03/23 0836   BP: 137/74   Pulse: 67   SpO2: 98%   Weight: 228 lb 12.8 oz (103.8 kg)   Height: 5' 7\" (1.702 m)        Constitutional and General Appearance: no apparent distress  HEENT: non-icteric sclera,mask in place   Neck: JVP less than 8 cm H20  Respiratory:  · No use of accessory muscles  · Clear breath sounds throughout, no wheezing, no crackles, no rhonchi  Cardiovascular:  · The apical impulses not displaced  · Heart tones are crisp and normal, no murmur/rub/gallop  · Regular rate and rhythm, S1,S2 normal  · Radial pulses 2+ and equal bilaterally  · No edema  · Pedal Pulses: 2+ and equal   Abdomen:  · No masses or tenderness  · Liver: No Abnormalities Noted  Musculoskeletal/Skin:  · Exhibits normal gait balance and coordination  · There is no clubbing, cyanosis of the extremities  · Skin is warm and dry  · Moves all extremities well  Neurological/Psychiatric:  · Alert and oriented in all spheres  · No abnormalities of mood, affect, memory, mentation, or behavior are noted    Lab Data reviewed and analyzed   CBC:   WBC   Date Value Ref Range Status   11/15/2022 7.2 4.0 - 11.0 K/uL Final   11/10/2022 6.8 4.0 - 11.0 K/uL Final   11/09/2022 8.1 4.0 - 11.0 K/uL Final     RBC   Date Value Ref Range Status   11/15/2022 4.63 4.00 - 5.20 M/uL Final   11/10/2022 4.01 4.00 - 5.20 M/uL Final   11/09/2022 4.16 4.00 - 5.20 M/uL Final     Hemoglobin   Date Value Ref Range Status   11/15/2022 14.2 12.0 - 16.0 g/dL Final   11/10/2022 12.3 12.0 - 16.0 g/dL Final   11/09/2022 12.8 12.0 - 16.0 g/dL Final     Hematocrit   Date Value Ref Range Status   11/15/2022 43.1 36.0 - 48.0 % Final   11/10/2022 37.7 36.0 - 48.0 % Final   11/09/2022 38.9 36.0 - 48.0 % Final     MCV   Date Value Ref Range Status   11/15/2022 93.2 80.0 - 100.0 fL Final   11/10/2022 93.9 80.0 - 100.0 fL Final   11/09/2022 93.7 80.0 - 100.0 fL Final     RDW   Date Value Ref Range Status   11/15/2022 13.9 12.4 - 15.4 % Final   11/10/2022 14.2 12.4 - 15.4 % Final   11/09/2022 14.0 12.4 - 15.4 % Final     Platelets   Date Value Ref Range Status   11/15/2022 267 135 - 450 K/uL Final   11/10/2022 227 135 - 450 K/uL Final   11/09/2022 237 135 - 450 K/uL Final     Iron:  No results found for: IRON, TIBC, FERRITIN  BMP:   Sodium   Date Value Ref Range Status   11/15/2022 137 136 - 145 mmol/L Final   11/14/2022 142 136 - 145 mmol/L Final   11/13/2022 140 136 - 145 mmol/L Final     Potassium   Date Value Ref Range Status   11/14/2022 4.5 3.5 - 5.1 mmol/L Final   11/13/2022 3.9 3.5 - 5.1 mmol/L Final     Potassium reflex Magnesium   Date Value Ref Range Status   11/15/2022 4.0 3.5 - 5.1 mmol/L Final     Chloride   Date Value Ref Range Status   11/15/2022 104 99 - 110 mmol/L Final   11/14/2022 105 99 - 110 mmol/L Final   11/13/2022 101 99 - 110 mmol/L Final     CO2   Date Value Ref Range Status   11/15/2022 19 (L) 21 - 32 mmol/L Final   11/14/2022 29 21 - 32 mmol/L Final   11/13/2022 30 21 - 32 mmol/L Final     BUN   Date Value Ref Range Status   11/15/2022 18 7 - 20 mg/dL Final   11/14/2022 20 7 - 20 mg/dL Final   11/13/2022 26 (H) 7 - 20 mg/dL Final     Creatinine   Date Value Ref Range Status   11/15/2022 1.0 0.6 - 1.2 mg/dL Final   11/14/2022 1.0 0.6 - 1.2 mg/dL Final   11/13/2022 1.6 (H) 0.6 - 1.2 mg/dL Final     BNP:   Lab Results   Component Value Date    PROBNP 3,059 (H) 11/15/2022    PROBNP 1,205 (H) 11/12/2022    PROBNP 7,255 (H) 11/09/2022     Lipids: No components found for: T                Triglycerides   Date Value Ref Range Status   11/10/2022 98 0 - 150 mg/dL Final                   HDL   Date Value Ref Range Status   11/10/2022 44 40 - 60 mg/dL Final     Comment:     An HDL cholesterol less than 40 mg/dL is low and  constitutes a coronary heart disease risk factor. An HDL cholesterol greater than 60 mg/dL is a  negative risk factor for coronary heart disease. LDL Calculated   Date Value Ref Range Status   11/10/2022 85 <100 mg/dL Final                   VLDL Cholesterol Calculated   Date Value Ref Range Status   11/10/2022 20 Not Established mg/dL Final                 No results found for: CHOLHDLRATIO    EF:   Lab Results   Component Value Date/Time    LVEF 25 11/11/2022 06:42 AM       Testing reviewed:     Echo 11/11/22   Summary   The left ventricular systolic function is severely reduced with an ejection   fraction of 25%. Left ventricular cavity size is moderately dilated with mild concentric left   ventricular hypertrophy. Grade I diastolic dysfunction with normal filling pressure. Mild mitral and pulmonic regurgitation. The left atrium is mildly dilated. Bi-atrial enlargement. Moderate aortic regurgitation. The aortic root is mildly dilated. The ascending aorta is moderately dilated. The right ventricle is moderately enlarged. Right ventricular systolic function is moderately reduced . Tricuspid valve is structurally normal.   Trivial tricuspid regurgitation. Systolic pulmonic artery pressure (SPAP) is normal estimated at 30 mmHg   (Right atrial pressure of 3 mmHg). The right atrium is moderately dilated.      Cardiac cath 7/12/2018  Right heart cath  RA  7/10/5  RV 25/8/8  PA 26/15 mean 20  PCWP 16     Left main coronary artery patent  LAD patent with luminal irregularities and normal diagonal branching pattern  Ramus intermedius was patent and large and minimal luminal irregularities  Left circumflex artery coronary artery patent and small with minimal luminal irregularities  Right coronary artery patent with minimal luminal irregularities  EDP 15 to 18 mmHg    Core measures for HFrEF:  EF: 25%   ICD: implanted in [address]  ACEi/ARB/ARNI: sacubitril/valsartan   BB: Carvedilol  Frank: Spironolactone   SGLT2:  pending lab   Hydralazine/nitrates:    Iron Deficiency Anemia:  Yes, hx  IV Iron Therapy:  No  2022 ACC/AHA HF Guidelines: In patients with HFrEF and iron deficiency with or without anemia, intravenous iron replacement is reasonable to improve functional status and QOL(ferritin <100 ng/ml or 100-300 ng/ml if transferrin saturation <20%). NYHA:                        Class II:  Slight limitation of physical activity. ACC/ AHA Stage:      Stage C:  Structural heart disease with prior or current symptoms of HF    Assessment:  · HFrEF LVEF <= 40%  · chronic systolic heart failure, with right heart failure  ·             -Reported by Patient was 20%-->50% (2021)  ·             - echo 11/2022 showed LVEF 25%  · Nonischemic cardiomyopathy (normal cors 2018)   · PAF, TONY clip, s/p ablation 2019  ·             -patient reports clip not in right position for complete closure- remains on eliquis  · Hx of ASD  · S/P dual chamber ICD 2019; (failed attempts at CS lead placement due to limited anatomy)   Moderate aortic regurg   · NSVT  · HTN    Visit Diagnosis:    1. Chronic systolic congestive heart failure ([address]    2. NICM (nonischemic cardiomyopathy) ([address]    3. ICD (implantable cardioverter-defibrillator), dual, in situ    4. Anemia, unspecified type       Plan:   1. Continue daily weights  2. Check labs- CMP, BNP, CBC and iron studies  3. Continue current meds   4. Will consider adding SGLT2 inhibitor once labs resulted  5. Will call with results  6. Follow up in 3 months       I appreciate the opportunity for caring for this patient.      DEMI Blackwell - CNP, 2/3/2023, 9:16 AM

## 2023-02-03 NOTE — PATIENT INSTRUCTIONS
Plan:   Continue daily weights  Check labs- CMP, BNP, CBC and iron studies  Continue current meds   Will consider adding SGLT2 inhibitor once labs resulted  Will call with results  Follow up in 3 months

## 2023-02-06 ENCOUNTER — TELEPHONE (OUTPATIENT)
Dept: CARDIOLOGY | Age: 73
End: 2023-02-06

## 2023-02-06 DIAGNOSIS — I42.8 NICM (NONISCHEMIC CARDIOMYOPATHY) (HCC): Primary | ICD-10-CM

## 2023-02-06 NOTE — TELEPHONE ENCOUNTER
Please call patient with results. Her kidney and liver panel is stable. Her heart failure number is remarkably improved since discharge  CBC counts are normal  Iron studies are normal.    Recommend we start Jardiance 10 mg daily. Please set up samples from the office along with co-pay cards. I sent 90-day prescription to her [facility].     Repeat renal panel and BNP in 1 month

## 2023-02-16 ENCOUNTER — HOSPITAL ENCOUNTER (INPATIENT)
Age: 73
LOS: 4 days | Discharge: HOME | DRG: 389 | End: 2023-02-20
Attending: EMERGENCY MEDICINE | Admitting: INTERNAL MEDICINE
Payer: MEDICARE

## 2023-02-16 ENCOUNTER — APPOINTMENT (OUTPATIENT)
Dept: CT IMAGING | Age: 73
DRG: 389 | End: 2023-02-16
Payer: MEDICARE

## 2023-02-16 ENCOUNTER — APPOINTMENT (OUTPATIENT)
Dept: GENERAL RADIOLOGY | Age: 73
DRG: 389 | End: 2023-02-16
Payer: MEDICARE

## 2023-02-16 DIAGNOSIS — K56.609 SBO (SMALL BOWEL OBSTRUCTION) (HCC): Primary | ICD-10-CM

## 2023-02-16 LAB
A/G RATIO: 1.3 (ref 1.1–2.2)
ALBUMIN SERPL-MCNC: 4.3 G/DL (ref 3.4–5)
ALP BLD-CCNC: 103 U/L (ref 40–129)
ALT SERPL-CCNC: 28 U/L (ref 10–40)
ANION GAP SERPL CALCULATED.3IONS-SCNC: 12 MMOL/L (ref 3–16)
AST SERPL-CCNC: 30 U/L (ref 15–37)
BASOPHILS ABSOLUTE: 0 K/UL (ref 0–0.2)
BASOPHILS RELATIVE PERCENT: 0.4 %
BILIRUB SERPL-MCNC: 0.3 MG/DL (ref 0–1)
BUN BLDV-MCNC: 27 MG/DL (ref 7–20)
CALCIUM SERPL-MCNC: 9.4 MG/DL (ref 8.3–10.6)
CHLORIDE BLD-SCNC: 104 MMOL/L (ref 99–110)
CO2: 21 MMOL/L (ref 21–32)
CREAT SERPL-MCNC: 1.2 MG/DL (ref 0.6–1.2)
EOSINOPHILS ABSOLUTE: 0.5 K/UL (ref 0–0.6)
EOSINOPHILS RELATIVE PERCENT: 5 %
GFR SERPL CREATININE-BSD FRML MDRD: 48 ML/MIN/{1.73_M2}
GLUCOSE BLD-MCNC: 112 MG/DL (ref 70–99)
HCT VFR BLD CALC: 41.6 % (ref 36–48)
HEMOGLOBIN: 14 G/DL (ref 12–16)
LIPASE: 52 U/L (ref 13–60)
LYMPHOCYTES ABSOLUTE: 2.1 K/UL (ref 1–5.1)
LYMPHOCYTES RELATIVE PERCENT: 22 %
MCH RBC QN AUTO: 30.5 PG (ref 26–34)
MCHC RBC AUTO-ENTMCNC: 33.6 G/DL (ref 31–36)
MCV RBC AUTO: 90.6 FL (ref 80–100)
MONOCYTES ABSOLUTE: 0.7 K/UL (ref 0–1.3)
MONOCYTES RELATIVE PERCENT: 7.4 %
NEUTROPHILS ABSOLUTE: 6.3 K/UL (ref 1.7–7.7)
NEUTROPHILS RELATIVE PERCENT: 65.2 %
PDW BLD-RTO: 15 % (ref 12.4–15.4)
PLATELET # BLD: 247 K/UL (ref 135–450)
PMV BLD AUTO: 7.1 FL (ref 5–10.5)
POTASSIUM SERPL-SCNC: 5.2 MMOL/L (ref 3.5–5.1)
RBC # BLD: 4.59 M/UL (ref 4–5.2)
SODIUM BLD-SCNC: 137 MMOL/L (ref 136–145)
SPECIMEN STATUS: NORMAL
TOTAL PROTEIN: 7.7 G/DL (ref 6.4–8.2)
TROPONIN: <0.01 NG/ML
WBC # BLD: 9.7 K/UL (ref 4–11)

## 2023-02-16 PROCEDURE — 74018 RADEX ABDOMEN 1 VIEW: CPT

## 2023-02-16 PROCEDURE — 74176 CT ABD & PELVIS W/O CONTRAST: CPT

## 2023-02-16 PROCEDURE — 96374 THER/PROPH/DIAG INJ IV PUSH: CPT

## 2023-02-16 PROCEDURE — 85025 COMPLETE CBC W/AUTO DIFF WBC: CPT

## 2023-02-16 PROCEDURE — 80053 COMPREHEN METABOLIC PANEL: CPT

## 2023-02-16 PROCEDURE — 71045 X-RAY EXAM CHEST 1 VIEW: CPT

## 2023-02-16 PROCEDURE — 84484 ASSAY OF TROPONIN QUANT: CPT

## 2023-02-16 PROCEDURE — 6370000000 HC RX 637 (ALT 250 FOR IP): Performed by: PHYSICIAN ASSISTANT

## 2023-02-16 PROCEDURE — 6360000002 HC RX W HCPCS: Performed by: PHYSICIAN ASSISTANT

## 2023-02-16 PROCEDURE — 1200000000 HC SEMI PRIVATE

## 2023-02-16 PROCEDURE — 99285 EMERGENCY DEPT VISIT HI MDM: CPT

## 2023-02-16 PROCEDURE — 93005 ELECTROCARDIOGRAM TRACING: CPT

## 2023-02-16 PROCEDURE — 83690 ASSAY OF LIPASE: CPT

## 2023-02-16 RX ORDER — ACETAMINOPHEN 325 MG/1
650 TABLET ORAL ONCE
Status: COMPLETED | OUTPATIENT
Start: 2023-02-16 | End: 2023-02-16

## 2023-02-16 RX ORDER — ONDANSETRON 2 MG/ML
4 INJECTION INTRAMUSCULAR; INTRAVENOUS ONCE
Status: COMPLETED | OUTPATIENT
Start: 2023-02-16 | End: 2023-02-16

## 2023-02-16 RX ORDER — CALCIUM CARBONATE 200(500)MG
1000 TABLET,CHEWABLE ORAL ONCE
Status: COMPLETED | OUTPATIENT
Start: 2023-02-16 | End: 2023-02-16

## 2023-02-16 RX ORDER — HYDROCODONE BITARTRATE AND ACETAMINOPHEN 5; 325 MG/1; MG/1
1 TABLET ORAL ONCE
Status: COMPLETED | OUTPATIENT
Start: 2023-02-16 | End: 2023-02-16

## 2023-02-16 RX ADMIN — ACETAMINOPHEN 650 MG: 325 TABLET ORAL at 21:47

## 2023-02-16 RX ADMIN — ONDANSETRON 4 MG: 2 INJECTION INTRAMUSCULAR; INTRAVENOUS at 22:08

## 2023-02-16 RX ADMIN — HYDROCODONE BITARTRATE AND ACETAMINOPHEN 1 TABLET: 5; 325 TABLET ORAL at 21:46

## 2023-02-16 RX ADMIN — ANTACID TABLETS 1000 MG: 500 TABLET, CHEWABLE ORAL at 21:49

## 2023-02-16 ASSESSMENT — PAIN - FUNCTIONAL ASSESSMENT
PAIN_FUNCTIONAL_ASSESSMENT: 0-10
PAIN_FUNCTIONAL_ASSESSMENT: NONE - DENIES PAIN

## 2023-02-16 ASSESSMENT — PAIN DESCRIPTION - LOCATION
LOCATION: ABDOMEN

## 2023-02-16 ASSESSMENT — PAIN SCALES - GENERAL
PAINLEVEL_OUTOF10: 10
PAINLEVEL_OUTOF10: 10

## 2023-02-16 ASSESSMENT — PAIN DESCRIPTION - ORIENTATION: ORIENTATION: RIGHT;UPPER

## 2023-02-16 ASSESSMENT — PAIN DESCRIPTION - DESCRIPTORS
DESCRIPTORS: ACHING
DESCRIPTORS: SHARP;SQUEEZING

## 2023-02-17 LAB
ANION GAP SERPL CALCULATED.3IONS-SCNC: 7 MMOL/L (ref 3–16)
BASOPHILS ABSOLUTE: 0 K/UL (ref 0–0.2)
BASOPHILS RELATIVE PERCENT: 0.1 %
BUN BLDV-MCNC: 24 MG/DL (ref 7–20)
CALCIUM SERPL-MCNC: 9.2 MG/DL (ref 8.3–10.6)
CHLORIDE BLD-SCNC: 106 MMOL/L (ref 99–110)
CO2: 28 MMOL/L (ref 21–32)
CREAT SERPL-MCNC: 1.2 MG/DL (ref 0.6–1.2)
EKG ATRIAL RATE: 75 BPM
EKG DIAGNOSIS: NORMAL
EKG P AXIS: 55 DEGREES
EKG P-R INTERVAL: 172 MS
EKG Q-T INTERVAL: 440 MS
EKG QRS DURATION: 146 MS
EKG QTC CALCULATION (BAZETT): 491 MS
EKG R AXIS: -47 DEGREES
EKG T AXIS: 121 DEGREES
EKG VENTRICULAR RATE: 75 BPM
EOSINOPHILS ABSOLUTE: 0.3 K/UL (ref 0–0.6)
EOSINOPHILS RELATIVE PERCENT: 3.4 %
GFR SERPL CREATININE-BSD FRML MDRD: 48 ML/MIN/{1.73_M2}
GLUCOSE BLD-MCNC: 102 MG/DL (ref 70–99)
HCT VFR BLD CALC: 40.1 % (ref 36–48)
HEMOGLOBIN: 13.1 G/DL (ref 12–16)
LYMPHOCYTES ABSOLUTE: 1.6 K/UL (ref 1–5.1)
LYMPHOCYTES RELATIVE PERCENT: 17.8 %
MCH RBC QN AUTO: 29.8 PG (ref 26–34)
MCHC RBC AUTO-ENTMCNC: 32.8 G/DL (ref 31–36)
MCV RBC AUTO: 91 FL (ref 80–100)
MONOCYTES ABSOLUTE: 0.7 K/UL (ref 0–1.3)
MONOCYTES RELATIVE PERCENT: 7.6 %
NEUTROPHILS ABSOLUTE: 6.6 K/UL (ref 1.7–7.7)
NEUTROPHILS RELATIVE PERCENT: 71.1 %
PDW BLD-RTO: 15.3 % (ref 12.4–15.4)
PLATELET # BLD: 213 K/UL (ref 135–450)
PMV BLD AUTO: 6.8 FL (ref 5–10.5)
POTASSIUM REFLEX MAGNESIUM: 5.1 MMOL/L (ref 3.5–5.1)
RBC # BLD: 4.4 M/UL (ref 4–5.2)
SODIUM BLD-SCNC: 141 MMOL/L (ref 136–145)
WBC # BLD: 9.3 K/UL (ref 4–11)

## 2023-02-17 PROCEDURE — 6360000002 HC RX W HCPCS: Performed by: NURSE PRACTITIONER

## 2023-02-17 PROCEDURE — 93010 ELECTROCARDIOGRAM REPORT: CPT | Performed by: INTERNAL MEDICINE

## 2023-02-17 PROCEDURE — 36415 COLL VENOUS BLD VENIPUNCTURE: CPT

## 2023-02-17 PROCEDURE — 2580000003 HC RX 258: Performed by: INTERNAL MEDICINE

## 2023-02-17 PROCEDURE — 85025 COMPLETE CBC W/AUTO DIFF WBC: CPT

## 2023-02-17 PROCEDURE — APPNB45 APP NON BILLABLE 31-45 MINUTES: Performed by: CLINICAL NURSE SPECIALIST

## 2023-02-17 PROCEDURE — 1200000000 HC SEMI PRIVATE

## 2023-02-17 PROCEDURE — 6370000000 HC RX 637 (ALT 250 FOR IP): Performed by: INTERNAL MEDICINE

## 2023-02-17 PROCEDURE — 94761 N-INVAS EAR/PLS OXIMETRY MLT: CPT

## 2023-02-17 PROCEDURE — 6360000002 HC RX W HCPCS: Performed by: INTERNAL MEDICINE

## 2023-02-17 PROCEDURE — 6360000002 HC RX W HCPCS: Performed by: EMERGENCY MEDICINE

## 2023-02-17 PROCEDURE — 99222 1ST HOSP IP/OBS MODERATE 55: CPT | Performed by: SURGERY

## 2023-02-17 PROCEDURE — 94640 AIRWAY INHALATION TREATMENT: CPT

## 2023-02-17 PROCEDURE — 80048 BASIC METABOLIC PNL TOTAL CA: CPT

## 2023-02-17 PROCEDURE — APPSS45 APP SPLIT SHARED TIME 31-45 MINUTES: Performed by: CLINICAL NURSE SPECIALIST

## 2023-02-17 PROCEDURE — 2700000000 HC OXYGEN THERAPY PER DAY

## 2023-02-17 RX ORDER — ACETAMINOPHEN 650 MG/1
650 SUPPOSITORY RECTAL EVERY 4 HOURS PRN
Status: DISCONTINUED | OUTPATIENT
Start: 2023-02-17 | End: 2023-02-20 | Stop reason: HOSPADM

## 2023-02-17 RX ORDER — HYDROXYZINE HYDROCHLORIDE 10 MG/1
25 TABLET, FILM COATED ORAL NIGHTLY
Status: DISCONTINUED | OUTPATIENT
Start: 2023-02-17 | End: 2023-02-20 | Stop reason: HOSPADM

## 2023-02-17 RX ORDER — ACETAMINOPHEN 325 MG/1
650 TABLET ORAL EVERY 4 HOURS PRN
Status: DISCONTINUED | OUTPATIENT
Start: 2023-02-17 | End: 2023-02-20 | Stop reason: HOSPADM

## 2023-02-17 RX ORDER — FENTANYL CITRATE 50 UG/ML
25 INJECTION, SOLUTION INTRAMUSCULAR; INTRAVENOUS
Status: DISCONTINUED | OUTPATIENT
Start: 2023-02-17 | End: 2023-02-17

## 2023-02-17 RX ORDER — SODIUM CHLORIDE 9 MG/ML
INJECTION, SOLUTION INTRAVENOUS PRN
Status: DISCONTINUED | OUTPATIENT
Start: 2023-02-17 | End: 2023-02-20 | Stop reason: HOSPADM

## 2023-02-17 RX ORDER — POLYETHYLENE GLYCOL 3350 17 G/17G
17 POWDER, FOR SOLUTION ORAL DAILY PRN
Status: DISCONTINUED | OUTPATIENT
Start: 2023-02-17 | End: 2023-02-20 | Stop reason: HOSPADM

## 2023-02-17 RX ORDER — SODIUM CHLORIDE 0.9 % (FLUSH) 0.9 %
10 SYRINGE (ML) INJECTION PRN
Status: DISCONTINUED | OUTPATIENT
Start: 2023-02-17 | End: 2023-02-20 | Stop reason: HOSPADM

## 2023-02-17 RX ORDER — ONDANSETRON 2 MG/ML
4 INJECTION INTRAMUSCULAR; INTRAVENOUS EVERY 4 HOURS PRN
Status: DISCONTINUED | OUTPATIENT
Start: 2023-02-17 | End: 2023-02-20 | Stop reason: HOSPADM

## 2023-02-17 RX ORDER — PANTOPRAZOLE SODIUM 40 MG/1
40 TABLET, DELAYED RELEASE ORAL DAILY
Status: DISCONTINUED | OUTPATIENT
Start: 2023-02-17 | End: 2023-02-20 | Stop reason: HOSPADM

## 2023-02-17 RX ORDER — CARVEDILOL 6.25 MG/1
12.5 TABLET ORAL 2 TIMES DAILY
Status: DISCONTINUED | OUTPATIENT
Start: 2023-02-17 | End: 2023-02-20 | Stop reason: HOSPADM

## 2023-02-17 RX ORDER — SODIUM CHLORIDE 0.9 % (FLUSH) 0.9 %
10 SYRINGE (ML) INJECTION EVERY 12 HOURS SCHEDULED
Status: DISCONTINUED | OUTPATIENT
Start: 2023-02-17 | End: 2023-02-20 | Stop reason: HOSPADM

## 2023-02-17 RX ORDER — ROPINIROLE 0.5 MG/1
1 TABLET, FILM COATED ORAL 3 TIMES DAILY
Status: DISCONTINUED | OUTPATIENT
Start: 2023-02-17 | End: 2023-02-20 | Stop reason: HOSPADM

## 2023-02-17 RX ORDER — SODIUM CHLORIDE 9 MG/ML
INJECTION, SOLUTION INTRAVENOUS CONTINUOUS
Status: DISCONTINUED | OUTPATIENT
Start: 2023-02-17 | End: 2023-02-19

## 2023-02-17 RX ORDER — FENTANYL CITRATE 50 UG/ML
50 INJECTION, SOLUTION INTRAMUSCULAR; INTRAVENOUS ONCE
Status: COMPLETED | OUTPATIENT
Start: 2023-02-17 | End: 2023-02-17

## 2023-02-17 RX ORDER — BUPROPION HYDROCHLORIDE 150 MG/1
300 TABLET ORAL EVERY MORNING
Status: DISCONTINUED | OUTPATIENT
Start: 2023-02-17 | End: 2023-02-20 | Stop reason: HOSPADM

## 2023-02-17 RX ADMIN — HYDROXYZINE HYDROCHLORIDE 25 MG: 10 TABLET ORAL at 21:24

## 2023-02-17 RX ADMIN — ONDANSETRON 4 MG: 2 INJECTION INTRAMUSCULAR; INTRAVENOUS at 23:06

## 2023-02-17 RX ADMIN — CARVEDILOL 12.5 MG: 6.25 TABLET, FILM COATED ORAL at 08:28

## 2023-02-17 RX ADMIN — CARVEDILOL 12.5 MG: 6.25 TABLET, FILM COATED ORAL at 21:25

## 2023-02-17 RX ADMIN — FENTANYL CITRATE 25 MCG: 50 INJECTION, SOLUTION INTRAMUSCULAR; INTRAVENOUS at 08:23

## 2023-02-17 RX ADMIN — PANTOPRAZOLE SODIUM 40 MG: 40 TABLET, DELAYED RELEASE ORAL at 08:28

## 2023-02-17 RX ADMIN — HYDROMORPHONE HYDROCHLORIDE 0.5 MG: 1 INJECTION, SOLUTION INTRAMUSCULAR; INTRAVENOUS; SUBCUTANEOUS at 18:26

## 2023-02-17 RX ADMIN — SODIUM CHLORIDE: 9 INJECTION, SOLUTION INTRAVENOUS at 01:15

## 2023-02-17 RX ADMIN — SACUBITRIL AND VALSARTAN 1 TABLET: 24; 26 TABLET, FILM COATED ORAL at 21:25

## 2023-02-17 RX ADMIN — FENTANYL CITRATE 50 MCG: 50 INJECTION, SOLUTION INTRAMUSCULAR; INTRAVENOUS at 00:58

## 2023-02-17 RX ADMIN — SODIUM CHLORIDE: 9 INJECTION, SOLUTION INTRAVENOUS at 14:28

## 2023-02-17 RX ADMIN — BUPROPION HYDROCHLORIDE 300 MG: 150 TABLET, EXTENDED RELEASE ORAL at 08:28

## 2023-02-17 RX ADMIN — Medication 2 PUFF: at 21:39

## 2023-02-17 RX ADMIN — HYDROMORPHONE HYDROCHLORIDE 0.5 MG: 1 INJECTION, SOLUTION INTRAMUSCULAR; INTRAVENOUS; SUBCUTANEOUS at 13:50

## 2023-02-17 RX ADMIN — ONDANSETRON 4 MG: 2 INJECTION INTRAMUSCULAR; INTRAVENOUS at 14:26

## 2023-02-17 RX ADMIN — APIXABAN 5 MG: 5 TABLET, FILM COATED ORAL at 21:25

## 2023-02-17 RX ADMIN — ROPINIROLE HYDROCHLORIDE 1 MG: 0.5 TABLET, FILM COATED ORAL at 21:25

## 2023-02-17 RX ADMIN — HYDROMORPHONE HYDROCHLORIDE 0.5 MG: 1 INJECTION, SOLUTION INTRAMUSCULAR; INTRAVENOUS; SUBCUTANEOUS at 09:32

## 2023-02-17 RX ADMIN — SACUBITRIL AND VALSARTAN 1 TABLET: 24; 26 TABLET, FILM COATED ORAL at 08:28

## 2023-02-17 RX ADMIN — APIXABAN 5 MG: 5 TABLET, FILM COATED ORAL at 08:28

## 2023-02-17 RX ADMIN — HYDROMORPHONE HYDROCHLORIDE 0.5 MG: 1 INJECTION, SOLUTION INTRAMUSCULAR; INTRAVENOUS; SUBCUTANEOUS at 23:03

## 2023-02-17 ASSESSMENT — PAIN SCALES - GENERAL
PAINLEVEL_OUTOF10: 2
PAINLEVEL_OUTOF10: 8
PAINLEVEL_OUTOF10: 8
PAINLEVEL_OUTOF10: 7
PAINLEVEL_OUTOF10: 10
PAINLEVEL_OUTOF10: 7
PAINLEVEL_OUTOF10: 9
PAINLEVEL_OUTOF10: 5
PAINLEVEL_OUTOF10: 8

## 2023-02-17 ASSESSMENT — PAIN DESCRIPTION - DESCRIPTORS
DESCRIPTORS: ACHING
DESCRIPTORS: SORE;SHARP
DESCRIPTORS: ACHING
DESCRIPTORS: SHARP;SHOOTING

## 2023-02-17 ASSESSMENT — PAIN DESCRIPTION - LOCATION
LOCATION: ABDOMEN
LOCATION: ABDOMEN;OTHER (COMMENT)
LOCATION: FACE
LOCATION: ABDOMEN
LOCATION: ABDOMEN

## 2023-02-17 ASSESSMENT — PAIN DESCRIPTION - ORIENTATION
ORIENTATION: LEFT;OUTER;LOWER
ORIENTATION: MID;RIGHT
ORIENTATION: MID
ORIENTATION: MID;RIGHT;UPPER

## 2023-02-17 ASSESSMENT — PAIN DESCRIPTION - FREQUENCY: FREQUENCY: CONTINUOUS

## 2023-02-17 ASSESSMENT — PAIN - FUNCTIONAL ASSESSMENT
PAIN_FUNCTIONAL_ASSESSMENT: PREVENTS OR INTERFERES SOME ACTIVE ACTIVITIES AND ADLS
PAIN_FUNCTIONAL_ASSESSMENT: ACTIVITIES ARE NOT PREVENTED

## 2023-02-17 ASSESSMENT — PAIN DESCRIPTION - PAIN TYPE
TYPE: ACUTE PAIN

## 2023-02-17 NOTE — ED PROVIDER NOTES
Emergency Department Attending Provider Note  Location: [facility]  ED  2/16/2023     Patient Identification  Baldev Hein is a 67 y.o. female      HPI:Mary Barboza was evaluated in the Emergency Department for abdominal pain. The patient states that over the past few days she has had abdominal discomfort however this acutely worsened about 4 PM yesterday associated with vomiting. She has had gallbladder surgery in the past.. Although initial history and physical exam information was obtained by SALIMA/NPP/MD/DO (who also dictated a record of this visit), I personally saw the patient and performed a substantive portion of the visit including all aspects of the medical decision making. PHYSICAL EXAM:  Nontoxic-appearing adult female in no acute distress. Heart and lung sounds normal.  Abdomen is soft and nondistended. She has an NG tube in place with drainage of stomach contents. EKG Interpretation  Twelve-lead EKG as read and interpreted by myself showed normal sinus rhythm at a rate of 75 beats minute, NM interval and QTc normal.  Prolonged QRS. Left axis deviation. Left lateral branch block pattern. No acute ischemic findings. No significant change when compared to prior EKG May 2022. Patient seen and evaluated. Relevant records reviewed. MDM:  Adult female who comes in with abdominal pain nausea vomiting. Abdomen is distended. Diagnostic work-up included laboratory studies and a CT scan. CT scan shows small bowel obstruction. NG tube was placed. Patient has improvement of her nausea and vomiting pain is still persisting. Patient is given IV fentanyl in addition to medications that she had already received. Her pain has improved. Patient is aware that she will need to be admitted to the hospital for further care. Hospitalist notified and has admitted this patient to his service.     CLINICAL IMPRESSION  1. SBO (small bowel obstruction) ([address]            I personally saw the patient and independently provided 25 minutes of non-concurrent critical care out of the total shared critical care time provided. This chart was generated in part by using Dragon Dictation system and may contain errors related to that system including errors in grammar, punctuation, and spelling, as well as words and phrases that may be inappropriate. If there are any questions or concerns please feel free to contact the dictating provider for clarification. Marlene Casillas MD  I am the primary clinician of record.    [address] [place] [address]      Marlene Casillas MD  02/17/23 1871

## 2023-02-17 NOTE — CONSULTS
Nutrition Note    RD received consult for CHF nutrition education. Pt admitted with SBO. Currently with NG to suction and NPO status. Not appropriate for diet education at this time. Will continue to monitor per nutrition standards of care.      Jamia Rivera MS, RD, LD on 2/17/2023 at 1:42 PM  Office: [phone]

## 2023-02-17 NOTE — H&P
Hospital Medicine History & Physical      PCP: DEMI Barrera CNP    Date of Admission: 2/16/2023    Date of Service: Pt seen/examined on 2/16/23 and Admitted to Inpatient with expected LOS greater than two midnights due to medical therapy. Chief Complaint:  abd pain      History Of Present Illness:      67 y.o. female with a PMH of Atrial fibrillation, CHF, COPD, GERD, Obesity, and RLS who presented to [facility] with a complaint of abdominal pain. Patient states over the last few days she developed abd pain with associated nausea/vomiting. She denies recent ill contacts, fever or diarrhea. Patient reports past surgical hx of cholecystectomy and hysterectomy. She denies hx of sbo. Last bm yesterday. Past Medical History:          Diagnosis Date    Atrial fibrillation (HCC)     CHF (congestive heart failure) (HCC)     Congenital heart disease     COPD (chronic obstructive pulmonary disease) (HCC)     GERD (gastroesophageal reflux disease)     Irritable bowel syndrome     Obesity     Restless legs syndrome        Past Surgical History:          Procedure Laterality Date    HYSTERECTOMY, VAGINAL      PACEMAKER PLACEMENT      STIMULATOR SURGERY         Medications Prior to Admission:      Prior to Admission medications    Medication Sig Start Date End Date Taking?  Authorizing Provider   empagliflozin (JARDIANCE) 10 MG tablet Take 1 tablet by mouth daily 2/6/23   DEMI Holguin CNP   carvedilol (COREG) 12.5 MG tablet TAKE 1 TABLET TWICE A DAY  Patient taking differently: Take 12.5 mg by mouth 2 times daily 1/27/23   Gladystine DEMI Jo CNP   Budeson-Glycopyrrol-Formoterol (BREZTRI AEROSPHERE) 160-9-4.8 MCG/ACT AERO Inhale 2 puffs into the lungs daily 1/12/23 4/12/23  Valerydystine DEMI Jo CNP   sacubitril-valsartan (ENTRESTO) 24-26 MG per tablet Take 1 tablet by mouth 2 times daily 12/22/22   Glalakiastine DEMI Jo CNP   pantoprazole (PROTONIX) 40 MG tablet Take 1 tablet by mouth daily 12/5/22 6/3/23  DEMI Meyer CNP   hydrOXYzine HCl (ATARAX) 25 MG tablet Take 1 tablet by mouth nightly 12/5/22 3/5/23  DEMI Meyer CNP   buPROPion (WELLBUTRIN XL) 300 MG extended release tablet Take 1 tablet by mouth every morning 12/2/22   DEMI Meyer CNP   spironolactone (ALDACTONE) 25 MG tablet Take 1 tablet by mouth daily 12/2/22   DEMI Meyer CNP   furosemide (LASIX) 40 MG tablet Take 1 tablet by mouth See Admin Instructions Daily as needed for weight gain 3lbs in a day or 5lbs in a week with shortness of breath 12/2/22   DEMI Meyer CNP   apixaban (ELIQUIS) 5 MG TABS tablet Take 1 tablet by mouth 2 times daily 11/15/22   DEMI Sawant CNP       Allergies: Iv contrast [iodides], Codeine, Penicillins, and Sulfa antibiotics    Social History:      The patient currently lives at home    TOBACCO:   reports that she quit smoking about 3 years ago. Her smoking use included cigarettes. She has never used smokeless tobacco.  ETOH:   reports that she does not currently use alcohol. E-cigarette/Vaping       Questions Responses    E-cigarette/Vaping Use Former User    Start Date     Passive Exposure     Quit Date     Counseling Given     Comments               Family History:      Reviewed and negative in regards to presenting illness/complaint. Problem Relation Age of Onset    Heart Attack Brother        REVIEW OF SYSTEMS COMPLETED:   Pertinent positives as noted in the HPI. All other systems reviewed and negative. PHYSICAL EXAM PERFORMED:    /74   Pulse 73   Temp 97.8 °F (36.6 °C) (Oral)   Resp 16   Ht 5' 7\" (1.702 m)   Wt 222 lb (100.7 kg)   SpO2 96%   BMI 34.77 kg/m²     General appearance:  Obese. No apparent distress, appears stated age and cooperative. HEENT:  Normal cephalic, atraumatic without obvious deformity. Pupils equal, round, and reactive to light. Extra ocular muscles intact.  Conjunctivae/corneas clear.  Neck: Supple, with full range of motion. No jugular venous distention. Trachea midline. Respiratory:  Normal respiratory effort. Clear to auscultation, bilaterally without Rales/Wheezes/Rhonchi. Cardiovascular:  Regular rate and rhythm with normal S1/S2 without murmurs, rubs or gallops. Abdomen: Soft, mild tenderness, non-distended with normal bowel sounds. Musculoskeletal:  No clubbing, cyanosis or edema bilaterally. Full range of motion without deformity. Skin: Skin color, texture, turgor normal.  No rashes or lesions. Neurologic:  Neurovascularly intact without any focal sensory/motor deficits. Cranial nerves: II-XII intact, grossly non-focal.  Psychiatric:  Alert and oriented, thought content appropriate, normal insight  Capillary Refill: Brisk,3 seconds, normal  Peripheral Pulses: +2 palpable, equal bilaterally       Labs:     Recent Labs     02/16/23 1909   WBC 9.7   HGB 14.0   HCT 41.6        Recent Labs     02/16/23 1909      K 5.2*      CO2 21   BUN 27*   CREATININE 1.2   CALCIUM 9.4     Recent Labs     02/16/23 1909   AST 30   ALT 28   BILITOT 0.3   ALKPHOS 103     No results for input(s): INR in the last 72 hours. Recent Labs     02/16/23 1909   TROPONINI <0.01       Urinalysis:    No results found for: Clarise Leaver, BACTERIA, RBCUA, BLOODU, [address]    Radiology:     CXR: I have reviewed the CXR with the following interpretation: Mild cardiomegaly without acute abnormality  EKG:  I have reviewed the EKG with the following interpretation: Normal sinus rhythm,left axis deviation, left bundle branch block    XR ABDOMEN FOR NG/OG/NE TUBE PLACEMENT   Final Result   Enteric tube in the stomach         CT ABDOMEN PELVIS WO CONTRAST Additional Contrast? None   Final Result   Small-bowel obstruction. Surgical consultation recommended. XR CHEST PORTABLE   Final Result   Mild cardiomegaly without acute abnormality.              Consults:    IP CONSULT TO GENERAL SURGERY  IP CONSULT TO GENERAL SURGERY    ASSESSMENT:    Active Hospital Problems    Diagnosis Date Noted    Small bowel obstruction ([facility] [facility] [N91.392] 02/16/2023     Priority: Medium         PLAN:    SBO-NGT, Gen Surg consult, IVF, bowel rest    PAF-Eliquis, BB    Chronic sHF, NICM, s/p ICD-Entresto, BB. HF orderset    RLS-requip    GERD-PPI    Obesity: With Body mass index is 34.77 kg/m². Complicating assessment and treatment. Placing patient at risk for multiple co-morbidities as well as early death and contributing to the patient's presentation. Counseled on weight loss    ACP: full code    DVT Prophylaxis: Eliquis  Diet: Diet NPO Exceptions are: Ice Chips, Sips of Water with Meds  Code Status: Full Code    PT/OT Eval Status: tbd    Dispo - >2 days       DEMI Ignacio CNP    Thank you DEMI Guan CNP for the opportunity to be involved in this patient's care. If you have any questions or concerns please feel free to contact me at [phone].

## 2023-02-17 NOTE — CARE COORDINATION
Case Management Assessment  Initial Evaluation    Date/Time of Evaluation: 2/17/2023 10:47 AM  Assessment Completed by: Clint Valenzuela RN    If patient is discharged prior to next notation, then this note serves as note for discharge by case management. Patient Name: Alison Almaraz                   YOB: 1950  Diagnosis: Small bowel obstruction ([facility] [facility] [C94.404]  SBO (small bowel obstruction) ([facility] [address] [C43.523]                   Date / Time: 2/16/2023  7:05 PM    Patient Admission Status: Inpatient   Readmission Risk (Low < 19, Mod (19-27), High > 27): Readmission Risk Score: 10.7    Current PCP: DEMI Prasad CNP  PCP verified by CM? Yes    Chart Reviewed: Yes      History Provided by: Patient  Patient Orientation: Alert and Oriented, Person, Place, Situation, Self    Patient Cognition: Alert    Hospitalization in the last 30 days (Readmission):  No    If yes, Readmission Assessment in CM Navigator will be completed. Advance Directives:      Code Status: Full Code   Patient's Primary Decision Maker is: Legal Next of Kin    Primary Decision MakerMore Russell - Spouse - [phone]    Secondary Decision Maker: Monico Silverio - Child - [phone]    Secondary Decision Maker: Dariojanie Tracie - Child - [phone]    Discharge Planning:    Patient lives with: Spouse/Significant Other Type of Home: House  Primary Care Giver: Self  Patient Support Systems include: Spouse/Significant Other, Children   Current Financial resources: Medicare  Current community resources: None  Current services prior to admission: Oxygen Therapy (HS 2LNC concentrator only from agency in [place], no portability)            Current DME:              Type of Home Care services:  None    ADLS  Prior functional level: Independent in ADLs/IADLs  Current functional level: Independent in ADLs/IADLs    PT AM-PAC:   /24  OT AM-PAC:   /24    Family can provide assistance at DC:  Yes  Would you like Case Management to discuss the discharge plan with any other family members/significant others, and if so, who? No  Plans to Return to Present Housing: Yes  Other Identified Issues/Barriers to RETURNING to current housing: none  Potential Assistance needed at discharge: N/A            Potential DME:    Patient expects to discharge to: [address] [place] [address] for transportation at discharge: [address]: [address] / Plan: MEDICARE PART A AND B / Product Type: *No Product type* /     Does insurance require precert for SNF: No    Potential assistance Purchasing Medications: No  Meds-to-Beds request: Yes      [address], [address] [phone] - F [phone]  [facility] [address]  Phone: [phone] Fax: [phone]      Notes:    Factors facilitating achievement of predicted outcomes: Family support, Motivated, Cooperative, Pleasant, and Sense of humor    Barriers to discharge: none    Additional Case Management Notes: spoke with patient bedside. Reported IPTA has support of . Will be able to get to any follow up appts. Has home O2 for use at  only, has no portability. Denied any DCP needs. Chalo Welch RN      The Plan for Transition of Care is related to the following treatment goals of Small bowel obstruction ([address] [K56.609]  SBO (small bowel obstruction) ([address] [K42.939]    IF APPLICABLE: The Patient and/or patient representative Paramjit Potts and her family were provided with a choice of provider and agrees with the discharge plan. Freedom of choice list with basic dialogue that supports the patient's individualized plan of care/goals and shares the quality data associated with the providers was provided to:     Patient Representative Name:       The Patient and/or Patient Representative Agree with the Discharge Plan?       Chalo Welch RN  Case Management Department

## 2023-02-17 NOTE — ED PROVIDER NOTES
[facility]  ED  EMERGENCY DEPARTMENT ENCOUNTER        Pt Name: Leslie Kumar  MRN: [id number]  [place] 1950  Date of evaluation: 2/16/2023  Provider: PAVITHRA Eli  PCP: DEMI Baldwin - TERESA  Note Started: 1:41 AM EST        I have seen and evaluated this patient with my supervising physician Valentine Landrum MD.    [address] [place] [address]       Chief Complaint   Patient presents with    Abdominal Pain      Mid abd pain since 1600, denies any other symptoms. HISTORY OF PRESENT ILLNESS      Chief Complaint: Abdominal pain    Leslie Kumar is a 67 y.o. female who presents with abdominal pain. Starting this afternoon. Epigastric. Worse with pressure. Nauseous and vomiting. No fevers or chills. Previous history of hysterectomy and cholecystectomy. No diarrhea. Still having bowel movements. SCREENINGS           Is this patient to be included in the SEP-1 Core Measure due to severe sepsis or septic shock? No   Exclusion criteria - the patient is NOT to be included for SEP-1 Core Measure due to: Infection is not suspected      PHYSICAL EXAM     Vitals: BP 95/61   Pulse 66   Temp 97.8 °F (36.6 °C) (Oral)   Resp 16   Ht 5' 7\" (1.702 m)   Wt 222 lb (100.7 kg)   SpO2 94%   BMI 34.77 kg/m²    General: awake, alert, no apparent distress  Pupils: equal, reactive  Head: Non-traumatic  Heart: Rate as noted, regular rhythm, no murmur or rubs. Chest/Lungs: CTAB, no wheezes or crackles  Abdomen: soft, nondistended, epigastric tenderness to palpation  Extremities:  cap refill <2 UE/LE, no tenderness of calves, no edema  Neuro: no facial droop, no slurred speech, answers questions appropriately. Skin: Warm.  No visible rash, lesions, or bruising       DIAGNOSTIC RESULTS   LABS:    Labs Reviewed   COMPREHENSIVE METABOLIC PANEL - Abnormal; Notable for the following components:       Result Value    Potassium 5.2 (*)     Glucose 112 (*)     BUN 27 (*)     Est, Glom Filt Rate 48 (*)     All other components within normal limits   CBC WITH AUTO DIFFERENTIAL   TROPONIN   LIPASE   SAMPLE POSSIBLE BLOOD BANK TESTING   BASIC METABOLIC PANEL W/ REFLEX TO MG FOR LOW K   CBC WITH AUTO DIFFERENTIAL       EKG: When ordered, EKG's are interpreted by the Emergency Department Physician in the absence of a cardiologist.  Please see their note for interpretation of EKG. RADIOLOGY:   XR ABDOMEN FOR NG/OG/NE TUBE PLACEMENT   Final Result   Enteric tube in the stomach         CT ABDOMEN PELVIS WO CONTRAST Additional Contrast? None   Final Result   Small-bowel obstruction. Surgical consultation recommended. XR CHEST PORTABLE   Final Result   Mild cardiomegaly without acute abnormality. CT ABDOMEN PELVIS WO CONTRAST Additional Contrast? None    Result Date: 2/16/2023  EXAMINATION: CT OF THE ABDOMEN AND PELVIS WITHOUT CONTRAST 2/16/2023 9:32 pm TECHNIQUE: CT of the abdomen and pelvis was performed without the administration of intravenous contrast. Multiplanar reformatted images are provided for review. Automated exposure control, iterative reconstruction, and/or weight based adjustment of the mA/kV was utilized to reduce the radiation dose to as low as reasonably achievable. COMPARISON: None. HISTORY: ORDERING SYSTEM PROVIDED HISTORY: Epigasric pain TECHNOLOGIST PROVIDED HISTORY: Reason for exam:->Epigasric pain Additional Contrast?->None Decision Support Exception - unselect if not a suspected or confirmed emergency medical condition->Emergency Medical Condition (MA) Reason for Exam: sudden onset of severe epigastric pain today. no other symptoms Relevant Medical/Surgical History: A-fib, CHF, COPD, HTN INBS FINDINGS: Lower Chest: Pacer wires right heart. Organs: Fat containing ventral hernia. The liver, spleen, pancreas, and adrenals appear normal.  Gallbladder surgically absent. 6 cm simple left renal cortical cyst.  No further follow-up required as appears simple/benign.   Right kidney normal. The bladder appears normal. GI/Bowel: Colonic diverticulosis. Stomach normal.  Proximal small bowel distended with multiple air-fluid levels. No definite transition point. Appendix normal. Pelvis: Fat containing left inguinal hernia. Peritoneum/Retroperitoneum: The abdominal aorta and iliac arteries are normal in caliber. There is no pathologic adenopathy. Bones/Soft Tissues: Intraspinal electrodes. Small-bowel obstruction. Surgical consultation recommended. XR CHEST PORTABLE    Result Date: 2/16/2023  EXAMINATION: ONE XRAY VIEW OF THE CHEST 2/16/2023 7:16 pm COMPARISON: 11/09/2022 HISTORY: Acute epigastric pain. FINDINGS: Stable mild cardiomegaly. Left chest ICD and left atrial appendage clip. Spinal cord stimulator. No acute airspace disease, pleural effusion, or pneumothorax. Mild cardiomegaly without acute abnormality. XR ABDOMEN FOR NG/OG/NE TUBE PLACEMENT    Result Date: 2/16/2023  EXAMINATION: ONE SUPINE XRAY VIEW(S) OF THE ABDOMEN 2/16/2023 10:19 pm COMPARISON: None. HISTORY: ORDERING SYSTEM PROVIDED HISTORY: SP NG TECHNOLOGIST PROVIDED HISTORY: Reason for exam:->SP NG Portable? ->Yes Reason for Exam: post ng placement FINDINGS: Enteric tube in the stomach. Neurostimulator left abdomen. Bipolar pacer left chest.  Nonspecific bowel gas pattern. Surgical clips right upper quadrant. Lung bases clear. Osseous structures normal.     Enteric tube in the stomach       No results found. PROCEDURES       CRITICAL CARE TIME   I personally saw the patient and independently provided 0 minutes of non-concurrent critical care time out of the total critical care time provided. This excludes time spent doing separately billable procedures. This includes time at the bedside, data interpretation, medication management, obtaining critical history from collateral sources if the patient is unable to provide it directly, and physician consultation.   Specifics of interventions taken and potentially life-threatening diagnostic considerations are listed above in the medical decision making.     CONSULTS   IP CONSULT TO GENERAL SURGERY  IP CONSULT TO GENERAL SURGERY    ED COURSE and MEDICAL DECISIONS MAKING:   Vitals:    Vitals:    02/16/23 1905 02/16/23 2257 02/17/23 0045 02/17/23 0117   BP: (!) 152/82 (!) 104/55 (!) 109/58 95/61   Pulse:  71 76 66   Resp:  18 18 16   Temp:   97.8 °F (36.6 °C)    TempSrc:   Oral    SpO2:  98% 97% 94%   Weight:   222 lb (100.7 kg)    Height:   5' 7\" (1.702 m)      MEDICATIONS:  Medications   sacubitril-valsartan (ENTRESTO) 24-26 MG per tablet 1 tablet (has no administration in time range)   rOPINIRole (REQUIP) tablet 1 mg (has no administration in time range)   pantoprazole (PROTONIX) tablet 40 mg (has no administration in time range)   hydrOXYzine HCl (ATARAX) tablet 25 mg (has no administration in time range)   carvedilol (COREG) tablet 12.5 mg (12.5 mg Oral Not Given 2/17/23 0145)   buPROPion (WELLBUTRIN XL) extended release tablet 300 mg (has no administration in time range)   Budeson-Glycopyrrol-Formoterol 160-9-4.8 MCG/ACT AERO 2 puff (has no administration in time range)   apixaban (ELIQUIS) tablet 5 mg (5 mg Oral Not Given 2/17/23 0144)   sodium chloride flush 0.9 % injection 10 mL (has no administration in time range)   sodium chloride flush 0.9 % injection 10 mL (has no administration in time range)   0.9 % sodium chloride infusion (has no administration in time range)   ondansetron (ZOFRAN) injection 4 mg (has no administration in time range)   polyethylene glycol (GLYCOLAX) packet 17 g (has no administration in time range)   acetaminophen (TYLENOL) tablet 650 mg (has no administration in time range)     Or   acetaminophen (TYLENOL) suppository 650 mg (has no administration in time range)   0.9 % sodium chloride infusion ( IntraVENous New Bag 2/17/23 0115)   calcium carbonate (TUMS) chewable tablet 1,000 mg (1,000 mg Oral Given 2/16/23 8499)   acetaminophen (TYLENOL) tablet 650 mg (650 mg Oral Given 2/16/23 2147)   HYDROcodone-acetaminophen (NORCO) 5-325 MG per tablet 1 tablet (1 tablet Oral Given 2/16/23 2146)   ondansetron [facility]) injection 4 mg (4 mg IntraVENous Given 2/16/23 2208)   fentaNYL (SUBLIMAZE) injection 50 mcg (50 mcg IntraVENous Given 2/17/23 0058)           79-year-old female presents with epigastric abdominal pain. CBC shows no leukocytosis or anemia. Chemistry shows mildly elevated potassium, normal renal function, normal liver function. Troponin undetectable x1. Lipase normal range, making pancreatic pathology less likely. CT abdomen pelvis with contrast shows small bowel obstruction. NG tube placed. Chest x-ray confirmed placement of NG tube. Pain controlled with p.o. Norco, IV Zofran, p.o. Tylenol. P.o. TUMS provided no improvement in her pain. Discussed with surgery. Recommended trial of nonoperative therapy including NG tube. Medical comorbidities including CHF and COPD. Will admit to medicine, with surgery consult. FINAL IMPRESSION      1. SBO (small bowel obstruction) ([facility] [address]          DISPOSITION/PLAN   DISPOSITION Admitted 02/16/2023 11:02:00 PM      PATIENT REFERRED TO:  No follow-up provider specified.     DISCHARGE MEDICATIONS:  New Prescriptions    No medications on file       Nery Beal, [address] Martinez (electronically signed)        Quintin Pacheco  02/17/23 0145

## 2023-02-17 NOTE — CONSULTS
Department of General Surgery Consult    PATIENT NAME: Rich Akhtar   YOB: 1950    ADMISSION DATE: 2/16/2023  7:05 PM      TODAY'S DATE: 2/17/2023    Reason for Consult:  SBO    Chief Complaint: abd pain    Requesting Physician:  Lana    HISTORY OF PRESENT ILLNESS:              The patient is a 67 y.o. female who presents with complaints of abdominal pain that had been ongoing for a few days and became intensely worse yesterday afternoon. She states she had a BM yesterday. She denies fever or chills, no diarrhea. She reports she didn't have vomiting until she was given some oral medications in the ED. She reports she is feeling better this AM, with less abd pain.      Past Medical History:        Diagnosis Date    Atrial fibrillation (HCC)     CHF (congestive heart failure) (HCC)     Congenital heart disease     COPD (chronic obstructive pulmonary disease) (HCC)     GERD (gastroesophageal reflux disease)     Irritable bowel syndrome     Obesity     Restless legs syndrome        Past Surgical History:        Procedure Laterality Date    HYSTERECTOMY, VAGINAL      PACEMAKER PLACEMENT      STIMULATOR SURGERY     Open gallbladder surgery ~ 30 years ago    Current Medications:   Current Facility-Administered Medications: sacubitril-valsartan (ENTRESTO) 24-26 MG per tablet 1 tablet, 1 tablet, Oral, BID  rOPINIRole (REQUIP) tablet 1 mg, 1 mg, Oral, TID  pantoprazole (PROTONIX) tablet 40 mg, 40 mg, Oral, Daily  hydrOXYzine HCl (ATARAX) tablet 25 mg, 25 mg, Oral, Nightly  carvedilol (COREG) tablet 12.5 mg, 12.5 mg, Oral, BID  buPROPion (WELLBUTRIN XL) extended release tablet 300 mg, 300 mg, Oral, QAM  apixaban (ELIQUIS) tablet 5 mg, 5 mg, Oral, BID  sodium chloride flush 0.9 % injection 10 mL, 10 mL, IntraVENous, 2 times per day  sodium chloride flush 0.9 % injection 10 mL, 10 mL, IntraVENous, PRN  0.9 % sodium chloride infusion, , IntraVENous, PRN  ondansetron (ZOFRAN) injection 4 mg, 4 mg, IntraVENous, Q4H PRN  polyethylene glycol (GLYCOLAX) packet 17 g, 17 g, Oral, Daily PRN  acetaminophen (TYLENOL) tablet 650 mg, 650 mg, Oral, Q4H PRN **OR** acetaminophen (TYLENOL) suppository 650 mg, 650 mg, Rectal, Q4H PRN  0.9 % sodium chloride infusion, , IntraVENous, Continuous  mometasone-formoterol (DULERA) 200-5 MCG/ACT inhaler 2 puff, 2 puff, Inhalation, BID  tiotropium (SPIRIVA RESPIMAT) 2.5 MCG/ACT inhaler 2 puff, 2 puff, Inhalation, Daily  HYDROmorphone (DILAUDID) injection 0.5 mg, 0.5 mg, IntraVENous, Q4H PRN  Prior to Admission medications    Medication Sig Start Date End Date Taking?  Authorizing Provider   empagliflozin (JARDIANCE) 10 MG tablet Take 1 tablet by mouth daily 2/6/23   DEMI Lyles CNP   carvedilol (COREG) 12.5 MG tablet TAKE 1 TABLET TWICE A DAY  Patient taking differently: Take 12.5 mg by mouth 2 times daily 1/27/23   DEMI Holland CNP   Budeson-Glycopyrrol-Formoterol (BREZTRI AEROSPHERE) 160-9-4.8 MCG/ACT AERO Inhale 2 puffs into the lungs daily 1/12/23 4/12/23  DEMI Holland CNP   sacubitril-valsartan (ENTRESTO) 24-26 MG per tablet Take 1 tablet by mouth 2 times daily 12/22/22   DEMI Holland CNP   pantoprazole (PROTONIX) 40 MG tablet Take 1 tablet by mouth daily 12/5/22 6/3/23  DEMI Holland CNP   hydrOXYzine HCl (ATARAX) 25 MG tablet Take 1 tablet by mouth nightly 12/5/22 3/5/23  DEMI Holland CNP   buPROPion (WELLBUTRIN XL) 300 MG extended release tablet Take 1 tablet by mouth every morning 12/2/22   DEMI Holland CNP   spironolactone (ALDACTONE) 25 MG tablet Take 1 tablet by mouth daily 12/2/22   DEMI Holland CNP   furosemide (LASIX) 40 MG tablet Take 1 tablet by mouth See Admin Instructions Daily as needed for weight gain 3lbs in a day or 5lbs in a week with shortness of breath 12/2/22   DEMI Holland CNP   apixaban (ELIQUIS) 5 MG TABS tablet Take 1 tablet by mouth 2 times daily 11/15/22 Gillian Michaels, APRN - CNP        Allergies: Iv contrast [iodides], Codeine, Penicillins, and Sulfa antibiotics    Social History:   TOBACCO:   reports that she quit smoking about 3 years ago. Her smoking use included cigarettes. She has never used smokeless tobacco.  ETOH:   reports that she does not currently use alcohol. DRUGS:   reports no history of drug use. Family History:        Problem Relation Age of Onset    Heart Attack Brother        REVIEW OF SYSTEMS:  CONSTITUTIONAL:  negative  HEENT:  negative  RESPIRATORY:  negative  CARDIOVASCULAR:  negative  GASTROINTESTINAL:  negative except for abdominal pain  GENITOURINARY:  negative  HEMATOLOGIC/LYMPHATIC:  negative  NEUROLOGICAL:  Negative  * All other ROS reviewed and negative. PHYSICAL EXAM:  VITALS:  /74   Pulse 73   Temp 97.8 °F (36.6 °C) (Oral)   Resp 16   Ht 5' 7\" (1.702 m)   Wt 222 lb (100.7 kg)   SpO2 96%   BMI 34.77 kg/m²   24HR INTAKE/OUTPUT:    I/O last 3 completed shifts:   In: 333.7 [I.V.:303.7; NG/GT:30]  Out: 1100 [Emesis/NG output:1100]  I/O this shift:  In: 237.7 [I.V.:237.7]  Out: -       CONSTITUTIONAL:  alert, no apparent distress and moderately obese  EYES:  PERRL, sclera clear  ENT:  Normocephalic,atraumatic, without obvious abnormality  NECK:  supple, symmetrical, trachea midline  LUNGS: Resp effort easy and unlabored, no crackles or wheezing  CARDIOVASCULAR:  NO JVD, regular rate and rhythm   ABDOMEN:  scars noted upper midline, hypoactive bowel sounds, soft, non-distended, non-tender,   MUSCULOSKELETAL: No clubbing or cyanosis, 0+ pitting edema lower extremities  NEUROLOGIC:  Mental Status Exam:  Level of Alertness:   awake  PSYCHIATRIC:   person, place, time  SKIN:  normal skin color, texture, turgor    DATA:    CBC:   Recent Labs     02/16/23  1909   WBC 9.7   HGB 14.0   HCT 41.6        BMP:    Recent Labs     02/16/23  1909      K 5.2*      CO2 21   BUN 27*   CREATININE 1.2   GLUCOSE 112*     Hepatic:   Recent Labs     02/16/23  1909   AST 30   ALT 28   BILITOT 0.3   ALKPHOS 103     Mag:    No results for input(s): MG in the last 72 hours. Phos:   No results for input(s): PHOS in the last 72 hours. INR: No results for input(s): INR in the last 72 hours. Radiology Review: Images personally reviewed by me. EXAMINATION:   CT OF THE ABDOMEN AND PELVIS WITHOUT CONTRAST 2/16/2023 9:32 pm     TECHNIQUE:   CT of the abdomen and pelvis was performed without the administration of   intravenous contrast. Multiplanar reformatted images are provided for review. Automated exposure control, iterative reconstruction, and/or weight based   adjustment of the mA/kV was utilized to reduce the radiation dose to as low   as reasonably achievable. COMPARISON:   None. HISTORY:   ORDERING SYSTEM PROVIDED HISTORY: Epigasric pain   TECHNOLOGIST PROVIDED HISTORY:   Reason for exam:->Epigasric pain   Additional Contrast?->None   Decision Support Exception - unselect if not a suspected or confirmed   emergency medical condition->Emergency Medical Condition (MA)   Reason for Exam: sudden onset of severe epigastric pain today. no other   symptoms   Relevant Medical/Surgical History: A-fib, CHF, COPD, HTN INBS     FINDINGS:   Lower Chest: Pacer wires right heart. Organs:     Fat containing ventral hernia. The liver, spleen, pancreas, and adrenals appear normal.  Gallbladder   surgically absent. 6 cm simple left renal cortical cyst.  No further follow-up required as   appears simple/benign. Right kidney normal.     The bladder appears normal.     GI/Bowel: Colonic diverticulosis. Stomach normal.  Proximal small bowel   distended with multiple air-fluid levels. No definite transition point. Appendix normal.     Pelvis: Fat containing left inguinal hernia. Peritoneum/Retroperitoneum: The abdominal aorta and iliac arteries are normal   in caliber. There is no pathologic adenopathy. Bones/Soft Tissues: Intraspinal electrodes. Impression:     Small-bowel obstruction. Surgical consultation recommended         IMPRESSION/RECOMMENDATIONS:    SBO: pt improving with ngt decompression. Continue with ngt, GI rest, IVF. Repeat AXR in the AM.     IF she does not improve further, than may consider further imaging such as SBFT, vs needing surgical intervention    Electronically signed by Sun Morris, APRN - [address] [facility]  [id number]    Patient seen and agree with above and more than half of the total time was spent by me on the encounter. Abd pain worsened yesterday with nausea and emesis. No fevers.   Continue NG today along with IVF  Axr tomorrow    Lizbeth Keating MD

## 2023-02-18 ENCOUNTER — APPOINTMENT (OUTPATIENT)
Dept: GENERAL RADIOLOGY | Age: 73
DRG: 389 | End: 2023-02-18
Payer: MEDICARE

## 2023-02-18 LAB
ANION GAP SERPL CALCULATED.3IONS-SCNC: 10 MMOL/L (ref 3–16)
BASOPHILS ABSOLUTE: 0 K/UL (ref 0–0.2)
BASOPHILS RELATIVE PERCENT: 0.4 %
BUN BLDV-MCNC: 22 MG/DL (ref 7–20)
CALCIUM SERPL-MCNC: 8.6 MG/DL (ref 8.3–10.6)
CHLORIDE BLD-SCNC: 109 MMOL/L (ref 99–110)
CO2: 21 MMOL/L (ref 21–32)
CREAT SERPL-MCNC: 1 MG/DL (ref 0.6–1.2)
EOSINOPHILS ABSOLUTE: 0.4 K/UL (ref 0–0.6)
EOSINOPHILS RELATIVE PERCENT: 4.5 %
GFR SERPL CREATININE-BSD FRML MDRD: 59 ML/MIN/{1.73_M2}
GLUCOSE BLD-MCNC: 90 MG/DL (ref 70–99)
HCT VFR BLD CALC: 36.7 % (ref 36–48)
HEMOGLOBIN: 11.8 G/DL (ref 12–16)
LYMPHOCYTES ABSOLUTE: 1.9 K/UL (ref 1–5.1)
LYMPHOCYTES RELATIVE PERCENT: 23.6 %
MCH RBC QN AUTO: 29.9 PG (ref 26–34)
MCHC RBC AUTO-ENTMCNC: 32.2 G/DL (ref 31–36)
MCV RBC AUTO: 92.8 FL (ref 80–100)
MONOCYTES ABSOLUTE: 0.6 K/UL (ref 0–1.3)
MONOCYTES RELATIVE PERCENT: 7.8 %
NEUTROPHILS ABSOLUTE: 5.2 K/UL (ref 1.7–7.7)
NEUTROPHILS RELATIVE PERCENT: 63.7 %
PDW BLD-RTO: 15.3 % (ref 12.4–15.4)
PLATELET # BLD: 204 K/UL (ref 135–450)
PMV BLD AUTO: 7.4 FL (ref 5–10.5)
POTASSIUM REFLEX MAGNESIUM: 4.8 MMOL/L (ref 3.5–5.1)
PRO-BNP: 381 PG/ML (ref 0–124)
RBC # BLD: 3.95 M/UL (ref 4–5.2)
SODIUM BLD-SCNC: 140 MMOL/L (ref 136–145)
WBC # BLD: 8.2 K/UL (ref 4–11)

## 2023-02-18 PROCEDURE — 85025 COMPLETE CBC W/AUTO DIFF WBC: CPT

## 2023-02-18 PROCEDURE — 6370000000 HC RX 637 (ALT 250 FOR IP): Performed by: INTERNAL MEDICINE

## 2023-02-18 PROCEDURE — 6360000002 HC RX W HCPCS: Performed by: INTERNAL MEDICINE

## 2023-02-18 PROCEDURE — 94640 AIRWAY INHALATION TREATMENT: CPT

## 2023-02-18 PROCEDURE — 36415 COLL VENOUS BLD VENIPUNCTURE: CPT

## 2023-02-18 PROCEDURE — 80048 BASIC METABOLIC PNL TOTAL CA: CPT

## 2023-02-18 PROCEDURE — 99232 SBSQ HOSP IP/OBS MODERATE 35: CPT | Performed by: SURGERY

## 2023-02-18 PROCEDURE — 2580000003 HC RX 258: Performed by: INTERNAL MEDICINE

## 2023-02-18 PROCEDURE — 94761 N-INVAS EAR/PLS OXIMETRY MLT: CPT

## 2023-02-18 PROCEDURE — 74019 RADEX ABDOMEN 2 VIEWS: CPT

## 2023-02-18 PROCEDURE — 2700000000 HC OXYGEN THERAPY PER DAY

## 2023-02-18 PROCEDURE — 1200000000 HC SEMI PRIVATE

## 2023-02-18 PROCEDURE — 6360000002 HC RX W HCPCS: Performed by: NURSE PRACTITIONER

## 2023-02-18 PROCEDURE — 83880 ASSAY OF NATRIURETIC PEPTIDE: CPT

## 2023-02-18 RX ADMIN — HYDROMORPHONE HYDROCHLORIDE 0.5 MG: 1 INJECTION, SOLUTION INTRAMUSCULAR; INTRAVENOUS; SUBCUTANEOUS at 15:39

## 2023-02-18 RX ADMIN — SACUBITRIL AND VALSARTAN 1 TABLET: 24; 26 TABLET, FILM COATED ORAL at 20:20

## 2023-02-18 RX ADMIN — ROPINIROLE HYDROCHLORIDE 1 MG: 0.5 TABLET, FILM COATED ORAL at 20:20

## 2023-02-18 RX ADMIN — ONDANSETRON 4 MG: 2 INJECTION INTRAMUSCULAR; INTRAVENOUS at 04:37

## 2023-02-18 RX ADMIN — APIXABAN 5 MG: 5 TABLET, FILM COATED ORAL at 09:06

## 2023-02-18 RX ADMIN — SODIUM CHLORIDE: 9 INJECTION, SOLUTION INTRAVENOUS at 04:13

## 2023-02-18 RX ADMIN — CARVEDILOL 12.5 MG: 6.25 TABLET, FILM COATED ORAL at 09:45

## 2023-02-18 RX ADMIN — Medication 10 ML: at 20:21

## 2023-02-18 RX ADMIN — Medication 2 PUFF: at 07:46

## 2023-02-18 RX ADMIN — HYDROXYZINE HYDROCHLORIDE 25 MG: 10 TABLET ORAL at 20:19

## 2023-02-18 RX ADMIN — CARVEDILOL 12.5 MG: 6.25 TABLET, FILM COATED ORAL at 20:20

## 2023-02-18 RX ADMIN — HYDROMORPHONE HYDROCHLORIDE 0.5 MG: 1 INJECTION, SOLUTION INTRAMUSCULAR; INTRAVENOUS; SUBCUTANEOUS at 10:23

## 2023-02-18 RX ADMIN — HYDROMORPHONE HYDROCHLORIDE 0.5 MG: 1 INJECTION, SOLUTION INTRAMUSCULAR; INTRAVENOUS; SUBCUTANEOUS at 20:19

## 2023-02-18 RX ADMIN — BUPROPION HYDROCHLORIDE 300 MG: 150 TABLET, EXTENDED RELEASE ORAL at 09:06

## 2023-02-18 RX ADMIN — PANTOPRAZOLE SODIUM 40 MG: 40 TABLET, DELAYED RELEASE ORAL at 09:07

## 2023-02-18 RX ADMIN — HYDROMORPHONE HYDROCHLORIDE 0.5 MG: 1 INJECTION, SOLUTION INTRAMUSCULAR; INTRAVENOUS; SUBCUTANEOUS at 04:35

## 2023-02-18 RX ADMIN — APIXABAN 5 MG: 5 TABLET, FILM COATED ORAL at 20:20

## 2023-02-18 RX ADMIN — SODIUM CHLORIDE: 9 INJECTION, SOLUTION INTRAVENOUS at 17:31

## 2023-02-18 RX ADMIN — TIOTROPIUM BROMIDE INHALATION SPRAY 2 PUFF: 3.12 SPRAY, METERED RESPIRATORY (INHALATION) at 07:46

## 2023-02-18 RX ADMIN — Medication 2 PUFF: at 19:57

## 2023-02-18 RX ADMIN — SACUBITRIL AND VALSARTAN 1 TABLET: 24; 26 TABLET, FILM COATED ORAL at 09:06

## 2023-02-18 ASSESSMENT — PAIN SCALES - GENERAL
PAINLEVEL_OUTOF10: 7
PAINLEVEL_OUTOF10: 8
PAINLEVEL_OUTOF10: 10
PAINLEVEL_OUTOF10: 8
PAINLEVEL_OUTOF10: 0

## 2023-02-18 ASSESSMENT — PAIN DESCRIPTION - DESCRIPTORS
DESCRIPTORS: ACHING
DESCRIPTORS: ACHING
DESCRIPTORS: DISCOMFORT;SORE

## 2023-02-18 ASSESSMENT — PAIN DESCRIPTION - LOCATION
LOCATION: ABDOMEN;FACE
LOCATION: FACE
LOCATION: FACE

## 2023-02-18 ASSESSMENT — PAIN - FUNCTIONAL ASSESSMENT: PAIN_FUNCTIONAL_ASSESSMENT: PREVENTS OR INTERFERES SOME ACTIVE ACTIVITIES AND ADLS

## 2023-02-18 NOTE — PROGRESS NOTES
Pt alert and oriented. VSS. Assessment completed as charted. Pt with active bowel sounds, states passed gas this morning. Pt denies pain or nausea at present time. Resting in bed, denies needs. Call light and bedside table within reach. Bed locked and in lowest position.

## 2023-02-18 NOTE — PLAN OF CARE
Problem: Pain  Goal: Verbalizes/displays adequate comfort level or baseline comfort level  2/18/2023 0958 by Torrey Butler RN  Outcome: Progressing  Flowsheets (Taken 2/18/2023 5157)  Verbalizes/displays adequate comfort level or baseline comfort level:   Encourage patient to monitor pain and request assistance   Assess pain using appropriate pain scale   Administer analgesics based on type and severity of pain and evaluate response   Implement non-pharmacological measures as appropriate and evaluate response

## 2023-02-18 NOTE — PROGRESS NOTES
Patient is alert and oriented. Pleasant and cooperative. Has been up to the bedside commode by herself. NG noted to be at 52cm and advanced back to 55cm. Green bile noted for output. Patient given a heat pack for her face.

## 2023-02-18 NOTE — PROGRESS NOTES
[facility] SURGERY    PATIENT NAME: Bud Mora     TODAY'S DATE: 2/18/2023    CHIEF COMPLAINT: abd pain    INTERVAL HISTORY/HPI:    Pt with some improvement in pain, some flatus, no bms, no nausea. REVIEW OF SYSTEMS:  Pertinent positives and negatives as per interval history section    OBJECTIVE:  VITALS:  /73   Pulse 74   Temp 98 °F (36.7 °C) (Oral)   Resp 18   Ht 5' 7\" (1.702 m)   Wt 222 lb (100.7 kg)   SpO2 95%   BMI 34.77 kg/m²     INTAKE/OUTPUT:    I/O last 3 completed shifts: In: 979.7 [I.V.:949.7; NG/GT:30]  Out: 1475 [Emesis/NG ZJBRJV:2556]  No intake/output data recorded. CONSTITUTIONAL:  awake and alert  LUNGS:  Respirations easy and unlabored,   CARD:  regular rate  ABDOMEN:  hypoactive bowel sounds, soft, non-distended, less tender     Data:  CBC:   Recent Labs     02/16/23 1909 02/17/23  1703 02/18/23  0540   WBC 9.7 9.3 8.2   HGB 14.0 13.1 11.8*   HCT 41.6 40.1 36.7    213 204     BMP:    Recent Labs     02/16/23 1909 02/17/23  1703 02/18/23  0540    141 140   K 5.2* 5.1 4.8    106 109   CO2 21 28 21   BUN 27* 24* 22*   CREATININE 1.2 1.2 1.0   GLUCOSE 112* 102* 90     Hepatic:   Recent Labs     02/16/23 1909   AST 30   ALT 28   BILITOT 0.3   ALKPHOS 103     Mag:    No results for input(s): MG in the last 72 hours. Phos:   No results for input(s): PHOS in the last 72 hours. INR: No results for input(s): INR in the last 72 hours. Radiology Review:  *Imaging personally reviewed by me.    AXR - pending      ASSESSMENT AND PLAN:  68 yo with sbo  Continue ng for now  Follow up on AXR today  Continue IVF     Electronically signed by Diamond Schneider, [address]

## 2023-02-18 NOTE — PROGRESS NOTES
Hospitalist Progress Note      PCP: DEMI Estrada - CNP    Date of Admission: 2/16/2023    Chief Complaint: Abd pain    Hospital Course:  67 y.o. female with a PMH of Atrial fibrillation, CHF, COPD, GERD, Obesity, and RLS who presented to [facility] with a complaint of abdominal pain. Patient states over the last few days she developed abd pain with associated nausea/vomiting. She denies recent ill contacts, fever or diarrhea. Patient reports past surgical hx of cholecystectomy and hysterectomy. She denies hx of sbo. Last bm yesterday. Subjective:  pain improving, no bm, no nausea      Medications:  Reviewed    Infusion Medications    sodium chloride      sodium chloride 75 mL/hr at 02/18/23 0413     Scheduled Medications    sacubitril-valsartan  1 tablet Oral BID    rOPINIRole  1 mg Oral TID    pantoprazole  40 mg Oral Daily    hydrOXYzine HCl  25 mg Oral Nightly    carvedilol  12.5 mg Oral BID    buPROPion  300 mg Oral QAM    apixaban  5 mg Oral BID    sodium chloride flush  10 mL IntraVENous 2 times per day    mometasone-formoterol  2 puff Inhalation BID    tiotropium  2 puff Inhalation Daily     PRN Meds: sodium chloride flush, sodium chloride, ondansetron, polyethylene glycol, acetaminophen **OR** acetaminophen, HYDROmorphone      Intake/Output Summary (Last 24 hours) at 2/18/2023 1142  Last data filed at 2/18/2023 0414  Gross per 24 hour   Intake 408.35 ml   Output 375 ml   Net 33.35 ml       Physical Exam Performed:    /73   Pulse 74   Temp 98 °F (36.7 °C) (Oral)   Resp 18   Ht 5' 7\" (1.702 m)   Wt 222 lb (100.7 kg)   SpO2 95%   BMI 34.77 kg/m²     General appearance: Obese. No apparent distress, appears stated age and cooperative. HEENT: Pupils equal, round, and reactive to light. Conjunctivae/corneas clear. Neck: Supple, with full range of motion. No jugular venous distention. Trachea midline. Respiratory:  Normal respiratory effort.  Clear to auscultation, bilaterally without Rales/Wheezes/Rhonchi. Cardiovascular: Regular rate and rhythm with normal S1/S2 without murmurs, rubs or gallops. Abdomen: Soft, non-tender, non-distended with normal bowel sounds. Musculoskeletal: No clubbing, cyanosis or edema bilaterally. Full range of motion without deformity. Skin: Skin color, texture, turgor normal.  No rashes or lesions. Neurologic:  Neurovascularly intact without any focal sensory/motor deficits. Cranial nerves: II-XII intact, grossly non-focal.  Psychiatric: Alert and oriented, thought content appropriate, normal insight  Capillary Refill: Brisk, 3 seconds, normal   Peripheral Pulses: +2 palpable, equal bilaterally       Labs:   Recent Labs     02/16/23 1909 02/17/23  1703 02/18/23  0540   WBC 9.7 9.3 8.2   HGB 14.0 13.1 11.8*   HCT 41.6 40.1 36.7    213 204     Recent Labs     02/16/23 1909 02/17/23  1703 02/18/23  0540    141 140   K 5.2* 5.1 4.8    106 109   CO2 21 28 21   BUN 27* 24* 22*   CREATININE 1.2 1.2 1.0   CALCIUM 9.4 9.2 8.6     Recent Labs     02/16/23 1909   AST 30   ALT 28   BILITOT 0.3   ALKPHOS 103     No results for input(s): INR in the last 72 hours. Recent Labs     02/16/23 1909   TROPONINI <0.01       Urinalysis:    No results found for: Song Tomas, BACTERIA, RBCUA, BLOODU, SPECGRAV, GLUCOSEU    Radiology:  XR ABDOMEN (2 VIEWS)   Final Result   Resolving pattern of small-bowel obstruction. XR ABDOMEN FOR NG/OG/NE TUBE PLACEMENT   Final Result   Enteric tube in the stomach         CT ABDOMEN PELVIS WO CONTRAST Additional Contrast? None   Final Result   Small-bowel obstruction. Surgical consultation recommended. XR CHEST PORTABLE   Final Result   Mild cardiomegaly without acute abnormality.              IP CONSULT TO GENERAL SURGERY  IP CONSULT TO GENERAL SURGERY  IP CONSULT TO HEART FAILURE NURSE/COORDINATOR  IP CONSULT TO DIETITIAN    Assessment/Plan:    Active Hospital Problems    Diagnosis     [facility] [R84.447]      Priority: Medium     SBO-NGT, Gen Surg consult, IVF, bowel rest. Abd XR shows sbo resolving     PAF-Eliquis, BB     Chronic sHF, NICM, s/p ICD-Entresto, BB. HF orderset     RLS-requip     GERD-PPI     Obesity: With Body mass index is 34.77 kg/m². Complicating assessment and treatment. Placing patient at risk for multiple co-morbidities as well as early death and contributing to the patient's presentation.  Counseled on weight loss    DVT Prophylaxis: Eliquis  Diet: Diet NPO Exceptions are: Ice Chips, Sips of Water with Meds  Code Status: Full Code  PT/OT Eval Status: NA    Dispo - 1-2 days    Appropriate for A1 Discharge Unit: DEMI Mcdaniel - CNP

## 2023-02-18 NOTE — PROGRESS NOTES
02 replaced on patient at 2L with an spo2 at 87%. Patient normally wears 2L NC at baseline at HS. Pain medication given for abdominal pain. Has had bowel sounds and has been able to pass gas this morning. Tolerating ice chips well. Denies any additional needs. Will continue to monitor.

## 2023-02-18 NOTE — PLAN OF CARE
Abdominal pain and pain noted from NG being in place. Warm pack provided previously and pain medication given recently.      Problem: Pain  Goal: Verbalizes/displays adequate comfort level or baseline comfort level  Outcome: Progressing

## 2023-02-19 LAB
BASOPHILS ABSOLUTE: 0 K/UL (ref 0–0.2)
BASOPHILS RELATIVE PERCENT: 0.2 %
EOSINOPHILS ABSOLUTE: 0.1 K/UL (ref 0–0.6)
EOSINOPHILS RELATIVE PERCENT: 1.7 %
HCT VFR BLD CALC: 35 % (ref 36–48)
HEMOGLOBIN: 11.1 G/DL (ref 12–16)
LYMPHOCYTES ABSOLUTE: 1.8 K/UL (ref 1–5.1)
LYMPHOCYTES RELATIVE PERCENT: 22.5 %
MCH RBC QN AUTO: 29.4 PG (ref 26–34)
MCHC RBC AUTO-ENTMCNC: 31.6 G/DL (ref 31–36)
MCV RBC AUTO: 92.8 FL (ref 80–100)
MONOCYTES ABSOLUTE: 0.6 K/UL (ref 0–1.3)
MONOCYTES RELATIVE PERCENT: 7.5 %
NEUTROPHILS ABSOLUTE: 5.4 K/UL (ref 1.7–7.7)
NEUTROPHILS RELATIVE PERCENT: 68.1 %
PDW BLD-RTO: 15.4 % (ref 12.4–15.4)
PLATELET # BLD: 186 K/UL (ref 135–450)
PMV BLD AUTO: 6.9 FL (ref 5–10.5)
RBC # BLD: 3.77 M/UL (ref 4–5.2)
WBC # BLD: 8 K/UL (ref 4–11)

## 2023-02-19 PROCEDURE — 1200000000 HC SEMI PRIVATE

## 2023-02-19 PROCEDURE — 6370000000 HC RX 637 (ALT 250 FOR IP): Performed by: INTERNAL MEDICINE

## 2023-02-19 PROCEDURE — 6360000002 HC RX W HCPCS: Performed by: NURSE PRACTITIONER

## 2023-02-19 PROCEDURE — 36415 COLL VENOUS BLD VENIPUNCTURE: CPT

## 2023-02-19 PROCEDURE — 2700000000 HC OXYGEN THERAPY PER DAY

## 2023-02-19 PROCEDURE — 99231 SBSQ HOSP IP/OBS SF/LOW 25: CPT | Performed by: SURGERY

## 2023-02-19 PROCEDURE — 2580000003 HC RX 258: Performed by: INTERNAL MEDICINE

## 2023-02-19 PROCEDURE — 6370000000 HC RX 637 (ALT 250 FOR IP): Performed by: HOSPITALIST

## 2023-02-19 PROCEDURE — 94761 N-INVAS EAR/PLS OXIMETRY MLT: CPT

## 2023-02-19 PROCEDURE — 94640 AIRWAY INHALATION TREATMENT: CPT

## 2023-02-19 PROCEDURE — 85025 COMPLETE CBC W/AUTO DIFF WBC: CPT

## 2023-02-19 RX ORDER — HYDROCODONE BITARTRATE AND ACETAMINOPHEN 5; 325 MG/1; MG/1
1 TABLET ORAL EVERY 6 HOURS PRN
Status: DISCONTINUED | OUTPATIENT
Start: 2023-02-19 | End: 2023-02-20 | Stop reason: HOSPADM

## 2023-02-19 RX ADMIN — Medication 2 PUFF: at 07:27

## 2023-02-19 RX ADMIN — HYDROMORPHONE HYDROCHLORIDE 0.5 MG: 1 INJECTION, SOLUTION INTRAMUSCULAR; INTRAVENOUS; SUBCUTANEOUS at 08:07

## 2023-02-19 RX ADMIN — SODIUM CHLORIDE: 9 INJECTION, SOLUTION INTRAVENOUS at 07:03

## 2023-02-19 RX ADMIN — TIOTROPIUM BROMIDE INHALATION SPRAY 2 PUFF: 3.12 SPRAY, METERED RESPIRATORY (INHALATION) at 07:27

## 2023-02-19 RX ADMIN — CARVEDILOL 12.5 MG: 6.25 TABLET, FILM COATED ORAL at 08:11

## 2023-02-19 RX ADMIN — BUPROPION HYDROCHLORIDE 300 MG: 150 TABLET, EXTENDED RELEASE ORAL at 08:11

## 2023-02-19 RX ADMIN — SACUBITRIL AND VALSARTAN 1 TABLET: 24; 26 TABLET, FILM COATED ORAL at 20:13

## 2023-02-19 RX ADMIN — APIXABAN 5 MG: 5 TABLET, FILM COATED ORAL at 20:14

## 2023-02-19 RX ADMIN — Medication 2 PUFF: at 20:51

## 2023-02-19 RX ADMIN — APIXABAN 5 MG: 5 TABLET, FILM COATED ORAL at 08:11

## 2023-02-19 RX ADMIN — HYDROCODONE BITARTRATE AND ACETAMINOPHEN 1 TABLET: 5; 325 TABLET ORAL at 18:31

## 2023-02-19 RX ADMIN — ROPINIROLE HYDROCHLORIDE 1 MG: 0.5 TABLET, FILM COATED ORAL at 20:13

## 2023-02-19 RX ADMIN — SACUBITRIL AND VALSARTAN 1 TABLET: 24; 26 TABLET, FILM COATED ORAL at 08:11

## 2023-02-19 RX ADMIN — PANTOPRAZOLE SODIUM 40 MG: 40 TABLET, DELAYED RELEASE ORAL at 08:11

## 2023-02-19 RX ADMIN — Medication 10 ML: at 20:15

## 2023-02-19 RX ADMIN — HYDROMORPHONE HYDROCHLORIDE 0.5 MG: 1 INJECTION, SOLUTION INTRAMUSCULAR; INTRAVENOUS; SUBCUTANEOUS at 02:38

## 2023-02-19 RX ADMIN — CARVEDILOL 12.5 MG: 6.25 TABLET, FILM COATED ORAL at 20:14

## 2023-02-19 RX ADMIN — HYDROXYZINE HYDROCHLORIDE 25 MG: 10 TABLET ORAL at 20:14

## 2023-02-19 ASSESSMENT — PAIN DESCRIPTION - LOCATION
LOCATION: JAW;THROAT
LOCATION: FACE;HEAD;ABDOMEN
LOCATION: FACE

## 2023-02-19 ASSESSMENT — PAIN SCALES - GENERAL
PAINLEVEL_OUTOF10: 8
PAINLEVEL_OUTOF10: 5
PAINLEVEL_OUTOF10: 7

## 2023-02-19 ASSESSMENT — PAIN DESCRIPTION - DESCRIPTORS
DESCRIPTORS: ACHING
DESCRIPTORS: DISCOMFORT;SORE

## 2023-02-19 ASSESSMENT — PAIN DESCRIPTION - ORIENTATION: ORIENTATION: LEFT

## 2023-02-19 NOTE — CARE COORDINATION
Chart reviewed day 3. Care managed per surgery and IM. NGT d/c'd. Start cl liq diet. Following for improvement. IPTA from home with .  Clint Valenzuela RN

## 2023-02-19 NOTE — PLAN OF CARE
Problem: Pain  Goal: Verbalizes/displays adequate comfort level or baseline comfort level  2/19/2023 1104 by Joan Garza RN  Flowsheets (Taken 2/19/2023 1104)  Verbalizes/displays adequate comfort level or baseline comfort level:   Encourage patient to monitor pain and request assistance   Assess pain using appropriate pain scale   Administer analgesics based on type and severity of pain and evaluate response   Implement non-pharmacological measures as appropriate and evaluate response

## 2023-02-19 NOTE — PROGRESS NOTES
[facility] SURGERY    PATIENT NAME: Pamela Chong     TODAY'S DATE: 2/19/2023    CHIEF COMPLAINT: abd pain    INTERVAL HISTORY/HPI:    Pt with less pain, no nausea, +flatus, no fevers     REVIEW OF SYSTEMS:  Pertinent positives and negatives as per interval history section    OBJECTIVE:  VITALS:  /73   Pulse 63   Temp 97.7 °F (36.5 °C) (Oral)   Resp 18   Ht 5' 7\" (1.702 m)   Wt 231 lb 3.2 oz (104.9 kg)   SpO2 91%   BMI 36.21 kg/m²     INTAKE/OUTPUT:    I/O last 3 completed shifts: In: 10 [I.V.:10]  Out: 300 [Emesis/NG output:300]  No intake/output data recorded. CONSTITUTIONAL:  awake and alert  LUNGS:  Respirations easy and unlabored,   CARD:  regular rate  ABDOMEN:  hypoactive bowel sounds, soft, non-distended, less tender     Data:  CBC:   Recent Labs     02/17/23  1703 02/18/23  0540 02/19/23  0533   WBC 9.3 8.2 8.0   HGB 13.1 11.8* 11.1*   HCT 40.1 36.7 35.0*    204 186       BMP:    Recent Labs     02/16/23  1909 02/17/23  1703 02/18/23  0540    141 140   K 5.2* 5.1 4.8    106 109   CO2 21 28 21   BUN 27* 24* 22*   CREATININE 1.2 1.2 1.0   GLUCOSE 112* 102* 90       Hepatic:   Recent Labs     02/16/23  1909   AST 30   ALT 28   BILITOT 0.3   ALKPHOS 103       Mag:    No results for input(s): MG in the last 72 hours. Phos:   No results for input(s): PHOS in the last 72 hours. INR: No results for input(s): INR in the last 72 hours. Radiology Review:  *Imaging personally reviewed by me.    AXR - pending      ASSESSMENT AND PLAN:  66 yo with sbo  D/c ng and start clear liquids       Electronically signed by Yanni Davidson, [address]

## 2023-02-19 NOTE — PROGRESS NOTES
Pt alert and oriented. VSS. Assessment completed as charted. Pt c/o pain 8/10, prn IV pain medication given per MAR. Pt states passing some gas. Active bowel sounds. Resting in bed, denies further needs at present time. Call light and bedside table within reach. Bed locked and in lowest position.

## 2023-02-19 NOTE — PLAN OF CARE
Pt complained of abdominal pain. Gave 0.5 mg of dilaudid IV. Call light in reach, will continue to monitor.

## 2023-02-19 NOTE — PROGRESS NOTES
Hospitalist Progress Note      PCP: Manjeet Hui APRN - CNP    Date of Admission: 2/16/2023    Chief Complaint: Abd pain    Hospital Course:  67 y.o. female with a PMH of Atrial fibrillation, CHF, COPD, GERD, Obesity, and RLS who presented to [facility] with a complaint of abdominal pain. Patient states over the last few days she developed abd pain with associated nausea/vomiting. She denies recent ill contacts, fever or diarrhea. Patient reports past surgical hx of cholecystectomy and hysterectomy. She denies hx of sbo. Last bm yesterday. Subjective:  pain improving, +flatus, no bm, no nausea      Medications:  Reviewed    Infusion Medications    sodium chloride      sodium chloride 75 mL/hr at 02/19/23 0703     Scheduled Medications    sacubitril-valsartan  1 tablet Oral BID    rOPINIRole  1 mg Oral TID    pantoprazole  40 mg Oral Daily    hydrOXYzine HCl  25 mg Oral Nightly    carvedilol  12.5 mg Oral BID    buPROPion  300 mg Oral QAM    apixaban  5 mg Oral BID    sodium chloride flush  10 mL IntraVENous 2 times per day    mometasone-formoterol  2 puff Inhalation BID    tiotropium  2 puff Inhalation Daily     PRN Meds: sodium chloride flush, sodium chloride, ondansetron, polyethylene glycol, acetaminophen **OR** acetaminophen, HYDROmorphone      Intake/Output Summary (Last 24 hours) at 2/19/2023 0801  Last data filed at 2/19/2023 0308  Gross per 24 hour   Intake 10 ml   Output 225 ml   Net -215 ml         Physical Exam Performed:    BP (!) 146/68   Pulse 64   Temp 98.2 °F (36.8 °C) (Oral)   Resp 16   Ht 5' 7\" (1.702 m)   Wt 231 lb 3.2 oz (104.9 kg)   SpO2 94%   BMI 36.21 kg/m²     General appearance: Obese. No apparent distress, appears stated age and cooperative. HEENT: Pupils equal, round, and reactive to light. Conjunctivae/corneas clear. Neck: Supple, with full range of motion. No jugular venous distention. Trachea midline. Respiratory:  Normal respiratory effort.  Clear to auscultation, bilaterally without Rales/Wheezes/Rhonchi. Cardiovascular: Regular rate and rhythm with normal S1/S2 without murmurs, rubs or gallops. Abdomen: Soft, non-tender, non-distended with normal bowel sounds. Musculoskeletal: No clubbing, cyanosis or edema bilaterally. Full range of motion without deformity. Skin: Skin color, texture, turgor normal.  No rashes or lesions. Neurologic:  Neurovascularly intact without any focal sensory/motor deficits. Cranial nerves: II-XII intact, grossly non-focal.  Psychiatric: Alert and oriented, thought content appropriate, normal insight  Capillary Refill: Brisk, 3 seconds, normal   Peripheral Pulses: +2 palpable, equal bilaterally       Labs:   Recent Labs     02/17/23  1703 02/18/23  0540 02/19/23  0533   WBC 9.3 8.2 8.0   HGB 13.1 11.8* 11.1*   HCT 40.1 36.7 35.0*    204 186       Recent Labs     02/16/23  1909 02/17/23  1703 02/18/23  0540    141 140   K 5.2* 5.1 4.8    106 109   CO2 21 28 21   BUN 27* 24* 22*   CREATININE 1.2 1.2 1.0   CALCIUM 9.4 9.2 8.6       Recent Labs     02/16/23  1909   AST 30   ALT 28   BILITOT 0.3   ALKPHOS 103       No results for input(s): INR in the last 72 hours. Recent Labs     02/16/23 1909   TROPONINI <0.01         Urinalysis:    No results found for: Glorietta March, BACTERIA, RBCUA, BLOODU, SPECGRAV, GLUCOSEU    Radiology:  XR ABDOMEN (2 VIEWS)   Final Result   Resolving pattern of small-bowel obstruction. XR ABDOMEN FOR NG/OG/NE TUBE PLACEMENT   Final Result   Enteric tube in the stomach         CT ABDOMEN PELVIS WO CONTRAST Additional Contrast? None   Final Result   Small-bowel obstruction. Surgical consultation recommended. XR CHEST PORTABLE   Final Result   Mild cardiomegaly without acute abnormality.              IP CONSULT TO GENERAL SURGERY  IP CONSULT TO GENERAL SURGERY  IP CONSULT TO HEART FAILURE NURSE/COORDINATOR  IP CONSULT TO DIETITIAN    Assessment/Plan:    AMENA/Katrin Sher 4625 Problems    Diagnosis     SBO (small bowel obstruction) ([facility] [K56.609]      Priority: Medium     SBO-Gen Surg consult, IVF, bowel rest. Abd XR shows sbo resolving. NGT d/c'd, start CLD, dc IVF. PAF-Eliquis, BB     Chronic sHF, NICM, s/p ICD-Entresto, BB. HF orderset     RLS-requip     GERD-PPI     Obesity: With Body mass index is 34.77 kg/m². Complicating assessment and treatment. Placing patient at risk for multiple co-morbidities as well as early death and contributing to the patient's presentation.  Counseled on weight loss    DVT Prophylaxis: Eliquis  Diet: Diet NPO Exceptions are: Ice Chips, Sips of Water with Meds  Code Status: Full Code  PT/OT Eval Status: NA    Dispo - 1-2 days    Appropriate for A1 Discharge Unit: DEMI Sheikh - TERESA

## 2023-02-20 VITALS
WEIGHT: 231.2 LBS | RESPIRATION RATE: 18 BRPM | HEIGHT: 67 IN | BODY MASS INDEX: 36.29 KG/M2 | DIASTOLIC BLOOD PRESSURE: 56 MMHG | SYSTOLIC BLOOD PRESSURE: 116 MMHG | HEART RATE: 69 BPM | TEMPERATURE: 97.8 F | OXYGEN SATURATION: 95 %

## 2023-02-20 LAB
ANION GAP SERPL CALCULATED.3IONS-SCNC: 13 MMOL/L (ref 3–16)
BUN BLDV-MCNC: 10 MG/DL (ref 7–20)
CALCIUM SERPL-MCNC: 9.4 MG/DL (ref 8.3–10.6)
CHLORIDE BLD-SCNC: 107 MMOL/L (ref 99–110)
CO2: 20 MMOL/L (ref 21–32)
CREAT SERPL-MCNC: 0.9 MG/DL (ref 0.6–1.2)
GFR SERPL CREATININE-BSD FRML MDRD: >60 ML/MIN/{1.73_M2}
GLUCOSE BLD-MCNC: 99 MG/DL (ref 70–99)
HCT VFR BLD CALC: 40.7 % (ref 36–48)
HEMOGLOBIN: 13.4 G/DL (ref 12–16)
MCH RBC QN AUTO: 30.6 PG (ref 26–34)
MCHC RBC AUTO-ENTMCNC: 33 G/DL (ref 31–36)
MCV RBC AUTO: 92.7 FL (ref 80–100)
PDW BLD-RTO: 14.7 % (ref 12.4–15.4)
PLATELET # BLD: 208 K/UL (ref 135–450)
PMV BLD AUTO: 6.6 FL (ref 5–10.5)
POTASSIUM REFLEX MAGNESIUM: 4.3 MMOL/L (ref 3.5–5.1)
PRO-BNP: 799 PG/ML (ref 0–124)
RBC # BLD: 4.39 M/UL (ref 4–5.2)
SODIUM BLD-SCNC: 140 MMOL/L (ref 136–145)
WBC # BLD: 9.1 K/UL (ref 4–11)

## 2023-02-20 PROCEDURE — 99232 SBSQ HOSP IP/OBS MODERATE 35: CPT | Performed by: SURGERY

## 2023-02-20 PROCEDURE — 85027 COMPLETE CBC AUTOMATED: CPT

## 2023-02-20 PROCEDURE — 83880 ASSAY OF NATRIURETIC PEPTIDE: CPT

## 2023-02-20 PROCEDURE — 36415 COLL VENOUS BLD VENIPUNCTURE: CPT

## 2023-02-20 PROCEDURE — 94640 AIRWAY INHALATION TREATMENT: CPT

## 2023-02-20 PROCEDURE — 6370000000 HC RX 637 (ALT 250 FOR IP): Performed by: INTERNAL MEDICINE

## 2023-02-20 PROCEDURE — 80048 BASIC METABOLIC PNL TOTAL CA: CPT

## 2023-02-20 PROCEDURE — APPSS30 APP SPLIT SHARED TIME 16-30 MINUTES: Performed by: CLINICAL NURSE SPECIALIST

## 2023-02-20 PROCEDURE — 2580000003 HC RX 258: Performed by: INTERNAL MEDICINE

## 2023-02-20 RX ORDER — POLYETHYLENE GLYCOL 3350 17 G/17G
17 POWDER, FOR SOLUTION ORAL DAILY PRN
Qty: 527 G | Refills: 1 | Status: SHIPPED | OUTPATIENT
Start: 2023-02-20 | End: 2023-03-22

## 2023-02-20 RX ADMIN — PANTOPRAZOLE SODIUM 40 MG: 40 TABLET, DELAYED RELEASE ORAL at 09:41

## 2023-02-20 RX ADMIN — APIXABAN 5 MG: 5 TABLET, FILM COATED ORAL at 09:41

## 2023-02-20 RX ADMIN — ROPINIROLE HYDROCHLORIDE 1 MG: 0.5 TABLET, FILM COATED ORAL at 09:41

## 2023-02-20 RX ADMIN — BUPROPION HYDROCHLORIDE 300 MG: 150 TABLET, EXTENDED RELEASE ORAL at 09:41

## 2023-02-20 RX ADMIN — ROPINIROLE HYDROCHLORIDE 1 MG: 0.5 TABLET, FILM COATED ORAL at 14:04

## 2023-02-20 RX ADMIN — CARVEDILOL 12.5 MG: 6.25 TABLET, FILM COATED ORAL at 09:41

## 2023-02-20 RX ADMIN — Medication 2 PUFF: at 07:47

## 2023-02-20 RX ADMIN — SACUBITRIL AND VALSARTAN 1 TABLET: 24; 26 TABLET, FILM COATED ORAL at 09:41

## 2023-02-20 RX ADMIN — Medication 10 ML: at 09:41

## 2023-02-20 RX ADMIN — TIOTROPIUM BROMIDE INHALATION SPRAY 2 PUFF: 3.12 SPRAY, METERED RESPIRATORY (INHALATION) at 07:47

## 2023-02-20 ASSESSMENT — PAIN SCALES - GENERAL
PAINLEVEL_OUTOF10: 0

## 2023-02-20 NOTE — PROGRESS NOTES
[facility] SURGERY    PATIENT NAME: Valentino Clayman     TODAY'S DATE: 2/20/2023    CHIEF COMPLAINT: none    INTERVAL HISTORY/HPI:    Pt feeling well, no nausea. Anabel liquids     REVIEW OF SYSTEMS:  Pertinent positives and negatives as per interval history section    OBJECTIVE:  VITALS:  BP 96/62   Pulse 64   Temp 98 °F (36.7 °C) (Oral)   Resp 18   Ht 5' 7\" (1.702 m)   Wt 231 lb 3.2 oz (104.9 kg)   SpO2 95%   BMI 36.21 kg/m²     INTAKE/OUTPUT:    I/O last 3 completed shifts: In: 20 [I.V.:20]  Out: 225 [Emesis/NG output:225]  I/O this shift:  In: 240 [P.O.:240]  Out: -     CONSTITUTIONAL:  awake and alert  LUNGS:  Respirations easy and unlabored,   CARD:  regular rate  ABDOMEN:  + bowel sounds, soft, non-distended, non-tender     Data:  CBC:   Recent Labs     02/18/23  0540 02/19/23  0533 02/20/23  0544   WBC 8.2 8.0 9.1   HGB 11.8* 11.1* 13.4   HCT 36.7 35.0* 40.7    186 208       BMP:    Recent Labs     02/17/23  1703 02/18/23  0540 02/20/23  0544    140 140   K 5.1 4.8 4.3    109 107   CO2 28 21 20*   BUN 24* 22* 10   CREATININE 1.2 1.0 0.9   GLUCOSE 102* 90 99           ASSESSMENT AND PLAN:  68 yo with sbo - resolving     Advance diet today. Electronically signed by DEMI Farrell CNP       Patient seen and agree with above and more than half of the total time was spent by me on the encounter. No pain or nausea. No fevers or chills. Having bms. Advance diet as tolerated. Ok to discharge home this afternoon.   Mitchell Faulkner MD

## 2023-02-20 NOTE — PLAN OF CARE
Problem: Pain  Goal: Verbalizes/displays adequate comfort level or baseline comfort level  2/20/2023 1546 by Bassem Albarran RN  Outcome: Completed  2/20/2023 1208 by Bassem Albarran RN  Outcome: Progressing     Problem: Safety - Adult  Goal: Free from fall injury  2/20/2023 1546 by Bassem Albarran RN  Outcome: Completed  2/20/2023 1208 by Bassem Albarran RN  Outcome: Progressing

## 2023-02-20 NOTE — PROGRESS NOTES
IV removed earlier and D/C instructions reviewed with pt. Pt voiced understanding. Pt being wheeled to the front door for her ride home.

## 2023-02-21 DIAGNOSIS — I50.9 ACUTE DECOMPENSATED HEART FAILURE (HCC): ICD-10-CM

## 2023-02-21 RX ORDER — FUROSEMIDE 40 MG/1
TABLET ORAL
Qty: 90 TABLET | Refills: 0 | Status: SHIPPED | OUTPATIENT
Start: 2023-02-21

## 2023-02-21 RX ORDER — SPIRONOLACTONE 25 MG/1
TABLET ORAL
Qty: 90 TABLET | Refills: 0 | Status: SHIPPED | OUTPATIENT
Start: 2023-02-21

## 2023-02-21 NOTE — TELEPHONE ENCOUNTER
Idolina Litten [phone] (home)    is requesting refill(s) of medication Spironolactone to preferred pharmacy [address] 12/2/22 (pertaining to medication)   Last refill 12/2/22 (per medication requested)  Next office visit scheduled or attempted No  Date   If No, reason     Furosemide- 12/2/22

## 2023-02-21 NOTE — DISCHARGE SUMMARY
Hospital Medicine Discharge Summary    Patient ID: Freeman Olmstead      Patient's PCP: Marbella Blanchard, APRN - CNP    Admit Date: 2/16/2023     Discharge Date: 2/20/2023      Admitting Provider: Luis F Ngo MD     Discharge Provider: Blanco Garcia DO     Discharge Diagnoses: Active Hospital Problems    Diagnosis     SBO (small bowel obstruction) ([facility] [K56.609]      Priority: Medium       The patient was seen and examined on day of discharge and this discharge summary is in conjunction with any daily progress note from day of discharge. Hospital Course:   67 y.o. female with a PMH of Atrial fibrillation, CHF, COPD, GERD, Obesity, and RLS who presented to [facility] with a complaint of abdominal pain. Patient states over the last few days she developed abd pain with associated nausea/vomiting. She denies recent ill contacts, fever or diarrhea. Patient reports past surgical hx of cholecystectomy and hysterectomy. She denies hx of sbo. SBO resolving  Advanced diet and tolerated   No pain or nausea   Having Bms    PAF-Eliquis, BB  Secondary hypercoag stgate     Chronic sHF, NICM, s/p ICD-Entresto, BB.      RLS-requip     GERD-PPI     Obesity: With Body mass index is 34.77 kg/m². Complicating assessment and treatment. Placing patient at risk for multiple co-morbidities as well as early death and contributing to the patient's presentation. Counseled on weight loss            Physical Exam Performed:     BP (!) 116/56   Pulse 69   Temp 97.8 °F (36.6 °C) (Oral)   Resp 18   Ht 5' 7\" (1.702 m)   Wt 231 lb 3.2 oz (104.9 kg)   SpO2 95%   BMI 36.21 kg/m²       General appearance:  No apparent distress, appears stated age and cooperative. HEENT:  Normal cephalic, atraumatic without obvious deformity. Pupils equal, round, and reactive to light. Extra ocular muscles intact. Conjunctivae/corneas clear. Neck: Supple, with full range of motion. No jugular venous distention.  Trachea midline. Respiratory:  Normal respiratory effort. Clear to auscultation, bilaterally without Rales/Wheezes/Rhonchi. Cardiovascular:  Regular rate and rhythm with normal S1/S2 without murmurs, rubs or gallops. Abdomen: Soft, non-tender, non-distended with normal bowel sounds. Musculoskeletal:  No clubbing, cyanosis or edema bilaterally. Full range of motion without deformity. Skin: Skin color, texture, turgor normal.  No rashes or lesions. Neurologic:  Neurovascularly intact without any focal sensory/motor deficits. Cranial nerves: II-XII intact, grossly non-focal.  Psychiatric:  Alert and oriented, thought content appropriate, normal insight  Capillary Refill: Brisk,< 3 seconds   Peripheral Pulses: +2 palpable, equal bilaterally       Labs: For convenience and continuity at follow-up the following most recent labs are provided:      CBC:    Lab Results   Component Value Date/Time    WBC 9.1 02/20/2023 05:44 AM    HGB 13.4 02/20/2023 05:44 AM    HCT 40.7 02/20/2023 05:44 AM     02/20/2023 05:44 AM       Renal:    Lab Results   Component Value Date/Time     02/20/2023 05:44 AM    K 4.3 02/20/2023 05:44 AM     02/20/2023 05:44 AM    CO2 20 02/20/2023 05:44 AM    BUN 10 02/20/2023 05:44 AM    CREATININE 0.9 02/20/2023 05:44 AM    CALCIUM 9.4 02/20/2023 05:44 AM         Significant Diagnostic Studies    Radiology:   XR ABDOMEN (2 VIEWS)   Final Result   Resolving pattern of small-bowel obstruction. XR ABDOMEN FOR NG/OG/NE TUBE PLACEMENT   Final Result   Enteric tube in the stomach         CT ABDOMEN PELVIS WO CONTRAST Additional Contrast? None   Final Result   Small-bowel obstruction. Surgical consultation recommended. XR CHEST PORTABLE   Final Result   Mild cardiomegaly without acute abnormality.                 Consults:     IP CONSULT TO GENERAL SURGERY  IP CONSULT TO GENERAL SURGERY  IP CONSULT TO HEART FAILURE NURSE/COORDINATOR  IP CONSULT TO DIETITIAN    Disposition:  Home Condition at Discharge: Stable    Discharge Instructions/Follow-up:  PCP in 1 week    Code Status:  Prior     Activity: activity as tolerated    Diet: regular diet      Discharge Medications:     Discharge Medication List as of 2/20/2023  3:49 PM             Details   polyethylene glycol (GLYCOLAX) 17 g packet Take 17 g by mouth daily as needed for Constipation, Disp-527 g, R-1Normal                Details   empagliflozin (JARDIANCE) 10 MG tablet Take 1 tablet by mouth daily, Disp-90 tablet, R-3Normal      carvedilol (COREG) 12.5 MG tablet TAKE 1 TABLET TWICE A DAY, Disp-180 tablet, R-1Normal      Budeson-Glycopyrrol-Formoterol (BREZTRI AEROSPHERE) 160-9-4.8 MCG/ACT AERO Inhale 2 puffs into the lungs daily, Disp-1 each, R-0Normal      sacubitril-valsartan (ENTRESTO) 24-26 MG per tablet Take 1 tablet by mouth 2 times daily, Disp-60 tablet, R-4Normal      pantoprazole (PROTONIX) 40 MG tablet Take 1 tablet by mouth daily, Disp-90 tablet, R-1Adjust Sig      hydrOXYzine HCl (ATARAX) 25 MG tablet Take 1 tablet by mouth nightly, Disp-90 tablet, R-0Normal      buPROPion (WELLBUTRIN XL) 300 MG extended release tablet Take 1 tablet by mouth every morning, Disp-90 tablet, R-1Normal      spironolactone (ALDACTONE) 25 MG tablet Take 1 tablet by mouth daily, Disp-30 tablet, R-3Normal      furosemide (LASIX) 40 MG tablet Take 1 tablet by mouth See Admin Instructions Daily as needed for weight gain 3lbs in a day or 5lbs in a week with shortness of breath, Disp-30 tablet, R-3Normal      apixaban (ELIQUIS) 5 MG TABS tablet Take 1 tablet by mouth 2 times daily, Disp-60 tablet, R-4Normal             Time Spent on discharge: 35 minutes in the examination, evaluation, counseling and review of medications and discharge plan. Signed:    Brianna Saxena DO   2/20/2023      Thank you Nathan Buenrostro APRN - TERESA for the opportunity to be involved in this patient's care.  If you have any questions or concerns, please feel free to contact me at [phone].

## 2023-02-23 ENCOUNTER — TELEPHONE (OUTPATIENT)
Dept: FAMILY MEDICINE CLINIC | Age: 73
End: 2023-02-23

## 2023-02-23 NOTE — TELEPHONE ENCOUNTER
Care Transitions Initial Follow Up Call    Outreach made within 2 business days of discharge: Yes    Patient: Leslie Kumar Patient : 1950   MRN: [id number]  Reason for Admission: There are no discharge diagnoses documented for the most recent discharge. Discharge Date: 23       Spoke with: Leslie Kumar    Discharge department/facility: [facility]     TCM Interactive Patient Contact:  Was patient able to fill all prescriptions: N/A  Was patient instructed to bring all medications to the follow-up visit: Yes  Is patient taking all medications as directed in the discharge summary?  Yes   Does patient understand their discharge instructions: Yes  Does patient have questions or concerns that need addressed prior to 7-14 day follow up office visit: NO    Scheduled appointment with PCP within 7-14 days    Follow Up  Future Appointments   Date Time Provider Kristy Conley   3/6/2023  8:35 AM SCHEDULE, [facility] REMOTE TRANSMISSION Reid SAM   3/7/2023 10:30 AM MD GISELL Sierra   2023 10:00 AM SCHEDULE, [facility] Reid SAM   2023 10:00 AM MD Reid Harvey       Tonafrank Hopson

## 2023-02-28 ENCOUNTER — OFFICE VISIT (OUTPATIENT)
Dept: FAMILY MEDICINE CLINIC | Age: 73
End: 2023-02-28

## 2023-02-28 VITALS
BODY MASS INDEX: 36.7 KG/M2 | OXYGEN SATURATION: 98 % | WEIGHT: 233.8 LBS | DIASTOLIC BLOOD PRESSURE: 78 MMHG | HEART RATE: 80 BPM | RESPIRATION RATE: 16 BRPM | SYSTOLIC BLOOD PRESSURE: 124 MMHG | HEIGHT: 67 IN

## 2023-02-28 DIAGNOSIS — M54.2 CERVICALGIA: ICD-10-CM

## 2023-02-28 DIAGNOSIS — Z09 HOSPITAL DISCHARGE FOLLOW-UP: Primary | ICD-10-CM

## 2023-02-28 ASSESSMENT — PATIENT HEALTH QUESTIONNAIRE - PHQ9
10. IF YOU CHECKED OFF ANY PROBLEMS, HOW DIFFICULT HAVE THESE PROBLEMS MADE IT FOR YOU TO DO YOUR WORK, TAKE CARE OF THINGS AT HOME, OR GET ALONG WITH OTHER PEOPLE: 0
1. LITTLE INTEREST OR PLEASURE IN DOING THINGS: 0
SUM OF ALL RESPONSES TO PHQ QUESTIONS 1-9: 0
4. FEELING TIRED OR HAVING LITTLE ENERGY: 0
8. MOVING OR SPEAKING SO SLOWLY THAT OTHER PEOPLE COULD HAVE NOTICED. OR THE OPPOSITE, BEING SO FIGETY OR RESTLESS THAT YOU HAVE BEEN MOVING AROUND A LOT MORE THAN USUAL: 0
SUM OF ALL RESPONSES TO PHQ9 QUESTIONS 1 & 2: 0
9. THOUGHTS THAT YOU WOULD BE BETTER OFF DEAD, OR OF HURTING YOURSELF: 0
SUM OF ALL RESPONSES TO PHQ QUESTIONS 1-9: 0
3. TROUBLE FALLING OR STAYING ASLEEP: 0
6. FEELING BAD ABOUT YOURSELF - OR THAT YOU ARE A FAILURE OR HAVE LET YOURSELF OR YOUR FAMILY DOWN: 0
SUM OF ALL RESPONSES TO PHQ QUESTIONS 1-9: 0
5. POOR APPETITE OR OVEREATING: 0
7. TROUBLE CONCENTRATING ON THINGS, SUCH AS READING THE NEWSPAPER OR WATCHING TELEVISION: 0
SUM OF ALL RESPONSES TO PHQ QUESTIONS 1-9: 0
2. FEELING DOWN, DEPRESSED OR HOPELESS: 0

## 2023-02-28 NOTE — PROGRESS NOTES
Post-Discharge Transitional Care  Follow Up      Heena Diane   YOB: 1950    Date of Office Visit:  2/28/2023  Date of Hospital Admission: 2/16/23  Date of Hospital Discharge: 2/20/23  Risk of hospital readmission (high >=14%. Medium >=10%) :Readmission Risk Score: 10.8      Care management risk score Rising risk (score 2-5) and Complex Care (Scores >=6): No Risk Score On File     Non face to face  following discharge, date last encounter closed (first attempt may have been earlier): 02/23/2023    Call initiated 2 business days of discharge: Yes    ASSESSMENT/PLAN:   Hospital discharge follow-up  -     MO DISCHARGE MEDS RECONCILED W/ CURRENT OUTPATIENT MED LIST  -Patient was hospitalized for small bowel obstruction. Patient was given MiraLAX. Patient was having good bowel movements since being discharged. Patient reports no abdominal pain. Patient is currently doing well. Cervicalgia  -     AFL (Epic) - Laure Joseph NP, Pain Management, [facility]  -Patient previously states that she has had a stimulator she was previously seeing pain management in [place]. We will plan to refer to pain management for further evaluation and treatment. She states that she believes the stimulator is longer working. Medical Decision Making: moderate complexity  Return in about 4 weeks (around 3/28/2023) for a1c. Subjective:   HPI:  Follow up of Hospital problems/diagnosis(es):     Inpatient course: Discharge summary reviewed- see chart.     Interval history/Current status:     Patient Active Problem List   Diagnosis    Allergic rhinitis    Arthralgia of knee    Astigmatism    Backache    Cervicalgia    Costochondritis    Cyst of eyelid    Depression    Esophagitis    Flexor hallucis longus tendinitis    Essential hypertension    Hyperlipidemia    Hematuria    Lateral epicondylitis    Left upper quadrant abdominal pain    Mild intermittent asthma    Motion sickness    Obesity    Nicotine dependence    Other seborrheic keratosis    Overweight    Pain in thoracic spine    Palpitations    Persistent insomnia    Senile lentigo    Presbyopia    Symptomatic menopausal or female climacteric states    Status post hysterectomy    Subacromial bursitis    Former smoker    Acute decompensated heart failure ([facility])    COPD (chronic obstructive pulmonary disease) ([facility])    PAF (paroxysmal atrial fibrillation) ([facility])    Pulmonary vascular congestion    Elevated brain natriuretic peptide (BNP) level    Premature atrial contraction    Acute on chronic systolic congestive heart failure ([facility])    Hypokalemia    ICD (implantable cardioverter-defibrillator), dual, in situ    SBO (small bowel obstruction) ([facility])       Medications listed as ordered at the time of discharge from hospital     Medication List            Accurate as of February 28, 2023 11:14 AM. If you have any questions, ask your nurse or doctor.                CHANGE how you take these medications      carvedilol 12.5 MG tablet  Commonly known as: COREG  TAKE 1 TABLET TWICE A DAY  What changed:   how much to take  how to take this  when to take this  additional instructions            CONTINUE taking these medications      apixaban 5 MG Tabs tablet  Commonly known as: ELIQUIS  Take 1 tablet by mouth 2 times daily     Brezi Aerosphere 160-9-4.8 MCG/ACT Aero  Generic drug: Budeson-Glycopyrrol-Formoterol  Inhale 2 puffs into the lungs daily     buPROPion 300 MG extended release tablet  Commonly known as: WELLBUTRIN XL  Take 1 tablet by mouth every morning     empagliflozin 10 MG tablet  Commonly known as: Jardiance  Take 1 tablet by mouth daily     furosemide 40 MG tablet  Commonly known as: LASIX  TAKE 1 TABLET DAILY AS NEEDED FOR WEIGHT GAIN 3 LBS IN A DAY OR 5 LBS IN A WEEK WITH SHORTNESS OF BREATH (SEE ADMIN INSTRUCTIONS)     hydrOXYzine HCl 25 MG tablet  Commonly known as: ATARAX  Take 1 tablet by mouth nightly     pantoprazole 40 MG tablet  Commonly known as:  PROTONIX  Take 1 tablet by mouth daily     polyethylene glycol 17 g packet  Commonly known as: GLYCOLAX  Take 17 g by mouth daily as needed for Constipation     sacubitril-valsartan 24-26 MG per tablet  Commonly known as: ENTRESTO  Take 1 tablet by mouth 2 times daily     spironolactone 25 MG tablet  Commonly known as: ALDACTONE  TAKE 1 TABLET DAILY                Medications marked \"taking\" at this time  Outpatient Medications Marked as Taking for the 2/28/23 encounter (Office Visit) with DEMI Miles - CNP   Medication Sig Dispense Refill    furosemide (LASIX) 40 MG tablet TAKE 1 TABLET DAILY AS NEEDED FOR WEIGHT GAIN 3 LBS IN A DAY OR 5 LBS IN A WEEK WITH SHORTNESS OF BREATH (SEE ADMIN INSTRUCTIONS) 90 tablet 0    spironolactone (ALDACTONE) 25 MG tablet TAKE 1 TABLET DAILY 90 tablet 0    polyethylene glycol (GLYCOLAX) 17 g packet Take 17 g by mouth daily as needed for Constipation 527 g 1    empagliflozin (JARDIANCE) 10 MG tablet Take 1 tablet by mouth daily 90 tablet 3    carvedilol (COREG) 12.5 MG tablet TAKE 1 TABLET TWICE A DAY (Patient taking differently: Take 12.5 mg by mouth 2 times daily) 180 tablet 1    Budeson-Glycopyrrol-Formoterol (BREZTRI AEROSPHERE) 160-9-4.8 MCG/ACT AERO Inhale 2 puffs into the lungs daily 1 each 0    sacubitril-valsartan (ENTRESTO) 24-26 MG per tablet Take 1 tablet by mouth 2 times daily 60 tablet 4    pantoprazole (PROTONIX) 40 MG tablet Take 1 tablet by mouth daily 90 tablet 1    hydrOXYzine HCl (ATARAX) 25 MG tablet Take 1 tablet by mouth nightly 90 tablet 0    buPROPion (WELLBUTRIN XL) 300 MG extended release tablet Take 1 tablet by mouth every morning 90 tablet 1    apixaban (ELIQUIS) 5 MG TABS tablet Take 1 tablet by mouth 2 times daily 60 tablet 4        Medications patient taking as of now reconciled against medications ordered at time of hospital discharge: Yes    A comprehensive review of systems was negative except for what was noted in the HPI.     Objective: /78 (Site: Left Upper Arm, Position: Sitting)   Pulse 80   Resp 16   Ht 5' 7\" (1.702 m)   Wt 233 lb 12.8 oz (106.1 kg)   SpO2 98%   BMI 36.62 kg/m²   General Appearance: alert and oriented to person, place and time, well-developed and well-nourished, in no acute distress  Head: normocephalic and atraumatic  Pulmonary/Chest: clear to auscultation bilaterally- no wheezes, rales or rhonchi, normal air movement, no respiratory distress  Cardiovascular: normal rate, normal S1 and S2, no gallops, intact distal pulses, and no carotid bruits  Abdomen: soft, non-tender, non-distended, normal bowel sounds, no masses or organomegaly  Genitourinary: genitals normal without hernia or inguinal adenopathy  Musculoskeletal: normal range of motion, no joint swelling, deformity or tenderness      An electronic signature was used to authenticate this note.   --Elia Canales, DEMI - CNP

## 2023-03-06 ENCOUNTER — NURSE ONLY (OUTPATIENT)
Dept: CARDIOLOGY CLINIC | Age: 73
End: 2023-03-06
Payer: MEDICARE

## 2023-03-06 DIAGNOSIS — I50.23 ACUTE ON CHRONIC SYSTOLIC CONGESTIVE HEART FAILURE (HCC): Primary | ICD-10-CM

## 2023-03-06 DIAGNOSIS — I25.5 ISCHEMIC CARDIOMYOPATHY: ICD-10-CM

## 2023-03-06 DIAGNOSIS — Z95.810 ICD (IMPLANTABLE CARDIOVERTER-DEFIBRILLATOR), DUAL, IN SITU: ICD-10-CM

## 2023-03-06 PROCEDURE — 93296 REM INTERROG EVL PM/IDS: CPT | Performed by: INTERNAL MEDICINE

## 2023-03-06 PROCEDURE — 93297 REM INTERROG DEV EVAL ICPMS: CPT | Performed by: INTERNAL MEDICINE

## 2023-03-06 PROCEDURE — 93295 DEV INTERROG REMOTE 1/2/MLT: CPT | Performed by: INTERNAL MEDICINE

## 2023-03-07 ENCOUNTER — TELEPHONE (OUTPATIENT)
Dept: PULMONOLOGY | Age: 73
End: 2023-03-07

## 2023-03-07 ENCOUNTER — OFFICE VISIT (OUTPATIENT)
Dept: PULMONOLOGY | Age: 73
End: 2023-03-07
Payer: MEDICARE

## 2023-03-07 VITALS
DIASTOLIC BLOOD PRESSURE: 70 MMHG | HEART RATE: 78 BPM | SYSTOLIC BLOOD PRESSURE: 128 MMHG | BODY MASS INDEX: 36.32 KG/M2 | WEIGHT: 231.4 LBS | HEIGHT: 67 IN | OXYGEN SATURATION: 97 %

## 2023-03-07 DIAGNOSIS — F17.200 SMOKING: ICD-10-CM

## 2023-03-07 DIAGNOSIS — J44.9 CHRONIC OBSTRUCTIVE PULMONARY DISEASE, UNSPECIFIED COPD TYPE (HCC): Primary | ICD-10-CM

## 2023-03-07 PROBLEM — E11.9 TYPE 2 DIABETES MELLITUS (HCC): Status: ACTIVE | Noted: 2023-03-07

## 2023-03-07 PROCEDURE — G8427 DOCREV CUR MEDS BY ELIG CLIN: HCPCS | Performed by: INTERNAL MEDICINE

## 2023-03-07 PROCEDURE — 1123F ACP DISCUSS/DSCN MKR DOCD: CPT | Performed by: INTERNAL MEDICINE

## 2023-03-07 PROCEDURE — 1111F DSCHRG MED/CURRENT MED MERGE: CPT | Performed by: INTERNAL MEDICINE

## 2023-03-07 PROCEDURE — 99214 OFFICE O/P EST MOD 30 MIN: CPT | Performed by: INTERNAL MEDICINE

## 2023-03-07 PROCEDURE — 3078F DIAST BP <80 MM HG: CPT | Performed by: INTERNAL MEDICINE

## 2023-03-07 PROCEDURE — 1036F TOBACCO NON-USER: CPT | Performed by: INTERNAL MEDICINE

## 2023-03-07 PROCEDURE — 1090F PRES/ABSN URINE INCON ASSESS: CPT | Performed by: INTERNAL MEDICINE

## 2023-03-07 PROCEDURE — 3023F SPIROM DOC REV: CPT | Performed by: INTERNAL MEDICINE

## 2023-03-07 PROCEDURE — G8400 PT W/DXA NO RESULTS DOC: HCPCS | Performed by: INTERNAL MEDICINE

## 2023-03-07 PROCEDURE — 3017F COLORECTAL CA SCREEN DOC REV: CPT | Performed by: INTERNAL MEDICINE

## 2023-03-07 PROCEDURE — G8484 FLU IMMUNIZE NO ADMIN: HCPCS | Performed by: INTERNAL MEDICINE

## 2023-03-07 PROCEDURE — G8417 CALC BMI ABV UP PARAM F/U: HCPCS | Performed by: INTERNAL MEDICINE

## 2023-03-07 PROCEDURE — 3074F SYST BP LT 130 MM HG: CPT | Performed by: INTERNAL MEDICINE

## 2023-03-07 RX ORDER — BUPROPION HYDROCHLORIDE 300 MG/1
TABLET ORAL
COMMUNITY
Start: 2022-08-31 | End: 2023-03-07

## 2023-03-07 NOTE — TELEPHONE ENCOUNTER
Refill Request         Last Seen: Last Seen Department: 2/28/2023  Last Seen by PCP: 2/28/2023    Last Written: 11/15/2022        Next Appointment:   Future Appointments   Date Time Provider Kristy Conley   3/7/2023 10:30 AM MD GISELL Meléndez   3/28/2023 10:00 AM DEMI Mattson - TERESA [place] kristofer MARIN   8/2/2023 10:00 [facility]   8/2/2023 10:00 AM MD Oj Hopson             Requested Prescriptions     Pending Prescriptions Disp Refills    apixaban (ELIQUIS) 5 MG TABS tablet 60 tablet 4     Sig: Take 1 tablet by mouth 2 times daily

## 2023-03-07 NOTE — PROGRESS NOTES
P  [facility]                                               Pulmonary Clinic Consult     I had the pleasure of seeing  Rozanna Shone     Chief Complaint   Patient presents with    COPD     New patient       HISTORY OF PRESENT ILLNESS:    Rozanna Shone is a 75y.o. year old  Who start has 30-40 PPY smoking history   quit smoking 2019     The Patient comes in with SOB that has been going on ythe last few years and some heart related as hse has A fib and had ICD and she had ablation for A fib     Her SOB Associated with  cough but not much ,=She  states that it get worse with exercise or walking long distance and he can walk 1/2 block  And go 1-2 flight of stairs before get short winded    He/She  has cough ,productive for   Sputum and it is more in the       She is on Breztri   She is on O2 at night               ALLERGIES:    Allergies   Allergen Reactions    Iv Contrast [Iodides] Anaphylaxis     2002    Codeine Other (See Comments)    Penicillins Other (See Comments)    Sulfa Antibiotics      Other reaction(s): Unknown       PAST MEDICAL HISTORY:       Diagnosis Date    Atrial fibrillation (HCC)     CHF (congestive heart failure) (HCC)     Congenital heart disease     COPD (chronic obstructive pulmonary disease) (HCC)     GERD (gastroesophageal reflux disease)     Irritable bowel syndrome     Obesity     Restless legs syndrome        MEDICATIONS:   Current Outpatient Medications   Medication Sig Dispense Refill    furosemide (LASIX) 40 MG tablet TAKE 1 TABLET DAILY AS NEEDED FOR WEIGHT GAIN 3 LBS IN A DAY OR 5 LBS IN A WEEK WITH SHORTNESS OF BREATH (SEE ADMIN INSTRUCTIONS) 90 tablet 0    spironolactone (ALDACTONE) 25 MG tablet TAKE 1 TABLET DAILY 90 tablet 0    empagliflozin (JARDIANCE) 10 MG tablet Take 1 tablet by mouth daily 90 tablet 3    carvedilol (COREG) 12.5 MG tablet TAKE 1 TABLET TWICE A DAY (Patient taking differently: Take 12.5 mg by mouth 2 times daily) 180 tablet 1    Budeson-Glycopyrrol-Formoterol (BREZTRI AEROSPHERE) 160-9-4.8 MCG/ACT AERO Inhale 2 puffs into the lungs daily 1 each 0    sacubitril-valsartan (ENTRESTO) 24-26 MG per tablet Take 1 tablet by mouth 2 times daily 60 tablet 4    pantoprazole (PROTONIX) 40 MG tablet Take 1 tablet by mouth daily 90 tablet 1    buPROPion (WELLBUTRIN XL) 300 MG extended release tablet Take 1 tablet by mouth every morning 90 tablet 1    apixaban (ELIQUIS) 5 MG TABS tablet Take 1 tablet by mouth 2 times daily (Patient not taking: Reported on 3/7/2023) 60 tablet 4    polyethylene glycol (GLYCOLAX) 17 g packet Take 17 g by mouth daily as needed for Constipation (Patient not taking: Reported on 3/7/2023) 527 g 1     No current facility-administered medications for this visit. SOCIAL AND OCCUPATIONAL HEALTH:  Social History     Tobacco Use   Smoking Status Former    Years: 44.00    Types: Cigarettes    Quit date: 10/28/2019    Years since quitting: 3.3   Smokeless Tobacco Never     TB :no   Pets no   Industrial exposure:no   Birds :    SURGICAL HISTORY:   Past Surgical History:   Procedure Laterality Date    CHOLECYSTECTOMY, OPEN      HYSTERECTOMY, VAGINAL      PACEMAKER PLACEMENT      STIMULATOR SURGERY         FAMILY HISTORY:   Lung cancer:  DVT or PE     REVIEW OF SYSTEMS:  Constitutional: General health is good . There has been no weight changes. No fevers, fatigue or weakness. Head: Patient denies any history of trauma, convulsive disorder or syncope. Skin:  Patient denies history of changes in pigmentation, eruptions or pruritus. No easy bruising or bleeding. EENT: no nasal congestion   Cardiovascular ,No chest pain ,No edema ,  Respiration:SOB: + ,LIN :+  Gastrointestinal:No GI bleed ,no melena  ,no hematemesis    Musculoskeletal: no joint pain ,no swelling  Neurological:no , syncope. Denies twitching, convulsions, loss of consciousness or memory. Endocrine:  . No history of goiter, exophthalmos or dryness of skin. The patient has no history of diabetes. Hematopoietic:  No history of bleeding disorders or easy bruising. Rheumatic:  No connective tissue disease history or polyarthritis/inflammatory joint disease. PHYSICAL EXAMINATION:  Vitals:    03/07/23 1016   BP: 128/70   Pulse: 78   SpO2: 97%     Constitutional: This is a well developed, well nourished 75y.o. year old female who is alert, oriented, cooperative and in no apparent distress. Head was normocephalic and atraumatic. EENT: Mallampati class :  Extraocular muscles intact. External canals are patent and no discharge was appreciated. Septum was midline,   mucosa was without erythema, exudates or cobblestoning. No thrush was noted. Neck: Supple without thyromegaly. No elevated JVP. Trachea was midline. No carotid bruits were auscultated. Respiratory: Rhonch    Cardiovascular: Regular without murmur, clicks, gallops or rubs. There is no left or right ventricular heave. Pulses:  Carotid, radial and femoral pulses were equally bilaterally. Abdomen: Slightly rounded and soft without organomegaly. No rebound, rigidity or guarding was appreciated. Lymphatic: No lymphadenopathy. Musculoskeletal: no joint deformity . Extremities: no   Skin:  Warm and dry. Good color, turgor and pigmentation. No lesions or scars. Neurological/Psychiatric: The patient's general behavior, level of consciousness, thought content and emotional status is normal.  Cranial nerves II-XII are intact. DATA:     IMPRESSION:    1-COPD unknown stage   2-YINA  3-A fib   4-high BMI               PLAN:      -keep Sydney Oms  -will get records  -will PFT and 6 minutes walk  -will get sleep study report,if she has it will send   _  Flu and Pneumovax as primary     Thank you for allowing me to participate in [facility].  I will keep following with you ,should you have any concerns ,please contact us at [place] pulmonary office     Sincerely,        Melinda Golden MD  Pulmonary & Critical Care Medicine     NOTE: This report was transcribed using voice recognition software. Every effort was made to ensure accuracy; however, inadvertent computerized transcription errors may be present.

## 2023-03-14 NOTE — PROGRESS NOTES
End of 91-day monitoring period 3/6  1 NSVT. (Coreg). Thoracic impedance trend stable. Remote transmission received for patients dual chamber ICD. Transmission shows normal sensing and pacing function. EP physician will review. See interrogation under the cardiology tab in the [address] [place] [address] [address] field for more details. Will continue to monitor remotely. Hx PAF-S/P ablation 2019 and TONY clipping. patient reports clip not in right position for complete closure- remains on eliquis.

## 2023-03-28 ENCOUNTER — OFFICE VISIT (OUTPATIENT)
Dept: FAMILY MEDICINE CLINIC | Age: 73
End: 2023-03-28
Payer: MEDICARE

## 2023-03-28 VITALS
SYSTOLIC BLOOD PRESSURE: 132 MMHG | HEIGHT: 67 IN | BODY MASS INDEX: 37.13 KG/M2 | WEIGHT: 236.6 LBS | HEART RATE: 71 BPM | OXYGEN SATURATION: 97 % | DIASTOLIC BLOOD PRESSURE: 88 MMHG

## 2023-03-28 DIAGNOSIS — E11.9 TYPE 2 DIABETES MELLITUS WITHOUT COMPLICATION, WITHOUT LONG-TERM CURRENT USE OF INSULIN (HCC): ICD-10-CM

## 2023-03-28 DIAGNOSIS — F33.0 MILD EPISODE OF RECURRENT MAJOR DEPRESSIVE DISORDER (HCC): Primary | ICD-10-CM

## 2023-03-28 DIAGNOSIS — F51.01 PRIMARY INSOMNIA: ICD-10-CM

## 2023-03-28 DIAGNOSIS — J44.9 CHRONIC OBSTRUCTIVE PULMONARY DISEASE, UNSPECIFIED COPD TYPE (HCC): ICD-10-CM

## 2023-03-28 LAB — HBA1C MFR BLD: 5.7 %

## 2023-03-28 PROCEDURE — 1123F ACP DISCUSS/DSCN MKR DOCD: CPT

## 2023-03-28 PROCEDURE — 3044F HG A1C LEVEL LT 7.0%: CPT

## 2023-03-28 PROCEDURE — 2022F DILAT RTA XM EVC RTNOPTHY: CPT

## 2023-03-28 PROCEDURE — 83036 HEMOGLOBIN GLYCOSYLATED A1C: CPT

## 2023-03-28 PROCEDURE — 3079F DIAST BP 80-89 MM HG: CPT

## 2023-03-28 PROCEDURE — 99213 OFFICE O/P EST LOW 20 MIN: CPT

## 2023-03-28 PROCEDURE — G8400 PT W/DXA NO RESULTS DOC: HCPCS

## 2023-03-28 PROCEDURE — 1090F PRES/ABSN URINE INCON ASSESS: CPT

## 2023-03-28 PROCEDURE — G8417 CALC BMI ABV UP PARAM F/U: HCPCS

## 2023-03-28 PROCEDURE — 3023F SPIROM DOC REV: CPT

## 2023-03-28 PROCEDURE — 1036F TOBACCO NON-USER: CPT

## 2023-03-28 PROCEDURE — 3075F SYST BP GE 130 - 139MM HG: CPT

## 2023-03-28 PROCEDURE — 82044 UR ALBUMIN SEMIQUANTITATIVE: CPT

## 2023-03-28 PROCEDURE — G8427 DOCREV CUR MEDS BY ELIG CLIN: HCPCS

## 2023-03-28 PROCEDURE — G8484 FLU IMMUNIZE NO ADMIN: HCPCS

## 2023-03-28 PROCEDURE — 3017F COLORECTAL CA SCREEN DOC REV: CPT

## 2023-03-28 RX ORDER — HYDROXYZINE HYDROCHLORIDE 25 MG/1
TABLET, FILM COATED ORAL
Qty: 90 TABLET | Refills: 3 | Status: SHIPPED | OUTPATIENT
Start: 2023-03-28

## 2023-03-28 RX ORDER — ALBUTEROL SULFATE 90 UG/1
2 AEROSOL, METERED RESPIRATORY (INHALATION) 4 TIMES DAILY PRN
Qty: 54 G | Refills: 1 | Status: SHIPPED | OUTPATIENT
Start: 2023-03-28

## 2023-03-28 SDOH — ECONOMIC STABILITY: HOUSING INSECURITY
IN THE LAST 12 MONTHS, WAS THERE A TIME WHEN YOU DID NOT HAVE A STEADY PLACE TO SLEEP OR SLEPT IN A SHELTER (INCLUDING NOW)?: NO

## 2023-03-28 SDOH — ECONOMIC STABILITY: FOOD INSECURITY: WITHIN THE PAST 12 MONTHS, THE FOOD YOU BOUGHT JUST DIDN'T LAST AND YOU DIDN'T HAVE MONEY TO GET MORE.: NEVER TRUE

## 2023-03-28 SDOH — ECONOMIC STABILITY: INCOME INSECURITY: HOW HARD IS IT FOR YOU TO PAY FOR THE VERY BASICS LIKE FOOD, HOUSING, MEDICAL CARE, AND HEATING?: NOT HARD AT ALL

## 2023-03-28 SDOH — ECONOMIC STABILITY: FOOD INSECURITY: WITHIN THE PAST 12 MONTHS, YOU WORRIED THAT YOUR FOOD WOULD RUN OUT BEFORE YOU GOT MONEY TO BUY MORE.: NEVER TRUE

## 2023-03-28 ASSESSMENT — PATIENT HEALTH QUESTIONNAIRE - PHQ9
SUM OF ALL RESPONSES TO PHQ QUESTIONS 1-9: 0
SUM OF ALL RESPONSES TO PHQ QUESTIONS 1-9: 0
5. POOR APPETITE OR OVEREATING: 0
SUM OF ALL RESPONSES TO PHQ9 QUESTIONS 1 & 2: 0
10. IF YOU CHECKED OFF ANY PROBLEMS, HOW DIFFICULT HAVE THESE PROBLEMS MADE IT FOR YOU TO DO YOUR WORK, TAKE CARE OF THINGS AT HOME, OR GET ALONG WITH OTHER PEOPLE: 0
SUM OF ALL RESPONSES TO PHQ QUESTIONS 1-9: 0
1. LITTLE INTEREST OR PLEASURE IN DOING THINGS: 0
9. THOUGHTS THAT YOU WOULD BE BETTER OFF DEAD, OR OF HURTING YOURSELF: 0
4. FEELING TIRED OR HAVING LITTLE ENERGY: 0
7. TROUBLE CONCENTRATING ON THINGS, SUCH AS READING THE NEWSPAPER OR WATCHING TELEVISION: 0
3. TROUBLE FALLING OR STAYING ASLEEP: 0
8. MOVING OR SPEAKING SO SLOWLY THAT OTHER PEOPLE COULD HAVE NOTICED. OR THE OPPOSITE, BEING SO FIGETY OR RESTLESS THAT YOU HAVE BEEN MOVING AROUND A LOT MORE THAN USUAL: 0
SUM OF ALL RESPONSES TO PHQ QUESTIONS 1-9: 0
2. FEELING DOWN, DEPRESSED OR HOPELESS: 0
6. FEELING BAD ABOUT YOURSELF - OR THAT YOU ARE A FAILURE OR HAVE LET YOURSELF OR YOUR FAMILY DOWN: 0

## 2023-03-28 ASSESSMENT — ENCOUNTER SYMPTOMS
DIARRHEA: 0
BLOOD IN STOOL: 0
CONSTIPATION: 0
ABDOMINAL PAIN: 0
SHORTNESS OF BREATH: 0
WHEEZING: 0
COUGH: 0
COLOR CHANGE: 0
SORE THROAT: 0

## 2023-03-28 NOTE — TELEPHONE ENCOUNTER
Refill Request         Last Seen: Last Seen Department: 2/28/2023  Last Seen by PCP: 2/28/2023            Next Appointment:   Future Appointments   Date Time Provider Kristy Blackburni   3/28/2023 10:00 AM DEMI Weiss CNP   3/29/2023 11:00 AM Chance Bella MD AFL TSP AND AFL Tri Stat   5/17/2023 12:00 PM SCHEDULE, [facility] PFT M[facility]Z PFT Win HOD   5/17/2023  1:00 PM MHA CT VCT [facility] CT Win Rad   8/2/2023 10:00 AM SCHEDULE, [facility] Caryle Neve MMA   8/2/2023 10:00 AM Drea Blanton MD Caryle Neve MMA   9/12/2023 11:30 AM MD GISELL Y[facility]         Requested Prescriptions     Pending Prescriptions Disp Refills    hydrOXYzine HCl (ATARAX) 25 MG tablet [Pharmacy Med Name: HYDROXYZINE HCL TABS 25MG] 90 tablet 3     Sig: TAKE 1 TABLET NIGHTLY

## 2023-03-28 NOTE — PROGRESS NOTES
Norma Blas (:  1950) is a 75 y.o. female,Established patient, here for evaluation of the following chief complaint(s):  Hypertension         ASSESSMENT/PLAN:  1. Mild episode of recurrent major depressive disorder ([facility] [address]  2. Type 2 diabetes mellitus without complication, without long-term current use of insulin ([facility])  -     POCT glycosylated hemoglobin (Hb A1C)  -     dapagliflozin (FARXIGA) 10 MG tablet; Take 1 tablet by mouth every morning, Disp-90 tablet, R-1Normal  -     Diabetic Foot Exam  -     POCT microalbumin  Lab Results   Component Value Date    LABA1C 5.7 2023     No results found for: EAG   Patient's A1c is currently 5.7. Patient is on Jardiance. Plan at this time is to change patient from [place] to [place] for better cardiovascular benefits. Patient's diabetes is well managed currently we will continue to monitor. Patient has had some dips in kidney function. We will continue to monitor. Patient was educated on completing diabetic eye exams. Patient was also educated on proper foot maintenance and how to prevent injuries to her feet. Patient is agreeable and understands plan at this time. - Update patient was unable to receive Farxiga due to coverage. Patient was placed back on Jardiance. 3. Chronic obstructive pulmonary disease, unspecified COPD type ([facility])  -     albuterol sulfate HFA (VENTOLIN HFA) 108 (90 Base) MCG/ACT inhaler; Inhale 2 puffs into the lungs 4 times daily as needed for Wheezing, Disp-54 g, R-1Normal  -COPD is currently well managed on breast tree we will add short acting inhaler for rescue treatment. Return in about 6 months (around 2023) for A1c. Subjective   SUBJECTIVE/OBJECTIVE:  HPI  Patient presents the office today for follow-up regarding her type 2 diabetes. Patient has a history of type 2 diabetes. Patient has not had an A1c drawn since moving from [place] to [address] [place] [address] Dr. Monique has no acute concerns at this time.     Review of

## 2023-04-20 DIAGNOSIS — K21.9 GASTROESOPHAGEAL REFLUX DISEASE WITHOUT ESOPHAGITIS: ICD-10-CM

## 2023-04-20 RX ORDER — PANTOPRAZOLE SODIUM 40 MG/1
TABLET, DELAYED RELEASE ORAL
Qty: 90 TABLET | Refills: 0 | Status: SHIPPED | OUTPATIENT
Start: 2023-04-20

## 2023-04-26 DIAGNOSIS — F33.0 MILD EPISODE OF RECURRENT MAJOR DEPRESSIVE DISORDER (HCC): ICD-10-CM

## 2023-04-26 NOTE — TELEPHONE ENCOUNTER
Ese Roldan patient  Refill Request     CONFIRM preferred pharmacy with the patient. If Mail Order Rx - Pend for 90 day refill.       Last Seen: Last Seen Department: 3/28/2023  Last Seen by PCP: 3/28/2023    Last Written: 1) wellbutrin - 12/02/2022, 90 tablets, 1 refill    Next Appointment:   Future Appointments   Date Time Provider Kristy Conley   5/2/2023  1:10 PM Francy Park MD AFL TSP AND AFL Tri Stat   5/17/2023 12:00 PM SCHEDULE, [facility] PFT [facility]Z PFT Win HOD   5/17/2023  1:00 PM MHA CT VCT [facility] CT Win Rad   6/5/2023  8:15 AM SCHEDULE, Saurav Toth REMOTE TRANSMISSION Reid Rdz [facility]   8/2/2023 10:00 AM SCHEDULE, [facility] Reid Rdz [facility]   8/2/2023 10:00 AM MD Reid Harvey [facility]   9/12/2023 11:30 AM [facility]   9/28/2023 10:00 AM DEMI Baldwin - TERESA [place] kristofer MARIN           Requested Prescriptions     Pending Prescriptions Disp Refills    buPROPion (WELLBUTRIN XL) 300 MG extended release tablet [Pharmacy Med Name: BUPROPION HCL XL TABS 300MG] 90 tablet 3     Sig: TAKE 1 TABLET EVERY MORNING

## 2023-04-27 RX ORDER — BUPROPION HYDROCHLORIDE 300 MG/1
TABLET ORAL
Qty: 90 TABLET | Refills: 0 | Status: SHIPPED | OUTPATIENT
Start: 2023-04-27

## 2023-05-04 ENCOUNTER — HOSPITAL ENCOUNTER (OUTPATIENT)
Age: 73
Setting detail: OUTPATIENT SURGERY
Discharge: HOME | End: 2023-05-04
Attending: PAIN MEDICINE | Admitting: PAIN MEDICINE
Payer: MEDICARE

## 2023-05-04 VITALS
WEIGHT: 235 LBS | TEMPERATURE: 97 F | DIASTOLIC BLOOD PRESSURE: 72 MMHG | OXYGEN SATURATION: 95 % | RESPIRATION RATE: 18 BRPM | HEART RATE: 68 BPM | HEIGHT: 67 IN | BODY MASS INDEX: 36.88 KG/M2 | SYSTOLIC BLOOD PRESSURE: 122 MMHG

## 2023-05-04 PROBLEM — M17.12 PRIMARY OSTEOARTHRITIS OF LEFT KNEE: Status: ACTIVE | Noted: 2023-05-04

## 2023-05-04 LAB
GLUCOSE BLD-MCNC: 89 MG/DL (ref 70–99)
PERFORMED ON: NORMAL

## 2023-05-04 PROCEDURE — 7100000010 HC PHASE II RECOVERY - FIRST 15 MIN: Performed by: PAIN MEDICINE

## 2023-05-04 PROCEDURE — A9577 INJ MULTIHANCE: HCPCS | Performed by: PAIN MEDICINE

## 2023-05-04 PROCEDURE — 2500000003 HC RX 250 WO HCPCS: Performed by: PAIN MEDICINE

## 2023-05-04 PROCEDURE — 6360000002 HC RX W HCPCS: Performed by: PAIN MEDICINE

## 2023-05-04 PROCEDURE — 6360000004 HC RX CONTRAST MEDICATION: Performed by: PAIN MEDICINE

## 2023-05-04 PROCEDURE — 20610 DRAIN/INJ JOINT/BURSA W/O US: CPT | Performed by: PAIN MEDICINE

## 2023-05-04 PROCEDURE — 3600000002 HC SURGERY LEVEL 2 BASE: Performed by: PAIN MEDICINE

## 2023-05-04 RX ORDER — LIDOCAINE HYDROCHLORIDE 10 MG/ML
INJECTION, SOLUTION EPIDURAL; INFILTRATION; INTRACAUDAL; PERINEURAL PRN
Status: DISCONTINUED | OUTPATIENT
Start: 2023-05-04 | End: 2023-05-04 | Stop reason: ALTCHOICE

## 2023-05-04 ASSESSMENT — PAIN DESCRIPTION - DESCRIPTORS: DESCRIPTORS: SHARP;ACHING

## 2023-05-04 ASSESSMENT — PAIN - FUNCTIONAL ASSESSMENT
PAIN_FUNCTIONAL_ASSESSMENT: 0-10
PAIN_FUNCTIONAL_ASSESSMENT: PREVENTS OR INTERFERES WITH ALL ACTIVE AND SOME PASSIVE ACTIVITIES

## 2023-05-04 NOTE — DISCHARGE INSTRUCTIONS
[facility]    [phone]    Post Pain Management Injection    PATIENT INSTRUCTIONS:     -Resume Normal Diet  -Other    ACTIVITY:    -No driving or operating machinery for 8 hours post procedure without sedation and 24 hours with sedation. If you are seen driving during this time the proper authorities will be notified.  -Do not stay alone for 4-6 hours after the procedure.  -If you have had IV sedation, do not sign legal documents, make any major decisions, or be involved in work decisions for the remainder of the day. -May shower or bathe.  -Resume normal activity when full movement/sensation has returned in extremities. 3)  SITE CARE:    -Observe puncture site for signs of infection (redness, warmth swelling, drainage with a foul odor, fever or increased tenderness). 4)  EXPECTED SIDE EFFECTS:    -Numbness/tingling/weakness in extremities, if this lasts more than 6 hours notify Dr. Delia Ladd. -Muscle stiffness, soreness at puncture site (soreness may last 2-4 days). DIABETIC PATIENTS ONLY:    -Increased glucose levels in all diabetic patients who have received a steroid injection. -Monitor blood sugars frequently for the first 5 days following procedure.      -Adjust medication accordingly. 6)  TO REACH DR. Lizbet Chiu: Call [phone]    ADDITIONAL INSTRUCTIONS:    Follow-up as scheduled or call for appointment if not already done. Patients taking Coumadin may resume taking as before the procedure.

## 2023-05-04 NOTE — PROCEDURES
Anay Kaufman is a 75 y.o. female patient. No diagnosis found. Past Medical History:   Diagnosis Date    Atrial fibrillation (HCC)     CHF (congestive heart failure) (HCC)     Congenital heart disease     COPD (chronic obstructive pulmonary disease) (HCC)     GERD (gastroesophageal reflux disease)     Irritable bowel syndrome     Obesity     Restless legs syndrome      Blood pressure 122/72, pulse 68, temperature 97 °F (36.1 °C), temperature source Infrared, resp. rate 18, height 5' 7\" (1.702 m), weight 235 lb (106.6 kg), SpO2 95 %. Procedures    Jelena Turner MD  5/4/2023      l KNEE INJECTION 20610 with [id number]  DX: M17.11, M17.12     Pt supine. Area sterile prep. [id number] needle placed under sterile condition into the knee joint using fluoroscopic guidance to contact the medial inferior femoral condyle lateral to the patellar tendon. Dye spread appropriate. 8080mg DEPOMEDROL mixed with 2 cc 1% lido injected after neg asp for blood. ESTIMATED BLOOD LOSS:  Less than 1 cc      Pt tolerated procedure well.        BLOOD LOSS < 0.5 CC

## 2023-05-17 ENCOUNTER — HOSPITAL ENCOUNTER (OUTPATIENT)
Dept: PULMONOLOGY | Age: 73
Discharge: HOME | End: 2023-05-17
Payer: MEDICARE

## 2023-05-17 ENCOUNTER — HOSPITAL ENCOUNTER (OUTPATIENT)
Dept: CT IMAGING | Age: 73
Discharge: HOME | End: 2023-05-17
Payer: MEDICARE

## 2023-05-17 VITALS — OXYGEN SATURATION: 96 %

## 2023-05-17 DIAGNOSIS — J44.9 CHRONIC OBSTRUCTIVE PULMONARY DISEASE, UNSPECIFIED COPD TYPE (HCC): ICD-10-CM

## 2023-05-17 DIAGNOSIS — F17.200 SMOKING: ICD-10-CM

## 2023-05-17 LAB
DLCO %PRED: 61 %
DLCO PRED: NORMAL
DLCO/VA %PRED: NORMAL
DLCO/VA PRED: NORMAL
DLCO/VA: NORMAL
DLCO: NORMAL
EXPIRATORY TIME-POST: NORMAL
EXPIRATORY TIME: NORMAL
FEF 25-75% %CHNG: NORMAL
FEF 25-75% %PRED-POST: NORMAL
FEF 25-75% %PRED-PRE: NORMAL
FEF 25-75% PRED: NORMAL
FEF 25-75%-POST: NORMAL
FEF 25-75%-PRE: NORMAL
FEV1 %PRED-POST: 96 %
FEV1 %PRED-PRE: 95 %
FEV1 PRED: NORMAL
FEV1-POST: NORMAL
FEV1-PRE: NORMAL
FEV1/FVC %PRED-POST: 102 %
FEV1/FVC %PRED-PRE: 100 %
FEV1/FVC PRED: NORMAL
FEV1/FVC-POST: NORMAL
FEV1/FVC-PRE: NORMAL
FVC %PRED-POST: 93 L
FVC %PRED-PRE: 93 %
FVC PRED: NORMAL
FVC-POST: NORMAL
FVC-PRE: NORMAL
GAW %PRED: NORMAL
GAW PRED: NORMAL
GAW: NORMAL
IC %PRED: NORMAL
IC PRED: NORMAL
IC: NORMAL
MEP: NORMAL
MIP: NORMAL
MVV %PRED-PRE: NORMAL
MVV PRED: NORMAL
MVV-PRE: NORMAL
PEF %PRED-POST: NORMAL
PEF %PRED-PRE: NORMAL
PEF PRED: NORMAL
PEF%CHNG: NORMAL
PEF-POST: NORMAL
PEF-PRE: NORMAL
RAW %PRED: NORMAL
RAW PRED: NORMAL
RAW: NORMAL
RV %PRED: NORMAL
RV PRED: NORMAL
RV: NORMAL
SVC %PRED: NORMAL
SVC PRED: NORMAL
SVC: NORMAL
TLC %PRED: 73 %
TLC PRED: NORMAL
TLC: NORMAL
VA %PRED: NORMAL
VA PRED: NORMAL
VA: NORMAL
VTG %PRED: NORMAL
VTG PRED: NORMAL
VTG: NORMAL

## 2023-05-17 PROCEDURE — 71271 CT THORAX LUNG CANCER SCR C-: CPT

## 2023-05-17 PROCEDURE — 94729 DIFFUSING CAPACITY: CPT

## 2023-05-17 PROCEDURE — 94618 PULMONARY STRESS TESTING: CPT

## 2023-05-17 PROCEDURE — 6370000000 HC RX 637 (ALT 250 FOR IP): Performed by: INTERNAL MEDICINE

## 2023-05-17 PROCEDURE — 94726 PLETHYSMOGRAPHY LUNG VOLUMES: CPT

## 2023-05-17 PROCEDURE — 94060 EVALUATION OF WHEEZING: CPT

## 2023-05-17 RX ORDER — ALBUTEROL SULFATE 90 UG/1
4 AEROSOL, METERED RESPIRATORY (INHALATION) ONCE
Status: COMPLETED | OUTPATIENT
Start: 2023-05-17 | End: 2023-05-17

## 2023-05-17 RX ADMIN — Medication 4 PUFF: at 12:27

## 2023-05-17 ASSESSMENT — PULMONARY FUNCTION TESTS
FEV1/FVC_PERCENT_PREDICTED_PRE: 100
FEV1_PERCENT_PREDICTED_PRE: 95
FEV1_PERCENT_PREDICTED_POST: 96
FVC_PERCENT_PREDICTED_POST: 93
FVC_PERCENT_PREDICTED_PRE: 93
FEV1/FVC_PERCENT_PREDICTED_POST: 102

## 2023-05-17 NOTE — PROCEDURES
[address] [place] [address] Pulmonary Function Lab - Six Minute Walk      Test Performed on:   Room Air____X__   Oxygen at _____ lpm via N/C- continuous Oxygen at _____ lpm via N/C- OCD  Assist Device Used During Test:  None____X__ Cane________ Walker_________    Modified Ramon's Scale  0 Nothing at all 5 Strong    0.5 Just noticeable 6 Stronger (Hard)    1 Very Light 7 Very Strong   2 Light 8 Very Very Strong   3 Moderate 9 Extremely Strong   4 Somewhat Strong 10 Maximum All out      Time SpO2 Heart Rate Respiratory Rate Dyspnea-  Modified Ramons Scale Fatigue- Modified Ramons Scale Other Symptoms   Baseline                     96% room [place]@[employer]  77 18 2 2      1 minute                     91% 97 20 2 2    2 minutes                     92% 97 21 2 2      3 minutes                     91% 98 22 3 3    4 minutes                     92% 98 22 3 3    5 minutes                     90% 100 23 4 4    6 minutes                     91% 101 24 4 4    Recovery x 1 minute                     92% 96 22 3 3    Recovery x 2 Minute                     94% 91 20 2 2     Number of Laps_____120____ X 120 feet + _________ additional feet = Total Distance _1200__feet   Stopped or paused before 6 minutes? No_____X__ Yes ________  Total expected 6 MW distance is __1169____feet. Patient achieved ___103_% of expected distance. Pre Blood Pressure: 133/87  Post Blood Pressure:   Other symptoms at the end of exercise:  some SOB, little dizzy/lightheadedness

## 2023-05-19 NOTE — PROCEDURES
[address] [address]                               PULMONARY FUNCTION    PATIENT NAME: Pretty Celis                         :        1950  MED REC NO:   [id number]                          ROOM:  ACCOUNT NO:   [de-identified]                           ADMIT DATE: 2023  PROVIDER:     Tito Gomez MD    DATE OF PROCEDURE:  2023    PFT INTERPRETATION    The patient is a 18-year-old female underwent a PFT. Spirometry shows  FVC to be 93%, FEV1 to be 95%, FEV1 to FVC ratio was 100%, VHI17-50% was  104%. The patient did not have any significant postbronchodilator  improvement. Lung volume shows total lung capacity was mildly reduced  at 73%. The patient does not have any air trapping. The patient's ERV  is slightly decreased. The patient diffusion capacity when adjusted for  volume was slightly decreased. On the basis of this PFT, the patient  has mild restrictive lung disease with some changes in the PFT  parameters because of body habitus. The patient's flow-volume loop was  normal.  The patient also underwent a 6-minute walk test which shows  baseline oxygen saturation of 96% with a heart rate of 77, respiratory  rate of 18, dyspnea-modified Ramon scale of 2, fatigue-modified Ramon  scale of 2. The patient did not have any exertional hypoxemia on the  study. The patient's total distance walked was 1200 feet. The  patient's total expected distance was 1169 feet. The patient achieved  103% of expected distance. On the basis of this 6-minute walk test, the  patient does not have any exertional hypoxemia on optimal exercise. Please correlate clinically.         Martin Acuna MD    D: 2023 11:48:31       T: 2023 11:51:33     [id number]  Job#: [id number]     Doc#: [id number]

## 2023-05-25 ENCOUNTER — HOSPITAL ENCOUNTER (OUTPATIENT)
Age: 73
Setting detail: OUTPATIENT SURGERY
Discharge: HOME | End: 2023-05-25
Attending: PAIN MEDICINE | Admitting: PAIN MEDICINE
Payer: MEDICARE

## 2023-05-25 VITALS
TEMPERATURE: 97.7 F | SYSTOLIC BLOOD PRESSURE: 132 MMHG | HEART RATE: 82 BPM | WEIGHT: 233 LBS | OXYGEN SATURATION: 96 % | HEIGHT: 67 IN | BODY MASS INDEX: 36.57 KG/M2 | RESPIRATION RATE: 16 BRPM | DIASTOLIC BLOOD PRESSURE: 86 MMHG

## 2023-05-25 LAB
GLUCOSE BLD-MCNC: 83 MG/DL (ref 70–99)
PERFORMED ON: NORMAL

## 2023-05-25 PROCEDURE — 6360000004 HC RX CONTRAST MEDICATION: Performed by: PAIN MEDICINE

## 2023-05-25 PROCEDURE — A9577 INJ MULTIHANCE: HCPCS | Performed by: PAIN MEDICINE

## 2023-05-25 PROCEDURE — 6360000002 HC RX W HCPCS: Performed by: PAIN MEDICINE

## 2023-05-25 PROCEDURE — 7100000010 HC PHASE II RECOVERY - FIRST 15 MIN: Performed by: PAIN MEDICINE

## 2023-05-25 PROCEDURE — 2709999900 HC NON-CHARGEABLE SUPPLY: Performed by: PAIN MEDICINE

## 2023-05-25 PROCEDURE — 3600000002 HC SURGERY LEVEL 2 BASE: Performed by: PAIN MEDICINE

## 2023-05-25 PROCEDURE — 20610 DRAIN/INJ JOINT/BURSA W/O US: CPT | Performed by: PAIN MEDICINE

## 2023-05-25 PROCEDURE — 2500000003 HC RX 250 WO HCPCS: Performed by: PAIN MEDICINE

## 2023-05-25 RX ORDER — BETAMETHASONE SODIUM PHOSPHATE AND BETAMETHASONE ACETATE 3; 3 MG/ML; MG/ML
INJECTION, SUSPENSION INTRA-ARTICULAR; INTRALESIONAL; INTRAMUSCULAR; SOFT TISSUE
Status: DISCONTINUED
Start: 2023-05-25 | End: 2023-05-25 | Stop reason: HOSPADM

## 2023-05-25 RX ORDER — LIDOCAINE HYDROCHLORIDE 10 MG/ML
INJECTION, SOLUTION EPIDURAL; INFILTRATION; INTRACAUDAL; PERINEURAL PRN
Status: DISCONTINUED | OUTPATIENT
Start: 2023-05-25 | End: 2023-05-25 | Stop reason: ALTCHOICE

## 2023-05-25 ASSESSMENT — PAIN - FUNCTIONAL ASSESSMENT
PAIN_FUNCTIONAL_ASSESSMENT: PREVENTS OR INTERFERES WITH MANY ACTIVE NOT PASSIVE ACTIVITIES
PAIN_FUNCTIONAL_ASSESSMENT: 0-10

## 2023-05-25 ASSESSMENT — PAIN DESCRIPTION - DESCRIPTORS: DESCRIPTORS: SHARP

## 2023-05-25 ASSESSMENT — PAIN SCALES - GENERAL: PAINLEVEL_OUTOF10: 3

## 2023-05-25 NOTE — PROCEDURES
Lin Grey is a 75 y.o. female patient. No diagnosis found. Past Medical History:   Diagnosis Date    Atrial fibrillation (HCC)     CHF (congestive heart failure) (HCC)     Congenital heart disease     COPD (chronic obstructive pulmonary disease) (HCC)     GERD (gastroesophageal reflux disease)     Irritable bowel syndrome     Obesity     Restless legs syndrome      Blood pressure 129/74, pulse 65, temperature 97.7 °F (36.5 °C), temperature source Temporal, resp. rate 16, height 5' 7\" (1.702 m), weight 233 lb (105.7 kg), SpO2 94 %. Procedures    Helen Duckworth MD  5/25/2023      L KNEE INJECTION 20610 with 14224  DX: M17.11, M17.12     Pt supine. Area sterile prep. [id number] needle placed under sterile condition into the knee joint using fluoroscopic guidance to contact the medial inferior femoral condyle lateral to the patellar tendon. Dye spread appropriate. 8080mg DEPOMEDROL mixed with 2 cc 1% lido injected after neg asp for blood. ESTIMATED BLOOD LOSS:  Less than 1 cc      Pt tolerated procedure well.    \.     BLOOD LOSS < 0.5 CC

## 2023-05-25 NOTE — DISCHARGE INSTRUCTIONS
[address] [facility]    [phone]    Post Pain Management Injection    PATIENT INSTRUCTIONS:     -Resume Normal Diet  -Other    ACTIVITY:    -No driving or operating machinery for 8 hours post procedure without sedation and 24 hours with sedation. If you are seen driving during this time the proper authorities will be notified.  -Do not stay alone for 4-6 hours after the procedure.  -If you have had IV sedation, do not sign legal documents, make any major decisions, or be involved in work decisions for the remainder of the day. -May shower or bathe.  -Resume normal activity when full movement/sensation has returned in extremities. 3)  SITE CARE:    -Observe puncture site for signs of infection (redness, warmth swelling, drainage with a foul odor, fever or increased tenderness). 4)  EXPECTED SIDE EFFECTS:    -Numbness/tingling/weakness in extremities, if this lasts more than 6 hours notify Dr. Fidel Ovalle. -Muscle stiffness, soreness at puncture site (soreness may last 2-4 days). DIABETIC PATIENTS ONLY:    -Increased glucose levels in all diabetic patients who have received a steroid injection. -Monitor blood sugars frequently for the first 5 days following procedure.      -Adjust medication accordingly. 6)  TO REACH DR. Ceci Jeronimo: Call [phone]    ADDITIONAL INSTRUCTIONS:    Follow-up as scheduled or call for appointment if not already done. Patients taking Coumadin may resume taking as before the procedure.

## 2023-05-26 DIAGNOSIS — R91.1 PULMONARY NODULE: Primary | ICD-10-CM

## 2023-05-26 DIAGNOSIS — I50.9 ACUTE DECOMPENSATED HEART FAILURE (HCC): ICD-10-CM

## 2023-05-26 RX ORDER — SPIRONOLACTONE 25 MG/1
TABLET ORAL
Qty: 90 TABLET | Refills: 3 | Status: SHIPPED | OUTPATIENT
Start: 2023-05-26

## 2023-05-26 RX ORDER — FUROSEMIDE 40 MG/1
TABLET ORAL
Qty: 90 TABLET | Refills: 3 | Status: SHIPPED | OUTPATIENT
Start: 2023-05-26

## 2023-05-26 NOTE — TELEPHONE ENCOUNTER
Refill Request       Last Seen: Last Seen Department: 3/28/2023  Last Seen by PCP: 3/28/2023    Last Written: 2/21/23      Next Appointment:   Future Appointments   Date Time Provider Kristy Conley   6/5/2023  8:15 AM SCHEDULE, Kerri Tate [facility]   6/28/2023  1:30 PM Waleska Washington MD [facility] AND [facility]   8/2/2023 10:00 AM SCHEDULE, [facility]   8/2/2023 10:00 AM MD Nadine Vang [facility]   9/12/2023 11:30 AM Agapito lBas MD [facility]   9/28/2023 10:00 AM Braulio Brunson APRN - CNP [place] [facility]   11/27/2023 11:00 AM [facility] CT T [place] CT [employer]         Requested Prescriptions     Pending Prescriptions Disp Refills    furosemide (LASIX) 40 MG tablet [Pharmacy Med Name: FUROSEMIDE TABS 40MG] 90 tablet 3     Sig: TAKE 1 TABLET DAILY AS NEEDED FOR WEIGHT GAIN 3 POUNDS IN A DAY OR 5 POUNDS IN A WEEK WITH SHORTNESS OF BREATH (SEE ADMIN INSTRUCTIONS)    spironolactone (ALDACTONE) 25 MG tablet [Pharmacy Med Name: SPIRONOLACTONE TABS 25MG] 90 tablet 3     Sig: TAKE 1 TABLET DAILY

## 2023-05-29 LAB — NONINV COLON CA DNA+OCC BLD SCRN STL QL: POSITIVE

## 2023-06-06 RX ORDER — APIXABAN 5 MG/1
TABLET, FILM COATED ORAL
Qty: 60 TABLET | Refills: 11 | Status: SHIPPED | OUTPATIENT
Start: 2023-06-06

## 2023-06-06 RX ORDER — SACUBITRIL AND VALSARTAN 24; 26 MG/1; MG/1
TABLET, FILM COATED ORAL
Qty: 60 TABLET | Refills: 11 | Status: SHIPPED | OUTPATIENT
Start: 2023-06-06

## 2023-06-06 NOTE — TELEPHONE ENCOUNTER
Refill Request         Last Seen: Last Seen Department: 3/28/2023  Last Seen by PCP: Visit date not found    Last Written: 04/20/2023      Next Appointment:   Future Appointments   Date Time Provider Kristy Conley   6/28/2023  1:30 PM Vik Roque MD [facility] AND [facility]   8/2/2023 10:00 AM SCHEDULE, [facility] Bertin Jordan [facility]   8/2/2023 10:00 AM MD Bertin Mohan [facility]   9/12/2023 11:30 AM [facility]   9/28/2023 10:00 AM DEMI Estrada - TERESA [place] k[facility]   11/27/2023 11:00 AM [facility] CT VCT [facility] CT Therese Jean           Requested Prescriptions     Pending Prescriptions Disp Refills    ENTRESTO 24-26 MG per tablet [Pharmacy Med Name: ENTRESTO TABS 24/26MG] 60 tablet 11     Sig: TAKE 1 TABLET TWICE A DAY

## 2023-06-06 NOTE — TELEPHONE ENCOUNTER
Refill Request         Last Seen: Last Seen Department: 3/28/2023  Last Seen by PCP: 3/28/2023    Last Written: 03/07/2023        Next Appointment:   Future Appointments   Date Time Provider Kristy Blackburni   6/28/2023  1:30 PM Veda Smith MD [facility]   8/2/2023 10:00 AM SCHEDULE, [facility] Melvin Smoker [facility]   8/2/2023 10:00 AM Hasmukh Sargent MD [facility]   9/12/2023 11:30 AM Agapito Landin MD [facility]   9/28/2023 10:00 AM DEMI Young - CNP [place] [facility]er [facility]   11/27/2023 11:00 AM ROGE CT T ESTHELA Jean           Requested Prescriptions     Pending Prescriptions Disp Refills    ELIQUIS 5 MG TABS tablet [Pharmacy Med Name: ELIQUIS TABS 5MG] 60 tablet 11     Sig: TAKE 1 TABLET TWICE A DAY

## 2023-06-15 ENCOUNTER — APPOINTMENT (OUTPATIENT)
Dept: GENERAL RADIOLOGY | Age: 73
DRG: 315 | End: 2023-06-15
Payer: MEDICARE

## 2023-06-15 ENCOUNTER — HOSPITAL ENCOUNTER (INPATIENT)
Age: 73
LOS: 2 days | Discharge: HOME | DRG: 315 | End: 2023-06-17
Attending: STUDENT IN AN ORGANIZED HEALTH CARE EDUCATION/TRAINING PROGRAM | Admitting: INTERNAL MEDICINE
Payer: MEDICARE

## 2023-06-15 DIAGNOSIS — I48.91 ATRIAL FIBRILLATION WITH RVR (HCC): Primary | ICD-10-CM

## 2023-06-15 DIAGNOSIS — N17.9 AKI (ACUTE KIDNEY INJURY) (HCC): ICD-10-CM

## 2023-06-15 DIAGNOSIS — I50.9 ACUTE DECOMPENSATED HEART FAILURE (HCC): ICD-10-CM

## 2023-06-15 DIAGNOSIS — I95.9 HYPOTENSION, UNSPECIFIED HYPOTENSION TYPE: ICD-10-CM

## 2023-06-15 LAB
ALBUMIN SERPL-MCNC: 3.9 G/DL (ref 3.4–5)
ALBUMIN/GLOB SERPL: 1.3 {RATIO} (ref 1.1–2.2)
ALP SERPL-CCNC: 74 U/L (ref 40–129)
ALT SERPL-CCNC: 25 U/L (ref 10–40)
ANION GAP SERPL CALCULATED.3IONS-SCNC: 14 MMOL/L (ref 3–16)
AST SERPL-CCNC: 22 U/L (ref 15–37)
BASOPHILS # BLD: 0.1 K/UL (ref 0–0.2)
BASOPHILS NFR BLD: 0.8 %
BILIRUB SERPL-MCNC: 0.4 MG/DL (ref 0–1)
BUN SERPL-MCNC: 30 MG/DL (ref 7–20)
CALCIUM SERPL-MCNC: 9.4 MG/DL (ref 8.3–10.6)
CHLORIDE SERPL-SCNC: 103 MMOL/L (ref 99–110)
CO2 SERPL-SCNC: 21 MMOL/L (ref 21–32)
CREAT SERPL-MCNC: 1.5 MG/DL (ref 0.6–1.2)
DEPRECATED RDW RBC AUTO: 14.3 % (ref 12.4–15.4)
EKG ATRIAL RATE: 138 BPM
EKG DIAGNOSIS: NORMAL
EKG Q-T INTERVAL: 312 MS
EKG QRS DURATION: 140 MS
EKG QTC CALCULATION (BAZETT): 483 MS
EKG R AXIS: -53 DEGREES
EKG T AXIS: 123 DEGREES
EKG VENTRICULAR RATE: 144 BPM
EOSINOPHIL # BLD: 0.3 K/UL (ref 0–0.6)
EOSINOPHIL NFR BLD: 3.7 %
GFR SERPLBLD CREATININE-BSD FMLA CKD-EPI: 36 ML/MIN/{1.73_M2}
GLUCOSE SERPL-MCNC: 151 MG/DL (ref 70–99)
HCT VFR BLD AUTO: 43.9 % (ref 36–48)
HGB BLD-MCNC: 14.2 G/DL (ref 12–16)
LYMPHOCYTES # BLD: 2.1 K/UL (ref 1–5.1)
LYMPHOCYTES NFR BLD: 29.9 %
MCH RBC QN AUTO: 30.6 PG (ref 26–34)
MCHC RBC AUTO-ENTMCNC: 32.4 G/DL (ref 31–36)
MCV RBC AUTO: 94.4 FL (ref 80–100)
MONOCYTES # BLD: 0.6 K/UL (ref 0–1.3)
MONOCYTES NFR BLD: 9 %
NEUTROPHILS # BLD: 4 K/UL (ref 1.7–7.7)
NEUTROPHILS NFR BLD: 56.6 %
NT-PROBNP SERPL-MCNC: 1103 PG/ML (ref 0–449)
PLATELET # BLD AUTO: 251 K/UL (ref 135–450)
PMV BLD AUTO: 7.7 FL (ref 5–10.5)
POTASSIUM SERPL-SCNC: 4.1 MMOL/L (ref 3.5–5.1)
PROT SERPL-MCNC: 6.8 G/DL (ref 6.4–8.2)
RBC # BLD AUTO: 4.65 M/UL (ref 4–5.2)
SODIUM SERPL-SCNC: 138 MMOL/L (ref 136–145)
SPECIMEN STATUS: NORMAL
TROPONIN, HIGH SENSITIVITY: 17 NG/L (ref 0–14)
TROPONIN, HIGH SENSITIVITY: 20 NG/L (ref 0–14)
WBC # BLD AUTO: 7.1 K/UL (ref 4–11)

## 2023-06-15 PROCEDURE — 2580000003 HC RX 258: Performed by: INTERNAL MEDICINE

## 2023-06-15 PROCEDURE — 36415 COLL VENOUS BLD VENIPUNCTURE: CPT

## 2023-06-15 PROCEDURE — 93005 ELECTROCARDIOGRAM TRACING: CPT | Performed by: STUDENT IN AN ORGANIZED HEALTH CARE EDUCATION/TRAINING PROGRAM

## 2023-06-15 PROCEDURE — 99285 EMERGENCY DEPT VISIT HI MDM: CPT

## 2023-06-15 PROCEDURE — 6360000002 HC RX W HCPCS: Performed by: STUDENT IN AN ORGANIZED HEALTH CARE EDUCATION/TRAINING PROGRAM

## 2023-06-15 PROCEDURE — 6370000000 HC RX 637 (ALT 250 FOR IP): Performed by: INTERNAL MEDICINE

## 2023-06-15 PROCEDURE — 71045 X-RAY EXAM CHEST 1 VIEW: CPT

## 2023-06-15 PROCEDURE — 80053 COMPREHEN METABOLIC PANEL: CPT

## 2023-06-15 PROCEDURE — 93010 ELECTROCARDIOGRAM REPORT: CPT | Performed by: INTERNAL MEDICINE

## 2023-06-15 PROCEDURE — 83880 ASSAY OF NATRIURETIC PEPTIDE: CPT

## 2023-06-15 PROCEDURE — 2500000003 HC RX 250 WO HCPCS: Performed by: STUDENT IN AN ORGANIZED HEALTH CARE EDUCATION/TRAINING PROGRAM

## 2023-06-15 PROCEDURE — 84484 ASSAY OF TROPONIN QUANT: CPT

## 2023-06-15 PROCEDURE — 96374 THER/PROPH/DIAG INJ IV PUSH: CPT

## 2023-06-15 PROCEDURE — 6370000000 HC RX 637 (ALT 250 FOR IP): Performed by: NURSE PRACTITIONER

## 2023-06-15 PROCEDURE — 99223 1ST HOSP IP/OBS HIGH 75: CPT | Performed by: INTERNAL MEDICINE

## 2023-06-15 PROCEDURE — 93005 ELECTROCARDIOGRAM TRACING: CPT | Performed by: NURSE PRACTITIONER

## 2023-06-15 PROCEDURE — 85025 COMPLETE CBC W/AUTO DIFF WBC: CPT

## 2023-06-15 PROCEDURE — 96375 TX/PRO/DX INJ NEW DRUG ADDON: CPT

## 2023-06-15 PROCEDURE — 2060000000 HC ICU INTERMEDIATE R&B

## 2023-06-15 RX ORDER — ETOMIDATE 2 MG/ML
0.1 INJECTION INTRAVENOUS ONCE
Status: COMPLETED | OUTPATIENT
Start: 2023-06-15 | End: 2023-06-15

## 2023-06-15 RX ORDER — ATORVASTATIN CALCIUM 40 MG/1
40 TABLET, FILM COATED ORAL NIGHTLY
Status: DISCONTINUED | OUTPATIENT
Start: 2023-06-15 | End: 2023-06-17 | Stop reason: HOSPADM

## 2023-06-15 RX ORDER — ONDANSETRON 2 MG/ML
4 INJECTION INTRAMUSCULAR; INTRAVENOUS EVERY 6 HOURS PRN
Status: DISCONTINUED | OUTPATIENT
Start: 2023-06-15 | End: 2023-06-17 | Stop reason: HOSPADM

## 2023-06-15 RX ORDER — SODIUM CHLORIDE 9 MG/ML
INJECTION, SOLUTION INTRAVENOUS PRN
Status: DISCONTINUED | OUTPATIENT
Start: 2023-06-15 | End: 2023-06-17 | Stop reason: HOSPADM

## 2023-06-15 RX ORDER — OXYCODONE HYDROCHLORIDE AND ACETAMINOPHEN 5; 325 MG/1; MG/1
1 TABLET ORAL 2 TIMES DAILY PRN
COMMUNITY

## 2023-06-15 RX ORDER — AMIODARONE HYDROCHLORIDE 200 MG/1
100 TABLET ORAL DAILY
Status: DISCONTINUED | OUTPATIENT
Start: 2023-06-15 | End: 2023-06-17 | Stop reason: HOSPADM

## 2023-06-15 RX ORDER — HYDROXYZINE PAMOATE 25 MG/1
25 CAPSULE ORAL NIGHTLY
Status: DISCONTINUED | OUTPATIENT
Start: 2023-06-15 | End: 2023-06-17 | Stop reason: HOSPADM

## 2023-06-15 RX ORDER — POLYETHYLENE GLYCOL 3350 17 G/17G
17 POWDER, FOR SOLUTION ORAL DAILY PRN
Status: DISCONTINUED | OUTPATIENT
Start: 2023-06-15 | End: 2023-06-17 | Stop reason: HOSPADM

## 2023-06-15 RX ORDER — PANTOPRAZOLE SODIUM 40 MG/1
40 TABLET, DELAYED RELEASE ORAL DAILY
Status: DISCONTINUED | OUTPATIENT
Start: 2023-06-16 | End: 2023-06-17 | Stop reason: HOSPADM

## 2023-06-15 RX ORDER — ACETAMINOPHEN 650 MG/1
650 SUPPOSITORY RECTAL EVERY 6 HOURS PRN
Status: DISCONTINUED | OUTPATIENT
Start: 2023-06-15 | End: 2023-06-17 | Stop reason: HOSPADM

## 2023-06-15 RX ORDER — ALBUTEROL SULFATE 90 UG/1
2 AEROSOL, METERED RESPIRATORY (INHALATION) 4 TIMES DAILY PRN
Status: DISCONTINUED | OUTPATIENT
Start: 2023-06-15 | End: 2023-06-17 | Stop reason: HOSPADM

## 2023-06-15 RX ORDER — ONDANSETRON 4 MG/1
4 TABLET, ORALLY DISINTEGRATING ORAL EVERY 8 HOURS PRN
Status: DISCONTINUED | OUTPATIENT
Start: 2023-06-15 | End: 2023-06-17 | Stop reason: HOSPADM

## 2023-06-15 RX ORDER — SODIUM CHLORIDE 0.9 % (FLUSH) 0.9 %
5-40 SYRINGE (ML) INJECTION EVERY 12 HOURS SCHEDULED
Status: DISCONTINUED | OUTPATIENT
Start: 2023-06-15 | End: 2023-06-17 | Stop reason: HOSPADM

## 2023-06-15 RX ORDER — ROPINIROLE 1 MG/1
1 TABLET, FILM COATED ORAL 3 TIMES DAILY
COMMUNITY

## 2023-06-15 RX ORDER — ASPIRIN 81 MG/1
81 TABLET, CHEWABLE ORAL DAILY
Status: DISCONTINUED | OUTPATIENT
Start: 2023-06-16 | End: 2023-06-17 | Stop reason: HOSPADM

## 2023-06-15 RX ORDER — ETOMIDATE 2 MG/ML
0.15 INJECTION INTRAVENOUS ONCE
Status: DISCONTINUED | OUTPATIENT
Start: 2023-06-15 | End: 2023-06-15

## 2023-06-15 RX ORDER — ACETAMINOPHEN 325 MG/1
650 TABLET ORAL EVERY 6 HOURS PRN
Status: DISCONTINUED | OUTPATIENT
Start: 2023-06-15 | End: 2023-06-17 | Stop reason: HOSPADM

## 2023-06-15 RX ORDER — FENTANYL CITRATE 50 UG/ML
50 INJECTION, SOLUTION INTRAMUSCULAR; INTRAVENOUS ONCE
Status: COMPLETED | OUTPATIENT
Start: 2023-06-15 | End: 2023-06-15

## 2023-06-15 RX ORDER — SODIUM CHLORIDE 0.9 % (FLUSH) 0.9 %
5-40 SYRINGE (ML) INJECTION PRN
Status: DISCONTINUED | OUTPATIENT
Start: 2023-06-15 | End: 2023-06-17 | Stop reason: HOSPADM

## 2023-06-15 RX ADMIN — AMIODARONE HYDROCHLORIDE 100 MG: 200 TABLET ORAL at 15:12

## 2023-06-15 RX ADMIN — FENTANYL CITRATE 50 MCG: 50 INJECTION, SOLUTION INTRAMUSCULAR; INTRAVENOUS at 12:44

## 2023-06-15 RX ADMIN — APIXABAN 2.5 MG: 2.5 TABLET, FILM COATED ORAL at 21:09

## 2023-06-15 RX ADMIN — HYDROXYZINE PAMOATE 25 MG: 25 CAPSULE ORAL at 21:56

## 2023-06-15 RX ADMIN — ETOMIDATE 10.6 MG: 20 INJECTION, SOLUTION INTRAVENOUS at 12:44

## 2023-06-15 RX ADMIN — Medication 10 ML: at 21:08

## 2023-06-15 ASSESSMENT — PAIN SCALES - GENERAL: PAINLEVEL_OUTOF10: 0

## 2023-06-15 ASSESSMENT — LIFESTYLE VARIABLES
HOW OFTEN DO YOU HAVE A DRINK CONTAINING ALCOHOL: MONTHLY OR LESS
HOW MANY STANDARD DRINKS CONTAINING ALCOHOL DO YOU HAVE ON A TYPICAL DAY: 1 OR 2

## 2023-06-15 NOTE — ED NOTES
Pt left ED via wheelchair in stable condition with Vidya Hagen ED tech on [employer]. All belongings sent with pt. Care transferred.      Manny Hughes RN  06/15/23 3554

## 2023-06-15 NOTE — ED NOTES
EKG order wasn't in from EKG performed at and reviewed by Dr. Portillo Hence at [phone]. Order placed at this time.      Brianna Zavala, RN  06/15/23 4298

## 2023-06-15 NOTE — ED NOTES
Perfect Serve sent to Dr. Mila Holloway: Dr. Milton Forbes ordered PO Amiodarone for pt. Pts BP soft at 88/61. Do you want me to give or hold at this time? Thanks. Virginia Geller RN  06/15/23 1971    Addendum 9260: BP 98/67. PO Amio given.        Virginia Geller RN  06/15/23 3272

## 2023-06-15 NOTE — H&P
yet ordered  []Ordered and Pending   []Seen with Recommendations for:  [x]Home independently  []Home w/ assist  []HHC  []SNF  []Acute Rehab    Anticipated Discharge Day/Date: 1 to 2 days    Anticipated Discharge Location: [x]Home  []HHC  []SNF  []Acute Rehab  []ECF  []LTAC  []Hospice    Consults:      IP CONSULT TO CARDIOLOGY  IP CONSULT TO CARDIOLOGY      This patient has a high likelihood of being discharged tomorrow and is appropriate for A1 Discharge Unit in AM pending clinical course overnight: []Yes  [x]No    --------------------------------------------------------------------------------------------------------------------------------------------------------------------    Imaging:     XR CHEST PORTABLE    Result Date: 6/15/2023  EXAMINATION: ONE XRAY VIEW OF THE CHEST 6/15/2023 10:29 am COMPARISON: Chest x-ray dated 16 February 2023 HISTORY: ORDERING SYSTEM PROVIDED HISTORY: Afib RVR TECHNOLOGIST PROVIDED HISTORY: Reason for exam:->Afib RVR Reason for Exam: afib rvr FINDINGS: Stable cardiomediastinal silhouette. Left-sided pacemaker device with leads in stable position. Neurostimulator lead projects over the midthoracic spine. No acute airspace infiltrate. No pneumothorax or pleural effusion. No acute cardiopulmonary findings     CT Lung Screen (Initial or Annual)    Result Date: 5/22/2023  EXAMINATION: LOW DOSE SCREENING CT OF THE CHEST WITHOUT CONTRAST 5/17/2023 12:54 pm TECHNIQUE: Low dose lung cancer screening CT of the chest was performed without the administration of intravenous contrast.  Multiplanar reformatted images are provided for review. Automated exposure control, iterative reconstruction, and/or weight based adjustment of the mA/kV was utilized to reduce the radiation dose to as low as reasonably achievable. COMPARISON: None. HISTORY: ORDERING SYSTEM PROVIDED HISTORY: Smoking TECHNOLOGIST PROVIDED HISTORY: Age: 75 y.o.  Smoking History: Social History Tobacco Use Smoking status: Former

## 2023-06-15 NOTE — CONSULTS
Electrophysiology Consultation   Date: 6/15/2023  Admit Date:  6/15/2023  Reason for Consultation: Symptomatic paroxysmal atrial fibrillation  Consult Requesting Physician: Kalin Rodrigez MD     Chief Complaint   Patient presents with    Dizziness     Pt reports she woke up at 2300 with some heaviness in chest, went back to sleep and woke up at 0800 and had palpitations, dizziness, and hypotension. Pt appears to be in afib RVR at this time. HPI:   Mrs. Todd Trejo is a pleasant 75year old female with a medical history significant for paroxysmal atrial fibrillation status post ablation x 2 (most recent 2019 or 2020) and left atrial appendage closure, non-ischemic cardiomyopathy with severely depressed LVEF status post DC ICD (failed LV lead deployment per patient), hypertension, COPD, and diabetes mellitus type II who presents from home with symptomatic atrial fibrillation with RVR. According to patient she was the use of health until approximately 1 AM this morning between 7 palpitations. She took some palpitations and chest pain with radiation up her left and right jaw. She took some aspirin is able to fall back asleep and when she woke up around 8 AM she used still having some dizziness and palpitations. Her  took her blood pressure and found that her blood pressure was low and that her pulse was elevated. Given how she felt and with her medical history patient presented to [facility] for further evaluation and care. Patient denies fevers, orthopnea, PND, lower extremity edema, abdominal swelling, shortness of breath, dyspnea on exertion, chills, visual changes, headaches, sore throat, cough, abdominal pain, nausea, vomiting, bleeding, bruising, dysuria, muscle/joint pain, confusion, depression, anxiety, skin lesions, etc.    Emergency Room/Hospital Course:  Patient is [facility] emergency room on 06/25/2023. Her CMP was notable for elevated creatinine at 1.5. Her troponin enzymes were negative.

## 2023-06-15 NOTE — ED NOTES
Ed cardiology consult   1048-Paged [facility] cardiology  [facility]  1107- repaged [facility] cardio per Abby Byrd NP  0- Dr. Farida Ramirez returned page- transferred to mitra Mcgrath  06/15/23 1114

## 2023-06-15 NOTE — ED NOTES
RT notified for cardioversion     [facility]  06/15/23 2718

## 2023-06-15 NOTE — ED PROVIDER NOTES
[facility]  ED  EMERGENCY DEPARTMENT ENCOUNTER        Patient Name: Ashkan Arias  MRN: [id number]  Sivangfjuan m 1950  Date of evaluation: 6/15/2023  Provider: Maritza Tabares MD  PCP: DEMI Coleman - CNP  Note Started: 2:57 PM EDT 6/15/23    I independently examined and evaluated Ashkan Arias. I personally saw the patient and performed a substantive portion of the visit including all aspects of the medical decision making. I am the primary physician of record. CHIEF COMPLAINT  *** Dizziness (Pt reports she woke up at 2300 with some heaviness in chest, went back to sleep and woke up at 0800 and had palpitations, dizziness, and hypotension. /Pt appears to be in afib RVR at this time. )       HISTORY OF PRESENT ILLNESS  {History from (Optional):01462}    {Limitations to history (Optional):09916}    In brief, Ashkan Arias is a 75 y.o. female  has a past medical history of Atrial fibrillation ([address] CHF (congestive heart failure) ([address] Congenital heart disease, COPD (chronic obstructive pulmonary disease) ([address] GERD (gastroesophageal reflux disease), Irritable bowel syndrome, Obesity, and Restless legs syndrome. , who presents to the ED complaining of ***. REVIEW OF SYSTEMS  All systems reviewed, pertinent positives per HPI otherwise noted to be negative. Focused exam revealed   PHYSICAL EXAM  ED Triage Vitals   BP Temp Temp Source Pulse Respirations SpO2 Height Weight - Scale   06/15/23 1014 06/15/23 1013 06/15/23 1013 06/15/23 1013 06/15/23 1013 06/15/23 1013 06/15/23 1212 06/15/23 1212   89/74 97.8 °F (36.6 °C) Oral (!) 142 18 96 % 5' 7\" (1.702 m) 240 lb (108.9 kg)     GENERAL APPEARANCE: Awake and alert. Cooperative. *** distress. HENT: Normocephalic. Atraumatic. Mucous membranes are ***  NECK: Supple. Full range of motion of the neck without stiffness or pain. EYES: PERRL. EOM's grossly intact. HEART/CHEST: RRR. No murmurs. Chest wall is ***not tender to palpation.   LUNGS:
SULFATE HFA (VENTOLIN HFA) 108 (90 BASE) MCG/ACT INHALER    Inhale 2 puffs into the lungs 4 times daily as needed for Wheezing    BUPROPION (WELLBUTRIN XL) 300 MG EXTENDED RELEASE TABLET    TAKE 1 TABLET EVERY MORNING    CARVEDILOL (COREG) 12.5 MG TABLET    TAKE 1 TABLET TWICE A DAY    ELIQUIS 5 MG TABS TABLET    TAKE 1 TABLET TWICE A DAY    EMPAGLIFLOZIN (JARDIANCE) 10 MG TABLET    Take 1 tablet by mouth daily    ENTRESTO 24-26 MG PER TABLET    TAKE 1 TABLET TWICE A DAY    FUROSEMIDE (LASIX) 40 MG TABLET    TAKE 1 TABLET DAILY AS NEEDED FOR WEIGHT GAIN 3 POUNDS IN A DAY OR 5 POUNDS IN A WEEK WITH SHORTNESS OF BREATH (SEE ADMIN INSTRUCTIONS)    HYDROXYZINE HCL (ATARAX) 25 MG TABLET    TAKE 1 TABLET NIGHTLY    OXYCODONE-ACETAMINOPHEN (PERCOCET) 5-325 MG PER TABLET    Take 1 tablet by mouth 2 times daily as needed for Pain.  Max Daily Amount: 2 tablets    PANTOPRAZOLE (PROTONIX) 40 MG TABLET    TAKE 1 TABLET ONCE DAILY    ROPINIROLE (REQUIP) 1 MG TABLET    Take 1 tablet by mouth 3 times daily    SPIRONOLACTONE (ALDACTONE) 25 MG TABLET    TAKE 1 TABLET DAILY       ALLERGIES     Iv contrast [iodides], Codeine, Penicillins, and Sulfa antibiotics    FAMILYHISTORY       Family History   Problem Relation Age of Onset    Heart Attack Brother         SOCIAL HISTORY       Social History     Tobacco Use    Smoking status: Former     Packs/day: 1.50     Years: 44.00     Pack years: 66.00     Types: Cigarettes     Start date: 1974     Quit date: 10/28/2019     Years since quitting: 3.6    Smokeless tobacco: Never   Vaping Use    Vaping Use: Former   Substance Use Topics    Alcohol use: Yes     Comment: occ/social    Drug use: Never       SCREENINGS        Zohra Coma Scale  Eye Opening: Spontaneous  Best Verbal Response: Oriented  Best Motor Response: Obeys commands  Zohra Coma Scale Score: 15                CIWA Assessment  BP: 92/61  Pulse: 62           PHYSICAL EXAM  1 or more Elements     ED Triage Vitals   BP Temp Temp

## 2023-06-15 NOTE — ED NOTES
Dr. Sujey Pérez notified this RN that pt does not need ICU and asked for this RN to notify clinical. Notified Carmen Mcgrath, Clinical RN.      Christina Watson, RN  06/15/23 2993

## 2023-06-15 NOTE — ED NOTES
Telephone report given to Roverto Suazo RN. Transport request placed.        Joaquina Alcazar RN  06/15/23 2101

## 2023-06-15 NOTE — PROGRESS NOTES
Palliative Medicine Consult Sarthak: [phone] ([phone]) Patient Name: Genaro Knapp YOB: 1953 Date of Initial Consult: 11/9/20 Reason for Consult: care decisions Requesting Provider: Veronika Ashraf MD  
Primary Care Physician: Paige Beckett MD 
 
 SUMMARY:  
Genaro Knapp is a 75 y.o. Female with a past history of COPD on 2 litres home oxygen , depression , anxiety , Fibromylagia, Hx of seizures , hx of substance abuse , chronic smoker , Recent fall with right foot fracture ,  who was admitted on 11/7/2020 from  Home with a diagnosis of acute COPD exacerbation , work  Up , CT chest showed emphysema with large pleural-based lateral right upper lobe mass, with bilateral  pulmonary nodules in both lungs,  highly suspicious of bronchogenic neoplasm. Pulmonary is following suggested CT guided bx of right lung lesion . Current medical issues leading to Palliative Medicine involvement include: care decisions for suspected lung cancer , awaiting biopsy . Last admission for COPD in our system is 2018. Social :  , lives with her only child Son [place], chronic smoker, quit few weeks ago , decline in function to basically mostly in bed for past few months due to weakness . Jain maggie . Interim hx : CT guided biopsy of lung mass on 11/10 consistant with squamous cell cancer . 11/14 : she was intubated for respiratory arrest due aspiration and sepsis,  nursing suction eggs from her airways . 11/17: MRI Brain pending , off pressors, plan to wean sedation prior to SBT, she was apenic on trail of sedation on 11/16/20 PALLIATIVE DIAGNOSES:  
1. Respiratory failure 2. Aspiration pneumonia 3. Sepsis resolved 4. Goals of care 5. DNR discussion 6. New diagnosis of lung cancer 7. Weakness , fatigue 8. Fibromyalgia 9. Hx of substance abuse 10. COPD PLAN:  
1.  Chart reviewed, prior to follow up visit , refer to initial note on Patient admitted to room 207 from ED. Patient oriented to room, call light, phone, lights and bathroom. Patient instructed on how to order dinner. Call light within reach. 11/10/20, Interim events noted above , patient now intubated , sedated . 2. Advance directives : patient does not have a written advance directives , her only child Carlos Hudson is legal surrogate. 4. Goals of care : 
 
Palliative team , myself , Ez Head and Debra Khan/JOHANNA , met with patient only child /son/legal next of kin Mr Benji Arenas 
·  Adia notes he is getting updates from Dr Leonel Cox ( patient known to Dr Leonel Cox for long time ).   
· Son  understand \" no real good outcome\",  
  
· Son decided for partial code, based on what patient would not want ( no chest compression , no shock , no ACLS), only vasopressors , if she needs to be placed  back on vasopressors , pink sheet placed. 
  
·  Current goal is to wait and see how she does over the course of few days to see if she is medically extubated , he does not want to prolong her suffering . we discuss Compassionate extubation and transition to comfort focus care if she does not show any signs of recovery  to medical extubation . · - He  wants  Immediate family members to visit for  closure , Abdoulaye Yo will discuss with ICU lead to help with visitation . 3. Initial consult note routed to primary continuity provider and/or primary health care team members 4. Communicated plan of care with: Palliative P[facility] PAGE [address] Team 
 
 GOALS OF CARE / TREATMENT PREFERENCES:  
 
GOALS OF CARE: 
  
 
TREATMENT PREFERENCES:  
Code Status: Partial Code Advance Care Planning: 
[x] The [facility] Interdisciplinary Team has updated the ACP Navigator with Aishwarya and Patient Capacity Primary Decision Maker (Active): Bonifacio Wolf - [phone] Advance Care Planning 3/3/2018 Patient's Healthcare Decision Maker is: Legal Next of Kin Primary Decision Maker Name Claude Renee Primary Decision Maker Phone Number [phone] Primary Decision Maker Relationship to Patient Adult child Secondary Decision Maker Name - Secondary Decision Maker Phone Number - Secondary Decision Maker Relationship to Patient -  
Confirm Advance Directive None Patient Would Like to Complete Advance Directive No  
Does the patient have other document types - Medical Interventions: Limited additional interventions Other Instructions: Other: As far as possible, the palliative care team has discussed with patient / health care proxy about goals of care / treatment preferences for patient. HISTORY:  
 
History obtained from: chart, Rn and patient . CHIEF COMPLAINT: Sedated , intubated HPI/SUBJECTIVE: The patient is:  
[] Verbal and participatory Patient has chronic pain issues/fibromyalgia , worse in last few days , she is extremely weak , c/o nausea on and off . Clinical Pain Assessment (nonverbal scale for severity on nonverbal patients):  
Clinical Pain Assessment Severity: 0 Location: back , shoulder , allover Character: dull Duration: few months worse in last 4 days. Effect: weak . Factors: dilaudid and morphine helps with pain . Duration: for how long has pt been experiencing pain (e.g., 2 days, 1 month, years) Frequency: how often pain is an issue (e.g., several times per day, once every few days, constant) FUNCTIONAL ASSESSMENT:  
 
Palliative Performance Scale (PPS): PPS: 20 
 
 
 PSYCHOSOCIAL/SPIRITUAL SCREENING:  
 
Palliative IDT has assessed this patient for cultural preferences / practices and a referral made as appropriate to needs (Cultural Services, Patient Advocacy, Ethics, etc.) Any spiritual / Adventist concerns: 
[] Yes /  [x] No 
 
Caregiver Burnout: 
[] Yes /  [x] No /  [] No Caregiver Present Anticipatory grief assessment:  
[x] Normal  / [] Maladaptive ESAS Anxiety: Anxiety: 1 ESAS Depression: Depression: 0 REVIEW OF SYSTEMS:  
 
Positive and pertinent negative findings in ROS are noted above in HPI. The following systems were [] reviewed / [x] unable to be reviewed as noted in HPI Other findings are noted below. Systems: constitutional, ears/nose/mouth/throat, respiratory, gastrointestinal, genitourinary, musculoskeletal, integumentary, neurologic, psychiatric, endocrine. Positive findings noted below. Modified ESAS Completed by: provider Fatigue: 8 Drowsiness: 0 Depression: 0 Pain: 0 Anxiety: 1 Nausea: 0 Anorexia: 7 Dyspnea: 0 Stool Occurrence(s): 1 PHYSICAL EXAM:  
 
From RN flowsheet: 
Wt Readings from Last 3 Encounters:  
11/09/20 169 lb 15.6 oz (77.1 kg)  
11/12/20 169 lb 15.6 oz (77.1 kg) 10/26/20 170 lb (77.1 kg) Blood pressure (!) 150/58, pulse (!) 113, temperature 98.6 °F (37 °C), resp. rate 14, height 5' 2\" (1.575 m), weight 169 lb 15.6 oz (77.1 kg), SpO2 92 %. Pain Scale 1: Behavioral Pain Scale (BPS) Pain Intensity 1: 3 Pain Onset 1: chronic Pain Location 1: Back, Shoulder Pain Orientation 1: Right Pain Description 1: Constant Pain Intervention(s) 1: Rest 
Last bowel movement, if known:  
 
Constitutional:intubated, sedated Eyes: pupils equal, anicteric ENMT: ET tube in place . Cardiovascular: regular rhythm. Respiratory: breathing not labored, symmetric Gastrointestinal: soft non-tender, +bowel sounds Musculoskeletal: no deformity, no tenderness to palpation Skin: warm, dry Neurologic: intubated , sedated Psychiatric: unable to evaluate because of patient factors . Other: 
 
 
 HISTORY:  
 
Active Problems: COPD exacerbation ([address] (9/13/2010) Overview: O2 dependent--Dr. Andra Toro Past Medical History:  
Diagnosis Date  Arthritis  Asthma  COPD   
 2 L NC  
 Hypertension  Ischemic colitis ([address] 1/2012  Migraines  Neurological disorder   
 migraines  Psychiatric disorder   
 depression  Seizures [address] [address] Last seizure 4 years ago  Vaginal candidiasis 1/2012 Past Surgical History: Procedure Laterality Date  HX APPENDECTOMY  HX  SECTION    
 HX CHOLECYSTECTOMY  HX HYSTERECTOMY  HX ORTHOPAEDIC    
 lumbar disc sx  HX ORTHOPAEDIC    
 left knee sx  HX OTHER SURGICAL    
 colonoscopy-recent colitis  DE COLONOSCOPY W/BIOPSY SINGLE/MULTIPLE  3/1/2012 Family History Problem Relation Age of Onset  Stroke Mother  Heart Disease Mother  Lung Disease Father   
     copd History reviewed, no pertinent family history. Social History Tobacco Use  Smoking status: Current Every Day Smoker Packs/day: 0.25 Years: 30.00 Pack years: 7.50  Smokeless tobacco: Never Used Substance Use Topics  Alcohol use: No  
 
Allergies Allergen Reactions  Codeine Other (comments)  Erythromycin Nausea Only  Other Medication Other (comments) No sedative or narcotic per son due to hx addiction Current Facility-Administered Medications Medication Dose Route Frequency  fentaNYL citrate (PF) injection 100 mcg  100 mcg IntraVENous Q4H PRN  
 methylPREDNISolone (PF) (Solu-MEDROL) injection 30 mg  30 mg IntraVENous Q8H  
 famotidine (PF) (PEPCID) 20 mg in 0.9% sodium chloride 10 mL injection  20 mg IntraVENous Q12H  chlorhexidine (ORAL CARE KIT) 0.12 % mouthwash 15 mL  15 mL Oral Q12H  
 alcohol 62% (NOZIN) nasal  1 Ampule  1 Ampule Topical Q12H  
 metoprolol tartrate (LOPRESSOR) tablet 50 mg  50 mg Oral Q12H  propofol (DIPRIVAN) 10 mg/mL infusion  0-50 mcg/kg/min IntraVENous TITRATE  levETIRAcetam (KEPPRA) 500 mg in saline (iso-osm) 100 mL IVPB (premix)  500 mg IntraVENous Q12H  
 albuterol-ipratropium (DUO-NEB) 2.5 MG-0.5 MG/3 ML  3 mL Nebulization Q4H RT  
 fentaNYL (PF) 1,500 mcg/30 mL (50 mcg/mL) infusion  0-200 mcg/hr IntraVENous TITRATE  miconazole (SECURA) 2 % extra thick cream   Topical BID  piperacillin-tazobactam (ZOSYN) 3.375 g in 0.9% sodium chloride (MBP/ADV) 100 mL MBP  3.375 g IntraVENous Q8H  
 insulin lispro (HUMALOG) injection   SubCUTAneous Q6H  
 glucose chewable tablet 16 g  4 Tab Oral PRN  
 dextrose (D50W) injection syrg 12.5-25 g  12.5-25 g IntraVENous PRN  
 glucagon (GLUCAGEN) injection 1 mg  1 mg IntraMUSCular PRN  
 scopolamine (TRANSDERM-SCOP) 1 mg over 3 days 1 Patch  1 Patch TransDERmal Q72H  
 HYDROcodone-acetaminophen (NORCO) 5-325 mg per tablet 2 Tab  2 Tab Oral Q4H PRN  
 ALPRAZolam (XANAX) tablet 0.5 mg  0.5 mg Oral QID PRN  prochlorperazine (COMPAZINE) with saline injection 10 mg  10 mg IntraVENous Q6H PRN  zinc oxide-cod liver oil (DESITIN) 40 % paste   Topical BID and QHS  methIMAzole (TAPAZOLE) tablet 10 mg  10 mg Oral DAILY  bacitracin 500 unit/gram packet 1 Packet  1 Packet Topical DAILY  [Held by provider] gabapentin (NEURONTIN) capsule 300 mg  300 mg Oral TID  zinc oxide-cod liver oil (DESITIN) 40 % paste   Topical PRN  
 0.9% sodium chloride infusion  25 mL/hr IntraVENous CONTINUOUS  
 labetaloL (NORMODYNE;TRANDATE) injection 10 mg  10 mg IntraVENous Q2H PRN  
 sodium chloride (NS) flush 5-40 mL  5-40 mL IntraVENous Q8H  
 sodium chloride (NS) flush 5-40 mL  5-40 mL IntraVENous PRN  
 acetaminophen (TYLENOL) tablet 650 mg  650 mg Oral Q4H PRN  
 naloxone (NARCAN) injection 0.4 mg  0.4 mg IntraVENous PRN  
 enoxaparin (LOVENOX) injection 40 mg  40 mg SubCUTAneous Q24H  
 [Held by provider] methocarbamoL (ROBAXIN) tablet 750-1,500 mg  750-1,500 mg Oral BID  ondansetron (ZOFRAN) injection 4 mg  4 mg IntraVENous Q4H PRN  
 
 
 
 LAB AND IMAGING FINDINGS:  
 
Lab Results Component Value Date/Time WBC 21.7 (H) 11/18/2020 04:28 AM  
 HGB 7.9 (L) 11/18/2020 04:28 AM  
 PLATELET 921 08/87/3832 04:28 AM  
 
Lab Results Component Value Date/Time  Sodium 142 11/18/2020 04:28 AM  
 Potassium 3.5 11/18/2020 04:28 AM  
 Chloride 104 11/18/2020 04:28 AM  
 CO2 38 (H) 11/18/2020 04:28 AM  
 BUN 36 (H) 11/18/2020 04:28 AM  
 Creatinine 0.55 11/18/2020 04:28 AM  
 Calcium 8.6 11/18/2020 04:28 AM  
 Magnesium 2.0 11/16/2020 04:26 AM  
 Phosphorus 2.6 03/08/2018 04:00 AM  
  
Lab Results Component Value Date/Time Alk. phosphatase 78 11/14/2020 09:17 AM  
 Protein, total 5.4 (L) 11/14/2020 09:17 AM  
 Albumin 1.8 (L) 11/14/2020 09:17 AM  
 Globulin 3.6 11/14/2020 09:17 AM  
 
Lab Results Component Value Date/Time INR 1.1 11/15/2020 03:07 AM  
 Prothrombin time 11.9 (H) 11/15/2020 03:07 AM  
 aPTT 31.5 (H) 01/28/2014 08:40 AM  
  
No results found for: IRON, FE, TIBC, IBCT, PSAT, FERR Lab Results Component Value Date/Time pH 7.37 11/18/2020 05:02 AM  
 PCO2 65 (H) 11/18/2020 05:02 AM  
 PO2 87 11/18/2020 05:02 AM  
 
No components found for: [place] Lab Results Component Value Date/Time CK 21 (L) 11/08/2020 06:23 AM  
 CK - MB 2.2 03/04/2018 03:34 AM  
  
 
 
   
 
Total time:  
Counseling / coordination time, spent as noted above:  
> 50% counseling / coordination?:  
 
Prolonged service was provided for  []30 min   []75 min in face to face time in the presence of the patient, spent as noted above. Time Start:  
Time End:  
Note: this can only be billed with 82237 (initial) or 76121 (follow up). If multiple start / stop times, list each separately.

## 2023-06-16 LAB
ALBUMIN SERPL-MCNC: 3.7 G/DL (ref 3.4–5)
ALP SERPL-CCNC: 54 U/L (ref 40–129)
ALT SERPL-CCNC: 21 U/L (ref 10–40)
ANION GAP SERPL CALCULATED.3IONS-SCNC: 9 MMOL/L (ref 3–16)
AST SERPL-CCNC: 20 U/L (ref 15–37)
BILIRUB DIRECT SERPL-MCNC: <0.2 MG/DL (ref 0–0.3)
BILIRUB INDIRECT SERPL-MCNC: ABNORMAL MG/DL (ref 0–1)
BILIRUB SERPL-MCNC: 0.4 MG/DL (ref 0–1)
BUN SERPL-MCNC: 26 MG/DL (ref 7–20)
CALCIUM SERPL-MCNC: 9.2 MG/DL (ref 8.3–10.6)
CHLORIDE SERPL-SCNC: 106 MMOL/L (ref 99–110)
CHOLEST SERPL-MCNC: 192 MG/DL (ref 0–199)
CO2 SERPL-SCNC: 23 MMOL/L (ref 21–32)
CREAT SERPL-MCNC: 1.4 MG/DL (ref 0.6–1.2)
DEPRECATED RDW RBC AUTO: 14.5 % (ref 12.4–15.4)
EKG ATRIAL RATE: 64 BPM
EKG DIAGNOSIS: NORMAL
EKG P AXIS: 15 DEGREES
EKG P-R INTERVAL: 162 MS
EKG Q-T INTERVAL: 466 MS
EKG QRS DURATION: 140 MS
EKG QTC CALCULATION (BAZETT): 480 MS
EKG R AXIS: -46 DEGREES
EKG T AXIS: 138 DEGREES
EKG VENTRICULAR RATE: 64 BPM
GFR SERPLBLD CREATININE-BSD FMLA CKD-EPI: 39 ML/MIN/{1.73_M2}
GLUCOSE SERPL-MCNC: 100 MG/DL (ref 70–99)
HCT VFR BLD AUTO: 39.2 % (ref 36–48)
HDLC SERPL-MCNC: 45 MG/DL (ref 40–60)
HGB BLD-MCNC: 13 G/DL (ref 12–16)
LACTATE BLDV-SCNC: 1.2 MMOL/L (ref 0.4–2)
LDLC SERPL CALC-MCNC: 113 MG/DL
LV EF: 25 %
LVEF MODALITY: NORMAL
MAGNESIUM SERPL-MCNC: 2.3 MG/DL (ref 1.8–2.4)
MCH RBC QN AUTO: 31.5 PG (ref 26–34)
MCHC RBC AUTO-ENTMCNC: 33.2 G/DL (ref 31–36)
MCV RBC AUTO: 94.9 FL (ref 80–100)
PLATELET # BLD AUTO: 178 K/UL (ref 135–450)
PMV BLD AUTO: 7 FL (ref 5–10.5)
POTASSIUM SERPL-SCNC: 4 MMOL/L (ref 3.5–5.1)
PROT SERPL-MCNC: 6.2 G/DL (ref 6.4–8.2)
RBC # BLD AUTO: 4.13 M/UL (ref 4–5.2)
SODIUM SERPL-SCNC: 138 MMOL/L (ref 136–145)
TRIGL SERPL-MCNC: 172 MG/DL (ref 0–150)
VLDLC SERPL CALC-MCNC: 34 MG/DL
WBC # BLD AUTO: 6.7 K/UL (ref 4–11)

## 2023-06-16 PROCEDURE — 80061 LIPID PANEL: CPT

## 2023-06-16 PROCEDURE — 2700000000 HC OXYGEN THERAPY PER DAY

## 2023-06-16 PROCEDURE — 94761 N-INVAS EAR/PLS OXIMETRY MLT: CPT

## 2023-06-16 PROCEDURE — 83605 ASSAY OF LACTIC ACID: CPT

## 2023-06-16 PROCEDURE — 2580000003 HC RX 258: Performed by: INTERNAL MEDICINE

## 2023-06-16 PROCEDURE — 6370000000 HC RX 637 (ALT 250 FOR IP): Performed by: INTERNAL MEDICINE

## 2023-06-16 PROCEDURE — 2060000000 HC ICU INTERMEDIATE R&B

## 2023-06-16 PROCEDURE — 80076 HEPATIC FUNCTION PANEL: CPT

## 2023-06-16 PROCEDURE — 83735 ASSAY OF MAGNESIUM: CPT

## 2023-06-16 PROCEDURE — 93306 TTE W/DOPPLER COMPLETE: CPT

## 2023-06-16 PROCEDURE — 80048 BASIC METABOLIC PNL TOTAL CA: CPT

## 2023-06-16 PROCEDURE — 6370000000 HC RX 637 (ALT 250 FOR IP): Performed by: NURSE PRACTITIONER

## 2023-06-16 PROCEDURE — 93010 ELECTROCARDIOGRAM REPORT: CPT | Performed by: INTERNAL MEDICINE

## 2023-06-16 PROCEDURE — 85027 COMPLETE CBC AUTOMATED: CPT

## 2023-06-16 RX ORDER — 0.9 % SODIUM CHLORIDE 0.9 %
1000 INTRAVENOUS SOLUTION INTRAVENOUS ONCE
Status: COMPLETED | OUTPATIENT
Start: 2023-06-16 | End: 2023-06-17

## 2023-06-16 RX ADMIN — ATORVASTATIN CALCIUM 40 MG: 40 TABLET, FILM COATED ORAL at 19:55

## 2023-06-16 RX ADMIN — PANTOPRAZOLE SODIUM 40 MG: 40 TABLET, DELAYED RELEASE ORAL at 05:32

## 2023-06-16 RX ADMIN — Medication 10 ML: at 19:56

## 2023-06-16 RX ADMIN — AMIODARONE HYDROCHLORIDE 100 MG: 200 TABLET ORAL at 08:43

## 2023-06-16 RX ADMIN — Medication 10 ML: at 08:44

## 2023-06-16 RX ADMIN — APIXABAN 2.5 MG: 2.5 TABLET, FILM COATED ORAL at 08:44

## 2023-06-16 RX ADMIN — ASPIRIN 81 MG 81 MG: 81 TABLET ORAL at 08:43

## 2023-06-16 RX ADMIN — APIXABAN 5 MG: 5 TABLET, FILM COATED ORAL at 19:55

## 2023-06-16 RX ADMIN — HYDROXYZINE PAMOATE 25 MG: 25 CAPSULE ORAL at 19:55

## 2023-06-16 RX ADMIN — SODIUM CHLORIDE 1000 ML: 9 INJECTION, SOLUTION INTRAVENOUS at 14:35

## 2023-06-16 NOTE — CARE COORDINATION
Spoke with RN who states patient had a cardioversion and an echo today. She states patient  could potentially discharge today. She states patient is independent at home and has no needs from CM.

## 2023-06-16 NOTE — PLAN OF CARE
Problem: Safety - Adult  Goal: Free from fall injury  6/16/2023 1049 by Parris Bonilla RN  Outcome: Progressing

## 2023-06-16 NOTE — PROGRESS NOTES
Hospital Medicine Progress Note        Subjective:  She is feeling much better. BP still a bit low, even without her usual meds. Cr improved but still above baseline. General appearance: No apparent distress, appears stated age and cooperative. HEENT: Pupils equal, round. Conjunctivae/corneas clear. Neck: No jugular venous distention. Respiratory:  Normal respiratory effort. Bilaterally without Rales/Wheezes/Rhonchi. Cardiovascular: Normal rate and regular rhythm with normal S1/S2 without murmurs, rubs or gallops. Abdomen: Soft, non-tender, non-distended with normal bowel sounds. Musculoskeletal: No cyanosis bilaterally. No edema. Without deformity. Skin: No jaundice. No rashes or lesions. Neurologic:  Neurovascularly intact without any focal sensory/motor deficits. Cranial nerves: II-XII intact, grossly non-focal.  Psychiatric: Alert and oriented, normal insight. Assessment and Plan:       The patient is a pleasant 75 Y F with a h/o COPD, obesity, CHF, and PAF. She presented here with chest pain and dizziness, was found to be in Afib with RVR. Eventually she converted to NSR and felt much better. Her BP was borderline low and she had NELIA, so hospital medicine was called for admission. PAF, with symptomatic RVR. Started amiodarone PO per EP. Increased apixaban dose per guidelines. F/u TTE.    NELIA. Due to hypotension. Baseline Cr is normal.  Monitor response to cautious IVFs. Chronic systolic and diastolic CHF. Held furosemide (she takes 40 po qd plus PRN), spironolactone, entresto, carvedilol, and empagliflozin due to NELIA and hypotension. We will see how many of these meds we can resume. Her outpatient BP was 132/88 on 3/28 and 128/70 on 3/7. COPD. Inhaled bronchodilators. She uses O2 at night at baseline, probably has YINA. Diet: ADULT DIET;  Regular; Low Fat/Low Chol/High Fiber/2 gm Na  DVT Prophylaxis: anticoagulation as above  Appropriate for A1:

## 2023-06-17 VITALS
HEIGHT: 67 IN | DIASTOLIC BLOOD PRESSURE: 78 MMHG | OXYGEN SATURATION: 99 % | BODY MASS INDEX: 39.18 KG/M2 | RESPIRATION RATE: 16 BRPM | TEMPERATURE: 97.9 F | HEART RATE: 64 BPM | SYSTOLIC BLOOD PRESSURE: 145 MMHG | WEIGHT: 249.6 LBS

## 2023-06-17 LAB
ANION GAP SERPL CALCULATED.3IONS-SCNC: 11 MMOL/L (ref 3–16)
BUN SERPL-MCNC: 17 MG/DL (ref 7–20)
CALCIUM SERPL-MCNC: 8.9 MG/DL (ref 8.3–10.6)
CHLORIDE SERPL-SCNC: 108 MMOL/L (ref 99–110)
CO2 SERPL-SCNC: 22 MMOL/L (ref 21–32)
CREAT SERPL-MCNC: 1 MG/DL (ref 0.6–1.2)
GFR SERPLBLD CREATININE-BSD FMLA CKD-EPI: 59 ML/MIN/{1.73_M2}
GLUCOSE SERPL-MCNC: 93 MG/DL (ref 70–99)
POTASSIUM SERPL-SCNC: 4 MMOL/L (ref 3.5–5.1)
SODIUM SERPL-SCNC: 141 MMOL/L (ref 136–145)

## 2023-06-17 PROCEDURE — 6370000000 HC RX 637 (ALT 250 FOR IP): Performed by: INTERNAL MEDICINE

## 2023-06-17 PROCEDURE — 2580000003 HC RX 258: Performed by: INTERNAL MEDICINE

## 2023-06-17 PROCEDURE — 80048 BASIC METABOLIC PNL TOTAL CA: CPT

## 2023-06-17 RX ORDER — FUROSEMIDE 20 MG/1
20 TABLET ORAL DAILY
Qty: 90 TABLET | Refills: 1 | Status: SHIPPED | OUTPATIENT
Start: 2023-06-17 | End: 2023-06-23 | Stop reason: SDUPTHER

## 2023-06-17 RX ORDER — AMIODARONE HYDROCHLORIDE 100 MG/1
100 TABLET ORAL DAILY
Qty: 30 TABLET | Refills: 4 | Status: SHIPPED | OUTPATIENT
Start: 2023-06-18 | End: 2023-06-23 | Stop reason: SDUPTHER

## 2023-06-17 RX ADMIN — APIXABAN 5 MG: 5 TABLET, FILM COATED ORAL at 09:10

## 2023-06-17 RX ADMIN — Medication 10 ML: at 09:10

## 2023-06-17 RX ADMIN — AMIODARONE HYDROCHLORIDE 100 MG: 200 TABLET ORAL at 09:10

## 2023-06-17 RX ADMIN — ASPIRIN 81 MG 81 MG: 81 TABLET ORAL at 09:10

## 2023-06-17 RX ADMIN — PANTOPRAZOLE SODIUM 40 MG: 40 TABLET, DELAYED RELEASE ORAL at 09:10

## 2023-06-17 ASSESSMENT — PAIN SCALES - GENERAL
PAINLEVEL_OUTOF10: 0
PAINLEVEL_OUTOF10: 0

## 2023-06-17 NOTE — PROGRESS NOTES
Outpatient pharmacy stated that pt's meds are not covered by insurance here. Per pt request, notified pharmacy to send meds to [facility] on [address] in [facility].

## 2023-06-17 NOTE — PLAN OF CARE
Problem: Safety - Adult  Goal: Free from fall injury  Outcome: Progressing  Note: Pt will remain free from falls throughout hospital stay. Fall precautions in place, bed alarm on, bed in lowest position with wheels locked and side rails 2/4 up. Room door open and hourly rounding completed. Will continue to monitor throughout shift. Problem: Pain  Goal: Verbalizes/displays adequate comfort level or baseline comfort level  Outcome: Progressing  Note: Pt will be satisfied with pain control. Pt uses numeric pain rating scale with reassessments after pain med administration. Will continue to monitor progression throughout shift.

## 2023-06-17 NOTE — DISCHARGE SUMMARY
rOPINIRole 1 MG tablet  Commonly known as: REQUIP     spironolactone 25 MG tablet  Commonly known as: ALDACTONE  TAKE 1 TABLET DAILY               Where to Get Your Medications        These medications were sent to [address], [facility] [address] [phone] Genevieve Guallpa [phone]  [address]      Phone: [phone]   amiodarone 100 MG tablet  apixaban 5 MG Tabs tablet  furosemide 20 MG tablet                Labs and Imaging   XR CHEST PORTABLE    Result Date: 6/15/2023  EXAMINATION: ONE XRAY VIEW OF THE CHEST 6/15/2023 10:29 am COMPARISON: Chest x-ray dated 16 February 2023 HISTORY: ORDERING SYSTEM PROVIDED HISTORY: Afib RVR TECHNOLOGIST PROVIDED HISTORY: Reason for exam:->Afib RVR Reason for Exam: afib rvr FINDINGS: Stable cardiomediastinal silhouette. Left-sided pacemaker device with leads in stable position. Neurostimulator lead projects over the midthoracic spine. No acute airspace infiltrate. No pneumothorax or pleural effusion.      No acute cardiopulmonary findings       CBC:   Recent Labs     06/15/23  1026 06/16/23  0454   WBC 7.1 6.7   HGB 14.2 13.0    178     BMP:    Recent Labs     06/15/23  1026 06/16/23  0454 06/17/23  0508    138 141   K 4.1 4.0 4.0    106 108   CO2 21 23 22   BUN 30* 26* 17   CREATININE 1.5* 1.4* 1.0   GLUCOSE 151* 100* 93     Hepatic:   Recent Labs     06/15/23  1026 06/16/23  0454   AST 22 20   ALT 25 21   BILITOT 0.4 0.4   ALKPHOS 74 54     Lipids:   Lab Results   Component Value Date/Time    CHOL 192 06/16/2023 04:54 AM    HDL 45 06/16/2023 04:54 AM    TRIG 172 06/16/2023 04:54 AM     Hemoglobin A1C:   Lab Results   Component Value Date/Time    LABA1C 5.7 03/28/2023 10:34 AM     TSH:   Lab Results   Component Value Date/Time    TSH 0.60 11/14/2022 06:51 AM     Troponin: No results found for: TROPONINT  Lactic Acid:   Recent Labs     06/16/23  0454   LACTA 1.2     BNP:   Recent Labs

## 2023-06-17 NOTE — PROGRESS NOTES
Pt d/c'd home with . Removed peripheral IV and stopped bleeding. Catheter intact. Pt tolerated well. No redness noted at site. Notified CMU and removed tele box. Reviewed d/c instructions, home meds, and  f/u information utilizing teach-back method. Patient verbalized understanding.

## 2023-06-19 ENCOUNTER — TELEPHONE (OUTPATIENT)
Dept: FAMILY MEDICINE CLINIC | Age: 73
End: 2023-06-19

## 2023-06-19 NOTE — TELEPHONE ENCOUNTER
Care Transitions Initial Follow Up Call    Outreach made within 2 business days of discharge: Yes    Patient: Ashu Contreras Patient : 1950   MRN: [id number]  Reason for Admission: There are no discharge diagnoses documented for the most recent discharge. Discharge Date: 23       Spoke with: Ashu Contreras     Discharge department/facility: [facility] Patient Contact:  Was patient able to fill all prescriptions: Yes  Was patient instructed to bring all medications to the follow-up visit: Yes  Is patient taking all medications as directed in the discharge summary?  Yes  Does patient understand their discharge instructions: Yes  Does patient have questions or concerns that need addressed prior to 7-14 day follow up office visit: no    Scheduled appointment with PCP within 7-14 days    Follow Up  Future Appointments   Date Time Provider Kristy Conley   2023  1:30 PM Tristen Delgado MD [facility] [place]   2023 10:00 [facility]   2023 10:00 AM Joey Fonseca MD [facility]   2023 11:30 AM MD GISELL Eason PULRODNEY [facility]   2023 10:00 AM DEMI Zavala - CNP [place] kristofer MARIN   10/30/2023  7:00 AM SCHEDULE, Boris JACKSON [place] [facility]   2023 11:00 AM A CT VCT [id number] [place] [address] Dyan Goltz Portillo

## 2023-06-23 ENCOUNTER — OFFICE VISIT (OUTPATIENT)
Dept: FAMILY MEDICINE CLINIC | Age: 73
End: 2023-06-23

## 2023-06-23 VITALS
DIASTOLIC BLOOD PRESSURE: 82 MMHG | OXYGEN SATURATION: 98 % | HEART RATE: 83 BPM | HEIGHT: 67 IN | SYSTOLIC BLOOD PRESSURE: 110 MMHG | RESPIRATION RATE: 16 BRPM | WEIGHT: 251.6 LBS | BODY MASS INDEX: 39.49 KG/M2

## 2023-06-23 DIAGNOSIS — Z79.899 LONG TERM USE OF DRUG: ICD-10-CM

## 2023-06-23 DIAGNOSIS — Z09 HOSPITAL DISCHARGE FOLLOW-UP: ICD-10-CM

## 2023-06-23 DIAGNOSIS — R06.02 SHORTNESS OF BREATH: Primary | ICD-10-CM

## 2023-06-23 DIAGNOSIS — F41.9 ANXIETY: ICD-10-CM

## 2023-06-23 DIAGNOSIS — I50.9 ACUTE DECOMPENSATED HEART FAILURE (HCC): ICD-10-CM

## 2023-06-23 DIAGNOSIS — I48.91 ATRIAL FIBRILLATION WITH RVR (HCC): ICD-10-CM

## 2023-06-23 DIAGNOSIS — F33.0 MILD EPISODE OF RECURRENT MAJOR DEPRESSIVE DISORDER (HCC): ICD-10-CM

## 2023-06-23 LAB
ALBUMIN SERPL-MCNC: 4.3 G/DL (ref 3.4–5)
ALBUMIN/GLOB SERPL: 1.9 {RATIO} (ref 1.1–2.2)
ALP SERPL-CCNC: 71 U/L (ref 40–129)
ALT SERPL-CCNC: 18 U/L (ref 10–40)
ANION GAP SERPL CALCULATED.3IONS-SCNC: 13 MMOL/L (ref 3–16)
AST SERPL-CCNC: 18 U/L (ref 15–37)
BILIRUB SERPL-MCNC: 0.3 MG/DL (ref 0–1)
BUN SERPL-MCNC: 20 MG/DL (ref 7–20)
CALCIUM SERPL-MCNC: 8.8 MG/DL (ref 8.3–10.6)
CHLORIDE SERPL-SCNC: 106 MMOL/L (ref 99–110)
CO2 SERPL-SCNC: 22 MMOL/L (ref 21–32)
CREAT SERPL-MCNC: 1.1 MG/DL (ref 0.6–1.2)
GFR SERPLBLD CREATININE-BSD FMLA CKD-EPI: 53 ML/MIN/{1.73_M2}
GLUCOSE SERPL-MCNC: 116 MG/DL (ref 70–99)
NT-PROBNP SERPL-MCNC: 146 PG/ML (ref 0–449)
POTASSIUM SERPL-SCNC: 4.2 MMOL/L (ref 3.5–5.1)
PROT SERPL-MCNC: 6.6 G/DL (ref 6.4–8.2)
SODIUM SERPL-SCNC: 141 MMOL/L (ref 136–145)
TSH SERPL DL<=0.005 MIU/L-ACNC: 2.26 UIU/ML (ref 0.27–4.2)

## 2023-06-23 RX ORDER — AMIODARONE HYDROCHLORIDE 100 MG/1
100 TABLET ORAL DAILY
Qty: 30 TABLET | Refills: 4 | Status: SHIPPED | OUTPATIENT
Start: 2023-06-23

## 2023-06-23 RX ORDER — BUPROPION HYDROCHLORIDE 150 MG/1
150 TABLET ORAL EVERY MORNING
Qty: 30 TABLET | Refills: 0 | Status: SHIPPED | OUTPATIENT
Start: 2023-06-23

## 2023-06-23 RX ORDER — FUROSEMIDE 20 MG/1
20 TABLET ORAL AS NEEDED
Qty: 90 TABLET | Refills: 1 | Status: SHIPPED | OUTPATIENT
Start: 2023-06-23 | End: 2023-07-23

## 2023-06-23 RX ORDER — BUSPIRONE HYDROCHLORIDE 10 MG/1
10 TABLET ORAL 2 TIMES DAILY
Qty: 60 TABLET | Refills: 0 | Status: SHIPPED | OUTPATIENT
Start: 2023-06-23 | End: 2023-07-23

## 2023-06-23 NOTE — PROGRESS NOTES
Post-Discharge Transitional Care  Follow Up      Nicanor Thomas   YOB: 1950    Date of Office Visit:  6/23/2023  Date of Hospital Admission: 6/15/23  Date of Hospital Discharge: 6/17/23  Risk of hospital readmission (high >=14%. Medium >=10%) :Readmission Risk Score: 12.9      Care management risk score Rising risk (score 2-5) and Complex Care (Scores >=6): No Risk Score On File     Non face to face  following discharge, date last encounter closed (first attempt may have been earlier): 06/19/2023    Call initiated 2 business days of discharge: Yes    ASSESSMENT/PLAN:   Shortness of breath  -     EKG 12 lead; Future  - EKG showed sinus rhythm without acute first AV block which is known not in A-fib currently  Acute decompensated heart failure ([facility] [address]  -     Comprehensive Metabolic Panel; Future  -     Brain Natriuretic Peptide; Future  -     furosemide (LASIX) 20 MG tablet; Take 1 tablet by mouth as needed (weight gain of 2 to 3 pounds in a day), Disp-90 tablet, R-1Normal  - Patient did have an elevated BNP in the hospital has gained weight since discharge approximately 3 pounds we will have patient take 40 mg of Lasix for the next 4 days and repeat kidney function  Anxiety  -     busPIRone (BUSPAR) 10 MG tablet; Take 1 tablet by mouth 2 times daily, Disp-60 tablet, R-0Normal  - Patient endorses a lot of anxiety around her current health condition we will plan to use buspirone. Patient was educated on side effects of medications. Patient is agreeable to medication at this time  Mild episode of recurrent major depressive disorder (HCC)  -     buPROPion (WELLBUTRIN XL) 150 MG extended release tablet; Take 1 tablet by mouth every morning, Disp-30 tablet, R-0Normal  - Patient believes that Wellbutrin is not beneficial to her and ultimately would like to stop taking this medication we will have patient wean off we will cut dose in half from 300 mg to 150.   Long term use of drug  -     TSH with Reflex;

## 2023-06-26 DIAGNOSIS — I48.91 ATRIAL FIBRILLATION WITH RVR (HCC): ICD-10-CM

## 2023-06-26 RX ORDER — AMIODARONE HYDROCHLORIDE 100 MG/1
100 TABLET ORAL DAILY
Qty: 90 TABLET | Refills: 1 | Status: SHIPPED | OUTPATIENT
Start: 2023-06-26

## 2023-07-07 ENCOUNTER — OFFICE VISIT (OUTPATIENT)
Dept: FAMILY MEDICINE CLINIC | Age: 73
End: 2023-07-07
Payer: MEDICARE

## 2023-07-07 VITALS
SYSTOLIC BLOOD PRESSURE: 104 MMHG | DIASTOLIC BLOOD PRESSURE: 72 MMHG | WEIGHT: 253.6 LBS | OXYGEN SATURATION: 95 % | RESPIRATION RATE: 16 BRPM | BODY MASS INDEX: 39.8 KG/M2 | HEART RATE: 78 BPM | HEIGHT: 67 IN

## 2023-07-07 DIAGNOSIS — M54.6 PAIN IN THORACIC SPINE: ICD-10-CM

## 2023-07-07 DIAGNOSIS — M17.12 PRIMARY OSTEOARTHRITIS OF LEFT KNEE: ICD-10-CM

## 2023-07-07 DIAGNOSIS — M79.672 LEFT FOOT PAIN: Primary | ICD-10-CM

## 2023-07-07 PROCEDURE — 1123F ACP DISCUSS/DSCN MKR DOCD: CPT

## 2023-07-07 PROCEDURE — 1036F TOBACCO NON-USER: CPT

## 2023-07-07 PROCEDURE — 99213 OFFICE O/P EST LOW 20 MIN: CPT

## 2023-07-07 PROCEDURE — 3078F DIAST BP <80 MM HG: CPT

## 2023-07-07 PROCEDURE — 1111F DSCHRG MED/CURRENT MED MERGE: CPT

## 2023-07-07 PROCEDURE — G8400 PT W/DXA NO RESULTS DOC: HCPCS

## 2023-07-07 PROCEDURE — 3074F SYST BP LT 130 MM HG: CPT

## 2023-07-07 PROCEDURE — G8427 DOCREV CUR MEDS BY ELIG CLIN: HCPCS

## 2023-07-07 PROCEDURE — G8417 CALC BMI ABV UP PARAM F/U: HCPCS

## 2023-07-07 PROCEDURE — 1090F PRES/ABSN URINE INCON ASSESS: CPT

## 2023-07-07 PROCEDURE — 3017F COLORECTAL CA SCREEN DOC REV: CPT

## 2023-07-07 RX ORDER — GABAPENTIN 100 MG/1
100 CAPSULE ORAL 2 TIMES DAILY
Qty: 60 CAPSULE | Refills: 0 | Status: SHIPPED | OUTPATIENT
Start: 2023-07-07 | End: 2023-08-06

## 2023-07-07 ASSESSMENT — ENCOUNTER SYMPTOMS
CONSTIPATION: 0
WHEEZING: 0
ABDOMINAL PAIN: 0
COLOR CHANGE: 0
BLOOD IN STOOL: 0
COUGH: 0
DIARRHEA: 0
SORE THROAT: 0
SHORTNESS OF BREATH: 0

## 2023-07-07 NOTE — PROGRESS NOTES
Noa Díaz (:  1950) is a 75 y.o. female,Established patient, here for evaluation of the following chief complaint(s):  Knee Pain, Back Pain, and Foot Pain (Left pain swelling, Ankle down pain, )         ASSESSMENT/PLAN:  1. Left foot pain  -     XR FOOT LEFT (2 VIEWS); Future  - Patient endorses a lot of foot pain on the left side. Patient ultimately stubbed her toes a while back does have a toe that is very just formed believes that she broke it in the past but never sought treatment. Patient is endorsing significant amount of foot pain as well as pain on the great toe as well as the second toe. With obvious deformities of the second toe. We will complete x-ray at this time patient may need referral to podiatry  2. Primary osteoarthritis of left knee  -     gabapentin (NEURONTIN) 100 MG capsule; Take 1 capsule by mouth 2 times daily for 30 days. , Disp-60 capsule, R-0Normal  - Patient is still having significant amount of pain we will plan to place patient on gabapentin to help manage pain. Patient will continue to work with orthopedic specialist/pain management specialist regarding ongoing chronic pain  3. Pain in thoracic spine  -     [address] [facility], Deandre PAVITHRA Rojas,  Orthopedic Surgery (Spine), [facility]  -Patient does have a spinal stimulator. Patient is requesting second opinion and evaluation from a spine specialist will refer to [facility] orthopedic spine surgery. No follow-ups on file. Subjective   SUBJECTIVE/OBJECTIVE:  HPI  Patient presents the office today for follow-up regarding breathing. Patient reports that she is doing well with her breathing no acute concerns that she was last seen in office, patient was treated for an acute heart failure exacerbation given Lasix for substantial weight gain. Patient overall is doing better. Patient comes to the office today with complaints of ongoing issues with back and knee and hip pain.   Patient recently just seen pain management

## 2023-07-13 ENCOUNTER — HOSPITAL ENCOUNTER (OUTPATIENT)
Age: 73
Setting detail: OUTPATIENT SURGERY
Discharge: HOME | End: 2023-07-13
Attending: PAIN MEDICINE | Admitting: PAIN MEDICINE
Payer: MEDICARE

## 2023-07-13 VITALS
DIASTOLIC BLOOD PRESSURE: 82 MMHG | HEART RATE: 76 BPM | BODY MASS INDEX: 36.57 KG/M2 | OXYGEN SATURATION: 96 % | TEMPERATURE: 97.5 F | RESPIRATION RATE: 16 BRPM | HEIGHT: 67 IN | SYSTOLIC BLOOD PRESSURE: 123 MMHG | WEIGHT: 233 LBS

## 2023-07-13 PROBLEM — G89.29 CHRONIC PAIN OF LEFT KNEE: Status: ACTIVE | Noted: 2022-11-09

## 2023-07-13 PROBLEM — M25.562 CHRONIC PAIN OF LEFT KNEE: Status: ACTIVE | Noted: 2022-11-09

## 2023-07-13 PROCEDURE — A9577 INJ MULTIHANCE: HCPCS | Performed by: PAIN MEDICINE

## 2023-07-13 PROCEDURE — 20610 DRAIN/INJ JOINT/BURSA W/O US: CPT | Performed by: PAIN MEDICINE

## 2023-07-13 PROCEDURE — 2709999900 HC NON-CHARGEABLE SUPPLY: Performed by: PAIN MEDICINE

## 2023-07-13 PROCEDURE — 6360000004 HC RX CONTRAST MEDICATION: Performed by: PAIN MEDICINE

## 2023-07-13 PROCEDURE — 3600000002 HC SURGERY LEVEL 2 BASE: Performed by: PAIN MEDICINE

## 2023-07-13 PROCEDURE — 2500000003 HC RX 250 WO HCPCS: Performed by: PAIN MEDICINE

## 2023-07-13 PROCEDURE — 6360000002 HC RX W HCPCS: Performed by: PAIN MEDICINE

## 2023-07-13 PROCEDURE — 7100000010 HC PHASE II RECOVERY - FIRST 15 MIN: Performed by: PAIN MEDICINE

## 2023-07-13 RX ORDER — LIDOCAINE HYDROCHLORIDE 10 MG/ML
INJECTION, SOLUTION EPIDURAL; INFILTRATION; INTRACAUDAL; PERINEURAL PRN
Status: DISCONTINUED | OUTPATIENT
Start: 2023-07-13 | End: 2023-07-13 | Stop reason: ALTCHOICE

## 2023-07-13 ASSESSMENT — PAIN SCALES - GENERAL: PAINLEVEL_OUTOF10: 3

## 2023-07-13 ASSESSMENT — PAIN - FUNCTIONAL ASSESSMENT
PAIN_FUNCTIONAL_ASSESSMENT: PREVENTS OR INTERFERES SOME ACTIVE ACTIVITIES AND ADLS
PAIN_FUNCTIONAL_ASSESSMENT: 0-10

## 2023-07-13 ASSESSMENT — PAIN DESCRIPTION - DESCRIPTORS: DESCRIPTORS: SHARP;ACHING

## 2023-07-14 ENCOUNTER — HOSPITAL ENCOUNTER (OUTPATIENT)
Dept: GENERAL RADIOLOGY | Age: 73
Discharge: HOME | End: 2023-07-14
Payer: MEDICARE

## 2023-07-14 DIAGNOSIS — M79.672 LEFT FOOT PAIN: ICD-10-CM

## 2023-07-14 PROCEDURE — 73630 X-RAY EXAM OF FOOT: CPT

## 2023-07-17 ENCOUNTER — HOSPITAL ENCOUNTER (OUTPATIENT)
Dept: MAMMOGRAPHY | Age: 73
Discharge: HOME | End: 2023-07-22
Payer: MEDICARE

## 2023-07-17 VITALS — WEIGHT: 245 LBS | BODY MASS INDEX: 38.45 KG/M2 | HEIGHT: 67 IN

## 2023-07-17 DIAGNOSIS — Z12.31 VISIT FOR SCREENING MAMMOGRAM: ICD-10-CM

## 2023-07-17 PROCEDURE — 77063 BREAST TOMOSYNTHESIS BI: CPT

## 2023-07-19 ENCOUNTER — TELEPHONE (OUTPATIENT)
Dept: FAMILY MEDICINE CLINIC | Age: 73
End: 2023-07-19

## 2023-07-19 DIAGNOSIS — M79.672 LEFT FOOT PAIN: Primary | ICD-10-CM

## 2023-07-21 DIAGNOSIS — F33.0 MILD EPISODE OF RECURRENT MAJOR DEPRESSIVE DISORDER (HCC): ICD-10-CM

## 2023-07-21 RX ORDER — BUPROPION HYDROCHLORIDE 150 MG/1
150 TABLET ORAL EVERY MORNING
Qty: 30 TABLET | Refills: 0 | Status: SHIPPED | OUTPATIENT
Start: 2023-07-21

## 2023-07-21 NOTE — TELEPHONE ENCOUNTER
Refill Request     Last Seen: Last Seen Department: 7/7/2023  Last Seen by PCP: 7/7/2023    Last Written: 06/23/2023        Next Appointment:   Future Appointments   Date Time Provider [address]   7/24/2023 10:45 AM BÁRBARA Avila [facility]   7/28/2023  2:30 PM Rosalba Albert MD [facility] AND [facility]   8/2/2023 10:00 AM SCHEDULE, [facility] Agatha Ambrose [facility]   8/2/2023 10:00 AM MD Agatha Sylvester [facility]   9/12/2023 11:30 AM [facility]   9/28/2023 10:00 AM DEMI Mullins CNP [place] kristofer MARIN   10/30/2023  7:00 AM SCHEDULE, Froylan Mora [facility]   11/27/2023 11:00 AM A CT VCT [place] CT Froylan Mora Rad       Requested Prescriptions     Pending Prescriptions Disp Refills    buPROPion (WELLBUTRIN XL) 150 MG extended release tablet [Pharmacy Med Name: BUPROPION XL 150MG TABLETS (24 H)] 30 tablet 0     Sig: TAKE 1 TABLET BY MOUTH EVERY MORNING

## 2023-07-24 ENCOUNTER — OFFICE VISIT (OUTPATIENT)
Dept: ORTHOPEDIC SURGERY | Age: 73
End: 2023-07-24
Payer: MEDICARE

## 2023-07-24 VITALS — WEIGHT: 245 LBS | HEIGHT: 67 IN | BODY MASS INDEX: 38.45 KG/M2

## 2023-07-24 DIAGNOSIS — G89.4 CHRONIC PAIN SYNDROME: ICD-10-CM

## 2023-07-24 DIAGNOSIS — M54.6 THORACIC SPINE PAIN: Primary | ICD-10-CM

## 2023-07-24 DIAGNOSIS — M51.36 DDD (DEGENERATIVE DISC DISEASE), LUMBAR: ICD-10-CM

## 2023-07-24 PROCEDURE — 1036F TOBACCO NON-USER: CPT | Performed by: PHYSICIAN ASSISTANT

## 2023-07-24 PROCEDURE — 1090F PRES/ABSN URINE INCON ASSESS: CPT | Performed by: PHYSICIAN ASSISTANT

## 2023-07-24 PROCEDURE — G8400 PT W/DXA NO RESULTS DOC: HCPCS | Performed by: PHYSICIAN ASSISTANT

## 2023-07-24 PROCEDURE — 1123F ACP DISCUSS/DSCN MKR DOCD: CPT | Performed by: PHYSICIAN ASSISTANT

## 2023-07-24 PROCEDURE — 99204 OFFICE O/P NEW MOD 45 MIN: CPT | Performed by: PHYSICIAN ASSISTANT

## 2023-07-24 PROCEDURE — G8427 DOCREV CUR MEDS BY ELIG CLIN: HCPCS | Performed by: PHYSICIAN ASSISTANT

## 2023-07-24 PROCEDURE — 3017F COLORECTAL CA SCREEN DOC REV: CPT | Performed by: PHYSICIAN ASSISTANT

## 2023-07-24 PROCEDURE — G8417 CALC BMI ABV UP PARAM F/U: HCPCS | Performed by: PHYSICIAN ASSISTANT

## 2023-07-24 NOTE — PROGRESS NOTES
11    spironolactone (ALDACTONE) 25 MG tablet, TAKE 1 TABLET DAILY, Disp: 90 tablet, Rfl: 3    pantoprazole (PROTONIX) 40 MG tablet, TAKE 1 TABLET ONCE DAILY, Disp: 90 tablet, Rfl: 0    empagliflozin (JARDIANCE) 10 MG tablet, Take 1 tablet by mouth daily, Disp: 90 tablet, Rfl: 1    hydrOXYzine HCl (ATARAX) 25 MG tablet, TAKE 1 TABLET NIGHTLY, Disp: 90 tablet, Rfl: 3    carvedilol (COREG) 12.5 MG tablet, TAKE 1 TABLET TWICE A DAY (Patient taking differently: Take 1 tablet by mouth 2 times daily), Disp: 180 tablet, Rfl: 1    Allergies: Iv contrast [iodides], Codeine, Penicillins, and Sulfa antibiotics    Social History:    reports that she quit smoking about 3 years ago. Her smoking use included cigarettes. She started smoking about 49 years ago. She has a 66.00 pack-year smoking history. She has never used smokeless tobacco. She reports current alcohol use. She reports that she does not use drugs. Family History:   Family History   Problem Relation Age of Onset    Heart Attack Brother        REVIEW OF SYSTEMS: Full ROS noted & scanned   CONSTITUTIONAL: Denies unexplained weight loss, fevers, chills or fatigue  NEUROLOGICAL: Denies unsteady gait or progressive weakness  MUSCULOSKELETAL: Denies joint swelling or redness  PSYCHOLOGICAL: Denies anxiety, depression   SKIN: Denies skin changes, delayed healing, rash, itching   HEMATOLOGIC: Denies easy bleeding or bruising  ENDOCRINE: Denies excessive thirst, urination, heat/cold  RESPIRATORY: Denies current dyspnea, cough  GI: Denies nausea, vomiting, diarrhea   : Denies bowel or bladder issues      PHYSICAL EXAM:    Vitals: Height 5' 7.01\" (1.702 m), weight 245 lb (111.1 kg). GENERAL EXAM:  General Apparence: Patient is adequately groomed with no evidence of malnutrition. Orientation: The patient is oriented to time, place and person. Mood & Affect:The patient's mood and affect are appropriate.   Vascular: Examination reveals no swelling tenderness in upper or

## 2023-07-24 NOTE — PATIENT INSTRUCTIONS
Today we discussed the diagnosis of chronic low back pain with degenerative disc disease at L5-S1 and the current use of her spinal cord stimulator. Patient feels that when she originally had the spinal cord stimulator placed in 2020 it was working and given her good relief. Now it has had the maximum and she feels no relief at all questioning even if the pain stimulator is working. She was referred to Dr. Wilber Mijares for his consideration and treatment option for potential new spinal cord stimulator.

## 2023-08-01 ASSESSMENT — ENCOUNTER SYMPTOMS
LEFT EYE: 0
WHEEZING: 0
STRIDOR: 0
HEMATEMESIS: 0
HEMATOCHEZIA: 0
SHORTNESS OF BREATH: 0
RIGHT EYE: 0

## 2023-08-01 NOTE — PROGRESS NOTES
Assessment:     1. Atrial fibrillation: patient has paroxysmal atrial fibrillation. Associated symptoms: Fatigue     History of cardioversion: Unknown  History of AF ablation: Had atrial fibrillation ablation in the past (2019)  History of heart surgery/procedure: yes, AF ablation 2019, TONY occlusion 2019    Current use of anti-arrhythmic drugs: amiodarone  Previous use of anti-arrhythmic drugs: Unknown    Overall LV function: Mild left ventricular systolic dysfunction   Size of left atrium:  Dilated  Significant cardiac valvular disease: No significant valve disease  Family history of atrial fibrillation: Unknown    Alcohol consumption: No alcohol consumption  Caffeine consumption: No/minimal caffeine intake  Smoking status: former smoker, quit many years ago  Obstructive sleep apnea: Not on CPAP/BiPAP therapy  Exercise status: Does not exercise regularly (sedentary lifestyle)    I have had discussions with the patient about issues related to atrial fibrillation (including etiology, disease progression patterns, stroke risk and rate control issues). Patient had her questions answered to her satisfaction. Patient has a [id number] score of at least 3 [Age over 72 (1 point), Female gender (1 point), and Heart failure (1 point)]  Longterm anticoagulation is: recommended  Current anticoagulation: Apixaban  Bleeding issues reported: no  Renal function: Normal  Thyroid function: Normal    Following AF ablation in 2019, it appears patient has had minimal AF burden. Had one 5+ hour episode in June 2023 (patient not aware). Now on low dose amiodarone. Needs periodic LFT's/TSH and will refer to eye doctor. Unclear what exact TONY occlusion mechanism was used but sounds, from patient description, to possibly be Lariat type device. Would continue anticoagulation for now. 2. Good blood pressure control.     3. Non-ischemic cardiomyopathy: seems patient had an LVEF of near 35% and underwent implant of dual-chamber

## 2023-08-02 ENCOUNTER — OFFICE VISIT (OUTPATIENT)
Dept: CARDIOLOGY CLINIC | Age: 73
End: 2023-08-02
Payer: MEDICARE

## 2023-08-02 ENCOUNTER — NURSE ONLY (OUTPATIENT)
Dept: CARDIOLOGY CLINIC | Age: 73
End: 2023-08-02
Payer: MEDICARE

## 2023-08-02 VITALS
SYSTOLIC BLOOD PRESSURE: 104 MMHG | DIASTOLIC BLOOD PRESSURE: 62 MMHG | HEART RATE: 68 BPM | OXYGEN SATURATION: 93 % | HEIGHT: 67 IN | WEIGHT: 255.8 LBS | BODY MASS INDEX: 40.15 KG/M2

## 2023-08-02 DIAGNOSIS — Z95.810 ICD (IMPLANTABLE CARDIOVERTER-DEFIBRILLATOR), DUAL, IN SITU: ICD-10-CM

## 2023-08-02 DIAGNOSIS — I42.8 NICM (NONISCHEMIC CARDIOMYOPATHY) (HCC): ICD-10-CM

## 2023-08-02 DIAGNOSIS — Z79.899 ENCOUNTER FOR MONITORING AMIODARONE THERAPY: ICD-10-CM

## 2023-08-02 DIAGNOSIS — I49.1 PREMATURE ATRIAL CONTRACTION: ICD-10-CM

## 2023-08-02 DIAGNOSIS — I48.0 PAF (PAROXYSMAL ATRIAL FIBRILLATION) (HCC): Primary | ICD-10-CM

## 2023-08-02 DIAGNOSIS — Z95.810 ICD (IMPLANTABLE CARDIOVERTER-DEFIBRILLATOR), DUAL, IN SITU: Primary | ICD-10-CM

## 2023-08-02 DIAGNOSIS — Z51.81 ENCOUNTER FOR MONITORING AMIODARONE THERAPY: ICD-10-CM

## 2023-08-02 DIAGNOSIS — E66.01 MORBID OBESITY (HCC): ICD-10-CM

## 2023-08-02 PROCEDURE — G8427 DOCREV CUR MEDS BY ELIG CLIN: HCPCS | Performed by: INTERNAL MEDICINE

## 2023-08-02 PROCEDURE — 93283 PRGRMG EVAL IMPLANTABLE DFB: CPT | Performed by: INTERNAL MEDICINE

## 2023-08-02 PROCEDURE — 1090F PRES/ABSN URINE INCON ASSESS: CPT | Performed by: INTERNAL MEDICINE

## 2023-08-02 PROCEDURE — 3017F COLORECTAL CA SCREEN DOC REV: CPT | Performed by: INTERNAL MEDICINE

## 2023-08-02 PROCEDURE — 1123F ACP DISCUSS/DSCN MKR DOCD: CPT | Performed by: INTERNAL MEDICINE

## 2023-08-02 PROCEDURE — 99214 OFFICE O/P EST MOD 30 MIN: CPT | Performed by: INTERNAL MEDICINE

## 2023-08-02 PROCEDURE — 3078F DIAST BP <80 MM HG: CPT | Performed by: INTERNAL MEDICINE

## 2023-08-02 PROCEDURE — 1036F TOBACCO NON-USER: CPT | Performed by: INTERNAL MEDICINE

## 2023-08-02 PROCEDURE — G8400 PT W/DXA NO RESULTS DOC: HCPCS | Performed by: INTERNAL MEDICINE

## 2023-08-02 PROCEDURE — 3074F SYST BP LT 130 MM HG: CPT | Performed by: INTERNAL MEDICINE

## 2023-08-02 PROCEDURE — G8417 CALC BMI ABV UP PARAM F/U: HCPCS | Performed by: INTERNAL MEDICINE

## 2023-08-02 ASSESSMENT — ENCOUNTER SYMPTOMS: BLURRED VISION: 1

## 2023-08-20 DIAGNOSIS — K21.9 GASTROESOPHAGEAL REFLUX DISEASE WITHOUT ESOPHAGITIS: ICD-10-CM

## 2023-08-20 DIAGNOSIS — F33.0 MILD EPISODE OF RECURRENT MAJOR DEPRESSIVE DISORDER (HCC): ICD-10-CM

## 2023-08-20 DIAGNOSIS — I10 ESSENTIAL HYPERTENSION: ICD-10-CM

## 2023-08-21 RX ORDER — PANTOPRAZOLE SODIUM 40 MG/1
TABLET, DELAYED RELEASE ORAL
Qty: 90 TABLET | Refills: 3 | Status: SHIPPED | OUTPATIENT
Start: 2023-08-21

## 2023-08-21 RX ORDER — BUPROPION HYDROCHLORIDE 300 MG/1
TABLET ORAL
Qty: 90 TABLET | Refills: 3 | Status: SHIPPED | OUTPATIENT
Start: 2023-08-21

## 2023-08-21 RX ORDER — CARVEDILOL 12.5 MG/1
TABLET ORAL
Qty: 180 TABLET | Refills: 3 | Status: SHIPPED | OUTPATIENT
Start: 2023-08-21

## 2023-08-21 NOTE — TELEPHONE ENCOUNTER
Refill Request       Last Seen: Last Seen Department: 7/7/2023  Last Seen by PCP: Visit date not found    Last Written: 04/20/2023         Next Appointment:   Future Appointments   Date Time Provider [address]   9/12/2023 11:30 AM MD GISELL Anaya [facility]   9/28/2023 10:00 AM DEMI Ferrell - CNP [place] - DYD   10/3/2023 12:30 PM DEMI Davis CNP [facility] AND [facility]   10/30/2023  7:00 AM SCHEDULE, Dicie Sizer [employer] Kevyn Olivares [facility]   11/27/2023 11:00 AM MHA CT VCT MHAZ CT Dicie Sizer Rad             Requested Prescriptions     Pending Prescriptions Disp Refills    pantoprazole (PROTONIX) 40 MG tablet [Pharmacy Med Name: PANTOPRAZOLE SODIUM DR TABS 40MG] 90 tablet 3     Sig: TAKE 1 TABLET ONCE DAILY    buPROPion (WELLBUTRIN XL) 300 MG extended release tablet [Pharmacy Med Name: BUPROPION HCL XL TABS 300MG] 90 tablet 3     Sig: TAKE 1 TABLET EVERY MORNING

## 2023-08-21 NOTE — TELEPHONE ENCOUNTER
Refill Request         Last Seen: Last Seen Department: 7/7/2023  Last Seen by PCP: 7/7/2023    Last Written: 01/27/2023          Next Appointment:   Future Appointments   Date Time Provider [address]   9/12/2023 11:30 AM Agapito Bellamy MD [facility]   9/28/2023 10:00 AM DEMI Mckeon CNP [place] kristofer MARIN   10/3/2023 12:30 PM DEMI Clemons CNP [facility] AND [facility]   10/30/2023  7:00 AM SCHEDULE, Carlos Anguiano [facility]   11/27/2023 11:00 AM A CT VCT [place] CT Carlos Anguiano Rad             Requested Prescriptions     Pending Prescriptions Disp Refills    carvedilol (COREG) 12.5 MG tablet [Pharmacy Med Name: CARVEDILOL TABS 12.5MG] 180 tablet 3     Sig: TAKE 1 TABLET TWICE A DAY

## 2023-08-25 DIAGNOSIS — M17.12 PRIMARY OSTEOARTHRITIS OF LEFT KNEE: ICD-10-CM

## 2023-08-25 RX ORDER — GABAPENTIN 100 MG/1
100 CAPSULE ORAL 2 TIMES DAILY
Qty: 60 CAPSULE | Refills: 0 | Status: SHIPPED | OUTPATIENT
Start: 2023-08-25 | End: 2023-09-24

## 2023-08-25 NOTE — TELEPHONE ENCOUNTER
Refill Request       Last Seen: Last Seen Department: 7/7/2023  Last Seen by PCP: 7/7/2023    Last Written: 07/07/2023        Next Appointment:   Future Appointments   Date Time Provider [address]   9/12/2023 11:30 AM MD GISELL Reeves [facility]   9/28/2023 10:00 AM DEMI Andre CNP [place] - DYD   10/3/2023 12:30 PM DEMI Rodas CNP [facility]   10/30/2023  7:00 AM SCHEDULE, LINDA REMOTE TRANSMISSION Isela Farley [facility]   11/27/2023 11:00 AM MHA CT VCT [place] CT Julio Jean         Requested Prescriptions     Pending Prescriptions Disp Refills    gabapentin (NEURONTIN) 100 MG capsule 60 capsule 2     Sig: Take 1 capsule by mouth 2 times daily for 30 days.

## 2023-08-28 NOTE — TELEPHONE ENCOUNTER
Refill Request       Last Seen: Last Seen Department: 7/7/2023  Last Seen by PCP: 7/7/2023    Last Written: 06/17/2023      Next Appointment:   Future Appointments   Date Time Provider [address]   9/12/2023 11:30 AM MD GISELL Car [facility]   9/28/2023 10:00 AM DEMI Waldron CNP [place] kristofer [place] - DYJEFFREY   10/3/2023 12:30 PM DEMI Brown CNP [facility] AND [facility]   10/30/2023  7:00 AM SCHEDULE, [facility] Zac Wright [facility]   11/27/2023 11:00 AM MHA CT VCT MHAZ CT [facility] Rad           Requested Prescriptions     Pending Prescriptions Disp Refills    apixaban (ELIQUIS) 5 MG TABS tablet 180 tablet 1     Sig: Take 1 tablet by mouth 2 times daily

## 2023-09-01 ENCOUNTER — TELEPHONE (OUTPATIENT)
Dept: CARDIOLOGY CLINIC | Age: 73
End: 2023-09-01

## 2023-09-01 NOTE — TELEPHONE ENCOUNTER
Patient notes that she just got a new shower head and it has something magnetic in it. It states to contact your MD before using. Is it safe for her to use with her ICD/pacer?

## 2023-09-30 DIAGNOSIS — M17.12 PRIMARY OSTEOARTHRITIS OF LEFT KNEE: ICD-10-CM

## 2023-10-02 RX ORDER — GABAPENTIN 100 MG/1
100 CAPSULE ORAL 2 TIMES DAILY
Qty: 60 CAPSULE | Refills: 0 | Status: SHIPPED | OUTPATIENT
Start: 2023-10-02 | End: 2023-11-10 | Stop reason: SDUPTHER

## 2023-10-02 NOTE — TELEPHONE ENCOUNTER
Refill Request     Last Seen: Last Seen Department: 7/7/2023  Last Seen by PCP: 7/7/2023    Last Written: 8/25/23     Next Appointment:   Future Appointments   Date Time Provider [address]   10/3/2023  9:30 AM Loraine Res, [address] [facility] [place] - DYD   10/3/2023 12:30 PM Tanja Lee, APRN - CNP AFL TSP AND [facility]   10/30/2023  7:00 AM SCHEDULE, Niyah SAM   11/27/2023 11:00 AM MHA CT VCT MHAZ CT Niyah Echavarria Rad   11/30/2023  2:15 PM MD GISELL Coombs [facility]       Requested Prescriptions     Pending Prescriptions Disp Refills    gabapentin (NEURONTIN) 100 MG capsule [Pharmacy Med Name: GABAPENTIN CAPS 100MG] 60 capsule 11     Sig: Take 1 capsule by mouth 2 times daily.

## 2023-10-03 ENCOUNTER — OFFICE VISIT (OUTPATIENT)
Dept: FAMILY MEDICINE CLINIC | Age: 73
End: 2023-10-03
Payer: MEDICARE

## 2023-10-03 VITALS
WEIGHT: 253 LBS | DIASTOLIC BLOOD PRESSURE: 68 MMHG | SYSTOLIC BLOOD PRESSURE: 122 MMHG | BODY MASS INDEX: 39.71 KG/M2 | RESPIRATION RATE: 16 BRPM | OXYGEN SATURATION: 95 % | HEIGHT: 67 IN | HEART RATE: 73 BPM

## 2023-10-03 DIAGNOSIS — E78.2 MIXED HYPERLIPIDEMIA: ICD-10-CM

## 2023-10-03 DIAGNOSIS — E11.9 TYPE 2 DIABETES MELLITUS WITHOUT COMPLICATION, WITHOUT LONG-TERM CURRENT USE OF INSULIN (HCC): Primary | ICD-10-CM

## 2023-10-03 DIAGNOSIS — N18.31 STAGE 3A CHRONIC KIDNEY DISEASE (HCC): ICD-10-CM

## 2023-10-03 DIAGNOSIS — E87.6 HYPOKALEMIA: ICD-10-CM

## 2023-10-03 DIAGNOSIS — F11.20 OPIOID DEPENDENCE WITH CURRENT USE (HCC): ICD-10-CM

## 2023-10-03 PROBLEM — M25.562 CHRONIC PAIN OF BOTH KNEES: Status: ACTIVE | Noted: 2023-10-03

## 2023-10-03 PROBLEM — M25.561 CHRONIC PAIN OF BOTH KNEES: Status: ACTIVE | Noted: 2023-10-03

## 2023-10-03 PROBLEM — G89.29 CHRONIC PAIN OF BOTH KNEES: Status: ACTIVE | Noted: 2023-10-03

## 2023-10-03 LAB
ALBUMIN SERPL-MCNC: 4.6 G/DL (ref 3.4–5)
ALBUMIN/GLOB SERPL: 1.8 {RATIO} (ref 1.1–2.2)
ALP SERPL-CCNC: 82 U/L (ref 40–129)
ALT SERPL-CCNC: 30 U/L (ref 10–40)
ANION GAP SERPL CALCULATED.3IONS-SCNC: 13 MMOL/L (ref 3–16)
AST SERPL-CCNC: 30 U/L (ref 15–37)
BILIRUB SERPL-MCNC: 0.6 MG/DL (ref 0–1)
BUN SERPL-MCNC: 23 MG/DL (ref 7–20)
CALCIUM SERPL-MCNC: 9.5 MG/DL (ref 8.3–10.6)
CHLORIDE SERPL-SCNC: 102 MMOL/L (ref 99–110)
CO2 SERPL-SCNC: 27 MMOL/L (ref 21–32)
CREAT SERPL-MCNC: 1.2 MG/DL (ref 0.6–1.2)
GFR SERPLBLD CREATININE-BSD FMLA CKD-EPI: 47 ML/MIN/{1.73_M2}
GLUCOSE SERPL-MCNC: 101 MG/DL (ref 70–99)
HBA1C MFR BLD: 5.9 %
POTASSIUM SERPL-SCNC: 3.3 MMOL/L (ref 3.5–5.1)
PROT SERPL-MCNC: 7.1 G/DL (ref 6.4–8.2)
SODIUM SERPL-SCNC: 142 MMOL/L (ref 136–145)

## 2023-10-03 PROCEDURE — 99214 OFFICE O/P EST MOD 30 MIN: CPT

## 2023-10-03 PROCEDURE — G8400 PT W/DXA NO RESULTS DOC: HCPCS

## 2023-10-03 PROCEDURE — 3044F HG A1C LEVEL LT 7.0%: CPT

## 2023-10-03 PROCEDURE — 1090F PRES/ABSN URINE INCON ASSESS: CPT

## 2023-10-03 PROCEDURE — G8427 DOCREV CUR MEDS BY ELIG CLIN: HCPCS

## 2023-10-03 PROCEDURE — 1036F TOBACCO NON-USER: CPT

## 2023-10-03 PROCEDURE — G8484 FLU IMMUNIZE NO ADMIN: HCPCS

## 2023-10-03 PROCEDURE — 83036 HEMOGLOBIN GLYCOSYLATED A1C: CPT

## 2023-10-03 PROCEDURE — 2022F DILAT RTA XM EVC RTNOPTHY: CPT

## 2023-10-03 PROCEDURE — G8417 CALC BMI ABV UP PARAM F/U: HCPCS

## 2023-10-03 PROCEDURE — 1123F ACP DISCUSS/DSCN MKR DOCD: CPT

## 2023-10-03 PROCEDURE — 3074F SYST BP LT 130 MM HG: CPT

## 2023-10-03 PROCEDURE — 3017F COLORECTAL CA SCREEN DOC REV: CPT

## 2023-10-03 PROCEDURE — 3078F DIAST BP <80 MM HG: CPT

## 2023-10-03 PROCEDURE — 36415 COLL VENOUS BLD VENIPUNCTURE: CPT

## 2023-10-03 RX ORDER — ATORVASTATIN CALCIUM 40 MG/1
40 TABLET, FILM COATED ORAL DAILY
Qty: 30 TABLET | Refills: 0 | Status: SHIPPED | OUTPATIENT
Start: 2023-10-03

## 2023-10-03 RX ORDER — FUROSEMIDE 40 MG/1
40 TABLET ORAL DAILY
COMMUNITY

## 2023-10-03 ASSESSMENT — ENCOUNTER SYMPTOMS
BLOOD IN STOOL: 0
SHORTNESS OF BREATH: 0
COLOR CHANGE: 0
COUGH: 0
BACK PAIN: 1
CONSTIPATION: 0
WHEEZING: 0
DIARRHEA: 0
SORE THROAT: 0
ABDOMINAL PAIN: 0

## 2023-10-03 NOTE — PROGRESS NOTES
Lamar Casas (:  1950) is a 75 y.o. female,Established patient, here for evaluation of the following chief complaint(s):  Diabetes (Pt is here for a 6 month follow up )         ASSESSMENT/PLAN:  1. Type 2 diabetes mellitus without complication, without long-term current use of insulin (HCC)  -     POCT glycosylated hemoglobin (Hb A1C)  -     MICROALBUMIN / CREATININE URINE RATIO; Future  -     Comprehensive Metabolic Panel; Future  - Patient is doing well with diabetes A1c was 5.9 today. Patient will continue to work on diet and exercise to improve A1c. We will continue to monitor closely. We will treat more aggressively given cardiac history. Lab Results   Component Value Date    LABA1C 5.9 10/03/2023    LABA1C 5.7 2023     No results found for: \"EAG\"     2. Mixed hyperlipidemia  -     atorvastatin (LIPITOR) 40 MG tablet; Take 1 tablet by mouth daily, Disp-30 tablet, R-0Normal  -Somewhere along the line patient was on Lipitor and has been discontinued she endorses that she has not been taking it for a year. We will restart Lipitor at this time. Patient would likely benefit from 80 mg however we will start her on 40 and increase to 80. Patient was educated that she should really follow-up with cardiology for further recommendations however her risk or is vastly elevated and needs medication at this time. We will start Lipitor as listed above. We will recheck cholesterol    The 10-year ASCVD risk score (Isidra DK, et al., 2019) is: 31%    Values used to calculate the score:      Age: 75 years      Sex: Female      Is Non- : No      Diabetic: Yes      Tobacco smoker: No      Systolic Blood Pressure: 489 mmHg      Is BP treated: Yes      HDL Cholesterol: 45 mg/dL      Total Cholesterol: 192 mg/dL     Return in about 6 months (around 4/3/2024) for Follow up.          Subjective   SUBJECTIVE/OBJECTIVE:  HPI  Patient presents to the office today for follow-up regarding

## 2023-10-04 PROBLEM — F11.20 OPIOID DEPENDENCE WITH CURRENT USE (HCC): Status: ACTIVE | Noted: 2023-10-04

## 2023-10-04 PROBLEM — N18.30 CHRONIC RENAL DISEASE, STAGE III (HCC): Status: ACTIVE | Noted: 2023-10-04

## 2023-10-04 RX ORDER — POTASSIUM CHLORIDE 750 MG/1
10 TABLET, EXTENDED RELEASE ORAL 2 TIMES DAILY
Qty: 14 TABLET | Refills: 0 | Status: SHIPPED | OUTPATIENT
Start: 2023-10-04 | End: 2023-10-11

## 2023-10-08 DIAGNOSIS — E87.6 HYPOKALEMIA: ICD-10-CM

## 2023-10-09 NOTE — TELEPHONE ENCOUNTER
Refill Request     Last Seen: Last Seen Department: 10/3/2023  Last Seen by PCP: 10/3/2023    Last Written: 10/4/23    Next Appointment:   Future Appointments   Date Time Provider [address]   10/11/2023 10:00 AM SCHEDULE, [address] [place] - DYJEFFREY   10/30/2023  7:00 AM SCHEDULE, Rylee SAM   11/27/2023 11:00 AM MHA CT VCT MHAZ CT Win Rad   11/28/2023 12:10 PM DEMI Sanchez - CNP [facility] TSP AND [facility]   11/30/2023  2:15 PM MD GISELL Nelson PULR[facility]       Requested Prescriptions     Pending Prescriptions Disp Refills    potassium chloride (KLOR-CON M) 10 MEQ extended release tablet [Pharmacy Med Name: POTASSIUM CL MICRO 10MEQ ER TABS] 14 tablet 0     Sig: TAKE 1 TABLET BY MOUTH TWICE DAILY FOR 7 DAYS

## 2023-10-10 RX ORDER — POTASSIUM CHLORIDE 750 MG/1
10 TABLET, EXTENDED RELEASE ORAL 2 TIMES DAILY
Qty: 14 TABLET | Refills: 0 | OUTPATIENT
Start: 2023-10-10 | End: 2023-10-17

## 2023-10-11 ENCOUNTER — NURSE ONLY (OUTPATIENT)
Dept: FAMILY MEDICINE CLINIC | Age: 73
End: 2023-10-11
Payer: MEDICARE

## 2023-10-11 DIAGNOSIS — E11.9 TYPE 2 DIABETES MELLITUS WITHOUT COMPLICATION, WITHOUT LONG-TERM CURRENT USE OF INSULIN (HCC): ICD-10-CM

## 2023-10-11 DIAGNOSIS — E87.6 HYPOKALEMIA: ICD-10-CM

## 2023-10-11 LAB
ANION GAP SERPL CALCULATED.3IONS-SCNC: 13 MMOL/L (ref 3–16)
BUN SERPL-MCNC: 12 MG/DL (ref 7–20)
CALCIUM SERPL-MCNC: 8.9 MG/DL (ref 8.3–10.6)
CHLORIDE SERPL-SCNC: 102 MMOL/L (ref 99–110)
CO2 SERPL-SCNC: 29 MMOL/L (ref 21–32)
CREAT SERPL-MCNC: 1.1 MG/DL (ref 0.6–1.2)
CREAT UR-MCNC: 154.1 MG/DL (ref 28–259)
GFR SERPLBLD CREATININE-BSD FMLA CKD-EPI: 53 ML/MIN/{1.73_M2}
GLUCOSE SERPL-MCNC: 112 MG/DL (ref 70–99)
MICROALBUMIN UR DL<=1MG/L-MCNC: 21.3 MG/DL
MICROALBUMIN/CREAT UR: 138.2 MG/G (ref 0–30)
POTASSIUM SERPL-SCNC: 3.6 MMOL/L (ref 3.5–5.1)
SODIUM SERPL-SCNC: 144 MMOL/L (ref 136–145)

## 2023-10-11 PROCEDURE — 36415 COLL VENOUS BLD VENIPUNCTURE: CPT

## 2023-10-18 DIAGNOSIS — J44.9 CHRONIC OBSTRUCTIVE PULMONARY DISEASE, UNSPECIFIED COPD TYPE (HCC): ICD-10-CM

## 2023-10-18 RX ORDER — BUDESONIDE, GLYCOPYRROLATE, AND FORMOTEROL FUMARATE 160; 9; 4.8 UG/1; UG/1; UG/1
2 AEROSOL, METERED RESPIRATORY (INHALATION) 2 TIMES DAILY
Qty: 10.7 G | Refills: 11 | Status: SHIPPED | OUTPATIENT
Start: 2023-10-18

## 2023-10-18 RX ORDER — ROPINIROLE 1 MG/1
1 TABLET, FILM COATED ORAL 3 TIMES DAILY
Qty: 90 TABLET | Refills: 11 | Status: SHIPPED | OUTPATIENT
Start: 2023-10-18

## 2023-10-18 NOTE — TELEPHONE ENCOUNTER
Refill Request          Last Seen: Last Seen Department: 10/3/2023  Last Seen by PCP: 10/3/2023            Next Appointment:   Future Appointments   Date Time Provider [address]   10/30/2023  7:00 AM SCHEDULE, [place] SAM   11/27/2023 11:00 AM MHA CT VCT MHAZ CT Win Rad   11/28/2023 12:10 PM Katelynn Gardiner APRN - CNP AFL TSP AND [facility]   11/30/2023  2:15 PM [facility]       Requested Prescriptions     Pending Prescriptions Disp Refills    rOPINIRole (REQUIP) 1 MG tablet [Pharmacy Med Name: ROPINIROLE HCL TABS 1MG] 90 tablet 11     Sig: TAKE 1 TABLET THREE TIMES A DAY    BREZTRI AEROSPHERE 160-9-4.8 MCG/ACT AERO [Pharmacy Med Name: Anne Ritter INH 10.7GM] 10.7 g 11     Sig: USE 2 INHALATIONS TWICE A DAY

## 2023-10-21 ENCOUNTER — APPOINTMENT (OUTPATIENT)
Dept: GENERAL RADIOLOGY | Age: 73
End: 2023-10-21
Payer: MEDICARE

## 2023-10-21 ENCOUNTER — HOSPITAL ENCOUNTER (EMERGENCY)
Age: 73
Discharge: HOME | End: 2023-10-21
Attending: EMERGENCY MEDICINE
Payer: MEDICARE

## 2023-10-21 VITALS
HEART RATE: 65 BPM | DIASTOLIC BLOOD PRESSURE: 76 MMHG | RESPIRATION RATE: 18 BRPM | TEMPERATURE: 97.7 F | HEIGHT: 67 IN | BODY MASS INDEX: 39.39 KG/M2 | SYSTOLIC BLOOD PRESSURE: 150 MMHG | OXYGEN SATURATION: 94 % | WEIGHT: 251 LBS

## 2023-10-21 DIAGNOSIS — I50.9 ACUTE ON CHRONIC CONGESTIVE HEART FAILURE, UNSPECIFIED HEART FAILURE TYPE (HCC): Primary | ICD-10-CM

## 2023-10-21 LAB
ALBUMIN SERPL-MCNC: 4.1 G/DL (ref 3.4–5)
ALBUMIN/GLOB SERPL: 1.4 {RATIO} (ref 1.1–2.2)
ALP SERPL-CCNC: 92 U/L (ref 40–129)
ALT SERPL-CCNC: 20 U/L (ref 10–40)
ANION GAP SERPL CALCULATED.3IONS-SCNC: 10 MMOL/L (ref 3–16)
AST SERPL-CCNC: 25 U/L (ref 15–37)
BASOPHILS # BLD: 0.2 K/UL (ref 0–0.2)
BASOPHILS NFR BLD: 3.3 %
BILIRUB SERPL-MCNC: 0.6 MG/DL (ref 0–1)
BUN SERPL-MCNC: 10 MG/DL (ref 7–20)
CALCIUM SERPL-MCNC: 9.1 MG/DL (ref 8.3–10.6)
CHLORIDE SERPL-SCNC: 106 MMOL/L (ref 99–110)
CO2 SERPL-SCNC: 26 MMOL/L (ref 21–32)
CREAT SERPL-MCNC: 1 MG/DL (ref 0.6–1.2)
DEPRECATED RDW RBC AUTO: 14 % (ref 12.4–15.4)
EKG ATRIAL RATE: 72 BPM
EKG DIAGNOSIS: NORMAL
EKG Q-T INTERVAL: 448 MS
EKG QRS DURATION: 140 MS
EKG QTC CALCULATION (BAZETT): 513 MS
EKG R AXIS: -45 DEGREES
EKG T AXIS: 108 DEGREES
EKG VENTRICULAR RATE: 79 BPM
EOSINOPHIL # BLD: 0.3 K/UL (ref 0–0.6)
EOSINOPHIL NFR BLD: 3.6 %
GFR SERPLBLD CREATININE-BSD FMLA CKD-EPI: 59 ML/MIN/{1.73_M2}
GLUCOSE SERPL-MCNC: 95 MG/DL (ref 70–99)
HCT VFR BLD AUTO: 38.1 % (ref 36–48)
HGB BLD-MCNC: 12.1 G/DL (ref 12–16)
LYMPHOCYTES # BLD: 1.1 K/UL (ref 1–5.1)
LYMPHOCYTES NFR BLD: 15.6 %
MAGNESIUM SERPL-MCNC: 2.3 MG/DL (ref 1.8–2.4)
MCH RBC QN AUTO: 29.2 PG (ref 26–34)
MCHC RBC AUTO-ENTMCNC: 31.9 G/DL (ref 31–36)
MCV RBC AUTO: 91.5 FL (ref 80–100)
MONOCYTES # BLD: 0.4 K/UL (ref 0–1.3)
MONOCYTES NFR BLD: 5.1 %
NEUTROPHILS # BLD: 5.3 K/UL (ref 1.7–7.7)
NEUTROPHILS NFR BLD: 72.4 %
NT-PROBNP SERPL-MCNC: 1483 PG/ML (ref 0–449)
PLATELET # BLD AUTO: 241 K/UL (ref 135–450)
PMV BLD AUTO: 7.5 FL (ref 5–10.5)
POTASSIUM SERPL-SCNC: 3.5 MMOL/L (ref 3.5–5.1)
PROT SERPL-MCNC: 7.1 G/DL (ref 6.4–8.2)
RBC # BLD AUTO: 4.16 M/UL (ref 4–5.2)
SODIUM SERPL-SCNC: 142 MMOL/L (ref 136–145)
TROPONIN, HIGH SENSITIVITY: 19 NG/L (ref 0–14)
TROPONIN, HIGH SENSITIVITY: 19 NG/L (ref 0–14)
WBC # BLD AUTO: 7.3 K/UL (ref 4–11)

## 2023-10-21 PROCEDURE — 99285 EMERGENCY DEPT VISIT HI MDM: CPT

## 2023-10-21 PROCEDURE — 85025 COMPLETE CBC W/AUTO DIFF WBC: CPT

## 2023-10-21 PROCEDURE — 93010 ELECTROCARDIOGRAM REPORT: CPT | Performed by: INTERNAL MEDICINE

## 2023-10-21 PROCEDURE — 83735 ASSAY OF MAGNESIUM: CPT

## 2023-10-21 PROCEDURE — 71046 X-RAY EXAM CHEST 2 VIEWS: CPT

## 2023-10-21 PROCEDURE — 84484 ASSAY OF TROPONIN QUANT: CPT

## 2023-10-21 PROCEDURE — 6360000002 HC RX W HCPCS: Performed by: PHYSICIAN ASSISTANT

## 2023-10-21 PROCEDURE — 96374 THER/PROPH/DIAG INJ IV PUSH: CPT

## 2023-10-21 PROCEDURE — 93005 ELECTROCARDIOGRAM TRACING: CPT | Performed by: EMERGENCY MEDICINE

## 2023-10-21 PROCEDURE — 83880 ASSAY OF NATRIURETIC PEPTIDE: CPT

## 2023-10-21 PROCEDURE — 6370000000 HC RX 637 (ALT 250 FOR IP): Performed by: PHYSICIAN ASSISTANT

## 2023-10-21 PROCEDURE — 80053 COMPREHEN METABOLIC PANEL: CPT

## 2023-10-21 RX ORDER — METHYLPREDNISOLONE SODIUM SUCCINATE 125 MG/2ML
125 INJECTION, POWDER, LYOPHILIZED, FOR SOLUTION INTRAMUSCULAR; INTRAVENOUS ONCE
Status: COMPLETED | OUTPATIENT
Start: 2023-10-21 | End: 2023-10-21

## 2023-10-21 RX ORDER — IPRATROPIUM BROMIDE AND ALBUTEROL SULFATE 2.5; .5 MG/3ML; MG/3ML
3 SOLUTION RESPIRATORY (INHALATION)
Status: DISCONTINUED | OUTPATIENT
Start: 2023-10-21 | End: 2023-10-21 | Stop reason: HOSPADM

## 2023-10-21 RX ADMIN — METHYLPREDNISOLONE SODIUM SUCCINATE 125 MG: 125 INJECTION INTRAMUSCULAR; INTRAVENOUS at 11:53

## 2023-10-21 RX ADMIN — IPRATROPIUM BROMIDE AND ALBUTEROL SULFATE 3 DOSE: 2.5; .5 SOLUTION RESPIRATORY (INHALATION) at 11:55

## 2023-10-21 ASSESSMENT — PATIENT HEALTH QUESTIONNAIRE - PHQ9
SUM OF ALL RESPONSES TO PHQ9 QUESTIONS 1 & 2: 0
SUM OF ALL RESPONSES TO PHQ QUESTIONS 1-9: 0
SUM OF ALL RESPONSES TO PHQ QUESTIONS 1-9: 0
2. FEELING DOWN, DEPRESSED OR HOPELESS: 0
SUM OF ALL RESPONSES TO PHQ QUESTIONS 1-9: 0
SUM OF ALL RESPONSES TO PHQ QUESTIONS 1-9: 0
1. LITTLE INTEREST OR PLEASURE IN DOING THINGS: 0

## 2023-10-21 ASSESSMENT — PAIN - FUNCTIONAL ASSESSMENT: PAIN_FUNCTIONAL_ASSESSMENT: 0-10

## 2023-10-21 ASSESSMENT — PAIN DESCRIPTION - DESCRIPTORS: DESCRIPTORS: ACHING

## 2023-10-21 ASSESSMENT — PAIN DESCRIPTION - LOCATION: LOCATION: HEAD

## 2023-10-21 ASSESSMENT — PAIN DESCRIPTION - PAIN TYPE: TYPE: ACUTE PAIN

## 2023-10-21 ASSESSMENT — PAIN SCALES - GENERAL: PAINLEVEL_OUTOF10: 3

## 2023-10-21 NOTE — DISCHARGE INSTRUCTIONS
Please double your dose of Lasix each of the next 3 days and follow-up with cardiology in the office.

## 2023-10-21 NOTE — ED NOTES
ED CARDIO CONSULT  RE-CHF  1402-PAGED DR. GARDNER  1405-DR. GARDNER RETURNED PAGE - TRANSFERRED TO Chuck Rea  10/21/23 [id number]

## 2023-10-21 NOTE — ED NOTES
Ambulated patient. Patient ambulated well with a steady gait. Patient did not require any assistance from ER Tech. Patient stated that she felt much better than she did before. States that she did not feel short of breath or dizzy, but felt \"fuzzy. \"     Pauline Bundy  10/21/23 [id number]

## 2023-10-23 ENCOUNTER — TELEPHONE (OUTPATIENT)
Dept: CARDIOLOGY CLINIC | Age: 73
End: 2023-10-23

## 2023-10-23 NOTE — TELEPHONE ENCOUNTER
Spoke with pt regarding appointment times and lasix recommendations. Per JUANCHO's August OV, pt should have f/u in 6 months with EPNP and him in a year. Scheduled pt to f/u with NPAM 01/2024. Dr. Chan Rivas ER recommends to f/u with cardio, not EP specifically for management of lasix. NPRB- are you able to advise this patient on lasix and provide see patient as HSFU?

## 2023-10-23 NOTE — TELEPHONE ENCOUNTER
Optivol level reviewed and remains elevated.    Continue the increased lasix 40mg twice a day for now  Add on to see me this Thursday at 1245

## 2023-10-23 NOTE — TELEPHONE ENCOUNTER
Left  at [phone] informing pt of NPRB's message. Informed pt to call back to schedule with nprb at [facility] [place] for 10/26/23 at 1245 ok'd per nprb.

## 2023-10-23 NOTE — TELEPHONE ENCOUNTER
Pt was seen in hospital and told to increase her Lasix for 3 days, and make appt with KXA. Pt is scheduled 11/10/23, should pt keep taking the increased Lasix till appt. Please advise.

## 2023-10-25 ENCOUNTER — TELEPHONE (OUTPATIENT)
Dept: CARDIOLOGY CLINIC | Age: 73
End: 2023-10-25

## 2023-10-25 NOTE — TELEPHONE ENCOUNTER
----- Message from Gurpreet Martínez MD sent at 10/25/2023 11:52 AM EDT -----  Please plug into cardiology schedule for follow up and EP clinic.   If she gets care elsewhere, then no need to refer/schedule.    ----- Message -----  From: Tee Mathur MD  Sent: 10/21/2023   3:32 PM EDT  To: Gurpreet Martínez MD

## 2023-10-26 ENCOUNTER — TELEPHONE (OUTPATIENT)
Dept: CARDIOLOGY CLINIC | Age: 73
End: 2023-10-26

## 2023-10-26 ENCOUNTER — OFFICE VISIT (OUTPATIENT)
Dept: CARDIOLOGY CLINIC | Age: 73
End: 2023-10-26
Payer: MEDICARE

## 2023-10-26 VITALS
BODY MASS INDEX: 39.71 KG/M2 | DIASTOLIC BLOOD PRESSURE: 70 MMHG | HEIGHT: 67 IN | HEART RATE: 88 BPM | WEIGHT: 253 LBS | OXYGEN SATURATION: 94 % | SYSTOLIC BLOOD PRESSURE: 100 MMHG

## 2023-10-26 DIAGNOSIS — I50.23 ACUTE ON CHRONIC SYSTOLIC HEART FAILURE (HCC): Primary | ICD-10-CM

## 2023-10-26 DIAGNOSIS — I10 ESSENTIAL HYPERTENSION: ICD-10-CM

## 2023-10-26 DIAGNOSIS — Z86.39 HX OF IRON DEFICIENCY: ICD-10-CM

## 2023-10-26 PROCEDURE — G8427 DOCREV CUR MEDS BY ELIG CLIN: HCPCS | Performed by: NURSE PRACTITIONER

## 2023-10-26 PROCEDURE — 3074F SYST BP LT 130 MM HG: CPT | Performed by: NURSE PRACTITIONER

## 2023-10-26 PROCEDURE — G8417 CALC BMI ABV UP PARAM F/U: HCPCS | Performed by: NURSE PRACTITIONER

## 2023-10-26 PROCEDURE — 1036F TOBACCO NON-USER: CPT | Performed by: NURSE PRACTITIONER

## 2023-10-26 PROCEDURE — 3078F DIAST BP <80 MM HG: CPT | Performed by: NURSE PRACTITIONER

## 2023-10-26 PROCEDURE — 1090F PRES/ABSN URINE INCON ASSESS: CPT | Performed by: NURSE PRACTITIONER

## 2023-10-26 PROCEDURE — 99214 OFFICE O/P EST MOD 30 MIN: CPT | Performed by: NURSE PRACTITIONER

## 2023-10-26 PROCEDURE — 3017F COLORECTAL CA SCREEN DOC REV: CPT | Performed by: NURSE PRACTITIONER

## 2023-10-26 PROCEDURE — 1123F ACP DISCUSS/DSCN MKR DOCD: CPT | Performed by: NURSE PRACTITIONER

## 2023-10-26 PROCEDURE — G8484 FLU IMMUNIZE NO ADMIN: HCPCS | Performed by: NURSE PRACTITIONER

## 2023-10-26 PROCEDURE — G8400 PT W/DXA NO RESULTS DOC: HCPCS | Performed by: NURSE PRACTITIONER

## 2023-10-26 RX ORDER — CARVEDILOL 12.5 MG/1
6.25 TABLET ORAL 2 TIMES DAILY WITH MEALS
Qty: 180 TABLET | Refills: 3
Start: 2023-10-26

## 2023-10-26 RX ORDER — SPIRONOLACTONE 50 MG/1
50 TABLET, FILM COATED ORAL DAILY
Qty: 30 TABLET | Refills: 3 | Status: SHIPPED | OUTPATIENT
Start: 2023-10-26

## 2023-10-26 RX ORDER — SACUBITRIL AND VALSARTAN 24; 26 MG/1; MG/1
TABLET, FILM COATED ORAL
Qty: 60 TABLET | Refills: 11
Start: 2023-10-26

## 2023-10-26 NOTE — TELEPHONE ENCOUNTER
Patient has appointment today (10/26/2023) with NPRB for elevated Optivol readings- will send to NPRB to address/discuss at [address] [facility]. Thanks.

## 2023-10-26 NOTE — PROGRESS NOTES
[address] [facility]  Cardiac Follow-up    Primary Care Doctor:  Roman Elliott, APRN - CNP    Chief Complaint   Patient presents with    Follow-up    Congestive Heart Failure     History of Present Illness:  I had the pleasure of seeing Belen Veloz in follow up for Cardiomyopathy. Moved from [place] to [place], [address]. Hx of non-ischemic cardiomyopathy. S/P Dual chamber MEdtronic ICD implanted 6/26/2019 by Dr. 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 Since last visit, seen in the ER on 10/21/23 with worsening shortness of breath and fatigue. Dicharged with increased lasix 40 mg BID, which she continues. She is short of breath with walking short distances and trying to do her ADLs. She has been having some lightheadedness described as brain fog. She has no BLE edema. + occasional palpitation   + Decreased appetite, doesn't feel like eating sometimes. Weight is elevated. + fatigue. She is also asking about iron deficiency, has required infusions in the past.   Home b/p running 130's    Belen Land describes symptoms including dyspnea, fatigue but denies chest pain, lower extremity edema. Home weights: 249 lbs (previous home weights 235-238)    Appetite: down  Fluid intake: two 22 ounces of the cirkul, water and soft drink  Taking medications as prescribed: Yes  Able to afford medications: Yes        Past Medical History:   has a past medical history of Atrial fibrillation ([address]), CHF (congestive heart failure) ([address]), Congenital heart disease, COPD (chronic obstructive pulmonary disease) ([address]), GERD (gastroesophageal reflux disease), Irritable bowel syndrome, Obesity, and Restless legs syndrome. Surgical History:   has a past surgical history that includes pacemaker placement; Hysterectomy, vaginal; Stimulator Surgery; Cholecystectomy, open; hip surgery (Left, 5/4/2023);  Pain management procedure (Left, 5/25/2023); and Knee

## 2023-10-26 NOTE — PATIENT INSTRUCTIONS
Plan:   Continue daily weights  Check labs- BMP, BNP, CBC and iron studies next week   Continue jardiance  Adjust coreg 6.25mg twice a day for now  Continue lasix 40mg twice day  Increase Spironolactone (aldactone) 50mg daily (take 2 tabs of the 25mg tablets)  Adjust the entresto 1 tab in the am and 2 tabs in the pm of the 24/26mg tab  Follow up in 3 months

## 2023-10-31 ENCOUNTER — OFFICE VISIT (OUTPATIENT)
Dept: ORTHOPEDIC SURGERY | Age: 73
End: 2023-10-31
Payer: MEDICARE

## 2023-10-31 ENCOUNTER — TELEPHONE (OUTPATIENT)
Dept: ORTHOPEDIC SURGERY | Age: 73
End: 2023-10-31

## 2023-10-31 DIAGNOSIS — M25.531 WRIST PAIN, RIGHT: Primary | ICD-10-CM

## 2023-10-31 DIAGNOSIS — S52.571A OTHER CLOSED INTRA-ARTICULAR FRACTURE OF DISTAL END OF RIGHT RADIUS, INITIAL ENCOUNTER: ICD-10-CM

## 2023-10-31 PROCEDURE — 3017F COLORECTAL CA SCREEN DOC REV: CPT | Performed by: PHYSICIAN ASSISTANT

## 2023-10-31 PROCEDURE — 1090F PRES/ABSN URINE INCON ASSESS: CPT | Performed by: PHYSICIAN ASSISTANT

## 2023-10-31 PROCEDURE — G8427 DOCREV CUR MEDS BY ELIG CLIN: HCPCS | Performed by: PHYSICIAN ASSISTANT

## 2023-10-31 PROCEDURE — 1036F TOBACCO NON-USER: CPT | Performed by: PHYSICIAN ASSISTANT

## 2023-10-31 PROCEDURE — G8417 CALC BMI ABV UP PARAM F/U: HCPCS | Performed by: PHYSICIAN ASSISTANT

## 2023-10-31 PROCEDURE — 1123F ACP DISCUSS/DSCN MKR DOCD: CPT | Performed by: PHYSICIAN ASSISTANT

## 2023-10-31 PROCEDURE — 99214 OFFICE O/P EST MOD 30 MIN: CPT | Performed by: PHYSICIAN ASSISTANT

## 2023-10-31 PROCEDURE — G8484 FLU IMMUNIZE NO ADMIN: HCPCS | Performed by: PHYSICIAN ASSISTANT

## 2023-10-31 PROCEDURE — G8400 PT W/DXA NO RESULTS DOC: HCPCS | Performed by: PHYSICIAN ASSISTANT

## 2023-10-31 NOTE — PROGRESS NOTES
Chief Complaint    Pain (Right wrist)      History of Present Illness:  Favio Parnell is a 75 y.o. female presents to the office today with a new problem. Patient here with a chief complaint of right wrist pain. Patient's right wrist became painful when she fell in [place] when she slipped on a rug. This occurred on 10/28/2023. Patient was seen in the emergency room. Patient had a closed reduction and casting performed. Pain over the distal radius region. She denies any neurologic symptoms  Pain Assessment  Location of Pain: Wrist  Location Modifiers: Right  Severity of Pain: 9  Quality of Pain: Sharp, Aching, Dull  Duration of Pain: Persistent  Frequency of Pain: Constant    Medical History:  Patient's medications, allergies, past medical, surgical, social and family histories were reviewed and updated as appropriate. Review of Systems:  Pertinent items are noted in HPI  Review of systems reviewed from Patient History Form dated on 10/31/2023 and available in the patient's chart under the Media tab. Vital Signs: There were no vitals taken for this visit. General Exam:   Constitutional: Patient is adequately groomed with no evidence of malnutrition  DTRs: Deep tendon reflexes are intact  Mental Status: The patient is oriented to time, place and person. The patient's mood and affect are appropriate. Lymphatic: The lymphatic examination bilaterally reveals all areas to be without enlargement or induration. Vascular: Examination reveals no swelling or calf tenderness. Peripheral pulses are palpable and 2+. Neurological: The patient has good coordination. There is no weakness or sensory deficit. Right wrist Examination:    Inspection: Appropriate fitting splint. Fingers pink    Palpation: Tenderness over the distal radius. Range of Motion: Deferred patient in splint    Skin: There are no rashes, ulcerations or lesions.     Gait: Normal    Reflex +2    Additional Comments:       Additional

## 2023-11-01 ENCOUNTER — OFFICE VISIT (OUTPATIENT)
Dept: ORTHOPEDIC SURGERY | Age: 73
End: 2023-11-01
Payer: MEDICARE

## 2023-11-01 ENCOUNTER — TELEPHONE (OUTPATIENT)
Dept: ORTHOPEDIC SURGERY | Age: 73
End: 2023-11-01

## 2023-11-01 VITALS — WEIGHT: 253 LBS | BODY MASS INDEX: 39.71 KG/M2 | HEIGHT: 67 IN

## 2023-11-01 DIAGNOSIS — S52.571A OTHER CLOSED INTRA-ARTICULAR FRACTURE OF DISTAL END OF RIGHT RADIUS, INITIAL ENCOUNTER: Primary | ICD-10-CM

## 2023-11-01 PROBLEM — S52.501A CLOSED FRACTURE OF RIGHT DISTAL RADIUS: Status: ACTIVE | Noted: 2023-11-01

## 2023-11-01 PROCEDURE — 1090F PRES/ABSN URINE INCON ASSESS: CPT | Performed by: ORTHOPAEDIC SURGERY

## 2023-11-01 PROCEDURE — G8427 DOCREV CUR MEDS BY ELIG CLIN: HCPCS | Performed by: ORTHOPAEDIC SURGERY

## 2023-11-01 PROCEDURE — G8417 CALC BMI ABV UP PARAM F/U: HCPCS | Performed by: ORTHOPAEDIC SURGERY

## 2023-11-01 PROCEDURE — G8484 FLU IMMUNIZE NO ADMIN: HCPCS | Performed by: ORTHOPAEDIC SURGERY

## 2023-11-01 PROCEDURE — 1123F ACP DISCUSS/DSCN MKR DOCD: CPT | Performed by: ORTHOPAEDIC SURGERY

## 2023-11-01 PROCEDURE — G8400 PT W/DXA NO RESULTS DOC: HCPCS | Performed by: ORTHOPAEDIC SURGERY

## 2023-11-01 PROCEDURE — 1036F TOBACCO NON-USER: CPT | Performed by: ORTHOPAEDIC SURGERY

## 2023-11-01 PROCEDURE — 3017F COLORECTAL CA SCREEN DOC REV: CPT | Performed by: ORTHOPAEDIC SURGERY

## 2023-11-01 PROCEDURE — 99214 OFFICE O/P EST MOD 30 MIN: CPT | Performed by: ORTHOPAEDIC SURGERY

## 2023-11-01 NOTE — TELEPHONE ENCOUNTER
Auth: NPR  Date: 11/06/23  Reference # None  Spoke with: None  Type of SX: Outpatient  Location: [facility]  CPT: 61220   DX: [phone]  SX area:  Rt wrist  Insurance: Medicare A&B

## 2023-11-01 NOTE — PROGRESS NOTES
Follow Up Prior to Surgery    DOS: 23  :1950            Dasha Rutherford Din:     PT HAS A PACEMAKER/CARDIAC DEFIBRILLATOR-[employer] AND ALSO HAS A SPINAL CORD THE [facility] [place] PATIENT SAID IS NOT WORKING                                 Surgeon's Name:

## 2023-11-01 NOTE — PROGRESS NOTES
[facility] Pre-Admission Testing Electronic Communication Worksheet for OR/ENDO Procedures        Patient: Corrie Irwin    DOS: 11/6/23    Arrival Time: 8AM    Surgery Time: 10AM    Meds to Bed:  [x] YES    []  NO    Transportation Confirmed: [x] YES    []  NO  [facility]    History and Physical:  [x] YES    []  NO  [] N/A  If yes, please list doctor or Urgent Care and date of H&P: DR. Jean Paul Lee WILL DO H8P    Additional Clearance(Cardiac, Pulmonary, etc):  [] YES    [x]  NO    Pre-Admission Testing Visit:  [] YES    [x]  NO If no, do labs/testing need to be done DOS?   [] YES    [x]  NO    Medication Reconciliation Complete:  [x] YES    []  NO        Additional Notes:    PATIENT HAS PACEMAKER/DEFIBRIALLATOR    PATIENT HAS SPINAL CORD STIMULATOR-NOT WORKING              Interview Complete: [x] YES    []  NO          Juanita Wing RN  11:29 AM

## 2023-11-01 NOTE — PROGRESS NOTES
[address]  time__0800______        Surgery time____10:00________    Take the following medications with a sip of water: Follow your MD/Surgeons pre-procedure instructions regarding your medications     Do not eat or drink anything after 12:00 midnight prior to your surgery. This includes water chewing gum, mints and ice chips. You may brush your teeth and gargle the morning of your surgery, but do not swallow the water     Please see your family doctor/pediatrician for a history and physical and/or concerning medications. Bring any test results/reports from your physicians office. If you are under the care of a heart doctor or specialist doctor, please be aware that you may be asked to them for clearance    You may be asked to stop blood thinners such as Coumadin, Plavix, Fragmin, Lovenox, etc., or any anti-inflammatories such as:  Aspirin, Ibuprofen, Advil, Naproxen prior to your surgery. We also ask that you stop any OTC medications such as fish oil, vitamin E, glucosamine, garlic, Multivitamins, COQ 10, etc. STOP ELIQUIS  AS DIRECTED-MAY TAKE TYLENOL    We ask that you do not smoke 24 hours prior to surgery  We ask that you do not  drink any alcoholic beverages 24 hours prior to surgery     You must make arrangements for a responsible adult to take you home after your surgery. For your safety you will not be allowed to leave alone or drive yourself home. Your surgery will be cancelled if you do not have a ride home. Also for your safety, it is strongly suggested that someone stay with you the first 24 hours after your surgery. A parent or legal guardian must accompany a child scheduled for surgery and plan to stay at the hospital until the child is discharged. Please do not bring other children with you. For your comfort, please wear simple loose fitting clothing to the hospital.  Please do not bring valuables.     Do not wear any

## 2023-11-02 ENCOUNTER — ANESTHESIA EVENT (OUTPATIENT)
Dept: OPERATING ROOM | Age: 73
End: 2023-11-02
Payer: MEDICARE

## 2023-11-06 ENCOUNTER — ANESTHESIA (OUTPATIENT)
Dept: OPERATING ROOM | Age: 73
End: 2023-11-06
Payer: MEDICARE

## 2023-11-06 ENCOUNTER — HOSPITAL ENCOUNTER (OUTPATIENT)
Age: 73
Setting detail: OUTPATIENT SURGERY
Discharge: HOME | End: 2023-11-06
Attending: ORTHOPAEDIC SURGERY | Admitting: ORTHOPAEDIC SURGERY
Payer: MEDICARE

## 2023-11-06 ENCOUNTER — APPOINTMENT (OUTPATIENT)
Dept: GENERAL RADIOLOGY | Age: 73
End: 2023-11-06
Attending: ORTHOPAEDIC SURGERY
Payer: MEDICARE

## 2023-11-06 VITALS
OXYGEN SATURATION: 95 % | HEIGHT: 67 IN | RESPIRATION RATE: 16 BRPM | DIASTOLIC BLOOD PRESSURE: 48 MMHG | BODY MASS INDEX: 39.78 KG/M2 | WEIGHT: 253.42 LBS | TEMPERATURE: 97 F | SYSTOLIC BLOOD PRESSURE: 109 MMHG | HEART RATE: 61 BPM

## 2023-11-06 DIAGNOSIS — S52.571A OTHER CLOSED INTRA-ARTICULAR FRACTURE OF DISTAL END OF RIGHT RADIUS, INITIAL ENCOUNTER: Primary | ICD-10-CM

## 2023-11-06 PROCEDURE — C1769 GUIDE WIRE: HCPCS | Performed by: ORTHOPAEDIC SURGERY

## 2023-11-06 PROCEDURE — 6360000002 HC RX W HCPCS: Performed by: ORTHOPAEDIC SURGERY

## 2023-11-06 PROCEDURE — 73100 X-RAY EXAM OF WRIST: CPT

## 2023-11-06 PROCEDURE — C1713 ANCHOR/SCREW BN/BN,TIS/BN: HCPCS | Performed by: ORTHOPAEDIC SURGERY

## 2023-11-06 PROCEDURE — 6360000002 HC RX W HCPCS

## 2023-11-06 PROCEDURE — 7100000010 HC PHASE II RECOVERY - FIRST 15 MIN: Performed by: ORTHOPAEDIC SURGERY

## 2023-11-06 PROCEDURE — 2580000003 HC RX 258: Performed by: ORTHOPAEDIC SURGERY

## 2023-11-06 PROCEDURE — 6360000002 HC RX W HCPCS: Performed by: ANESTHESIOLOGY

## 2023-11-06 PROCEDURE — 7100000001 HC PACU RECOVERY - ADDTL 15 MIN: Performed by: ORTHOPAEDIC SURGERY

## 2023-11-06 PROCEDURE — 3700000000 HC ANESTHESIA ATTENDED CARE: Performed by: ORTHOPAEDIC SURGERY

## 2023-11-06 PROCEDURE — 7100000000 HC PACU RECOVERY - FIRST 15 MIN: Performed by: ORTHOPAEDIC SURGERY

## 2023-11-06 PROCEDURE — 64417 NJX AA&/STRD AX NERVE IMG: CPT | Performed by: ANESTHESIOLOGY

## 2023-11-06 PROCEDURE — 2580000003 HC RX 258: Performed by: ANESTHESIOLOGY

## 2023-11-06 PROCEDURE — 2720000010 HC SURG SUPPLY STERILE: Performed by: ORTHOPAEDIC SURGERY

## 2023-11-06 PROCEDURE — 3600000004 HC SURGERY LEVEL 4 BASE: Performed by: ORTHOPAEDIC SURGERY

## 2023-11-06 PROCEDURE — 7100000011 HC PHASE II RECOVERY - ADDTL 15 MIN: Performed by: ORTHOPAEDIC SURGERY

## 2023-11-06 PROCEDURE — 2500000003 HC RX 250 WO HCPCS

## 2023-11-06 PROCEDURE — 3600000014 HC SURGERY LEVEL 4 ADDTL 15MIN: Performed by: ORTHOPAEDIC SURGERY

## 2023-11-06 PROCEDURE — 3700000001 HC ADD 15 MINUTES (ANESTHESIA): Performed by: ORTHOPAEDIC SURGERY

## 2023-11-06 PROCEDURE — 6370000000 HC RX 637 (ALT 250 FOR IP): Performed by: ANESTHESIOLOGY

## 2023-11-06 PROCEDURE — 2709999900 HC NON-CHARGEABLE SUPPLY: Performed by: ORTHOPAEDIC SURGERY

## 2023-11-06 PROCEDURE — A4217 STERILE WATER/SALINE, 500 ML: HCPCS | Performed by: ORTHOPAEDIC SURGERY

## 2023-11-06 DEVICE — LOCKING SCREW, FULLY THREADED,T8
Type: IMPLANTABLE DEVICE | Site: WRIST | Status: FUNCTIONAL
Brand: VARIAX

## 2023-11-06 DEVICE — VOLAR PLATE STANDARD RIGHT, X-SHORT
Type: IMPLANTABLE DEVICE | Site: WRIST | Status: FUNCTIONAL
Brand: VARIAX

## 2023-11-06 DEVICE — BONE SCREW, FULLY THREADED, T8
Type: IMPLANTABLE DEVICE | Site: WRIST | Status: FUNCTIONAL
Brand: VARIAX

## 2023-11-06 RX ORDER — SODIUM CHLORIDE 9 MG/ML
INJECTION, SOLUTION INTRAVENOUS PRN
Status: DISCONTINUED | OUTPATIENT
Start: 2023-11-06 | End: 2023-11-06 | Stop reason: HOSPADM

## 2023-11-06 RX ORDER — SODIUM CHLORIDE 0.9 % (FLUSH) 0.9 %
5-40 SYRINGE (ML) INJECTION PRN
Status: DISCONTINUED | OUTPATIENT
Start: 2023-11-06 | End: 2023-11-06 | Stop reason: HOSPADM

## 2023-11-06 RX ORDER — CEFAZOLIN SODIUM 1 G/3ML
INJECTION, POWDER, FOR SOLUTION INTRAMUSCULAR; INTRAVENOUS PRN
Status: DISCONTINUED | OUTPATIENT
Start: 2023-11-06 | End: 2023-11-06 | Stop reason: SDUPTHER

## 2023-11-06 RX ORDER — PROPOFOL 10 MG/ML
INJECTION, EMULSION INTRAVENOUS PRN
Status: DISCONTINUED | OUTPATIENT
Start: 2023-11-06 | End: 2023-11-06 | Stop reason: SDUPTHER

## 2023-11-06 RX ORDER — ONDANSETRON 2 MG/ML
4 INJECTION INTRAMUSCULAR; INTRAVENOUS
Status: DISCONTINUED | OUTPATIENT
Start: 2023-11-06 | End: 2023-11-06 | Stop reason: HOSPADM

## 2023-11-06 RX ORDER — HYDROCODONE BITARTRATE AND ACETAMINOPHEN 5; 325 MG/1; MG/1
1 TABLET ORAL
Status: COMPLETED | OUTPATIENT
Start: 2023-11-06 | End: 2023-11-06

## 2023-11-06 RX ORDER — ROPIVACAINE HYDROCHLORIDE 5 MG/ML
INJECTION, SOLUTION EPIDURAL; INFILTRATION; PERINEURAL
Status: COMPLETED | OUTPATIENT
Start: 2023-11-06 | End: 2023-11-06

## 2023-11-06 RX ORDER — LIDOCAINE HYDROCHLORIDE 20 MG/ML
INJECTION, SOLUTION EPIDURAL; INFILTRATION; INTRACAUDAL; PERINEURAL PRN
Status: DISCONTINUED | OUTPATIENT
Start: 2023-11-06 | End: 2023-11-06 | Stop reason: SDUPTHER

## 2023-11-06 RX ORDER — HYDROCODONE BITARTRATE AND ACETAMINOPHEN 5; 325 MG/1; MG/1
1 TABLET ORAL EVERY 6 HOURS PRN
Qty: 20 TABLET | Refills: 0 | Status: SHIPPED | OUTPATIENT
Start: 2023-11-06 | End: 2023-11-11

## 2023-11-06 RX ORDER — MAGNESIUM HYDROXIDE 1200 MG/15ML
LIQUID ORAL CONTINUOUS PRN
Status: DISCONTINUED | OUTPATIENT
Start: 2023-11-06 | End: 2023-11-06 | Stop reason: HOSPADM

## 2023-11-06 RX ORDER — DEXAMETHASONE SODIUM PHOSPHATE 4 MG/ML
INJECTION, SOLUTION INTRA-ARTICULAR; INTRALESIONAL; INTRAMUSCULAR; INTRAVENOUS; SOFT TISSUE PRN
Status: DISCONTINUED | OUTPATIENT
Start: 2023-11-06 | End: 2023-11-06 | Stop reason: SDUPTHER

## 2023-11-06 RX ORDER — SODIUM CHLORIDE 0.9 % (FLUSH) 0.9 %
5-40 SYRINGE (ML) INJECTION EVERY 12 HOURS SCHEDULED
Status: DISCONTINUED | OUTPATIENT
Start: 2023-11-06 | End: 2023-11-06 | Stop reason: HOSPADM

## 2023-11-06 RX ORDER — FENTANYL CITRATE 0.05 MG/ML
25 INJECTION, SOLUTION INTRAMUSCULAR; INTRAVENOUS EVERY 5 MIN PRN
Status: DISCONTINUED | OUTPATIENT
Start: 2023-11-06 | End: 2023-11-06 | Stop reason: HOSPADM

## 2023-11-06 RX ORDER — PHENYLEPHRINE HCL IN 0.9% NACL 1 MG/10 ML
SYRINGE (ML) INTRAVENOUS PRN
Status: DISCONTINUED | OUTPATIENT
Start: 2023-11-06 | End: 2023-11-06 | Stop reason: SDUPTHER

## 2023-11-06 RX ORDER — FENTANYL CITRATE 50 UG/ML
INJECTION, SOLUTION INTRAMUSCULAR; INTRAVENOUS PRN
Status: DISCONTINUED | OUTPATIENT
Start: 2023-11-06 | End: 2023-11-06 | Stop reason: SDUPTHER

## 2023-11-06 RX ORDER — ONDANSETRON 2 MG/ML
INJECTION INTRAMUSCULAR; INTRAVENOUS PRN
Status: DISCONTINUED | OUTPATIENT
Start: 2023-11-06 | End: 2023-11-06 | Stop reason: SDUPTHER

## 2023-11-06 RX ADMIN — FENTANYL CITRATE 25 MCG: 50 INJECTION INTRAMUSCULAR; INTRAVENOUS at 10:48

## 2023-11-06 RX ADMIN — DEXAMETHASONE SODIUM PHOSPHATE 10 MG: 4 INJECTION, SOLUTION INTRAMUSCULAR; INTRAVENOUS at 09:37

## 2023-11-06 RX ADMIN — HYDROMORPHONE HYDROCHLORIDE 0.5 MG: 1 INJECTION, SOLUTION INTRAMUSCULAR; INTRAVENOUS; SUBCUTANEOUS at 10:40

## 2023-11-06 RX ADMIN — PROPOFOL 150 MG: 10 INJECTION, EMULSION INTRAVENOUS at 09:32

## 2023-11-06 RX ADMIN — LIDOCAINE HYDROCHLORIDE 100 MG: 20 INJECTION, SOLUTION EPIDURAL; INFILTRATION; INTRACAUDAL; PERINEURAL at 09:32

## 2023-11-06 RX ADMIN — ONDANSETRON 4 MG: 2 INJECTION INTRAMUSCULAR; INTRAVENOUS at 09:58

## 2023-11-06 RX ADMIN — Medication 100 MCG: at 10:21

## 2023-11-06 RX ADMIN — FENTANYL CITRATE 50 MCG: 50 INJECTION INTRAMUSCULAR; INTRAVENOUS at 08:31

## 2023-11-06 RX ADMIN — FENTANYL CITRATE 50 MCG: 50 INJECTION INTRAMUSCULAR; INTRAVENOUS at 09:46

## 2023-11-06 RX ADMIN — PROPOFOL 50 MG: 10 INJECTION, EMULSION INTRAVENOUS at 10:14

## 2023-11-06 RX ADMIN — SODIUM CHLORIDE: 9 INJECTION, SOLUTION INTRAVENOUS at 08:10

## 2023-11-06 RX ADMIN — ROPIVACAINE HYDROCHLORIDE 23 ML: 5 INJECTION, SOLUTION EPIDURAL; INFILTRATION; PERINEURAL at 08:31

## 2023-11-06 RX ADMIN — FENTANYL CITRATE 25 MCG: 50 INJECTION INTRAMUSCULAR; INTRAVENOUS at 10:55

## 2023-11-06 RX ADMIN — CEFAZOLIN 1 G: 1 INJECTION, POWDER, FOR SOLUTION INTRAMUSCULAR; INTRAVENOUS at 09:46

## 2023-11-06 RX ADMIN — HYDROMORPHONE HYDROCHLORIDE 0.5 MG: 1 INJECTION, SOLUTION INTRAMUSCULAR; INTRAVENOUS; SUBCUTANEOUS at 10:33

## 2023-11-06 RX ADMIN — HYDROCODONE BITARTRATE AND ACETAMINOPHEN 1 TABLET: 5; 325 TABLET ORAL at 11:31

## 2023-11-06 RX ADMIN — Medication 100 MCG: at 09:39

## 2023-11-06 RX ADMIN — PROPOFOL 30 MG: 10 INJECTION, EMULSION INTRAVENOUS at 09:34

## 2023-11-06 RX ADMIN — Medication 100 MCG: at 10:08

## 2023-11-06 RX ADMIN — CEFAZOLIN 2000 MG: 2 INJECTION, POWDER, FOR SOLUTION INTRAMUSCULAR; INTRAVENOUS at 09:36

## 2023-11-06 ASSESSMENT — PAIN DESCRIPTION - ONSET
ONSET: ON-GOING
ONSET: ON-GOING
ONSET: SUDDEN
ONSET: ON-GOING
ONSET: ON-GOING

## 2023-11-06 ASSESSMENT — PAIN - FUNCTIONAL ASSESSMENT
PAIN_FUNCTIONAL_ASSESSMENT: 0-10
PAIN_FUNCTIONAL_ASSESSMENT: ACTIVITIES ARE NOT PREVENTED

## 2023-11-06 ASSESSMENT — PAIN SCALES - GENERAL
PAINLEVEL_OUTOF10: 7
PAINLEVEL_OUTOF10: 7
PAINLEVEL_OUTOF10: 5
PAINLEVEL_OUTOF10: 6
PAINLEVEL_OUTOF10: 7
PAINLEVEL_OUTOF10: 4
PAINLEVEL_OUTOF10: 6
PAINLEVEL_OUTOF10: 6
PAINLEVEL_OUTOF10: 4
PAINLEVEL_OUTOF10: 6
PAINLEVEL_OUTOF10: 7

## 2023-11-06 ASSESSMENT — PAIN DESCRIPTION - FREQUENCY
FREQUENCY: CONTINUOUS

## 2023-11-06 ASSESSMENT — PAIN DESCRIPTION - LOCATION
LOCATION: WRIST
LOCATION: ARM
LOCATION: ARM
LOCATION: WRIST
LOCATION: WRIST
LOCATION: ARM
LOCATION: WRIST

## 2023-11-06 ASSESSMENT — PAIN DESCRIPTION - PAIN TYPE
TYPE: SURGICAL PAIN

## 2023-11-06 ASSESSMENT — PAIN DESCRIPTION - DESCRIPTORS
DESCRIPTORS: DISCOMFORT
DESCRIPTORS: ACHING;DISCOMFORT
DESCRIPTORS: DISCOMFORT
DESCRIPTORS: DISCOMFORT
DESCRIPTORS: DULL
DESCRIPTORS: ACHING
DESCRIPTORS: ACHING;DISCOMFORT
DESCRIPTORS: ACHING;DULL

## 2023-11-06 ASSESSMENT — PAIN DESCRIPTION - ORIENTATION
ORIENTATION: RIGHT

## 2023-11-06 ASSESSMENT — PAIN SCALES - WONG BAKER
WONGBAKER_NUMERICALRESPONSE: 2
WONGBAKER_NUMERICALRESPONSE: 0

## 2023-11-06 NOTE — PROGRESS NOTES
PT received into bay 5 from OR. Pt with oral airway, non responsive to stimuli. Appears comfortable. Report obtained. O2 at 4L/NC. Resp easy, unlabored. Right wrist dressing c/d/I, elevated with pillow. Ice pack at site. Will monitor at bedside while airway in place.

## 2023-11-06 NOTE — H&P
Preoperative H&P Update    The patient's History and Physical in the medical record from 11/1/2023 was reviewed by me today. Past Medical History:   Diagnosis Date    Arthritis     BACK    Atrial fibrillation (HCC)     CHF (congestive heart failure) (HCC)     Congenital heart disease     COPD (chronic obstructive pulmonary disease) (HCC)     GERD (gastroesophageal reflux disease)     Hypertension     Irritable bowel syndrome     Obesity     Restless legs syndrome      Past Surgical History:   Procedure Laterality Date    ABLATION OF DYSRHYTHMIC FOCUS      CHOLECYSTECTOMY, OPEN      COLONOSCOPY      HIP SURGERY Left 05/04/2023    LEFT KNEE INTRA ARTICULAR INJECTION SITE CONFIRMED BY FLUOROSCOPY performed by Jake Chopra MD at [address] ARTHROCENTESIS Left 07/13/2023    LEFT KNEE INTRA ARTICULAR INJECTION SITE CONFIRMED BY FLUOROSCOPY performed by Jake Chopra MD at [facility] Left 05/25/2023    LEFT KNEE INTRA ARTICULAR INJECTION SITE CONFIRMED BY FLUOROSCOPY performed by Jake Chopra MD at [address] [place] [address].     No current facility-administered medications on file prior to encounter.      Current Outpatient Medications on File Prior to Encounter   Medication Sig Dispense Refill    albuterol sulfate (PROAIR RESPICLICK) 968 (90 Base) MCG/ACT aerosol powder inhalation Inhale 2 puffs into the lungs every 4 hours as needed for Wheezing or Shortness of Breath      OXYGEN Inhale 2 L/min into the lungs at bedtime      carvedilol (COREG) 12.5 MG tablet Take 0.5 tablets by mouth 2 times daily (with meals) 180 tablet 3    spironolactone (ALDACTONE) 50 MG tablet Take 1 tablet by mouth daily 30 tablet 3    sacubitril-valsartan (ENTRESTO) 24-26 MG per tablet Take 1 tab in the am then take 2 tabs in the pm 60 tablet 11    rOPINIRole (REQUIP) 1 MG tablet TAKE 1 TABLET THREE TIMES A DAY (Patient taking

## 2023-11-06 NOTE — ANESTHESIA PROCEDURE NOTES
Peripheral Block    Patient location during procedure: pre-op  Reason for block: post-op pain management and at surgeon's request  Start time: 11/6/2023 8:31 AM  End time: 11/6/2023 8:36 AM  Staffing  Performed: anesthesiologist   Anesthesiologist: Maurilio Mercedes MD  Performed by: Maurilio Mercedes MD  Authorized by: Maurilio Mercedes MD    Preanesthetic Checklist  Completed: patient identified, IV checked, site marked, risks and benefits discussed, surgical/procedural consents, equipment checked, pre-op evaluation, timeout performed, anesthesia consent given, oxygen available, monitors applied/VS acknowledged, fire risk safety assessment completed and verbalized and blood product R/B/A discussed and consented  Peripheral Block   Patient position: supine  Prep: DuraPrep  Provider prep: mask and sterile gloves  Patient monitoring: cardiac monitor, continuous pulse ox, IV access, oxygen and responsive to questions  Block type: Axillary  R axillary  Laterality: right  Injection technique: single-shot  Guidance: ultrasound guided    Needle   Needle type: insulated echogenic nerve stimulator needle   Needle localization: ultrasound guidance  Needle insertion depth: 3 cm  Needle length: 8 cm  Assessment   Injection assessment: negative aspiration for heme, no paresthesia on injection, local visualized surrounding nerve on ultrasound and no intravascular symptoms  Paresthesia pain: none  Slow fractionated injection: yes  Hemodynamics: stable  Real-time US image taken/store: yes  Outcomes: uncomplicated and patient tolerated procedure well    Medications Administered  ropivacaine (NAROPIN) injection 0.5% - Perineural   23 mL - 11/6/2023 8:31:00 AM

## 2023-11-06 NOTE — H&P
Preoperative H&P Update    The patient's History and Physical in the medical record from 11/1/2023 was reviewed by me today. Past Medical History:   Diagnosis Date    Arthritis     BACK    Atrial fibrillation (HCC)     CHF (congestive heart failure) (HCC)     Congenital heart disease     COPD (chronic obstructive pulmonary disease) (HCC)     GERD (gastroesophageal reflux disease)     Hypertension     Irritable bowel syndrome     Obesity     Restless legs syndrome      Past Surgical History:   Procedure Laterality Date    ABLATION OF DYSRHYTHMIC FOCUS      CHOLECYSTECTOMY, OPEN      COLONOSCOPY      HIP SURGERY Left 05/04/2023    LEFT KNEE INTRA ARTICULAR INJECTION SITE CONFIRMED BY FLUOROSCOPY performed by Ellis Blas MD at [address] [place] [address] ARTHROCENTESIS Left 07/13/2023    LEFT KNEE INTRA ARTICULAR INJECTION SITE CONFIRMED BY FLUOROSCOPY performed by Ellis Blas MD at [facility] Left 05/25/2023    LEFT KNEE INTRA ARTICULAR INJECTION SITE CONFIRMED BY FLUOROSCOPY performed by Ellis Blas MD at [address] [place] [address].     No current facility-administered medications on file prior to encounter.      Current Outpatient Medications on File Prior to Encounter   Medication Sig Dispense Refill    albuterol sulfate (PROAIR RESPICLICK) 971 (90 Base) MCG/ACT aerosol powder inhalation Inhale 2 puffs into the lungs every 4 hours as needed for Wheezing or Shortness of Breath      OXYGEN Inhale 2 L/min into the lungs at bedtime      carvedilol (COREG) 12.5 MG tablet Take 0.5 tablets by mouth 2 times daily (with meals) 180 tablet 3    spironolactone (ALDACTONE) 50 MG tablet Take 1 tablet by mouth daily 30 tablet 3    sacubitril-valsartan (ENTRESTO) 24-26 MG per tablet Take 1 tab in the am then take 2 tabs in the pm 60 tablet 11    rOPINIRole (REQUIP) 1 MG tablet TAKE 1 TABLET THREE TIMES A DAY (Patient taking

## 2023-11-06 NOTE — PROGRESS NOTES
Pt arrived to phase 2 from PACU. Pt awake and alert. Right arm with splint and ace wrap C/D/I. Pt rates surgical pain as 6/10.

## 2023-11-06 NOTE — DISCHARGE INSTRUCTIONS
Post op instruction:  1- D/C home  2- Dx Right wrist pain/ moderately displaced distal radius intraarticular fracture. 3- NWB rightr wist  4- Elevation surgical site, with ice  5- Keep splint dry and clean  6- F/U in 2 weeks.        Michael Crouch MD, 11/6/2023

## 2023-11-06 NOTE — ANESTHESIA PRE PROCEDURE
[facility] Department of Anesthesiology  Pre-Anesthesia Evaluation/Consultation       Name:  Milton Garnica  : 1950  Age:  75 y.o. MRN:  [id number]  Date: 2023           Surgeon: Surgeon(s):  Romelia Munoz MD    Procedure: Procedure(s):  RIGHT OPEN REDUCTION AND INTERNAL FIXATION WRIST     Allergies   Allergen Reactions    Codeine Anaphylaxis    Iv Contrast [Iodides] Anaphylaxis     2002    Penicillins Other (See Comments)     AS A CHILD UNABLE TO RECALL REACTION-? RASH    Sulfa Antibiotics      Other reaction(s): Unknown-AS A CHILD UNABLE TO RECALL     Patient Active Problem List   Diagnosis    Allergic rhinitis    Chronic pain of left knee    Astigmatism    Backache    Cervicalgia    Costochondritis    Cyst of eyelid    Depression    Esophagitis    Flexor hallucis longus tendinitis    Essential hypertension    Hyperlipidemia    Hematuria    Lateral epicondylitis    Left upper quadrant abdominal pain    Mild intermittent asthma    Motion sickness    Obesity    Nicotine dependence    Other seborrheic keratosis    Overweight    Pain in thoracic spine    Palpitations    Persistent insomnia    Senile lentigo    Presbyopia    Symptomatic menopausal or female climacteric states    Status post hysterectomy    Subacromial bursitis    Former smoker    Acute decompensated heart failure ([facility])    COPD (chronic obstructive pulmonary disease) ([facility])    PAF (paroxysmal atrial fibrillation) ([facility])    Pulmonary vascular congestion    Elevated brain natriuretic peptide (BNP) level    Premature atrial contraction    Acute on chronic systolic congestive heart failure ([facility])    Hypokalemia    ICD (implantable cardioverter-defibrillator), dual, in situ    SBO (small bowel obstruction) ([facility])    Type 2 diabetes mellitus    Mild episode of recurrent major depressive disorder ([facility])    Primary osteoarthritis of left knee    Hypotension    Atrial fibrillation with RVR ([facility])    Chronic pain

## 2023-11-07 NOTE — BRIEF OP NOTE
Brief Postoperative Note      Patient: Mary Frank  YOB: 1950  MRN: [id number]    Date of Procedure: 11/6/2023    Pre-Op Diagnosis Codes:     * Closed fracture of distal end of right radius, unspecified fracture morphology, initial encounter [S52.501A]    Post-Op Diagnosis: Same       Procedure(s):  RIGHT OPEN REDUCTION AND INTERNAL FIXATION WRIST    Surgeon(s):  Lorraine Lloyd MD    Assistant: Claude Lorenz CNP    Anesthesia: General    Estimated Blood Loss (mL): Minimal    Complications: None    Specimens:   * No specimens in log *    Implants:  Implant Name Type Inv. Item Serial No.  Lot No. LRB No. Used Action   PLATE BNE STD 10 H EXTRASHORT ST R DST VOLAR RAD - [id number]  PLATE BNE STD 10 H EXTRASHORT ST R DST VOLAR RAD  [employer] [facility]  Right 1 Implanted   SCREW BNE LCK 2.7X12 MM TI STRL VARIAX - [id number]  SCREW BNE LCK 2.7X12 MM TI STRL VARIAX  [employer] [facility]  Right 1 Implanted   SCREW BNE L16MM OD27MM ST GREG FULL THRD T8 DRV - [id number]  SCREW BNE L16MM OD27MM ST GREG FULL THRD T8 [facility]  Right 1 Implanted   SCREW BNE L18MM OD27MM ST GREG FULL THRD T8 DRV - [id number]  SCREW BNE L18MM OD27MM ST GREG FULL THRD T8 [facility]unn[facility]  Right 1 Implanted   SCREW LOCKING 2.7XEV76FU - [id number]  SCREW LOCKING [id number]  [employer] [facility]  Right 4 Implanted   SCREW BNE L22MM OD27MM ST GREG FULL THRD T8 DRV - [id number]  SCREW BNE L22MM OD27MM ST GREG FULL THRD T8 [facility]unn[facility]ANG[facility]  Right 2 Implanted   SCREW T8 BONE 2. 3NBN46PM - [id number]  SCREW T8 BONE 2. [id number]  [employer] [facility]  Right 1 Implanted         Drains: * No LDAs found *    Findings: Same.       Electronically signed by Lorraine Lloyd MD on 11/7/2023 at 6:35 AM

## 2023-11-09 ASSESSMENT — ENCOUNTER SYMPTOMS
HEMATEMESIS: 0
LEFT EYE: 0
RIGHT EYE: 0
HEMATOCHEZIA: 0
STRIDOR: 0
WHEEZING: 0
SHORTNESS OF BREATH: 0

## 2023-11-09 NOTE — PROGRESS NOTES
Assessment:     1. Atrial fibrillation: patient has paroxysmal atrial fibrillation. Associated symptoms: Fatigue     History of cardioversion: Unknown  History of AF ablation: Had atrial fibrillation ablation in the past (2019)  History of heart surgery/procedure: yes, AF ablation 2019, TONY occlusion 2019    Current use of anti-arrhythmic drugs: amiodarone  Previous use of anti-arrhythmic drugs: Unknown    Overall LV function: Mild left ventricular systolic dysfunction   Size of left atrium:  Dilated  Significant cardiac valvular disease: No significant valve disease  Family history of atrial fibrillation: Unknown    Alcohol consumption: No alcohol consumption  Caffeine consumption: No/minimal caffeine intake  Smoking status: former smoker, quit many years ago  Obstructive sleep apnea: Not on CPAP/BiPAP therapy  Exercise status: Does not exercise regularly (sedentary lifestyle)    I have had discussions with the patient about issues related to atrial fibrillation (including etiology, disease progression patterns, stroke risk and rate control issues). Patient had her questions answered to her satisfaction. Patient has a [id number] score of at least 3 [Age over 72 (1 point), Female gender (1 point), and Heart failure (1 point)]  Longterm anticoagulation is: recommended  Current anticoagulation: Apixaban  Bleeding issues reported: no  Renal function: Normal  Thyroid function: Normal    Following AF ablation in 2019, patient had minimal AF burden for some time. Had one 5+ hour episode in June 2023 (patient was not aware). Now on low dose amiodarone with no further episodes of atrial fibrillation. Needs periodic LFT's/TSH and has upcoming appointment with eye doctor. Unclear what exact TONY occlusion mechanism was used but sounds, from patient description, to possibly be Lariat type device. Would continue anticoagulation for now. 2. Good blood pressure control.     3. Non-ischemic cardiomyopathy: seems

## 2023-11-10 ENCOUNTER — OFFICE VISIT (OUTPATIENT)
Dept: CARDIOLOGY CLINIC | Age: 73
End: 2023-11-10

## 2023-11-10 ENCOUNTER — NURSE ONLY (OUTPATIENT)
Dept: CARDIOLOGY CLINIC | Age: 73
End: 2023-11-10
Payer: MEDICARE

## 2023-11-10 VITALS
HEIGHT: 67 IN | BODY MASS INDEX: 40.21 KG/M2 | DIASTOLIC BLOOD PRESSURE: 84 MMHG | HEART RATE: 68 BPM | OXYGEN SATURATION: 95 % | WEIGHT: 256.2 LBS | SYSTOLIC BLOOD PRESSURE: 148 MMHG

## 2023-11-10 DIAGNOSIS — I50.22 CHRONIC SYSTOLIC HEART FAILURE (HCC): ICD-10-CM

## 2023-11-10 DIAGNOSIS — Z95.810 ICD (IMPLANTABLE CARDIOVERTER-DEFIBRILLATOR), DUAL, IN SITU: ICD-10-CM

## 2023-11-10 DIAGNOSIS — I42.8 NICM (NONISCHEMIC CARDIOMYOPATHY) (HCC): ICD-10-CM

## 2023-11-10 DIAGNOSIS — E78.2 MIXED HYPERLIPIDEMIA: ICD-10-CM

## 2023-11-10 DIAGNOSIS — I48.91 ATRIAL FIBRILLATION WITH RVR (HCC): ICD-10-CM

## 2023-11-10 DIAGNOSIS — Z95.810 ICD (IMPLANTABLE CARDIOVERTER-DEFIBRILLATOR), DUAL, IN SITU: Primary | ICD-10-CM

## 2023-11-10 DIAGNOSIS — I48.0 PAF (PAROXYSMAL ATRIAL FIBRILLATION) (HCC): Primary | ICD-10-CM

## 2023-11-10 DIAGNOSIS — F51.01 PRIMARY INSOMNIA: ICD-10-CM

## 2023-11-10 DIAGNOSIS — Z51.81 ENCOUNTER FOR MONITORING AMIODARONE THERAPY: ICD-10-CM

## 2023-11-10 DIAGNOSIS — Z79.899 ENCOUNTER FOR MONITORING AMIODARONE THERAPY: ICD-10-CM

## 2023-11-10 DIAGNOSIS — M17.12 PRIMARY OSTEOARTHRITIS OF LEFT KNEE: ICD-10-CM

## 2023-11-10 DIAGNOSIS — Z79.899 ON AMIODARONE THERAPY: ICD-10-CM

## 2023-11-10 PROCEDURE — 93283 PRGRMG EVAL IMPLANTABLE DFB: CPT | Performed by: INTERNAL MEDICINE

## 2023-11-10 RX ORDER — AMIODARONE HYDROCHLORIDE 100 MG/1
100 TABLET ORAL DAILY
Qty: 90 TABLET | Refills: 1 | Status: SHIPPED | OUTPATIENT
Start: 2023-11-10

## 2023-11-10 RX ORDER — HYDROXYZINE HYDROCHLORIDE 25 MG/1
25 TABLET, FILM COATED ORAL NIGHTLY
Qty: 30 TABLET | Refills: 0 | Status: SHIPPED | OUTPATIENT
Start: 2023-11-10 | End: 2023-12-10

## 2023-11-10 RX ORDER — ATORVASTATIN CALCIUM 40 MG/1
40 TABLET, FILM COATED ORAL DAILY
Qty: 30 TABLET | Refills: 0 | Status: SHIPPED | OUTPATIENT
Start: 2023-11-10

## 2023-11-10 RX ORDER — GABAPENTIN 100 MG/1
100 CAPSULE ORAL 2 TIMES DAILY
Qty: 60 CAPSULE | Refills: 0 | Status: SHIPPED | OUTPATIENT
Start: 2023-11-10 | End: 2023-12-10

## 2023-11-10 ASSESSMENT — ENCOUNTER SYMPTOMS: BLURRED VISION: 0

## 2023-11-10 NOTE — PATIENT INSTRUCTIONS
Plan:     Remote device checks every 3 months. Follow up with optometry. TSH due. Continue amiodarone and Eliquis. Follow up in 6 months.

## 2023-11-10 NOTE — PROGRESS NOTES
Refill Request     Last Seen: Last Seen Department: 10/3/2023  Last Seen by PCP: Visit date not found    Last Written: 10/2/23    Next Appointment:   Future Appointments   Date Time Provider [address]   11/21/2023  9:30 AM Derick Lacey MD AND ORTHO MMA   11/27/2023 11:00 AM [facility] CT VCT [facility] CT Win Rad   11/28/2023 12:10 PM Keri Price APRN - CNP AFL TSP AND AFL [facility]   11/30/2023  2:15 PM MD GISELL Soliman PULM [facility]   1/30/2024  1:30 PM DEMI Evans - CNP Win Car [facility]   5/2/2024 11:15 AM SCHEDULE, [facility]   5/2/2024 11:15 AM Chloe Benito APRN - TERESA [facility]       Requested Prescriptions     Pending Prescriptions Disp Refills    atorvastatin (LIPITOR) 40 MG tablet 30 tablet 0     Sig: Take 1 tablet by mouth daily    gabapentin (NEURONTIN) 100 MG capsule 60 capsule 0     Sig: Take 1 capsule by mouth 2 times daily for 30 days.

## 2023-11-20 RX ORDER — SACUBITRIL AND VALSARTAN 24; 26 MG/1; MG/1
TABLET, FILM COATED ORAL
Qty: 90 TABLET | Refills: 0 | Status: CANCELLED | OUTPATIENT
Start: 2023-11-20

## 2023-11-20 NOTE — TELEPHONE ENCOUNTER
Pt stated that she is out of the Entresto and it won't arrive at her house until 11/27. She would like to know if she can get a small supply sent to her local pharmacy. Preferred pharmacy is     [id number] - [place], [place] - [address] - P [phone]     Last ov 10/26/2023 nprb  Next ov 1/30/2024 nprb

## 2023-11-20 NOTE — TELEPHONE ENCOUNTER
I [facility]   I sent in the higher strength- 49/51mg tabs and she should take 1/2 tab in the am and 1 tab in the pm.

## 2023-11-21 ENCOUNTER — TELEPHONE (OUTPATIENT)
Dept: CARDIOLOGY CLINIC | Age: 73
End: 2023-11-21

## 2023-11-21 ENCOUNTER — OFFICE VISIT (OUTPATIENT)
Dept: ORTHOPEDIC SURGERY | Age: 73
End: 2023-11-21

## 2023-11-21 DIAGNOSIS — S52.571A OTHER CLOSED INTRA-ARTICULAR FRACTURE OF DISTAL END OF RIGHT RADIUS, INITIAL ENCOUNTER: Primary | ICD-10-CM

## 2023-11-21 NOTE — TELEPHONE ENCOUNTER
I sent script to [facility]   I sent in the higher strength- 49/51mg tabs and she should take 1/2 tab in the am and 1 tab in the pm.     Called pt. She stated that the Rx is too expensive at [facility]. She will wait until its delivered.

## 2023-11-27 ENCOUNTER — HOSPITAL ENCOUNTER (OUTPATIENT)
Dept: CT IMAGING | Age: 73
Discharge: HOME | End: 2023-11-27
Payer: MEDICARE

## 2023-11-27 DIAGNOSIS — R91.1 PULMONARY NODULE: ICD-10-CM

## 2023-11-27 PROCEDURE — 71250 CT THORAX DX C-: CPT

## 2023-11-30 ENCOUNTER — OFFICE VISIT (OUTPATIENT)
Dept: PULMONOLOGY | Age: 73
End: 2023-11-30
Payer: MEDICARE

## 2023-11-30 VITALS
BODY MASS INDEX: 39.71 KG/M2 | HEIGHT: 67 IN | HEART RATE: 88 BPM | WEIGHT: 253 LBS | DIASTOLIC BLOOD PRESSURE: 78 MMHG | SYSTOLIC BLOOD PRESSURE: 138 MMHG | OXYGEN SATURATION: 92 %

## 2023-11-30 DIAGNOSIS — R06.02 SOB (SHORTNESS OF BREATH): Primary | ICD-10-CM

## 2023-11-30 PROCEDURE — G8417 CALC BMI ABV UP PARAM F/U: HCPCS | Performed by: INTERNAL MEDICINE

## 2023-11-30 PROCEDURE — 3017F COLORECTAL CA SCREEN DOC REV: CPT | Performed by: INTERNAL MEDICINE

## 2023-11-30 PROCEDURE — 3075F SYST BP GE 130 - 139MM HG: CPT | Performed by: INTERNAL MEDICINE

## 2023-11-30 PROCEDURE — 1036F TOBACCO NON-USER: CPT | Performed by: INTERNAL MEDICINE

## 2023-11-30 PROCEDURE — 1090F PRES/ABSN URINE INCON ASSESS: CPT | Performed by: INTERNAL MEDICINE

## 2023-11-30 PROCEDURE — 99214 OFFICE O/P EST MOD 30 MIN: CPT | Performed by: INTERNAL MEDICINE

## 2023-11-30 PROCEDURE — G8427 DOCREV CUR MEDS BY ELIG CLIN: HCPCS | Performed by: INTERNAL MEDICINE

## 2023-11-30 PROCEDURE — 3078F DIAST BP <80 MM HG: CPT | Performed by: INTERNAL MEDICINE

## 2023-11-30 PROCEDURE — G8484 FLU IMMUNIZE NO ADMIN: HCPCS | Performed by: INTERNAL MEDICINE

## 2023-11-30 PROCEDURE — G8400 PT W/DXA NO RESULTS DOC: HCPCS | Performed by: INTERNAL MEDICINE

## 2023-11-30 PROCEDURE — 1124F ACP DISCUSS-NO DSCNMKR DOCD: CPT | Performed by: INTERNAL MEDICINE

## 2023-11-30 NOTE — PROGRESS NOTES
intact. External canals are patent and no discharge was appreciated. Septum was midline,   mucosa was without erythema, exudates or cobblestoning. No thrush was noted. Neck: Supple without thyromegaly. No elevated JVP. Trachea was midline. No carotid bruits were auscultated. Respiratory: Rhonch    Cardiovascular: Regular without murmur, clicks, gallops or rubs. There is no left or right ventricular heave. Pulses:  Carotid, radial and femoral pulses were equally bilaterally. Abdomen: Slightly rounded and soft without organomegaly. No rebound, rigidity or guarding was appreciated. Lymphatic: No lymphadenopathy. Musculoskeletal: no joint deformity . Extremities: no   Skin:  Warm and dry. Good color, turgor and pigmentation. No lesions or scars. Neurological/Psychiatric: The patient's general behavior, level of consciousness, thought content and emotional status is normal.  Cranial nerves II-XII are intact. DATA:   1. Unchanged 2 mm solid left pulmonary nodule. 2. Unchanged 4.5 cm ascending thoracic aortic dilatation. IMPRESSION:    1-COPD,with no obstruction in PFT ,  2-YINA  3-A fib   4-high BMI    5- nocturnal hypoxia on  night      PLAN:      -keep Breztri  - did not want sleep study   - need yearly CT ,will see in 6 months and order CT   -will get records  -will PFT and 6 minutes walk shows no COPD but restrictive pattern related to weight   _ follow up with cardiology for thoracic   Flu and Pneumovax as primary   Need PCV 20 to get through PCP   Recommended vaccine RSV   Thank you for allowing me to participate in [facility] care. I will keep following with you ,should you have any concerns ,please contact us at [facility] pulmonary office     Sincerely,        Reynold Scott MD  Pulmonary & Critical Care Medicine     NOTE: This report was transcribed using voice recognition software.  Every effort was made to ensure accuracy; however, inadvertent computerized transcription errors

## 2023-12-05 DIAGNOSIS — E78.2 MIXED HYPERLIPIDEMIA: ICD-10-CM

## 2023-12-05 DIAGNOSIS — M17.12 PRIMARY OSTEOARTHRITIS OF LEFT KNEE: ICD-10-CM

## 2023-12-05 RX ORDER — GABAPENTIN 100 MG/1
100 CAPSULE ORAL 2 TIMES DAILY
Qty: 60 CAPSULE | Refills: 0 | Status: SHIPPED | OUTPATIENT
Start: 2023-12-05 | End: 2024-01-04

## 2023-12-05 RX ORDER — ATORVASTATIN CALCIUM 40 MG/1
40 TABLET, FILM COATED ORAL DAILY
Qty: 30 TABLET | Refills: 11 | Status: SHIPPED | OUTPATIENT
Start: 2023-12-05

## 2023-12-05 NOTE — TELEPHONE ENCOUNTER
Refill Request     Last Seen: Last Seen Department: 10/3/2023  Last Seen by PCP: Visit date not found    Last Written: 11/10/23      Next Appointment:   Future Appointments   Date Time Provider [address]   12/26/2023  2:30 PM Corina Bedoya MD AFL TSP AND [facility]   1/2/2024  9:30 AM Mojgan Adrian MD AND ORTHO MMA   1/30/2024  1:30 PM Marilee Eldridge, APRN - CNP Win Car [facility]   5/2/2024 11:15 AM SCHEDULE, [facility] Mallika Rend [facility]   5/2/2024 11:15 AM Chloe Pyle APRN - CNP Mallika Rend [facility]   5/30/2024  9:00 AM [facility]umann[facility]           Requested Prescriptions     Pending Prescriptions Disp Refills    gabapentin (NEURONTIN) 100 MG capsule [Pharmacy Med Name: GABAPENTIN CAPS 100MG] 60 capsule 11     Sig: TAKE 1 CAPSULE TWICE A DAY    atorvastatin (LIPITOR) 40 MG tablet [Pharmacy Med Name: ATORVASTATIN TABS 40MG] 30 tablet 11     Sig: TAKE 1 TABLET DAILY

## 2023-12-06 ENCOUNTER — TELEPHONE (OUTPATIENT)
Dept: FAMILY MEDICINE CLINIC | Age: 73
End: 2023-12-06

## 2023-12-06 NOTE — TELEPHONE ENCOUNTER
The pulmonologist is recommending the patient get vaccines:    Flu and Pneumovax   Need PCV 20 to get through PCP   Recommended vaccine RSV     She was unsure about all of them so she wanted to ask what IDA Kong CNP would recommend. Is it okay to schedule a lab visit?

## 2023-12-13 ENCOUNTER — TELEPHONE (OUTPATIENT)
Dept: PULMONOLOGY | Age: 73
End: 2023-12-13

## 2023-12-13 DIAGNOSIS — J44.9 CHRONIC OBSTRUCTIVE PULMONARY DISEASE, UNSPECIFIED COPD TYPE (HCC): Primary | ICD-10-CM

## 2023-12-13 NOTE — TELEPHONE ENCOUNTER
Pt rec'd letter from insurance stating they need new 02 supplies. Letter scanned to chart.     Faxed orders to [employer]

## 2024-01-02 ENCOUNTER — OFFICE VISIT (OUTPATIENT)
Dept: ORTHOPEDIC SURGERY | Age: 74
End: 2024-01-02

## 2024-01-02 VITALS — BODY MASS INDEX: 39.71 KG/M2 | HEIGHT: 67 IN | WEIGHT: 253 LBS

## 2024-01-02 DIAGNOSIS — S52.571A OTHER CLOSED INTRA-ARTICULAR FRACTURE OF DISTAL END OF RIGHT RADIUS, INITIAL ENCOUNTER: Primary | ICD-10-CM

## 2024-01-02 PROCEDURE — 99024 POSTOP FOLLOW-UP VISIT: CPT | Performed by: ORTHOPAEDIC SURGERY

## 2024-01-03 NOTE — PROGRESS NOTES
DIAGNOSIS:  Right distal radius 3 to 4 parts intra-articular displaced fracture, status post ORIF.    DATE OF SURGERY:  11/6/2023.    HISTORY OF PRESENT ILLNESS:  Ms. Silverio 73 y.o.  female right handed returns today  for 2 months postoperative visit.  The patient denies any significant pain in the right wrist. Rates pain a 6/10 VAS mild, aching, No numbness or tingling sensation. No fever or Chills.      PHYSICAL EXAMINATION:  The incision is completely healed .  No signs of any erythema or drainage. She  has no pain with the active or passive range of motion of the right wrist, good ROM.  She  has intact sensation, distally, and she  is neurovascularly intact.    IMAGING:  Three views right wrist taken today in the office showed anatomic alignment of right distal radius, plate and screws in good position, no loosening.      IMPRESSION:  2 months out from right distal radius ORIF and doing very well.    PLAN:  I have told the patient to work on ROM, as well as strengthening exercises. A removable forearm brace applied. No heavy impact activities. The patient will come back for a follow up in 6 weeks.  At that time, we will take 3 views of the right wrist. PT if needed then.    As this patient has demonstrated risk factors for osteoporosis, such as age and evidence of a fracture, I have referred the patient back to the primary care physician for evaluation for osteoporosis, including consideration for DEXA scanning, if this is felt to be clinically indicated.  The patient is advised to contact the primary care physician to follow-up for further evaluation.        Frnaki Garcia MD

## 2024-01-08 SDOH — HEALTH STABILITY: PHYSICAL HEALTH: ON AVERAGE, HOW MANY DAYS PER WEEK DO YOU ENGAGE IN MODERATE TO STRENUOUS EXERCISE (LIKE A BRISK WALK)?: 0 DAYS

## 2024-01-08 SDOH — HEALTH STABILITY: PHYSICAL HEALTH: ON AVERAGE, HOW MANY MINUTES DO YOU ENGAGE IN EXERCISE AT THIS LEVEL?: 0 MIN

## 2024-01-08 ASSESSMENT — LIFESTYLE VARIABLES
HOW OFTEN DO YOU HAVE A DRINK CONTAINING ALCOHOL: 2
HOW MANY STANDARD DRINKS CONTAINING ALCOHOL DO YOU HAVE ON A TYPICAL DAY: 3 OR 4
HOW OFTEN DO YOU HAVE SIX OR MORE DRINKS ON ONE OCCASION: 2
HAVE YOU OR SOMEONE ELSE BEEN INJURED AS A RESULT OF YOUR DRINKING: 2
HOW OFTEN DURING THE LAST YEAR HAVE YOU HAD A FEELING OF GUILT OR REMORSE AFTER DRINKING: NEVER
HOW OFTEN DURING THE LAST YEAR HAVE YOU FOUND THAT YOU WERE NOT ABLE TO STOP DRINKING ONCE YOU HAD STARTED: NEVER
HOW OFTEN DURING THE LAST YEAR HAVE YOU FAILED TO DO WHAT WAS NORMALLY EXPECTED FROM YOU BECAUSE OF DRINKING: NEVER
HOW OFTEN DURING THE LAST YEAR HAVE YOU HAD A FEELING OF GUILT OR REMORSE AFTER DRINKING: 0
HOW OFTEN DURING THE LAST YEAR HAVE YOU NEEDED AN ALCOHOLIC DRINK FIRST THING IN THE MORNING TO GET YOURSELF GOING AFTER A NIGHT OF HEAVY DRINKING: NEVER
HOW OFTEN DURING THE LAST YEAR HAVE YOU BEEN UNABLE TO REMEMBER WHAT HAPPENED THE NIGHT BEFORE BECAUSE YOU HAD BEEN DRINKING: NEVER
HOW OFTEN DURING THE LAST YEAR HAVE YOU NEEDED AN ALCOHOLIC DRINK FIRST THING IN THE MORNING TO GET YOURSELF GOING AFTER A NIGHT OF HEAVY DRINKING: 0
HAS A RELATIVE, FRIEND, DOCTOR, OR ANOTHER HEALTH PROFESSIONAL EXPRESSED CONCERN ABOUT YOUR DRINKING OR SUGGESTED YOU CUT DOWN: 0
HAVE YOU OR SOMEONE ELSE BEEN INJURED AS A RESULT OF YOUR DRINKING: YES, BUT NOT IN THE PAST YEAR
HOW OFTEN DURING THE LAST YEAR HAVE YOU FOUND THAT YOU WERE NOT ABLE TO STOP DRINKING ONCE YOU HAD STARTED: 0
HOW OFTEN DURING THE LAST YEAR HAVE YOU FAILED TO DO WHAT WAS NORMALLY EXPECTED FROM YOU BECAUSE OF DRINKING: 0
HOW MANY STANDARD DRINKS CONTAINING ALCOHOL DO YOU HAVE ON A TYPICAL DAY: 2
HAS A RELATIVE, FRIEND, DOCTOR, OR ANOTHER HEALTH PROFESSIONAL EXPRESSED CONCERN ABOUT YOUR DRINKING OR SUGGESTED YOU CUT DOWN: NO
HOW OFTEN DO YOU HAVE A DRINK CONTAINING ALCOHOL: MONTHLY OR LESS
HOW OFTEN DURING THE LAST YEAR HAVE YOU BEEN UNABLE TO REMEMBER WHAT HAPPENED THE NIGHT BEFORE BECAUSE YOU HAD BEEN DRINKING: 0

## 2024-01-08 ASSESSMENT — PATIENT HEALTH QUESTIONNAIRE - PHQ9
SUM OF ALL RESPONSES TO PHQ QUESTIONS 1-9: 2
1. LITTLE INTEREST OR PLEASURE IN DOING THINGS: 1
SUM OF ALL RESPONSES TO PHQ QUESTIONS 1-9: 2
SUM OF ALL RESPONSES TO PHQ QUESTIONS 1-9: 2
2. FEELING DOWN, DEPRESSED OR HOPELESS: 1
SUM OF ALL RESPONSES TO PHQ QUESTIONS 1-9: 2
SUM OF ALL RESPONSES TO PHQ9 QUESTIONS 1 & 2: 2

## 2024-01-11 ENCOUNTER — TELEMEDICINE (OUTPATIENT)
Dept: FAMILY MEDICINE CLINIC | Age: 74
End: 2024-01-11
Payer: MEDICARE

## 2024-01-11 DIAGNOSIS — Z78.0 ASYMPTOMATIC MENOPAUSAL STATE: ICD-10-CM

## 2024-01-11 DIAGNOSIS — Z00.00 MEDICARE ANNUAL WELLNESS VISIT, SUBSEQUENT: Primary | ICD-10-CM

## 2024-01-11 PROCEDURE — 3017F COLORECTAL CA SCREEN DOC REV: CPT

## 2024-01-11 PROCEDURE — G0439 PPPS, SUBSEQ VISIT: HCPCS

## 2024-01-11 PROCEDURE — 1124F ACP DISCUSS-NO DSCNMKR DOCD: CPT

## 2024-01-11 PROCEDURE — G8484 FLU IMMUNIZE NO ADMIN: HCPCS

## 2024-01-11 NOTE — PATIENT INSTRUCTIONS
different.  Find what works best for you. If you do not have time or do not like to cook, a program that offers meal replacement bars or shakes may be better for you. Or if you like to prepare meals, finding a plan that includes daily menus and recipes may be best.  Ask your doctor about other health professionals who can help you achieve your weight loss goals.  A dietitian can help you make healthy changes in your diet.  An exercise specialist or  can help you develop a safe and effective exercise program.  A counselor or psychiatrist can help you cope with issues such as depression, anxiety, or family problems that can make it hard to focus on weight loss.  Consider joining a support group for people who are trying to lose weight. Your doctor can suggest groups in your area.  Where can you learn more?  Go to https://www.[employer].net/patientEd and enter U357 to learn more about \"Starting a Weight Loss Plan: Care Instructions.\"  Current as of: September 20, 2023               Content Version: 13.9  © 2006-2023 [employer].   Care instructions adapted under license by [employer]. If you have questions about a medical condition or this instruction, always ask your healthcare professional. [employer] disclaims any warranty or liability for your use of this information.           A Healthy Heart: Care Instructions  Your Care Instructions     Coronary artery disease, also called heart disease, occurs when a substance called plaque builds up in the vessels that supply oxygen-rich blood to your heart muscle. This can narrow the blood vessels and reduce blood flow. A heart attack happens when blood flow is completely blocked. A high-fat diet, smoking, and other factors increase the risk of heart disease.  Your doctor has found that you have a chance of having heart disease. You can do lots of things to keep your heart healthy. It may not be easy, but you can change your diet,

## 2024-01-11 NOTE — PROGRESS NOTES
Medicare Annual Wellness Visit    Mary Silverio is here for Medicare AWV    Assessment & Plan   Medicare annual wellness visit, subsequent  Asymptomatic menopausal state  -     DEXA Bone Density Axial Skeleton; Future  -Patient did have a fall with fracture after tripping on a rug.  Recommended DEXA scan at this time.  Patient was educated on the need of DEXA scan and the importance of bone density.  Spent time educating patient on exercise and weight to help with bone density through vitamin supplementation and weightbearing exercises    Recommendations for Preventive Services Due: see orders and patient instructions/AVS.  Recommended screening schedule for the next 5-10 years is provided to the patient in written form: see Patient Instructions/AVS.     No follow-ups on file.     Subjective   The following acute and/or chronic problems were also addressed today:      Patient's complete Health Risk Assessment and screening values have been reviewed and are found in Flowsheets. The following problems were reviewed today and where indicated follow up appointments were made and/or referrals ordered.    Positive Risk Factor Screenings with Interventions:    Fall Risk:  Do you feel unsteady or are you worried about falling? : no  2 or more falls in past year?: no  Fall with injury in past year?: (!) yes     Interventions:    Reviewed medications, home hazards, visual acuity, and co-morbidities that can increase risk for falls      Alcohol Screening:  Alcohol Use: Heavy Drinker (1/8/2024)    AUDIT-C     Frequency of Alcohol Consumption: Monthly or less     Average Number of Drinks: 3 or 4     Frequency of Binge Drinking: Less than monthly      AUDIT-C Score: 3   AUDIT Total Score: 5    Interpretation of AUDIT-C score:   3-7 indicates potential alcohol risk.    8 or more is associated with harmful or hazardous drinking.   13 or more in women, and 15 or more in men, is likely to indicate alcohol

## 2024-01-30 ENCOUNTER — OFFICE VISIT (OUTPATIENT)
Dept: CARDIOLOGY CLINIC | Age: 74
End: 2024-01-30
Payer: MEDICARE

## 2024-01-30 ENCOUNTER — HOSPITAL ENCOUNTER (OUTPATIENT)
Age: 74
Discharge: HOME | End: 2024-01-30
Payer: MEDICARE

## 2024-01-30 VITALS
BODY MASS INDEX: 41.91 KG/M2 | DIASTOLIC BLOOD PRESSURE: 74 MMHG | WEIGHT: 267 LBS | HEIGHT: 67 IN | HEART RATE: 88 BPM | SYSTOLIC BLOOD PRESSURE: 128 MMHG | OXYGEN SATURATION: 96 %

## 2024-01-30 DIAGNOSIS — R06.02 SOB (SHORTNESS OF BREATH): ICD-10-CM

## 2024-01-30 DIAGNOSIS — I50.22 CHRONIC SYSTOLIC CONGESTIVE HEART FAILURE (HCC): ICD-10-CM

## 2024-01-30 DIAGNOSIS — I50.23 ACUTE ON CHRONIC SYSTOLIC HEART FAILURE (HCC): ICD-10-CM

## 2024-01-30 DIAGNOSIS — R06.02 SOB (SHORTNESS OF BREATH): Primary | ICD-10-CM

## 2024-01-30 DIAGNOSIS — I48.0 PAF (PAROXYSMAL ATRIAL FIBRILLATION) (HCC): ICD-10-CM

## 2024-01-30 DIAGNOSIS — E66.01 OBESITY, CLASS III, BMI 40-49.9 (MORBID OBESITY) (HCC): ICD-10-CM

## 2024-01-30 LAB
25(OH)D3 SERPL-MCNC: 15.9 NG/ML
ANION GAP SERPL CALCULATED.3IONS-SCNC: 17 MMOL/L (ref 3–16)
BUN SERPL-MCNC: 12 MG/DL (ref 7–20)
CALCIUM SERPL-MCNC: 8.8 MG/DL (ref 8.3–10.6)
CHLORIDE SERPL-SCNC: 107 MMOL/L (ref 99–110)
CO2 SERPL-SCNC: 21 MMOL/L (ref 21–32)
CREAT SERPL-MCNC: 1.1 MG/DL (ref 0.6–1.2)
DEPRECATED RDW RBC AUTO: 17.3 % (ref 12.4–15.4)
FERRITIN SERPL IA-MCNC: 55.8 NG/ML (ref 15–150)
GFR SERPLBLD CREATININE-BSD FMLA CKD-EPI: 52 ML/MIN/{1.73_M2}
GLUCOSE SERPL-MCNC: 75 MG/DL (ref 70–99)
HCT VFR BLD AUTO: 35.5 % (ref 36–48)
HGB BLD-MCNC: 11.8 G/DL (ref 12–16)
IRON SATN MFR SERPL: 18 % (ref 15–50)
IRON SERPL-MCNC: 63 UG/DL (ref 37–145)
MCH RBC QN AUTO: 30.4 PG (ref 26–34)
MCHC RBC AUTO-ENTMCNC: 33.3 G/DL (ref 31–36)
MCV RBC AUTO: 91.3 FL (ref 80–100)
NT-PROBNP SERPL-MCNC: 1033 PG/ML (ref 0–449)
PLATELET # BLD AUTO: 217 K/UL (ref 135–450)
PMV BLD AUTO: 7.5 FL (ref 5–10.5)
POTASSIUM SERPL-SCNC: 4.2 MMOL/L (ref 3.5–5.1)
RBC # BLD AUTO: 3.89 M/UL (ref 4–5.2)
SODIUM SERPL-SCNC: 145 MMOL/L (ref 136–145)
TIBC SERPL-MCNC: 357 UG/DL (ref 260–445)
TSH SERPL DL<=0.005 MIU/L-ACNC: 2.82 UIU/ML (ref 0.27–4.2)
WBC # BLD AUTO: 7.3 K/UL (ref 4–11)

## 2024-01-30 PROCEDURE — 36415 COLL VENOUS BLD VENIPUNCTURE: CPT

## 2024-01-30 PROCEDURE — 83550 IRON BINDING TEST: CPT

## 2024-01-30 PROCEDURE — 3078F DIAST BP <80 MM HG: CPT | Performed by: NURSE PRACTITIONER

## 2024-01-30 PROCEDURE — 1124F ACP DISCUSS-NO DSCNMKR DOCD: CPT | Performed by: NURSE PRACTITIONER

## 2024-01-30 PROCEDURE — 80048 BASIC METABOLIC PNL TOTAL CA: CPT

## 2024-01-30 PROCEDURE — 99214 OFFICE O/P EST MOD 30 MIN: CPT | Performed by: NURSE PRACTITIONER

## 2024-01-30 PROCEDURE — 3074F SYST BP LT 130 MM HG: CPT | Performed by: NURSE PRACTITIONER

## 2024-01-30 PROCEDURE — 82306 VITAMIN D 25 HYDROXY: CPT

## 2024-01-30 PROCEDURE — 1090F PRES/ABSN URINE INCON ASSESS: CPT | Performed by: NURSE PRACTITIONER

## 2024-01-30 PROCEDURE — G8400 PT W/DXA NO RESULTS DOC: HCPCS | Performed by: NURSE PRACTITIONER

## 2024-01-30 PROCEDURE — 93000 ELECTROCARDIOGRAM COMPLETE: CPT | Performed by: NURSE PRACTITIONER

## 2024-01-30 PROCEDURE — 3017F COLORECTAL CA SCREEN DOC REV: CPT | Performed by: NURSE PRACTITIONER

## 2024-01-30 PROCEDURE — 82728 ASSAY OF FERRITIN: CPT

## 2024-01-30 PROCEDURE — G8417 CALC BMI ABV UP PARAM F/U: HCPCS | Performed by: NURSE PRACTITIONER

## 2024-01-30 PROCEDURE — G8484 FLU IMMUNIZE NO ADMIN: HCPCS | Performed by: NURSE PRACTITIONER

## 2024-01-30 PROCEDURE — 85027 COMPLETE CBC AUTOMATED: CPT

## 2024-01-30 PROCEDURE — 83880 ASSAY OF NATRIURETIC PEPTIDE: CPT

## 2024-01-30 PROCEDURE — 84443 ASSAY THYROID STIM HORMONE: CPT

## 2024-01-30 PROCEDURE — G8427 DOCREV CUR MEDS BY ELIG CLIN: HCPCS | Performed by: NURSE PRACTITIONER

## 2024-01-30 PROCEDURE — 1036F TOBACCO NON-USER: CPT | Performed by: NURSE PRACTITIONER

## 2024-01-30 PROCEDURE — 83540 ASSAY OF IRON: CPT

## 2024-01-30 RX ORDER — TORSEMIDE 20 MG/1
TABLET ORAL
Qty: 60 TABLET | Refills: 0 | Status: SHIPPED | OUTPATIENT
Start: 2024-01-30

## 2024-01-30 RX ORDER — TORSEMIDE 20 MG/1
40 TABLET ORAL DAILY
Qty: 180 TABLET | Refills: 3 | Status: SHIPPED | OUTPATIENT
Start: 2024-01-30 | End: 2024-01-30

## 2024-01-30 NOTE — PROGRESS NOTES
10/18/23  Yes Jewel Kong APRN - CNP   furosemide (LASIX) 40 MG tablet Take 1 tablet by mouth daily   Yes Provider, MD Justine   pantoprazole (PROTONIX) 40 MG tablet TAKE 1 TABLET ONCE DAILY 8/21/23  Yes Jewel Kong APRN - CNP   empagliflozin (JARDIANCE) 10 MG tablet Take 1 tablet by mouth daily 3/30/23  Yes Jewel Kong APRN - CNP   oxyCODONE-acetaminophen (PERCOCET) 5-325 MG per tablet Take 1 tablet by mouth 2 times daily for 30 days. Max Daily Amount: 2 tablets  Patient not taking: Reported on 1/30/2024 1/23/24 2/22/24  Leif Deluna MD      Allergies:  Codeine, Iv contrast [iodides], Penicillins, and Sulfa antibiotics     Physical Examination:    Vitals:    01/30/24 1313   BP: 128/74   Pulse: 88   SpO2: 96%   Weight: 121.1 kg (267 lb)   Height: 1.702 m (5' 7.01\")     Physical exam was reviewed and updated as below at the time of the visit:     Constitutional and General Appearance: no apparent distress, tearful  HEENT: non-icteric sclera  Respiratory:  No use of accessory muscles  Clear breath sounds throughout, no wheezing, no crackles, no rhonchi  Cardiovascular:  The apical impulses not displaced  Heart tones are crisp and normal, no murmur/rub/gallop  Regular rate and rhythm, S1,S2 normal  Radial pulses 2+ and equal bilaterally  + BLE edema  Pedal Pulses: 2+ and equal   Abdomen:  No masses or tenderness  Liver: No Abnormalities Noted  Musculoskeletal/Skin:  Exhibits normal gait balance and coordination  There is no clubbing, cyanosis of the extremities  Skin is warm and dry  Moves all extremities well  Neurological/Psychiatric:  Alert and oriented in all spheres  No abnormalities of  affect, mentation, or behavior are noted    Lab Data reviewed and analyzed   CBC:   WBC   Date Value Ref Range Status   10/21/2023 7.3 4.0 - 11.0 K/uL Final   06/16/2023 6.7 4.0 - 11.0 K/uL Final   06/15/2023 7.1 4.0 - 11.0 K/uL Final     RBC   Date Value Ref Range Status   10/21/2023 4.16 4.00 - 5.20 M/uL

## 2024-01-30 NOTE — PATIENT INSTRUCTIONS
Plan:   Continue daily weights  Check labs- BMP, BNP, CBC and iron studies, vitamin D today and repeat BMP in about 1 week   Continue jardiance  coreg 6.25mg twice a day  Stop the lasix and change to torsemide 60mg for 1 day and then 40mg daily after that  Spironolactone (aldactone) 50mg daily (take 2 tabs of the 25mg tablets)  Adjust the entresto 1 tab in the am and 2 tabs in the pm of the 24/26mg tab  Follow up 4-6 weeks

## 2024-01-31 DIAGNOSIS — E55.9 VITAMIN D DEFICIENCY: Primary | ICD-10-CM

## 2024-01-31 RX ORDER — ERGOCALCIFEROL 1.25 MG/1
50000 CAPSULE ORAL WEEKLY
Qty: 8 CAPSULE | Refills: 0 | Status: SHIPPED | OUTPATIENT
Start: 2024-01-31

## 2024-01-31 NOTE — TELEPHONE ENCOUNTER
----- Message from DEMI Hameed - CNP sent at 1/31/2024  9:24 AM EST -----  Please notify patient results. Kidney function is stable. Electrolytes are stable.   Pro-BNP (heart failure number) is improved  Vitamin D level is extremely low at 15.9.  I recommend we start vitamin D 50,000 units once a week for 8 weeks and then reduce to the over-the-counter vitamin D 3 2000 IUs a day  Continue current regimen.

## 2024-02-03 DIAGNOSIS — M17.12 PRIMARY OSTEOARTHRITIS OF LEFT KNEE: ICD-10-CM

## 2024-02-05 RX ORDER — GABAPENTIN 100 MG/1
100 CAPSULE ORAL 2 TIMES DAILY
Qty: 60 CAPSULE | Refills: 0 | Status: SHIPPED | OUTPATIENT
Start: 2024-02-05 | End: 2024-03-06

## 2024-02-05 NOTE — TELEPHONE ENCOUNTER
Refill Request         Last Seen: Last Seen Department: 1/11/2024  Last Seen by PCP: 1/11/2024    Last Written: 12/05/2023        Next Appointment:   Future Appointments   Date Time Provider Department Center   2/6/2024 11:40 AM Patricio Reaves MD [facility]   2/13/2024 10:00 AM Franki Garcia MD AND ORTHO [facility]   2/16/2024 10:40 AM [facility] DEXA [facility]   2/21/2024 10:00 AM Marilynn Rodgers APRN - CNP [employer] [facility]   5/2/2024 11:15 AM SCHEDULE, LINDA DEVICE CHECK [facility]   5/2/2024 11:15 AM Chloe Benito APRN - CNP [employer] [facility]   5/30/2024  9:00 AM Agapito Brandon MD [facility]         Requested Prescriptions     Pending Prescriptions Disp Refills    gabapentin (NEURONTIN) 100 MG capsule [Pharmacy Med Name: GABAPENTIN CAPS 100MG] 60 capsule 11     Sig: Take 1 capsule by mouth 2 times daily.

## 2024-02-08 ENCOUNTER — TELEPHONE (OUTPATIENT)
Dept: CARDIOLOGY CLINIC | Age: 74
End: 2024-02-08

## 2024-02-08 ENCOUNTER — HOSPITAL ENCOUNTER (OUTPATIENT)
Age: 74
Setting detail: OUTPATIENT SURGERY
Discharge: HOME | End: 2024-02-08
Attending: ANESTHESIOLOGY | Admitting: ANESTHESIOLOGY
Payer: MEDICARE

## 2024-02-08 VITALS
WEIGHT: 259 LBS | RESPIRATION RATE: 16 BRPM | DIASTOLIC BLOOD PRESSURE: 95 MMHG | BODY MASS INDEX: 40.65 KG/M2 | TEMPERATURE: 97.2 F | SYSTOLIC BLOOD PRESSURE: 140 MMHG | HEART RATE: 82 BPM | HEIGHT: 67 IN | OXYGEN SATURATION: 96 %

## 2024-02-08 PROBLEM — M17.0 BILATERAL PRIMARY OSTEOARTHRITIS OF KNEE: Status: ACTIVE | Noted: 2024-02-08

## 2024-02-08 PROCEDURE — 7100000010 HC PHASE II RECOVERY - FIRST 15 MIN: Performed by: ANESTHESIOLOGY

## 2024-02-08 PROCEDURE — 3600000002 HC SURGERY LEVEL 2 BASE: Performed by: ANESTHESIOLOGY

## 2024-02-08 PROCEDURE — 2500000003 HC RX 250 WO HCPCS: Performed by: ANESTHESIOLOGY

## 2024-02-08 PROCEDURE — 6360000004 HC RX CONTRAST MEDICATION: Performed by: ANESTHESIOLOGY

## 2024-02-08 PROCEDURE — 6360000002 HC RX W HCPCS: Performed by: ANESTHESIOLOGY

## 2024-02-08 PROCEDURE — 20610 DRAIN/INJ JOINT/BURSA W/O US: CPT | Performed by: ANESTHESIOLOGY

## 2024-02-08 RX ORDER — BETAMETHASONE SODIUM PHOSPHATE AND BETAMETHASONE ACETATE 3; 3 MG/ML; MG/ML
INJECTION, SUSPENSION INTRA-ARTICULAR; INTRALESIONAL; INTRAMUSCULAR; SOFT TISSUE
Status: DISCONTINUED
Start: 2024-02-08 | End: 2024-02-08 | Stop reason: HOSPADM

## 2024-02-08 RX ORDER — LIDOCAINE HYDROCHLORIDE 10 MG/ML
INJECTION, SOLUTION EPIDURAL; INFILTRATION; INTRACAUDAL; PERINEURAL PRN
Status: DISCONTINUED | OUTPATIENT
Start: 2024-02-08 | End: 2024-02-08 | Stop reason: ALTCHOICE

## 2024-02-08 RX ORDER — LIDOCAINE HYDROCHLORIDE 10 MG/ML
INJECTION, SOLUTION INFILTRATION; PERINEURAL
Status: DISCONTINUED
Start: 2024-02-08 | End: 2024-02-08 | Stop reason: HOSPADM

## 2024-02-08 ASSESSMENT — PAIN DESCRIPTION - DESCRIPTORS: DESCRIPTORS: STABBING

## 2024-02-08 ASSESSMENT — PAIN - FUNCTIONAL ASSESSMENT: PAIN_FUNCTIONAL_ASSESSMENT: 0-10

## 2024-02-08 NOTE — TELEPHONE ENCOUNTER
Attempted to reach pt, left vm to call office back.    When pt returns call please clarify they need a refill. NPRB sent in Vit D on 1/31, 8 capsules once a week. Pt should have at least 6 weeks left. Please clarify with pt

## 2024-02-08 NOTE — PROGRESS NOTES
Discharge instructions given to patient. Verbalized understanding of instructions and written instructions given. Discharged ambulatory. Assessment unchanged.

## 2024-02-08 NOTE — H&P
Nursing History and Physical reviewed and agreed upon.      Additional findings:    Allergies and medications have been reviewed.    HENT: Airway patent and reviewed  Cardiovascular: Normal rate, regular rhythm, normal heart sounds.   Pulmonary/Chest: No wheezes. No rhonchi. No rales.   Abdominal: Soft. Bowel sounds are normal. No distension.    Mallampati: 2    ASA CLASS:         []   I. Normal, healthy adult           [x]   II.  Mild systemic disease            []   III.  Severe systemic disease      Sedation plan:   [x]  Local/MINIMAL     []  Minimal    MALLAMPATI:           []   I.   Complete visualization of the soft palate           [x]   II.  Complete visualization of the uvula            []   III.  Visualization of only the base of the uvula           []   IV. Soft palate is not visibl    Patient's condition acceptable for planned procedure/sedation.   Post Procedure Plan   Return to same level of care   ______________________     The risks and benefits as well as alternatives to the procedure have been discussed with the patient and or family.  The patient and or next of kin understands and agrees to proceed.    Patricio Reaves MD

## 2024-02-08 NOTE — DISCHARGE INSTRUCTIONS
[facility]    [phone]    Post Pain Management Injection    PATIENT INSTRUCTIONS:     -Resume Normal Diet  -Other    ACTIVITY:    -No driving or operating machinery for 8 hours post procedure without sedation and 24 hours if you had sedation.  If you are seen driving during this time the proper authorities will be notified.  -Do not stay alone for 4-6 hours after the procedure.  -If you have had IV sedation, do not sign legal documents, make any major decisions, or be involved in work decisions for the remainder of the day.  -May shower or bathe.  -Resume normal activity when full movement/sensation has returned in extremities.           3)  SITE CARE:    -Observe puncture site for signs of infection (redness, warmth swelling, drainage with a foul odor, fever or increased tenderness).           4)  EXPECTED SIDE EFFECTS:    -Numbness/tingling/weakness in extremities, if this lasts more than 6 hours notify Dr. Reaves.  -Muscle stiffness, soreness at puncture site (soreness may last 2-4 days).          DIABETIC PATIENTS ONLY:    -Increased glucose levels in all diabetic patients who have received a steroid injection.  -Monitor blood sugars frequently for the first 5 days following procedure.      -Adjust medication accordingly.           5)  TO REACH DR. REAVES: Call [phone]    6)   ADDITIONAL INSTRUCTIONS:    Follow-up as scheduled or call for appointment if not already done.  Patients taking blood thinners may resume as prescribed. Coumadin can be restarted this evening, all other blood thinners will be resumed tomorrow.

## 2024-02-08 NOTE — PROGRESS NOTES
Patient was able to demonstrate the ability to move from a reclining position to an upright position within the recliner.

## 2024-02-08 NOTE — PROCEDURES
Procedure: Bilateral Intra-articular knee injection under fluoro  Large joint injection [id number]    Diagnosis: Primary knee osteoarthritis     Informed Consent  Informed consent was obtained after the risks and benefits of the procedure were discussed in detail with the patient; to include but not limited to infection and or bleeding at the injection site, soreness at the injection site, nerve injury, incomplete pain control, worsening of pain, and headache.  All questions were answered and treatment options discussed.  Patient agreed to continue with planned procedure.    Time-out  A time-out was completed prior to starting the procedure.  Staff in the procedure suite as well as myself reviewed and confirmed the patient name, type or procedure and procedure location, and the presurgical questionnaire including blood thinners, allergies, and infections risks.  Time-out was completed properly.      Procedure  Pt supine. Area sterile prep. 25g 2-inch needle placed under sterile condition in between the medial femoral and tibial condyles under fluoroscopy. Injection of contrast demonstrates  Celestone 6mg mixed with 4 cc Marcaine 0.25% injected after neg asp for blood.  This procedure was done in the exact same fashion to the opposite knee.    Complications  None    Estimated Blood Loss: less than 1cc    Disposition  The patient was evaluated post-procedure.  The patient tolerated the procedure well.  Vital signs were stable.  Patient was transferred to the recovery room.  In the recovery room, staff assessed for numbness, weakness, additional pain, changes in symptoms, or any new symptoms.      Discharge  Discharge instructions were given to the patient verbally and in writing by our staff, they signed a copy and were given a copy.  Specifically, discharge instructions discussed monitoring their pain levels post-procedure; consider using ice on the site for brief periods of time to help with swelling.  The patient was

## 2024-02-08 NOTE — TELEPHONE ENCOUNTER
Medication Refill    Medication needing refilled:  Vitamin D  Dosage of the medication:  50,000 units  How are you taking this medication (QD, BID, TID, QID, PRN):  Take 1 capsule by mouth once a week   30 or 90 day supply called in:    Which Pharmacy are we sending the medication to?:    [facility] - [address] [phone] - F [phone] [16]     Last ov: 1.30.2024 [facility]  Next ov: 2.21.2024 NPRB

## 2024-02-12 LAB — DIABETIC RETINOPATHY: NEGATIVE

## 2024-02-13 ENCOUNTER — OFFICE VISIT (OUTPATIENT)
Dept: ORTHOPEDIC SURGERY | Age: 74
End: 2024-02-13
Payer: MEDICARE

## 2024-02-13 ENCOUNTER — HOSPITAL ENCOUNTER (OUTPATIENT)
Age: 74
Discharge: HOME | End: 2024-02-13
Payer: MEDICARE

## 2024-02-13 VITALS — BODY MASS INDEX: 40.65 KG/M2 | WEIGHT: 259 LBS | HEIGHT: 67 IN

## 2024-02-13 DIAGNOSIS — E66.01 OBESITY, CLASS III, BMI 40-49.9 (MORBID OBESITY) (HCC): ICD-10-CM

## 2024-02-13 DIAGNOSIS — S52.571A OTHER CLOSED INTRA-ARTICULAR FRACTURE OF DISTAL END OF RIGHT RADIUS, INITIAL ENCOUNTER: Primary | ICD-10-CM

## 2024-02-13 DIAGNOSIS — I50.23 ACUTE ON CHRONIC SYSTOLIC HEART FAILURE (HCC): ICD-10-CM

## 2024-02-13 DIAGNOSIS — I48.0 PAF (PAROXYSMAL ATRIAL FIBRILLATION) (HCC): ICD-10-CM

## 2024-02-13 DIAGNOSIS — R06.02 SOB (SHORTNESS OF BREATH): ICD-10-CM

## 2024-02-13 DIAGNOSIS — I50.22 CHRONIC SYSTOLIC CONGESTIVE HEART FAILURE (HCC): ICD-10-CM

## 2024-02-13 LAB
ANION GAP SERPL CALCULATED.3IONS-SCNC: 15 MMOL/L (ref 3–16)
BUN SERPL-MCNC: 25 MG/DL (ref 7–20)
CALCIUM SERPL-MCNC: 8.6 MG/DL (ref 8.3–10.6)
CHLORIDE SERPL-SCNC: 97 MMOL/L (ref 99–110)
CO2 SERPL-SCNC: 29 MMOL/L (ref 21–32)
CREAT SERPL-MCNC: 1.5 MG/DL (ref 0.6–1.2)
GFR SERPLBLD CREATININE-BSD FMLA CKD-EPI: 36 ML/MIN/{1.73_M2}
GLUCOSE SERPL-MCNC: 153 MG/DL (ref 70–99)
POTASSIUM SERPL-SCNC: 3.3 MMOL/L (ref 3.5–5.1)
SODIUM SERPL-SCNC: 141 MMOL/L (ref 136–145)

## 2024-02-13 PROCEDURE — 1124F ACP DISCUSS-NO DSCNMKR DOCD: CPT | Performed by: ORTHOPAEDIC SURGERY

## 2024-02-13 PROCEDURE — 3017F COLORECTAL CA SCREEN DOC REV: CPT | Performed by: ORTHOPAEDIC SURGERY

## 2024-02-13 PROCEDURE — G8417 CALC BMI ABV UP PARAM F/U: HCPCS | Performed by: ORTHOPAEDIC SURGERY

## 2024-02-13 PROCEDURE — 80048 BASIC METABOLIC PNL TOTAL CA: CPT

## 2024-02-13 PROCEDURE — G8484 FLU IMMUNIZE NO ADMIN: HCPCS | Performed by: ORTHOPAEDIC SURGERY

## 2024-02-13 PROCEDURE — 36415 COLL VENOUS BLD VENIPUNCTURE: CPT

## 2024-02-13 PROCEDURE — 1036F TOBACCO NON-USER: CPT | Performed by: ORTHOPAEDIC SURGERY

## 2024-02-13 PROCEDURE — G8427 DOCREV CUR MEDS BY ELIG CLIN: HCPCS | Performed by: ORTHOPAEDIC SURGERY

## 2024-02-13 PROCEDURE — 99213 OFFICE O/P EST LOW 20 MIN: CPT | Performed by: ORTHOPAEDIC SURGERY

## 2024-02-13 PROCEDURE — 1090F PRES/ABSN URINE INCON ASSESS: CPT | Performed by: ORTHOPAEDIC SURGERY

## 2024-02-13 PROCEDURE — G8400 PT W/DXA NO RESULTS DOC: HCPCS | Performed by: ORTHOPAEDIC SURGERY

## 2024-02-13 NOTE — PROGRESS NOTES
DIAGNOSIS:  Right distal radius 3 to 4 parts intra-articular displaced fracture, status post ORIF.    DATE OF SURGERY:  11/6/2023.    HISTORY OF PRESENT ILLNESS:  Ms. Silverio 73 y.o.  female right handed returns today for 3 months postoperative visit.  The patient denies any significant pain in the right wrist. Rates pain a 2/10 VAS mild, aching, No numbness or tingling sensation. No fever or Chills.      Past Medical History:   Diagnosis Date    Arthritis     BACK    Atrial fibrillation (HCC)     CHF (congestive heart failure) (HCC)     Congenital heart disease     COPD (chronic obstructive pulmonary disease) (HCC)     GERD (gastroesophageal reflux disease)     Hypertension     Irritable bowel syndrome     Obesity     Restless legs syndrome        Past Surgical History:   Procedure Laterality Date    ABLATION OF DYSRHYTHMIC FOCUS      CHOLECYSTECTOMY, OPEN      COLONOSCOPY      HIP SURGERY Left 05/04/2023    LEFT KNEE INTRA ARTICULAR INJECTION SITE CONFIRMED BY FLUOROSCOPY performed by Leif Deluna MD at [facility] OR    HIP SURGERY Bilateral 2/8/2024    BILATERAL KNEE INTRA ARTICULAR INJECTION SITE CONFIRMED BY FLUOROSCOPY performed by Patricio Reaves MD at [facility] OR    HYSTERECTOMY, VAGINAL      KNEE ARTHROCENTESIS Left 07/13/2023    LEFT KNEE INTRA ARTICULAR INJECTION SITE CONFIRMED BY FLUOROSCOPY performed by Leif Deluna MD at [facility] OR    PACEMAKER PLACEMENT      PAIN MANAGEMENT PROCEDURE Left 05/25/2023    LEFT KNEE INTRA ARTICULAR INJECTION SITE CONFIRMED BY FLUOROSCOPY performed by Leif Deluna MD at [facility] OR    STIMULATOR SURGERY      SPINAL    WRIST FRACTURE SURGERY Right 11/6/2023    RIGHT OPEN REDUCTION AND INTERNAL FIXATION WRIST performed by Franki Garcia MD at [facility] OR       Social History     Socioeconomic History    Marital status:      Spouse name: Not on file    Number of children: Not on file    Years of education: Not on file    Highest education level: Not on file

## 2024-02-14 ENCOUNTER — TELEPHONE (OUTPATIENT)
Dept: CARDIOLOGY CLINIC | Age: 74
End: 2024-02-14

## 2024-02-14 DIAGNOSIS — I50.23 ACUTE ON CHRONIC SYSTOLIC HEART FAILURE (HCC): ICD-10-CM

## 2024-02-14 RX ORDER — SPIRONOLACTONE 50 MG/1
50 TABLET, FILM COATED ORAL DAILY
Qty: 90 TABLET | Refills: 3 | Status: SHIPPED | OUTPATIENT
Start: 2024-02-14

## 2024-02-14 NOTE — TELEPHONE ENCOUNTER
Thanks so much for update. We need to get her back on the Spironolactone (aldactone) 50mg daily as this is a potassium sparing water pill and will help increase the potassium level.     Please repeat BMP in 1 week after restarting Spironolactone (aldactone).    Continue to monitor daily weights

## 2024-02-14 NOTE — TELEPHONE ENCOUNTER
----- Message from DEMI Hameed CNP sent at 2/14/2024  5:43 AM EST -----  Please notify patient results. Kidney function is stable. Potassium is low and she is showing some dehydration from the diuretics   Please get an update how she is feeling, weights, edema.   Please verify how she is taking the torsemide and Spironolactone (aldactone)    Will need to add low dose potassium supplement as well for a few doses.

## 2024-02-16 ENCOUNTER — HOSPITAL ENCOUNTER (OUTPATIENT)
Dept: WOMENS IMAGING | Age: 74
Discharge: HOME | End: 2024-02-16
Payer: MEDICARE

## 2024-02-16 DIAGNOSIS — Z78.0 ASYMPTOMATIC MENOPAUSAL STATE: ICD-10-CM

## 2024-02-16 DIAGNOSIS — F51.01 PRIMARY INSOMNIA: ICD-10-CM

## 2024-02-16 PROCEDURE — 77080 DXA BONE DENSITY AXIAL: CPT

## 2024-02-16 RX ORDER — HYDROXYZINE HYDROCHLORIDE 25 MG/1
TABLET, FILM COATED ORAL
Qty: 90 TABLET | Refills: 3 | Status: SHIPPED | OUTPATIENT
Start: 2024-02-16

## 2024-02-16 NOTE — TELEPHONE ENCOUNTER
Refill Request       Last Seen: Last Seen Department: 1/11/2024  Last Seen by PCP: 1/11/2024          Next Appointment:   Future Appointments   Date Time Provider Department Center   2/16/2024 10:40 AM ROGE MORRIS  [facility] [place] [facility]   2/21/2024 10:00 AM Marilynn Rodgers APRN - CNP [facility]   3/1/2024  3:10 PM Patricio Reaves MD [facility] TSP AND [facility]   5/2/2024 11:15 AM SCHEDULE, LINDA DEVICE CHECK [facility]   5/2/2024 11:15 AM Chloe Benito APRN - CNP [facility]   5/30/2024  9:00 AM [facility]             Requested Prescriptions     Pending Prescriptions Disp Refills    hydrOXYzine HCl (ATARAX) 25 MG tablet [Pharmacy Med Name: HYDROXYZINE HCL TABS 25MG] 90 tablet 3     Sig: TAKE 1 TABLET NIGHTLY

## 2024-02-26 RX ORDER — TORSEMIDE 20 MG/1
40 TABLET ORAL DAILY
Qty: 180 TABLET | Refills: 1 | Status: SHIPPED | OUTPATIENT
Start: 2024-02-26

## 2024-02-27 ENCOUNTER — OFFICE VISIT (OUTPATIENT)
Dept: CARDIOLOGY CLINIC | Age: 74
End: 2024-02-27
Payer: MEDICARE

## 2024-02-27 VITALS
DIASTOLIC BLOOD PRESSURE: 58 MMHG | WEIGHT: 264 LBS | BODY MASS INDEX: 41.44 KG/M2 | HEIGHT: 67 IN | HEART RATE: 67 BPM | SYSTOLIC BLOOD PRESSURE: 90 MMHG | OXYGEN SATURATION: 94 %

## 2024-02-27 DIAGNOSIS — I50.23 ACUTE ON CHRONIC SYSTOLIC CONGESTIVE HEART FAILURE (HCC): Primary | ICD-10-CM

## 2024-02-27 DIAGNOSIS — I10 ESSENTIAL HYPERTENSION: ICD-10-CM

## 2024-02-27 DIAGNOSIS — I50.22 CHRONIC SYSTOLIC CONGESTIVE HEART FAILURE (HCC): ICD-10-CM

## 2024-02-27 DIAGNOSIS — Z95.810 ICD (IMPLANTABLE CARDIOVERTER-DEFIBRILLATOR), DUAL, IN SITU: ICD-10-CM

## 2024-02-27 DIAGNOSIS — E11.9 TYPE 2 DIABETES MELLITUS WITHOUT COMPLICATION, WITHOUT LONG-TERM CURRENT USE OF INSULIN (HCC): ICD-10-CM

## 2024-02-27 PROCEDURE — 2022F DILAT RTA XM EVC RTNOPTHY: CPT | Performed by: NURSE PRACTITIONER

## 2024-02-27 PROCEDURE — G8417 CALC BMI ABV UP PARAM F/U: HCPCS | Performed by: NURSE PRACTITIONER

## 2024-02-27 PROCEDURE — G8427 DOCREV CUR MEDS BY ELIG CLIN: HCPCS | Performed by: NURSE PRACTITIONER

## 2024-02-27 PROCEDURE — 1090F PRES/ABSN URINE INCON ASSESS: CPT | Performed by: NURSE PRACTITIONER

## 2024-02-27 PROCEDURE — 3078F DIAST BP <80 MM HG: CPT | Performed by: NURSE PRACTITIONER

## 2024-02-27 PROCEDURE — G8484 FLU IMMUNIZE NO ADMIN: HCPCS | Performed by: NURSE PRACTITIONER

## 2024-02-27 PROCEDURE — 3074F SYST BP LT 130 MM HG: CPT | Performed by: NURSE PRACTITIONER

## 2024-02-27 PROCEDURE — 1124F ACP DISCUSS-NO DSCNMKR DOCD: CPT | Performed by: NURSE PRACTITIONER

## 2024-02-27 PROCEDURE — 3046F HEMOGLOBIN A1C LEVEL >9.0%: CPT | Performed by: NURSE PRACTITIONER

## 2024-02-27 PROCEDURE — 1036F TOBACCO NON-USER: CPT | Performed by: NURSE PRACTITIONER

## 2024-02-27 PROCEDURE — 3017F COLORECTAL CA SCREEN DOC REV: CPT | Performed by: NURSE PRACTITIONER

## 2024-02-27 PROCEDURE — G8399 PT W/DXA RESULTS DOCUMENT: HCPCS | Performed by: NURSE PRACTITIONER

## 2024-02-27 PROCEDURE — 99215 OFFICE O/P EST HI 40 MIN: CPT | Performed by: NURSE PRACTITIONER

## 2024-02-27 RX ORDER — CARVEDILOL 12.5 MG/1
6.25 TABLET ORAL 2 TIMES DAILY WITH MEALS
Qty: 180 TABLET | Refills: 3
Start: 2024-02-27

## 2024-02-27 RX ORDER — SACUBITRIL AND VALSARTAN 24; 26 MG/1; MG/1
1 TABLET, FILM COATED ORAL 2 TIMES DAILY
Qty: 180 TABLET | Refills: 3 | Status: SHIPPED | OUTPATIENT
Start: 2024-02-27

## 2024-02-27 RX ORDER — SACUBITRIL AND VALSARTAN 24; 26 MG/1; MG/1
1 TABLET, FILM COATED ORAL 2 TIMES DAILY
COMMUNITY
End: 2024-02-27 | Stop reason: SDUPTHER

## 2024-02-27 RX ORDER — FUROSEMIDE 20 MG/1
20 TABLET ORAL DAILY
COMMUNITY
End: 2024-02-27 | Stop reason: ALTCHOICE

## 2024-02-27 NOTE — PATIENT INSTRUCTIONS
Plan:   Continue daily weights- and record; monitor for 3lbs in a day or 5 lbs in a week   Check b/p if feeling dizziness or lightheadedness  Stress test - call to schedule [phone]- will call with results   Check labs in about 1 week   Continue jardiance  Hold the coreg for tonight and then restart coreg 6.25mg twice a day  Continue torsemide 40mg daily for now    the Spironolactone (aldactone) 50mg daily  Continue entresto   Follow up 4 weeks    No

## 2024-02-27 NOTE — PROGRESS NOTES
Carvedilol  Frank: Spironolactone   SGLT2: jardiance   Hydralazine/nitrates:    Iron Deficiency Anemia:  Yes, hx  IV Iron Therapy:  No  2022 ACC/AHA HF Guidelines:   In patients with HFrEF and iron deficiency with or without anemia, intravenous iron replacement is reasonable to improve functional status and QOL(ferritin <100 ng/ml or 100-300 ng/ml if transferrin saturation <20%).     NYHA:                        Class III:  Marked limitation of physical activity.  ACC/ AHA Stage:      Stage C:  Structural heart disease with prior or current symptoms of HF    Assessment:  HFrEF LVEF <= 40%  chronic systolic heart failure, with right heart failure              -Reported by Patient was 20%-->50% (2021)              - echo 11/2022 showed LVEF 25%  Nonischemic cardiomyopathy (normal cors 2018)   PAF, TONY clip, s/p ablation 2019              -patient reports clip not in right position for complete closure- remains on eliquis  Hx of ASD  S/P dual chamber ICD 2019; (failed attempts at CS lead placement due to limited anatomy)   Moderate aortic regurg   NSVT  HTN    Visit Diagnosis:    1. Acute on chronic systolic congestive heart failure (HCC)    2. Chronic systolic congestive heart failure (HCC)    3. ICD (implantable cardioverter-defibrillator), dual, in situ    4. Essential hypertension    5. Type 2 diabetes mellitus without complication, without long-term current use of insulin (HCC)      Plan:   Continue daily weights- and record; monitor for 3lbs in a day or 5 lbs in a week   Check b/p if feeling dizziness or lightheadedness  Stress test - call to schedule [phone]- will call with results   Check labs in about 1 week   Continue jardiance  Hold the coreg for tonight and then restart coreg 6.25mg twice a day  Continue torsemide 40mg daily for now    the Spironolactone (aldactone) 50mg daily  Continue entresto   Follow up 4 weeks     I appreciate the opportunity for caring for this patient.     Marilynn STEVENS

## 2024-02-28 ENCOUNTER — TELEPHONE (OUTPATIENT)
Dept: CARDIOLOGY CLINIC | Age: 74
End: 2024-02-28

## 2024-02-28 ENCOUNTER — OFFICE VISIT (OUTPATIENT)
Dept: FAMILY MEDICINE CLINIC | Age: 74
End: 2024-02-28
Payer: MEDICARE

## 2024-02-28 VITALS
WEIGHT: 265.4 LBS | HEART RATE: 78 BPM | SYSTOLIC BLOOD PRESSURE: 128 MMHG | OXYGEN SATURATION: 93 % | DIASTOLIC BLOOD PRESSURE: 78 MMHG | RESPIRATION RATE: 16 BRPM | BODY MASS INDEX: 41.65 KG/M2 | HEIGHT: 67 IN

## 2024-02-28 DIAGNOSIS — M81.0 AGE-RELATED OSTEOPOROSIS WITHOUT CURRENT PATHOLOGICAL FRACTURE: Primary | ICD-10-CM

## 2024-02-28 DIAGNOSIS — F33.0 MILD EPISODE OF RECURRENT MAJOR DEPRESSIVE DISORDER (HCC): ICD-10-CM

## 2024-02-28 DIAGNOSIS — F11.20 OPIOID DEPENDENCE WITH CURRENT USE (HCC): ICD-10-CM

## 2024-02-28 DIAGNOSIS — N18.31 STAGE 3A CHRONIC KIDNEY DISEASE (HCC): ICD-10-CM

## 2024-02-28 DIAGNOSIS — J44.9 CHRONIC OBSTRUCTIVE PULMONARY DISEASE, UNSPECIFIED COPD TYPE (HCC): ICD-10-CM

## 2024-02-28 PROCEDURE — 3017F COLORECTAL CA SCREEN DOC REV: CPT

## 2024-02-28 PROCEDURE — G8417 CALC BMI ABV UP PARAM F/U: HCPCS

## 2024-02-28 PROCEDURE — 3074F SYST BP LT 130 MM HG: CPT

## 2024-02-28 PROCEDURE — G8427 DOCREV CUR MEDS BY ELIG CLIN: HCPCS

## 2024-02-28 PROCEDURE — 1124F ACP DISCUSS-NO DSCNMKR DOCD: CPT

## 2024-02-28 PROCEDURE — 99214 OFFICE O/P EST MOD 30 MIN: CPT

## 2024-02-28 PROCEDURE — G8399 PT W/DXA RESULTS DOCUMENT: HCPCS

## 2024-02-28 PROCEDURE — G8484 FLU IMMUNIZE NO ADMIN: HCPCS

## 2024-02-28 PROCEDURE — 3078F DIAST BP <80 MM HG: CPT

## 2024-02-28 PROCEDURE — 1036F TOBACCO NON-USER: CPT

## 2024-02-28 PROCEDURE — 1090F PRES/ABSN URINE INCON ASSESS: CPT

## 2024-02-28 PROCEDURE — 3023F SPIROM DOC REV: CPT

## 2024-02-28 RX ORDER — ANTACID TABLETS 648 MG/1
1 TABLET, CHEWABLE ORAL 2 TIMES DAILY
Qty: 720 TABLET | Refills: 0 | Status: SHIPPED | OUTPATIENT
Start: 2024-02-28 | End: 2025-02-27

## 2024-02-28 RX ORDER — DENOSUMAB 60 MG/ML
60 INJECTION SUBCUTANEOUS ONCE
Qty: 180 ML | Refills: 0 | Status: SHIPPED | OUTPATIENT
Start: 2024-02-28 | End: 2024-02-28

## 2024-02-28 ASSESSMENT — ENCOUNTER SYMPTOMS
ABDOMINAL PAIN: 0
CONSTIPATION: 0
COLOR CHANGE: 0
SORE THROAT: 0
WHEEZING: 0
COUGH: 0
BLOOD IN STOOL: 0
SHORTNESS OF BREATH: 0
DIARRHEA: 0

## 2024-02-28 NOTE — PROGRESS NOTES
Mary Silverio (:  1950) is a 74 y.o. female,Established patient, here for evaluation of the following chief complaint(s):  Discuss Medications (Pt is here to discuss medications ) and Handicap letter (Pt would like handicap letter )         ASSESSMENT/PLAN:  1. Age-related osteoporosis without current pathological fracture  -     denosumab (PROLIA) 60 MG/ML SOSY SC injection; Inject 1 mL into the skin once for 1 dose, Disp-180 mL, R-0Normal  -     calcium carbonate 648 MG TABS; Take 1 tablet by mouth 2 times daily, Disp-720 tablet, R-0Normal  - Had lengthy discussion with patient today in office regarding options for treatment for osteoporosis.  After reviewing patient's medications and lab history Fosamax would not be a good option for patient since kidney function and GFR is currently sitting at 36 cutoff is 35.  After discussion with patient really is felt to be the best option for this patient.  Will start the process to have Prolia injections given the patient educated patient on calcium and vitamin D supplementation and is currently on high-dose vitamin D prescribed by the cardiologist for extremely low vitamin D.  Will supplement calcium which will be sent to the pharmacy.  Patient was educated on core strength as well as muscle strengthening exercises to help with bone density.  2. Opioid dependence with current use ([facility])  - Currently sees pain management no acute concerns with diversion or abuse  3. Chronic obstructive pulmonary disease, unspecified COPD type ([facility])  - Well-managed on inhaler regimen has no issues with breathing related to pulmonary stands.  On Breztri and albuterol doing well  4. Mild episode of recurrent major depressive disorder ([facility])  - Mood is currently doing well.  Endorses no issues with thoughts of harming self or others.  Currently on hydroxyzine as needed at night for sleep doing well with this regimen  5. Stage 3a chronic kidney disease (HCC)  -Last GFR is 36.  Cut off for

## 2024-02-28 NOTE — TELEPHONE ENCOUNTER
Pt stated her prescription for spironolactone wasn`t at her pharmacy or her old one. Could it be resent to [facility] in [place].    [address] [place] [address]   Ph. [phone]

## 2024-02-28 NOTE — TELEPHONE ENCOUNTER
Pt called to check on medication for Spironlactone. Advised pt that it was sent to [facility] in [place] on 2/14/24 with 3 refills. Pt is going to call the pharmacy for a transfer.

## 2024-02-29 DIAGNOSIS — I50.23 ACUTE ON CHRONIC SYSTOLIC CONGESTIVE HEART FAILURE (HCC): Primary | ICD-10-CM

## 2024-02-29 RX ORDER — SODIUM CHLORIDE 9 MG/ML
INJECTION, SOLUTION INTRAVENOUS CONTINUOUS
OUTPATIENT
Start: 2024-02-29

## 2024-02-29 RX ORDER — ACETAMINOPHEN 325 MG/1
650 TABLET ORAL
OUTPATIENT
Start: 2024-02-29

## 2024-02-29 RX ORDER — ALBUTEROL SULFATE 90 UG/1
4 AEROSOL, METERED RESPIRATORY (INHALATION) PRN
OUTPATIENT
Start: 2024-02-29

## 2024-02-29 RX ORDER — ONDANSETRON 2 MG/ML
8 INJECTION INTRAMUSCULAR; INTRAVENOUS
OUTPATIENT
Start: 2024-02-29

## 2024-02-29 RX ORDER — DIPHENHYDRAMINE HYDROCHLORIDE 50 MG/ML
50 INJECTION INTRAMUSCULAR; INTRAVENOUS
OUTPATIENT
Start: 2024-02-29

## 2024-02-29 RX ORDER — EPINEPHRINE 1 MG/ML
0.3 INJECTION, SOLUTION INTRAMUSCULAR; SUBCUTANEOUS PRN
OUTPATIENT
Start: 2024-02-29

## 2024-03-04 ENCOUNTER — HOSPITAL ENCOUNTER (OUTPATIENT)
Age: 74
Discharge: HOME | End: 2024-03-04
Payer: MEDICARE

## 2024-03-04 ENCOUNTER — HOSPITAL ENCOUNTER (OUTPATIENT)
Dept: GENERAL RADIOLOGY | Age: 74
Discharge: HOME | End: 2024-03-04
Payer: MEDICARE

## 2024-03-04 DIAGNOSIS — M16.11 PRIMARY LOCALIZED OSTEOARTHRITIS OF RIGHT HIP: ICD-10-CM

## 2024-03-04 PROCEDURE — 73502 X-RAY EXAM HIP UNI 2-3 VIEWS: CPT

## 2024-03-07 ENCOUNTER — HOSPITAL ENCOUNTER (OUTPATIENT)
Dept: ONCOLOGY | Age: 74
Setting detail: INFUSION SERIES
Discharge: HOME | End: 2024-03-07
Payer: MEDICARE

## 2024-03-07 VITALS
BODY MASS INDEX: 41.43 KG/M2 | RESPIRATION RATE: 18 BRPM | TEMPERATURE: 97.6 F | SYSTOLIC BLOOD PRESSURE: 95 MMHG | HEART RATE: 63 BPM | DIASTOLIC BLOOD PRESSURE: 57 MMHG | WEIGHT: 264.55 LBS | OXYGEN SATURATION: 92 %

## 2024-03-07 DIAGNOSIS — I50.23 ACUTE ON CHRONIC SYSTOLIC CONGESTIVE HEART FAILURE (HCC): Primary | ICD-10-CM

## 2024-03-07 LAB
ALBUMIN SERPL-MCNC: 3.8 G/DL (ref 3.4–5)
ALBUMIN/GLOB SERPL: 1.2 {RATIO} (ref 1.1–2.2)
ALP SERPL-CCNC: 110 U/L (ref 40–129)
ALT SERPL-CCNC: 19 U/L (ref 10–40)
ANION GAP SERPL CALCULATED.3IONS-SCNC: 4 MMOL/L (ref 3–16)
AST SERPL-CCNC: 36 U/L (ref 15–37)
BILIRUB SERPL-MCNC: 0.3 MG/DL (ref 0–1)
BUN SERPL-MCNC: 12 MG/DL (ref 7–20)
CO2 SERPL-SCNC: 35 MMOL/L (ref 21–32)
CREAT SERPL-MCNC: 1.1 MG/DL (ref 0.6–1.2)
GFR SERPLBLD CREATININE-BSD FMLA CKD-EPI: 52 ML/MIN/{1.73_M2}
GLUCOSE SERPL-MCNC: 118 MG/DL (ref 70–99)
PROT SERPL-MCNC: 6.9 G/DL (ref 6.4–8.2)
SODIUM SERPL-SCNC: 141 MMOL/L (ref 136–145)

## 2024-03-07 PROCEDURE — 80053 COMPREHEN METABOLIC PANEL: CPT

## 2024-03-07 PROCEDURE — 96372 THER/PROPH/DIAG INJ SC/IM: CPT

## 2024-03-07 PROCEDURE — 6360000002 HC RX W HCPCS

## 2024-03-07 RX ORDER — ONDANSETRON 2 MG/ML
8 INJECTION INTRAMUSCULAR; INTRAVENOUS
OUTPATIENT
Start: 2024-09-01

## 2024-03-07 RX ORDER — EPINEPHRINE 1 MG/ML
0.3 INJECTION, SOLUTION INTRAMUSCULAR; SUBCUTANEOUS PRN
OUTPATIENT
Start: 2024-09-01

## 2024-03-07 RX ORDER — SODIUM CHLORIDE 9 MG/ML
INJECTION, SOLUTION INTRAVENOUS CONTINUOUS
OUTPATIENT
Start: 2024-09-01

## 2024-03-07 RX ORDER — DIPHENHYDRAMINE HYDROCHLORIDE 50 MG/ML
50 INJECTION INTRAMUSCULAR; INTRAVENOUS
OUTPATIENT
Start: 2024-09-01

## 2024-03-07 RX ORDER — ALBUTEROL SULFATE 90 UG/1
4 AEROSOL, METERED RESPIRATORY (INHALATION) PRN
OUTPATIENT
Start: 2024-09-01

## 2024-03-07 RX ORDER — ACETAMINOPHEN 325 MG/1
650 TABLET ORAL
OUTPATIENT
Start: 2024-09-01

## 2024-03-07 RX ADMIN — DENOSUMAB 60 MG: 60 INJECTION SUBCUTANEOUS at 14:05

## 2024-03-07 NOTE — FLOWSHEET NOTE
03/07/24 1414   AVS Reviewed   AVS & discharge instructions reviewed with patient and/or representative? Yes   Reviewed instructions with Patient   Level of Understanding Questions answered;Teach back completed;Verbalized understanding     An After Visit Summary was printed and given to the patient.    Katie Cole RN

## 2024-03-07 NOTE — PROGRESS NOTES
Pt arrived to [facility] for 60 mg Prolia injection per orders from RYAN Kong.  Labs drawn and sent. Results below.  Subcutaneous injection given in the right arm.  Pt tolerated injection well and without incident.  Pt denied additional questions or concerns. Discharged ambulatory to her home.    Katie Cole RN      Recent Labs     03/07/24  1319      K 4.3   BUN 12   CREATININE 1.1

## 2024-03-09 DIAGNOSIS — M17.12 PRIMARY OSTEOARTHRITIS OF LEFT KNEE: ICD-10-CM

## 2024-03-11 RX ORDER — GABAPENTIN 100 MG/1
100 CAPSULE ORAL 2 TIMES DAILY
Qty: 60 CAPSULE | Refills: 0 | Status: SHIPPED | OUTPATIENT
Start: 2024-03-11 | End: 2024-04-10

## 2024-03-11 NOTE — TELEPHONE ENCOUNTER
Refill Request         Last Seen: Last Seen Department: 2/28/2024  Last Seen by PCP: 2/28/2024    Last Written: 02/05/2024        Next Appointment:   Future Appointments   Date Time Provider Department Center   3/12/2024  8:00 AM [facility] CARDIAC [place] 2 [facility] NM [place] [facility]   3/12/2024  8:30 AM [facility] STRESS DINA [facility] STRESS [facility]   3/28/2024  2:30 PM Marilynn Rodgers, APRN - CNP [facility]   5/2/2024 11:15 AM SCHEDULE, LINDA DEVICE CHECK [facility]   5/2/2024 11:15 AM hCloe Benito APRN - CNP [facility]   5/7/2024  2:20 PM Patricio Reaves MD AFL TSP AND [facility]   5/30/2024  9:00 AM [facility]           Requested Prescriptions     Pending Prescriptions Disp Refills    gabapentin (NEURONTIN) 100 MG capsule [Pharmacy Med Name: GABAPENTIN CAPS 100MG] 60 capsule 11     Sig: Take 1 capsule by mouth 2 times daily.

## 2024-03-12 ENCOUNTER — HOSPITAL ENCOUNTER (OUTPATIENT)
Dept: NUCLEAR MEDICINE | Age: 74
Discharge: HOME | End: 2024-03-12
Payer: MEDICARE

## 2024-03-12 ENCOUNTER — HOSPITAL ENCOUNTER (OUTPATIENT)
Dept: NON INVASIVE DIAGNOSTICS | Age: 74
Discharge: HOME | End: 2024-03-12
Payer: MEDICARE

## 2024-03-12 DIAGNOSIS — I50.23 ACUTE ON CHRONIC SYSTOLIC CONGESTIVE HEART FAILURE (HCC): ICD-10-CM

## 2024-03-12 DIAGNOSIS — I50.22 CHRONIC SYSTOLIC CONGESTIVE HEART FAILURE (HCC): ICD-10-CM

## 2024-03-12 DIAGNOSIS — Z95.810 ICD (IMPLANTABLE CARDIOVERTER-DEFIBRILLATOR), DUAL, IN SITU: ICD-10-CM

## 2024-03-12 PROCEDURE — 3430000000 HC RX DIAGNOSTIC RADIOPHARMACEUTICAL: Performed by: NURSE PRACTITIONER

## 2024-03-12 PROCEDURE — 6360000002 HC RX W HCPCS: Performed by: NURSE PRACTITIONER

## 2024-03-12 PROCEDURE — 93017 CV STRESS TEST TRACING ONLY: CPT

## 2024-03-12 PROCEDURE — A9502 TC99M TETROFOSMIN: HCPCS | Performed by: NURSE PRACTITIONER

## 2024-03-12 PROCEDURE — 78452 HT MUSCLE IMAGE SPECT MULT: CPT

## 2024-03-12 RX ORDER — REGADENOSON 0.08 MG/ML
0.4 INJECTION, SOLUTION INTRAVENOUS
Status: COMPLETED | OUTPATIENT
Start: 2024-03-12 | End: 2024-03-12

## 2024-03-12 RX ADMIN — REGADENOSON 0.4 MG: 0.08 INJECTION, SOLUTION INTRAVENOUS at 10:42

## 2024-03-12 RX ADMIN — TETROFOSMIN 31 MILLICURIE: 1.38 INJECTION, POWDER, LYOPHILIZED, FOR SOLUTION INTRAVENOUS at 10:42

## 2024-03-12 NOTE — PROGRESS NOTES
Patient arrived to stress lab for Lexiscan/Myoview Stress test.  Patient was educated on procedure, all questions answered, and consent verified. Patient is a 2 day test, stress today and resting scan tomorrow.

## 2024-03-12 NOTE — PROGRESS NOTES
Pt completed stress portion of cardiac stress test. Pt is discharged to nuclear department for final scan. Nuclear tech will remove PIV. Discharge instructions given to pt. Pt verbalizes understanding to discharge instructions. Pt is a 2-day test.

## 2024-03-13 ENCOUNTER — HOSPITAL ENCOUNTER (OUTPATIENT)
Dept: NUCLEAR MEDICINE | Age: 74
Discharge: HOME | End: 2024-03-13
Payer: MEDICARE

## 2024-03-13 PROCEDURE — 3430000000 HC RX DIAGNOSTIC RADIOPHARMACEUTICAL: Performed by: NURSE PRACTITIONER

## 2024-03-13 PROCEDURE — A9502 TC99M TETROFOSMIN: HCPCS | Performed by: NURSE PRACTITIONER

## 2024-03-13 RX ADMIN — TETROFOSMIN 33.4 MILLICURIE: 1.38 INJECTION, POWDER, LYOPHILIZED, FOR SOLUTION INTRAVENOUS at 12:25

## 2024-03-15 ENCOUNTER — TELEPHONE (OUTPATIENT)
Dept: CARDIOLOGY CLINIC | Age: 74
End: 2024-03-15

## 2024-03-15 NOTE — RESULT ENCOUNTER NOTE
Reviewed, stress test shows no ischemia (which is no decrease blood flow to the heart muscle, indicating no major blockage at this time).

## 2024-03-15 NOTE — TELEPHONE ENCOUNTER
----- Message from DEMI Hameed - CNP sent at 3/15/2024  8:50 AM EDT -----  Reviewed, stress test shows no ischemia (which is no decrease blood flow to the heart muscle, indicating no major blockage at this time).

## 2024-03-19 DIAGNOSIS — M17.12 PRIMARY OSTEOARTHRITIS OF LEFT KNEE: ICD-10-CM

## 2024-03-19 RX ORDER — GABAPENTIN 100 MG/1
100 CAPSULE ORAL 2 TIMES DAILY
Qty: 60 CAPSULE | Refills: 0 | Status: SHIPPED | OUTPATIENT
Start: 2024-03-19 | End: 2024-04-18

## 2024-03-19 NOTE — TELEPHONE ENCOUNTER
----- Message from Eve Phillips sent at 3/19/2024 11:13 AM EDT -----  Subject: Medication Problem     Medication: gabapentin (NEURONTIN) 100 MG capsule  Dosage: 1 capsule 2x day  Ordering Provider:     Question/Problem: [facility] reached out for authorization, please   confirm asap & fill      Pharmacy: [facility] - [place], [place] - [address] [place] [address] [phone] - F [phone]    ---------------------------------------------------------------------------  --------------  CALL BACK INFO  [phone]; OK to leave message on voicemail  ---------------------------------------------------------------------------  --------------    SCRIPT ANSWERS  Relationship to Patient: Self

## 2024-03-24 DIAGNOSIS — E55.9 VITAMIN D DEFICIENCY: ICD-10-CM

## 2024-03-26 RX ORDER — ERGOCALCIFEROL 1.25 MG/1
50000 CAPSULE ORAL WEEKLY
Qty: 4 CAPSULE | Refills: 0 | Status: SHIPPED | OUTPATIENT
Start: 2024-03-26

## 2024-03-28 DIAGNOSIS — I48.0 PAF (PAROXYSMAL ATRIAL FIBRILLATION) (HCC): ICD-10-CM

## 2024-03-28 DIAGNOSIS — I48.91 ATRIAL FIBRILLATION WITH RVR (HCC): ICD-10-CM

## 2024-03-28 RX ORDER — AMIODARONE HYDROCHLORIDE 100 MG/1
100 TABLET ORAL DAILY
Qty: 90 TABLET | Refills: 3 | Status: SHIPPED | OUTPATIENT
Start: 2024-03-28

## 2024-03-28 NOTE — TELEPHONE ENCOUNTER
Last OV:11/10/2023 K[facility]  Future OV: 5/2/2024 npam   Tsh 1/30/2024  Cmp 3/7/2024  Lft 3/7/2024  EKG 1/30/2024    KXA OOT   Last ov 11/12/2022 with NPAM

## 2024-04-17 ENCOUNTER — OFFICE VISIT (OUTPATIENT)
Dept: FAMILY MEDICINE CLINIC | Age: 74
End: 2024-04-17
Payer: MEDICARE

## 2024-04-17 VITALS
BODY MASS INDEX: 42.53 KG/M2 | OXYGEN SATURATION: 93 % | DIASTOLIC BLOOD PRESSURE: 70 MMHG | RESPIRATION RATE: 16 BRPM | HEART RATE: 84 BPM | HEIGHT: 67 IN | SYSTOLIC BLOOD PRESSURE: 102 MMHG | WEIGHT: 271 LBS

## 2024-04-17 DIAGNOSIS — E11.22 TYPE 2 DIABETES MELLITUS WITH STAGE 3 CHRONIC KIDNEY DISEASE, WITHOUT LONG-TERM CURRENT USE OF INSULIN, UNSPECIFIED WHETHER STAGE 3A OR 3B CKD (HCC): ICD-10-CM

## 2024-04-17 DIAGNOSIS — M54.6 PAIN IN THORACIC SPINE: Primary | ICD-10-CM

## 2024-04-17 DIAGNOSIS — N18.31 STAGE 3A CHRONIC KIDNEY DISEASE (HCC): ICD-10-CM

## 2024-04-17 DIAGNOSIS — E66.01 CLASS 3 SEVERE OBESITY DUE TO EXCESS CALORIES WITHOUT SERIOUS COMORBIDITY WITH BODY MASS INDEX (BMI) OF 40.0 TO 44.9 IN ADULT (HCC): ICD-10-CM

## 2024-04-17 DIAGNOSIS — N18.30 TYPE 2 DIABETES MELLITUS WITH STAGE 3 CHRONIC KIDNEY DISEASE, WITHOUT LONG-TERM CURRENT USE OF INSULIN, UNSPECIFIED WHETHER STAGE 3A OR 3B CKD (HCC): ICD-10-CM

## 2024-04-17 DIAGNOSIS — T85.192S MALFUNCTION OF SPINAL CORD STIMULATOR, SEQUELA: ICD-10-CM

## 2024-04-17 DIAGNOSIS — E11.9 TYPE 2 DIABETES MELLITUS WITHOUT COMPLICATION, WITHOUT LONG-TERM CURRENT USE OF INSULIN (HCC): ICD-10-CM

## 2024-04-17 PROCEDURE — G8427 DOCREV CUR MEDS BY ELIG CLIN: HCPCS

## 2024-04-17 PROCEDURE — 1124F ACP DISCUSS-NO DSCNMKR DOCD: CPT

## 2024-04-17 PROCEDURE — 2022F DILAT RTA XM EVC RTNOPTHY: CPT

## 2024-04-17 PROCEDURE — 3078F DIAST BP <80 MM HG: CPT

## 2024-04-17 PROCEDURE — 3074F SYST BP LT 130 MM HG: CPT

## 2024-04-17 PROCEDURE — 1090F PRES/ABSN URINE INCON ASSESS: CPT

## 2024-04-17 PROCEDURE — G8399 PT W/DXA RESULTS DOCUMENT: HCPCS

## 2024-04-17 PROCEDURE — 99214 OFFICE O/P EST MOD 30 MIN: CPT

## 2024-04-17 PROCEDURE — G8417 CALC BMI ABV UP PARAM F/U: HCPCS

## 2024-04-17 PROCEDURE — 1036F TOBACCO NON-USER: CPT

## 2024-04-17 PROCEDURE — 3017F COLORECTAL CA SCREEN DOC REV: CPT

## 2024-04-17 PROCEDURE — 3046F HEMOGLOBIN A1C LEVEL >9.0%: CPT

## 2024-04-17 PROCEDURE — 36415 COLL VENOUS BLD VENIPUNCTURE: CPT

## 2024-04-17 RX ORDER — TIZANIDINE 2 MG/1
2 TABLET ORAL EVERY 6 HOURS PRN
Qty: 30 TABLET | Refills: 0 | Status: SHIPPED | OUTPATIENT
Start: 2024-04-17 | End: 2024-04-25

## 2024-04-17 RX ORDER — LIDOCAINE 50 MG/G
1 PATCH TOPICAL DAILY
Qty: 30 PATCH | Refills: 0 | Status: SHIPPED | OUTPATIENT
Start: 2024-04-17 | End: 2024-05-17

## 2024-04-17 RX ORDER — CALCIUM CARBONATE 500(1250)
500 TABLET ORAL DAILY
COMMUNITY

## 2024-04-17 RX ORDER — BUDESONIDE, GLYCOPYRROLATE, AND FORMOTEROL FUMARATE 160; 9; 4.8 UG/1; UG/1; UG/1
2 AEROSOL, METERED RESPIRATORY (INHALATION) 2 TIMES DAILY
Qty: 10.7 G | Refills: 11 | Status: CANCELLED | OUTPATIENT
Start: 2024-04-17

## 2024-04-17 SDOH — ECONOMIC STABILITY: INCOME INSECURITY: HOW HARD IS IT FOR YOU TO PAY FOR THE VERY BASICS LIKE FOOD, HOUSING, MEDICAL CARE, AND HEATING?: NOT HARD AT ALL

## 2024-04-17 SDOH — ECONOMIC STABILITY: FOOD INSECURITY: WITHIN THE PAST 12 MONTHS, YOU WORRIED THAT YOUR FOOD WOULD RUN OUT BEFORE YOU GOT MONEY TO BUY MORE.: NEVER TRUE

## 2024-04-17 SDOH — ECONOMIC STABILITY: FOOD INSECURITY: WITHIN THE PAST 12 MONTHS, THE FOOD YOU BOUGHT JUST DIDN'T LAST AND YOU DIDN'T HAVE MONEY TO GET MORE.: NEVER TRUE

## 2024-04-17 ASSESSMENT — ENCOUNTER SYMPTOMS
BLOOD IN STOOL: 0
SHORTNESS OF BREATH: 0
COUGH: 0
CONSTIPATION: 0
ABDOMINAL PAIN: 0
SORE THROAT: 0
BACK PAIN: 1
WHEEZING: 0
COLOR CHANGE: 0
DIARRHEA: 0

## 2024-04-17 NOTE — PROGRESS NOTES
Mary Silverio (:  1950) is a 74 y.o. female,Established patient, here for evaluation of the following chief complaint(s):  Lower Back Pain (Worsening low back pain, )      Assessment & Plan   ASSESSMENT/PLAN:  1. Pain in thoracic spine  -     tiZANidine (ZANAFLEX) 2 MG tablet; Take 1 tablet by mouth every 6 hours as needed (muscle spasms), Disp-30 tablet, R-0Normal  -     diclofenac sodium (VOLTAREN) 1 % GEL; Apply 4 g topically 4 times daily, Topical, 4 TIMES DAILY Starting 2024, Disp-350 g, R-0, Normal  -     lidocaine (LIDODERM) 5 %; Place 1 patch onto the skin daily 12 hours on, 12 hours off., Disp-30 patch, R-0Normal  -     CITL[place] - Lon Orozco MD, Neurosurgery (Spine), [facility]  - Chronic back pain in nature.  Patient does have a spinal cord stimulator.  Does follow with pain management is on opioid pain management with endorses ongoing chronic back pain that is recently worsening will add muscle relaxer to help manage acute pain as well as medications listed above.  Patient was encouraged to continue to follow with pain management.  Patient will also be given referral to neurosurgery for further evaluation and treatment of ongoing back pain and spinal cord stimulator evaluation.  Overall patient denies any bowel or bladder incontinence.  2. Class 3 severe obesity due to excess calories without serious comorbidity with body mass index (BMI) of 40.0 to 44.9 in adult (HCC)  -     Tirzepatide-Weight Management 2.5 MG/0.5ML SOAJ; Inject 2.5 mg into the skin once a week, Disp-0.5 mL, R-0Normal  -     CBC with Auto Differential; Future  - Patient would benefit from weight loss.  Does have an issue with obesity.  Did have further discussion with patient regarding medication weight loss options.  It was found after discussion with patient that patient would benefit and be agreeable to medication at this time  1.Discussed risks, benefits and alternatives of Zepbound.  Patient meets BMI

## 2024-04-18 LAB
ALBUMIN SERPL-MCNC: 4.4 G/DL (ref 3.4–5)
ALBUMIN/GLOB SERPL: 1.5 {RATIO} (ref 1.1–2.2)
ALP SERPL-CCNC: 102 U/L (ref 40–129)
ALT SERPL-CCNC: 31 U/L (ref 10–40)
ANION GAP SERPL CALCULATED.3IONS-SCNC: 12 MMOL/L (ref 3–16)
AST SERPL-CCNC: 37 U/L (ref 15–37)
BASOPHILS # BLD: 0 K/UL (ref 0–0.2)
BASOPHILS NFR BLD: 0.5 %
BILIRUB SERPL-MCNC: 0.4 MG/DL (ref 0–1)
BUN SERPL-MCNC: 22 MG/DL (ref 7–20)
CALCIUM SERPL-MCNC: 9.2 MG/DL (ref 8.3–10.6)
CHLORIDE SERPL-SCNC: 104 MMOL/L (ref 99–110)
CO2 SERPL-SCNC: 28 MMOL/L (ref 21–32)
CREAT SERPL-MCNC: 1.4 MG/DL (ref 0.6–1.2)
DEPRECATED RDW RBC AUTO: 15.8 % (ref 12.4–15.4)
EOSINOPHIL # BLD: 0.2 K/UL (ref 0–0.6)
EOSINOPHIL NFR BLD: 2.9 %
EST. AVERAGE GLUCOSE BLD GHB EST-MCNC: 139.9 MG/DL
GFR SERPLBLD CREATININE-BSD FMLA CKD-EPI: 39 ML/MIN/{1.73_M2}
GLUCOSE SERPL-MCNC: 91 MG/DL (ref 70–99)
HBA1C MFR BLD: 6.5 %
HCT VFR BLD AUTO: 37 % (ref 36–48)
HGB BLD-MCNC: 12.1 G/DL (ref 12–16)
LYMPHOCYTES # BLD: 1.9 K/UL (ref 1–5.1)
LYMPHOCYTES NFR BLD: 22.8 %
MCH RBC QN AUTO: 29.7 PG (ref 26–34)
MCHC RBC AUTO-ENTMCNC: 32.8 G/DL (ref 31–36)
MCV RBC AUTO: 90.5 FL (ref 80–100)
MONOCYTES # BLD: 0.7 K/UL (ref 0–1.3)
MONOCYTES NFR BLD: 8.5 %
NEUTROPHILS # BLD: 5.5 K/UL (ref 1.7–7.7)
NEUTROPHILS NFR BLD: 65.3 %
PLATELET # BLD AUTO: 264 K/UL (ref 135–450)
PMV BLD AUTO: 7.8 FL (ref 5–10.5)
POTASSIUM SERPL-SCNC: 4.8 MMOL/L (ref 3.5–5.1)
PROT SERPL-MCNC: 7.3 G/DL (ref 6.4–8.2)
RBC # BLD AUTO: 4.09 M/UL (ref 4–5.2)
SODIUM SERPL-SCNC: 144 MMOL/L (ref 136–145)
WBC # BLD AUTO: 8.4 K/UL (ref 4–11)

## 2024-04-22 DIAGNOSIS — M54.6 PAIN IN THORACIC SPINE: ICD-10-CM

## 2024-04-22 RX ORDER — TIZANIDINE 2 MG/1
TABLET ORAL
Qty: 30 TABLET | Refills: 0 | OUTPATIENT
Start: 2024-04-22

## 2024-04-25 DIAGNOSIS — M17.12 PRIMARY OSTEOARTHRITIS OF LEFT KNEE: ICD-10-CM

## 2024-04-25 RX ORDER — TORSEMIDE 20 MG/1
TABLET ORAL
Qty: 60 TABLET | Refills: 1 | Status: SHIPPED | OUTPATIENT
Start: 2024-04-25

## 2024-04-25 RX ORDER — GABAPENTIN 100 MG/1
100 CAPSULE ORAL 2 TIMES DAILY
Qty: 60 CAPSULE | Refills: 0 | Status: SHIPPED | OUTPATIENT
Start: 2024-04-25 | End: 2024-05-25

## 2024-04-25 NOTE — TELEPHONE ENCOUNTER
4/25- called pt @[phone]. Pt is now scheduled 4/29/2024 k[facility] with [facility] for 4W follow up (from 2/27/2024). Please review med request.

## 2024-04-25 NOTE — TELEPHONE ENCOUNTER
2/27/2024 [facility]-Follow up 4 weeks    No upcoming   4/17/2024 cmp, cbc      Please contact pt for an appt

## 2024-04-25 NOTE — TELEPHONE ENCOUNTER
Refill Request     Last Seen: Last Seen Department: 4/17/2024  Last Seen by PCP: 4/17/2024    Last Written: 3/19/24      Next Appointment:   Future Appointments   Date Time Provider Department Center   5/2/2024 11:15 AM SCHEDULE, LINDA DEVICE CHECK [facility]   5/2/2024 11:15 AM Chloe Benito, APRN - CNP [facility]   5/7/2024  2:20 PM Patricio Reaves MD [facility] AND [facility]   5/30/2024  9:00 AM Agapito Brandon MD CLERM PULRODNEY [facility]           Requested Prescriptions     Pending Prescriptions Disp Refills    gabapentin (NEURONTIN) 100 MG capsule [Pharmacy Med Name: GABAPENTIN CAPS 100MG] 60 capsule 11     Sig: TAKE 1 CAPSULE TWICE A DAY

## 2024-04-29 ENCOUNTER — OFFICE VISIT (OUTPATIENT)
Dept: CARDIOLOGY CLINIC | Age: 74
End: 2024-04-29
Payer: MEDICARE

## 2024-04-29 VITALS
DIASTOLIC BLOOD PRESSURE: 60 MMHG | OXYGEN SATURATION: 94 % | SYSTOLIC BLOOD PRESSURE: 90 MMHG | BODY MASS INDEX: 42.53 KG/M2 | HEIGHT: 67 IN | HEART RATE: 69 BPM | WEIGHT: 271 LBS

## 2024-04-29 DIAGNOSIS — Z95.810 ICD (IMPLANTABLE CARDIOVERTER-DEFIBRILLATOR), DUAL, IN SITU: ICD-10-CM

## 2024-04-29 DIAGNOSIS — I42.8 NICM (NONISCHEMIC CARDIOMYOPATHY) (HCC): ICD-10-CM

## 2024-04-29 DIAGNOSIS — E66.01 OBESITY, CLASS III, BMI 40-49.9 (MORBID OBESITY) (HCC): ICD-10-CM

## 2024-04-29 DIAGNOSIS — I50.22 CHRONIC SYSTOLIC CONGESTIVE HEART FAILURE (HCC): Primary | ICD-10-CM

## 2024-04-29 PROCEDURE — 99214 OFFICE O/P EST MOD 30 MIN: CPT | Performed by: NURSE PRACTITIONER

## 2024-04-29 PROCEDURE — 1124F ACP DISCUSS-NO DSCNMKR DOCD: CPT | Performed by: NURSE PRACTITIONER

## 2024-04-29 PROCEDURE — 3017F COLORECTAL CA SCREEN DOC REV: CPT | Performed by: NURSE PRACTITIONER

## 2024-04-29 PROCEDURE — 1036F TOBACCO NON-USER: CPT | Performed by: NURSE PRACTITIONER

## 2024-04-29 PROCEDURE — G8417 CALC BMI ABV UP PARAM F/U: HCPCS | Performed by: NURSE PRACTITIONER

## 2024-04-29 PROCEDURE — 3074F SYST BP LT 130 MM HG: CPT | Performed by: NURSE PRACTITIONER

## 2024-04-29 PROCEDURE — G8399 PT W/DXA RESULTS DOCUMENT: HCPCS | Performed by: NURSE PRACTITIONER

## 2024-04-29 PROCEDURE — 3078F DIAST BP <80 MM HG: CPT | Performed by: NURSE PRACTITIONER

## 2024-04-29 PROCEDURE — 1090F PRES/ABSN URINE INCON ASSESS: CPT | Performed by: NURSE PRACTITIONER

## 2024-04-29 PROCEDURE — 93297 REM INTERROG DEV EVAL ICPMS: CPT | Performed by: NURSE PRACTITIONER

## 2024-04-29 PROCEDURE — G8427 DOCREV CUR MEDS BY ELIG CLIN: HCPCS | Performed by: NURSE PRACTITIONER

## 2024-04-29 RX ORDER — TORSEMIDE 20 MG/1
TABLET ORAL
Qty: 135 TABLET | Refills: 3 | Status: SHIPPED | OUTPATIENT
Start: 2024-04-29

## 2024-04-29 RX ORDER — TIZANIDINE 2 MG/1
TABLET ORAL
COMMUNITY
Start: 2024-04-17

## 2024-04-29 NOTE — PATIENT INSTRUCTIONS
Continue daily weights- and record; monitor for 3lbs in a day or 5 lbs in a week   Adjust the torsemide to 1 tab (20mg) every other day alternating with 2 tabs (40mg) the other days  Continue jardiance  coreg 6.25mg twice a day  Spironolactone (aldactone) 50mg daily  Continue entresto   Follow up 3 months with repeat BMP and BNP

## 2024-04-29 NOTE — PROGRESS NOTES
[facility]  Cardiac Follow-up    Primary Care Doctor:  Jewel Kong, DEMI - CNP    Chief Complaint   Patient presents with    Follow-up    Congestive Heart Failure     History of Present Illness:  I had the pleasure of seeing Mary Silverio in follow up for Cardiomyopathy.     Moved from [place] to [place], [place] Fall 2022. Hx of non-ischemic cardiomyopathy.   S/P Dual chamber MEdtronic ICD implanted 6/26/2019 by Dr. 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       Since last visit, she had stress test 3/2024 which was low risk. Restarted on Spironolactone (aldactone).  Still having shortness of breath with trying to be active.   She is frustrated with her weight.   She is grieving due to her dog passing away.   Having dizziness that comes and goes. Recently started new muscle relaxer.   activity is limited due to back pain.     Home weights: 270 lbs    Taking medications as prescribed: Yes    Past Medical History:   has a past medical history of Arthritis, Atrial fibrillation ([facility]), CHF (congestive heart failure) ([facility]), Congenital heart disease, COPD (chronic obstructive pulmonary disease) ([facility]), GERD (gastroesophageal reflux disease), Hypertension, Irritable bowel syndrome, Obesity, and Restless legs syndrome.  Surgical History:   has a past surgical history that includes pacemaker placement; Hysterectomy, vaginal; Stimulator Surgery; Cholecystectomy, open; hip surgery (Left, 05/04/2023); Pain management procedure (Left, 05/25/2023); Knee Arthrocentesis (Left, 07/13/2023); Colonoscopy; ablation of dysrhythmic focus; Wrist fracture surgery (Right, 11/6/2023); and hip surgery (Bilateral, 2/8/2024).   Social History:   reports that she quit smoking about 4 years ago. Her smoking use included cigarettes. She started smoking about 50 years ago. She has a 68.7 pack-year smoking history. She has never used smokeless tobacco. She reports current alcohol

## 2024-05-01 NOTE — PROGRESS NOTES
[facility]   Electrophysiology Outpatient Note              Date:  May 2, 2024  Patient name: Mary Silverio  YOB: 1950    Primary Care physician: Jewel Kong APRN - CNP    HISTORY OF PRESENT ILLNESS: The patient is a 74 y.o.  female with a history of AF, nonischemic cardiomyopathy, systolic CHF, HLD, and COPD.   In 11/2022, patient was admitted with shortness of breath and CHF.  Patient with previous cardiac history and had recently moved to [place] from [place].  Echo showed an EF of 25%.  In 6/2023, patient was admitted with symptomatic atrial fibrillation.  Patient was urgently cardioverted in the emergency room.  She was started on low-dose amiodarone at the time.  Today she is being seen for AF and NICM. EKG shows SR with a HR of 85. Patient complains of chroni shortness of breath and back pain.  She saw NP Malena Rodgers 2 days ago.  Her torsemide dose was adjusted.  She reports she has had a good urinary response and is already down a pound. Denies chest pain, palpitations, and dizziness.       Device check 4/29/2024 shows:   Brand: Resilience  Mode: AAIR>DDDR  Normal function   18.4% AP  <0.1%    Arrhythmias: none  Battery life 4.5 years  RA impedance 456 ohms   RV impedance 456 ohms   RA threshold 0.625 V @ 0.40 ms  RV threshold 0.750 V @ 0.40 ms    Past Medical History:   has a past medical history of Arthritis, Atrial fibrillation (HCC), CHF (congestive heart failure) (HCC), Congenital heart disease, COPD (chronic obstructive pulmonary disease) (HCC), GERD (gastroesophageal reflux disease), Hypertension, Irritable bowel syndrome, Obesity, and Restless legs syndrome.    Past Surgical History:   has a past surgical history that includes pacemaker placement; Hysterectomy, vaginal; Stimulator Surgery; Cholecystectomy, open; hip surgery (Left, 05/04/2023); Pain management procedure (Left, 05/25/2023); Knee Arthrocentesis (Left, 07/13/2023); Colonoscopy; ablation of

## 2024-05-02 ENCOUNTER — OFFICE VISIT (OUTPATIENT)
Dept: CARDIOLOGY CLINIC | Age: 74
End: 2024-05-02

## 2024-05-02 VITALS
WEIGHT: 270.8 LBS | SYSTOLIC BLOOD PRESSURE: 102 MMHG | HEIGHT: 67 IN | HEART RATE: 75 BPM | BODY MASS INDEX: 42.5 KG/M2 | OXYGEN SATURATION: 97 % | DIASTOLIC BLOOD PRESSURE: 60 MMHG

## 2024-05-02 DIAGNOSIS — I49.1 PREMATURE ATRIAL CONTRACTION: ICD-10-CM

## 2024-05-02 DIAGNOSIS — I42.8 NICM (NONISCHEMIC CARDIOMYOPATHY) (HCC): ICD-10-CM

## 2024-05-02 DIAGNOSIS — Z79.899 ON AMIODARONE THERAPY: ICD-10-CM

## 2024-05-02 DIAGNOSIS — R00.2 PALPITATIONS: ICD-10-CM

## 2024-05-02 DIAGNOSIS — I48.0 PAF (PAROXYSMAL ATRIAL FIBRILLATION) (HCC): Primary | ICD-10-CM

## 2024-05-02 NOTE — PATIENT INSTRUCTIONS
No changes today      Remote device transmissions every three months     Labs in July     Annual eye exams     Follow up in 6 months

## 2024-05-07 ENCOUNTER — TELEPHONE (OUTPATIENT)
Dept: PULMONOLOGY | Age: 74
End: 2024-05-07

## 2024-05-07 ENCOUNTER — PATIENT MESSAGE (OUTPATIENT)
Dept: CARDIOLOGY CLINIC | Age: 74
End: 2024-05-07

## 2024-05-07 NOTE — TELEPHONE ENCOUNTER
Patient cancelled appointment on 5/17/24 with Dr. Brandon for 6 month.    Reason:  has a conflicting appt    Patient did reschedule appointment.    Appointment rescheduled for 6/24/24.     Last OV 11/30/23:    IMPRESSION:    1-COPD,with no obstruction in PFT ,  2-YINA  3-A fib   4-high BMI    5- nocturnal hypoxia on  night       PLAN:      -keep Breztri  - did not want sleep study   - need yearly CT ,will see in 6 months and order CT   -will get records  -will PFT and 6 minutes walk shows no COPD but restrictive pattern related to weight   _ follow up with cardiology for thoracic   Flu and Pneumovax as primary   Need PCV 20 to get through PCP   Recommended vaccine RSV

## 2024-05-17 DIAGNOSIS — M17.12 PRIMARY OSTEOARTHRITIS OF LEFT KNEE: ICD-10-CM

## 2024-05-17 DIAGNOSIS — I50.23 ACUTE ON CHRONIC SYSTOLIC HEART FAILURE (HCC): ICD-10-CM

## 2024-05-17 RX ORDER — GABAPENTIN 100 MG/1
100 CAPSULE ORAL 2 TIMES DAILY
Qty: 60 CAPSULE | Refills: 0 | Status: SHIPPED | OUTPATIENT
Start: 2024-05-17 | End: 2024-06-16

## 2024-05-17 NOTE — TELEPHONE ENCOUNTER
Refill Request       Last Seen: Last Seen Department: 4/17/2024  Last Seen by PCP: 4/17/2024    Last Written: 04/25/2024        Next Appointment:   Future Appointments   Date Time Provider Department Center   6/25/2024 11:00 AM Agapito Brandon MD CLERM PULM [facility]   7/9/2024 12:10 PM Angelika Byrd APRN - CNP [facility] TSP AND [facility]   7/29/2024 10:30 AM Marilynn Rodgers APRN - CNP [facility]   11/1/2024 11:15 AM LINDA BLANK DEVICE CHECK [facility]   11/1/2024 11:15 AM Chloe Benito APRN - CNP [facility]         Requested Prescriptions     Pending Prescriptions Disp Refills    gabapentin (NEURONTIN) 100 MG capsule [Pharmacy Med Name: GABAPENTIN CAPS 100MG] 60 capsule 11     Sig: Take 1 capsule by mouth 2 times daily.

## 2024-05-20 RX ORDER — SPIRONOLACTONE 50 MG/1
50 TABLET, FILM COATED ORAL DAILY
Qty: 90 TABLET | Refills: 0 | Status: SHIPPED | OUTPATIENT
Start: 2024-05-20

## 2024-05-20 NOTE — TELEPHONE ENCOUNTER
4/29/2024 [facility]- follow up in 3 months with blood work  5/2/2024 [facility]  7/29/2024 [facility] upcoming appt  4/17/2024 cmp, cbc

## 2024-05-28 ENCOUNTER — HOSPITAL ENCOUNTER (OUTPATIENT)
Dept: GENERAL RADIOLOGY | Age: 74
Discharge: HOME | End: 2024-05-28
Payer: MEDICARE

## 2024-05-28 ENCOUNTER — HOSPITAL ENCOUNTER (OUTPATIENT)
Age: 74
Discharge: HOME | End: 2024-05-28
Payer: MEDICARE

## 2024-05-28 DIAGNOSIS — M54.40 ACUTE RIGHT-SIDED LOW BACK PAIN WITH SCIATICA, SCIATICA LATERALITY UNSPECIFIED: ICD-10-CM

## 2024-05-28 DIAGNOSIS — I48.0 PAF (PAROXYSMAL ATRIAL FIBRILLATION) (HCC): ICD-10-CM

## 2024-05-28 PROCEDURE — 72120 X-RAY BEND ONLY L-S SPINE: CPT

## 2024-05-28 PROCEDURE — 72100 X-RAY EXAM L-S SPINE 2/3 VWS: CPT

## 2024-05-28 PROCEDURE — 72070 X-RAY EXAM THORAC SPINE 2VWS: CPT

## 2024-05-28 PROCEDURE — 72070 X-RAY EXAM THORAC SPINE 2VWS: CPT | Performed by: NEUROLOGICAL SURGERY

## 2024-06-07 DIAGNOSIS — I48.0 PAF (PAROXYSMAL ATRIAL FIBRILLATION) (HCC): ICD-10-CM

## 2024-06-07 NOTE — TELEPHONE ENCOUNTER
Last OV 05/02/2024 [facility]  Last OV 11/10/2023  Upcoming OV 11/01/2024 [facility]  CBC 04/2024  BMP 04/2024    Pt got a refill on 05/28/2024 but was sent to the wrong pharmacy.    ANITA YUNG

## 2024-06-13 ENCOUNTER — HOSPITAL ENCOUNTER (OUTPATIENT)
Dept: MRI IMAGING | Age: 74
Discharge: HOME | End: 2024-06-13
Attending: NEUROLOGICAL SURGERY

## 2024-06-13 DIAGNOSIS — M54.50 LOW BACK PAIN, UNSPECIFIED BACK PAIN LATERALITY, UNSPECIFIED CHRONICITY, UNSPECIFIED WHETHER SCIATICA PRESENT: ICD-10-CM

## 2024-06-25 ENCOUNTER — OFFICE VISIT (OUTPATIENT)
Dept: PULMONOLOGY | Age: 74
End: 2024-06-25
Payer: MEDICARE

## 2024-06-25 VITALS
BODY MASS INDEX: 42.79 KG/M2 | HEART RATE: 81 BPM | OXYGEN SATURATION: 93 % | WEIGHT: 272.6 LBS | HEIGHT: 67 IN | DIASTOLIC BLOOD PRESSURE: 70 MMHG | RESPIRATION RATE: 18 BRPM | SYSTOLIC BLOOD PRESSURE: 128 MMHG

## 2024-06-25 DIAGNOSIS — R91.8 LUNG NODULES: Primary | ICD-10-CM

## 2024-06-25 PROCEDURE — 3078F DIAST BP <80 MM HG: CPT | Performed by: INTERNAL MEDICINE

## 2024-06-25 PROCEDURE — 99214 OFFICE O/P EST MOD 30 MIN: CPT | Performed by: INTERNAL MEDICINE

## 2024-06-25 PROCEDURE — 3017F COLORECTAL CA SCREEN DOC REV: CPT | Performed by: INTERNAL MEDICINE

## 2024-06-25 PROCEDURE — G8417 CALC BMI ABV UP PARAM F/U: HCPCS | Performed by: INTERNAL MEDICINE

## 2024-06-25 PROCEDURE — 1124F ACP DISCUSS-NO DSCNMKR DOCD: CPT | Performed by: INTERNAL MEDICINE

## 2024-06-25 PROCEDURE — G8427 DOCREV CUR MEDS BY ELIG CLIN: HCPCS | Performed by: INTERNAL MEDICINE

## 2024-06-25 PROCEDURE — 3074F SYST BP LT 130 MM HG: CPT | Performed by: INTERNAL MEDICINE

## 2024-06-25 PROCEDURE — 1090F PRES/ABSN URINE INCON ASSESS: CPT | Performed by: INTERNAL MEDICINE

## 2024-06-25 PROCEDURE — 1036F TOBACCO NON-USER: CPT | Performed by: INTERNAL MEDICINE

## 2024-06-25 PROCEDURE — G8399 PT W/DXA RESULTS DOCUMENT: HCPCS | Performed by: INTERNAL MEDICINE

## 2024-06-25 NOTE — PROGRESS NOTES
thoracic aortic dilatation.    IMPRESSION:      1-COPD,with no obstruction in PFT ,  2-YINA  3-A fib   4-high BMI    5- nocturnal hypoxia on  night        PLAN:      -keep Breztri  - did not want sleep study   - need yearly CT ,will see in 6 months and order CT   -will PFT and 6 minutes walk shows no COPD but restrictive pattern related to weight   _ follow up with cardiology for thoracic   Flu and Pneumovax as primary   Need PCV 20 to get through PCP   Recommended vaccine RSV   [facility] I will keep following with you ,should you have any concerns ,please contact us at [facility]     Sincerely,        Agapito Brandon MD  Pulmonary & Critical Care Medicine     NOTE: This report was transcribed using voice recognition software. Every effort was made to ensure accuracy; however, inadvertent computerized transcription errors may be present.

## 2024-06-27 ENCOUNTER — OFFICE VISIT (OUTPATIENT)
Dept: ORTHOPEDIC SURGERY | Age: 74
End: 2024-06-27
Payer: MEDICARE

## 2024-06-27 VITALS — BODY MASS INDEX: 41.22 KG/M2 | WEIGHT: 272 LBS | HEIGHT: 68 IN

## 2024-06-27 DIAGNOSIS — M51.36 DDD (DEGENERATIVE DISC DISEASE), LUMBAR: Primary | ICD-10-CM

## 2024-06-27 DIAGNOSIS — G89.4 CHRONIC PAIN SYNDROME: ICD-10-CM

## 2024-06-27 PROCEDURE — G8399 PT W/DXA RESULTS DOCUMENT: HCPCS | Performed by: PHYSICIAN ASSISTANT

## 2024-06-27 PROCEDURE — G8427 DOCREV CUR MEDS BY ELIG CLIN: HCPCS | Performed by: PHYSICIAN ASSISTANT

## 2024-06-27 PROCEDURE — 99214 OFFICE O/P EST MOD 30 MIN: CPT | Performed by: PHYSICIAN ASSISTANT

## 2024-06-27 PROCEDURE — 3017F COLORECTAL CA SCREEN DOC REV: CPT | Performed by: PHYSICIAN ASSISTANT

## 2024-06-27 PROCEDURE — 1124F ACP DISCUSS-NO DSCNMKR DOCD: CPT | Performed by: PHYSICIAN ASSISTANT

## 2024-06-27 PROCEDURE — 1090F PRES/ABSN URINE INCON ASSESS: CPT | Performed by: PHYSICIAN ASSISTANT

## 2024-06-27 PROCEDURE — G8417 CALC BMI ABV UP PARAM F/U: HCPCS | Performed by: PHYSICIAN ASSISTANT

## 2024-06-27 PROCEDURE — 1036F TOBACCO NON-USER: CPT | Performed by: PHYSICIAN ASSISTANT

## 2024-06-27 NOTE — PROGRESS NOTES
Follow-up: LUMBAR SPINE    Referring Provider:  No ref. provider found    CHIEF COMPLAINT:    Chief Complaint   Patient presents with    Back Pain     thoracic       HISTORY OF PRESENT ILLNESS:       Ms. Mary Silverio  is a pleasant 74 y.o. female, who has a history of chronic thoracolumbar spine pain and has a current lumbar spinal cord stimulator, here for follow-up evaluation regarding her LBP.  Patient states that she has been under the care of Dr. Reaves who is been providing pain medicine for her back pain and has been given her cortisone injections into her knees.  She also has seen Dr. Everardo William who wants to obtain an MRI of her lumbar spine but since she has a spinal cord stimulator in place and it is not functioning this needs to be removed.  She denies any new bowel or bladder dysfunction or saddle anesthesia    7/24/2023  She states her pain began insidiously in 2020.  She states that she is recently moved to [facility] from [facility] where she was undergoing care for her back pain.  She had a spinal cord stimulator placed in 2020 in [place] and states the spinal cord stimulator has been working well until recently.  She states that her pain in her back is intermittent and begins the first thing in the morning when she gets up and can last throughout the day.  She states her current back pain is 9/10.  She states that the pain is increased with weightbearing activity and improved with sitting down.  She does occasionally have numbness and tingling into the right leg but denies any progressive weakness.  Patient does state that she does have a history of stress incontinence but denies any a[place] incontinence of bowel or bladder.  She denies any saddle anesthesia.  Patient has recently been seen by Dr. Deluna for injections into her left knee but he has not been treating her spine pain.  He has been providing pain medication for her.  Pain Assessment  Location of Pain: Back  Location Modifiers:

## 2024-06-28 DIAGNOSIS — M17.12 PRIMARY OSTEOARTHRITIS OF LEFT KNEE: ICD-10-CM

## 2024-06-28 RX ORDER — GABAPENTIN 100 MG/1
100 CAPSULE ORAL 2 TIMES DAILY
Qty: 60 CAPSULE | Refills: 0 | Status: SHIPPED | OUTPATIENT
Start: 2024-06-28 | End: 2024-07-28

## 2024-06-28 NOTE — TELEPHONE ENCOUNTER
Refill Request     Last Seen: Last Seen Department: 4/17/2024  Last Seen by PCP: 4/17/2024          Next Appointment:   Future Appointments   Date Time Provider Department Center   7/9/2024 12:10 PM Angelika Byrd APRN - CNP [facility]SP AND [facility]   7/29/2024 10:30 AM Marilynn Rodgers APRN - CNP [facility]   11/1/2024 11:15 AM SCHEDULE, LINDA DEVICE CHECK [facility] [place] [facility]   11/1/2024 11:15 AM Chloe Benito APRN - CNP [facility]   12/16/2024 10:00 AM [facility] CT [facility] CT [facility]   12/31/2024 11:00 AM [facility]           Requested Prescriptions     Pending Prescriptions Disp Refills    gabapentin (NEURONTIN) 100 MG capsule [Pharmacy Med Name: GABAPENTIN CAPS 100MG] 60 capsule 11     Sig: TAKE 1 CAPSULE TWICE A DAY

## 2024-07-03 ENCOUNTER — TELEPHONE (OUTPATIENT)
Dept: CARDIOLOGY CLINIC | Age: 74
End: 2024-07-03

## 2024-07-03 NOTE — TELEPHONE ENCOUNTER
Pt states she will soon be starting tirzepatide soon for weight loss . Pt states she needs a letter saying that she is allowed to start this medication from [facility]. Please advise.

## 2024-07-05 NOTE — TELEPHONE ENCOUNTER
Letter created, relayed message to pt, pt would like to  letter at [facility] location. Letter prepared for patient pickup

## 2024-07-15 ENCOUNTER — HOSPITAL ENCOUNTER (OUTPATIENT)
Age: 74
Discharge: HOME | End: 2024-07-15
Payer: MEDICARE

## 2024-07-15 DIAGNOSIS — Z95.810 ICD (IMPLANTABLE CARDIOVERTER-DEFIBRILLATOR), DUAL, IN SITU: ICD-10-CM

## 2024-07-15 DIAGNOSIS — I48.0 PAF (PAROXYSMAL ATRIAL FIBRILLATION) (HCC): ICD-10-CM

## 2024-07-15 DIAGNOSIS — I50.22 CHRONIC SYSTOLIC CONGESTIVE HEART FAILURE (HCC): ICD-10-CM

## 2024-07-15 DIAGNOSIS — I42.8 NICM (NONISCHEMIC CARDIOMYOPATHY) (HCC): ICD-10-CM

## 2024-07-15 LAB
ANION GAP SERPL CALCULATED.3IONS-SCNC: 11 MMOL/L (ref 3–16)
BUN SERPL-MCNC: 37 MG/DL (ref 7–20)
CALCIUM SERPL-MCNC: 9.6 MG/DL (ref 8.3–10.6)
CHLORIDE SERPL-SCNC: 105 MMOL/L (ref 99–110)
CO2 SERPL-SCNC: 25 MMOL/L (ref 21–32)
CREAT SERPL-MCNC: 2.1 MG/DL (ref 0.6–1.2)
GFR SERPLBLD CREATININE-BSD FMLA CKD-EPI: 24 ML/MIN/{1.73_M2}
GLUCOSE SERPL-MCNC: 114 MG/DL (ref 70–99)
NT-PROBNP SERPL-MCNC: 175 PG/ML (ref 0–449)
POTASSIUM SERPL-SCNC: 5 MMOL/L (ref 3.5–5.1)
SODIUM SERPL-SCNC: 141 MMOL/L (ref 136–145)
TSH SERPL DL<=0.005 MIU/L-ACNC: 2.91 UIU/ML (ref 0.27–4.2)

## 2024-07-15 PROCEDURE — 80048 BASIC METABOLIC PNL TOTAL CA: CPT

## 2024-07-15 PROCEDURE — 84443 ASSAY THYROID STIM HORMONE: CPT

## 2024-07-15 PROCEDURE — 83880 ASSAY OF NATRIURETIC PEPTIDE: CPT

## 2024-07-15 PROCEDURE — 36415 COLL VENOUS BLD VENIPUNCTURE: CPT

## 2024-07-15 PROCEDURE — 93297 REM INTERROG DEV EVAL ICPMS: CPT | Performed by: NURSE PRACTITIONER

## 2024-07-17 ENCOUNTER — TELEPHONE (OUTPATIENT)
Dept: CARDIOLOGY CLINIC | Age: 74
End: 2024-07-17

## 2024-07-17 DIAGNOSIS — I50.23 ACUTE ON CHRONIC SYSTOLIC CONGESTIVE HEART FAILURE (HCC): Primary | ICD-10-CM

## 2024-07-17 NOTE — TELEPHONE ENCOUNTER
----- Message from Diane M Enzweiler, APRN - CNP sent at 7/16/2024  4:37 PM EDT -----  BMP and BNP reviewed. She is dry based on her increased BUN/Cr and BNP level.     Currently on:   Entresto 24/26 mg BID  Aldactone 50 mg daily  Torsemide 20 mg daily alternating with 40 mg every other day    Recommend holding Aldactone and Torsemide x 2 days and then resume Torsemide 20 mg daily and Aldactone 50 mg daily. Continue Entresto. Recheck BMP in 5-7 days. Please call. Thanks!

## 2024-07-28 DIAGNOSIS — M17.12 PRIMARY OSTEOARTHRITIS OF LEFT KNEE: ICD-10-CM

## 2024-07-29 ENCOUNTER — OFFICE VISIT (OUTPATIENT)
Dept: CARDIOLOGY CLINIC | Age: 74
End: 2024-07-29
Payer: MEDICARE

## 2024-07-29 ENCOUNTER — HOSPITAL ENCOUNTER (OUTPATIENT)
Age: 74
Discharge: HOME | End: 2024-07-29
Payer: MEDICARE

## 2024-07-29 VITALS
HEART RATE: 80 BPM | HEIGHT: 67 IN | OXYGEN SATURATION: 93 % | BODY MASS INDEX: 42.53 KG/M2 | WEIGHT: 271 LBS | DIASTOLIC BLOOD PRESSURE: 60 MMHG | SYSTOLIC BLOOD PRESSURE: 94 MMHG

## 2024-07-29 DIAGNOSIS — I50.23 ACUTE ON CHRONIC SYSTOLIC HEART FAILURE (HCC): ICD-10-CM

## 2024-07-29 DIAGNOSIS — Z86.39 HX OF IRON DEFICIENCY: ICD-10-CM

## 2024-07-29 DIAGNOSIS — Z01.818 PRE-OP EXAM: Primary | ICD-10-CM

## 2024-07-29 DIAGNOSIS — E11.9 TYPE 2 DIABETES MELLITUS WITHOUT COMPLICATION, WITHOUT LONG-TERM CURRENT USE OF INSULIN (HCC): ICD-10-CM

## 2024-07-29 DIAGNOSIS — Z01.818 PRE-OP EXAM: ICD-10-CM

## 2024-07-29 LAB
ANION GAP SERPL CALCULATED.3IONS-SCNC: 17 MMOL/L (ref 3–16)
BUN SERPL-MCNC: 50 MG/DL (ref 7–20)
CALCIUM SERPL-MCNC: 9.7 MG/DL (ref 8.3–10.6)
CHLORIDE SERPL-SCNC: 103 MMOL/L (ref 99–110)
CO2 SERPL-SCNC: 19 MMOL/L (ref 21–32)
CREAT SERPL-MCNC: 2.3 MG/DL (ref 0.6–1.2)
DEPRECATED RDW RBC AUTO: 16.2 % (ref 12.4–15.4)
FERRITIN SERPL IA-MCNC: 75.2 NG/ML (ref 15–150)
GFR SERPLBLD CREATININE-BSD FMLA CKD-EPI: 22 ML/MIN/{1.73_M2}
GLUCOSE SERPL-MCNC: 92 MG/DL (ref 70–99)
HCT VFR BLD AUTO: 37.6 % (ref 36–48)
HGB BLD-MCNC: 12.3 G/DL (ref 12–16)
IRON SATN MFR SERPL: 39 % (ref 15–50)
IRON SERPL-MCNC: 137 UG/DL (ref 37–145)
MCH RBC QN AUTO: 29.7 PG (ref 26–34)
MCHC RBC AUTO-ENTMCNC: 32.8 G/DL (ref 31–36)
MCV RBC AUTO: 90.6 FL (ref 80–100)
PLATELET # BLD AUTO: 221 K/UL (ref 135–450)
PMV BLD AUTO: 8.2 FL (ref 5–10.5)
POTASSIUM SERPL-SCNC: 5.1 MMOL/L (ref 3.5–5.1)
RBC # BLD AUTO: 4.15 M/UL (ref 4–5.2)
SODIUM SERPL-SCNC: 139 MMOL/L (ref 136–145)
TIBC SERPL-MCNC: 349 UG/DL (ref 260–445)
WBC # BLD AUTO: 10.5 K/UL (ref 4–11)

## 2024-07-29 PROCEDURE — 82728 ASSAY OF FERRITIN: CPT

## 2024-07-29 PROCEDURE — G8427 DOCREV CUR MEDS BY ELIG CLIN: HCPCS | Performed by: NURSE PRACTITIONER

## 2024-07-29 PROCEDURE — 3078F DIAST BP <80 MM HG: CPT | Performed by: NURSE PRACTITIONER

## 2024-07-29 PROCEDURE — 3074F SYST BP LT 130 MM HG: CPT | Performed by: NURSE PRACTITIONER

## 2024-07-29 PROCEDURE — G8417 CALC BMI ABV UP PARAM F/U: HCPCS | Performed by: NURSE PRACTITIONER

## 2024-07-29 PROCEDURE — 1036F TOBACCO NON-USER: CPT | Performed by: NURSE PRACTITIONER

## 2024-07-29 PROCEDURE — 93000 ELECTROCARDIOGRAM COMPLETE: CPT | Performed by: NURSE PRACTITIONER

## 2024-07-29 PROCEDURE — 1124F ACP DISCUSS-NO DSCNMKR DOCD: CPT | Performed by: NURSE PRACTITIONER

## 2024-07-29 PROCEDURE — 80048 BASIC METABOLIC PNL TOTAL CA: CPT

## 2024-07-29 PROCEDURE — 83540 ASSAY OF IRON: CPT

## 2024-07-29 PROCEDURE — 3044F HG A1C LEVEL LT 7.0%: CPT | Performed by: NURSE PRACTITIONER

## 2024-07-29 PROCEDURE — 3017F COLORECTAL CA SCREEN DOC REV: CPT | Performed by: NURSE PRACTITIONER

## 2024-07-29 PROCEDURE — 36415 COLL VENOUS BLD VENIPUNCTURE: CPT

## 2024-07-29 PROCEDURE — 1090F PRES/ABSN URINE INCON ASSESS: CPT | Performed by: NURSE PRACTITIONER

## 2024-07-29 PROCEDURE — 2022F DILAT RTA XM EVC RTNOPTHY: CPT | Performed by: NURSE PRACTITIONER

## 2024-07-29 PROCEDURE — 85027 COMPLETE CBC AUTOMATED: CPT

## 2024-07-29 PROCEDURE — 83550 IRON BINDING TEST: CPT

## 2024-07-29 PROCEDURE — 99214 OFFICE O/P EST MOD 30 MIN: CPT | Performed by: NURSE PRACTITIONER

## 2024-07-29 PROCEDURE — G8399 PT W/DXA RESULTS DOCUMENT: HCPCS | Performed by: NURSE PRACTITIONER

## 2024-07-29 RX ORDER — TORSEMIDE 20 MG/1
20 TABLET ORAL DAILY
Qty: 30 TABLET | Refills: 3
Start: 2024-07-29

## 2024-07-29 RX ORDER — LIDOCAINE 50 MG/G
PATCH TOPICAL
COMMUNITY

## 2024-07-29 RX ORDER — SPIRONOLACTONE 50 MG/1
50 TABLET, FILM COATED ORAL DAILY
Qty: 90 TABLET | Refills: 1 | Status: SHIPPED | OUTPATIENT
Start: 2024-07-29

## 2024-07-29 RX ORDER — GABAPENTIN 100 MG/1
100 CAPSULE ORAL 2 TIMES DAILY
Qty: 60 CAPSULE | Refills: 0 | Status: SHIPPED | OUTPATIENT
Start: 2024-07-29 | End: 2024-08-28

## 2024-07-29 NOTE — PATIENT INSTRUCTIONS
Plan:   Continue daily weights- and record; monitor for 3lbs in a day or 5 lbs in a week   Check BMP today   Continue torsemide 20mg daily for now until labs resulted and then may need to decide to reduce to 10mg daily if renal function remains dry   Continue jardiance  coreg 6.25mg twice a day  Spironolactone (aldactone) 50mg daily  Continue entresto   Follow up with EP as planned  Follow up with CHF in [place]

## 2024-07-29 NOTE — PROGRESS NOTES
[facility]  Cardiac Follow-up    Primary Care Doctor:  Jewel Kong APRN - CNP    Chief Complaint   Patient presents with    Follow-up    Congestive Heart Failure     History of Present Illness:  I had the pleasure of seeing Mary Silverio in follow up for Cardiomyopathy.     Moved from [place] to [place], [place] Fall 2022. Hx of non-ischemic cardiomyopathy.   S/P Dual chamber MEdtronic ICD implanted 6/26/2019 by Dr. 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       Since last visit, she had stress test 3/2024 which was low risk. Restarted on Spironolactone (aldactone).  Still having shortness of breath with trying to be active.   She is frustrated with her weight.   She is grieving due to her dog passing away.   Having dizziness that comes and goes. Recently started new muscle relaxer.   activity is limited due to back pain.     Since last visit, torsemide was adjusted.   Repeat labs showed further dehydration on 7/16/24 and torsemide and Spironolactone (aldactone) was held for a few days. Now on torsemide 1 tab daily 20mg   Having a lot of back pain limits the activity.   Breathng is stable.   No chest pain or pressures   To have spinal stimulator removed soon.   No edema.     Mary Silverio describes symptoms including fatigue but denies chest pain, chest pressure/discomfort, syncope.     Home weights: not checking   Taking medications as prescribed: Yes  Able to afford medications: Yes    Past Medical History:   has a past medical history of Arthritis, Atrial fibrillation ([facility]), CHF (congestive heart failure) ([facility]), Congenital heart disease, COPD (chronic obstructive pulmonary disease) ([facility]), GERD (gastroesophageal reflux disease), Hypertension, Irritable bowel syndrome, Obesity, and Restless legs syndrome.  Surgical History:   has a past surgical history that includes pacemaker placement; Hysterectomy, vaginal; Stimulator Surgery; Cholecystectomy,

## 2024-07-30 ENCOUNTER — TELEPHONE (OUTPATIENT)
Dept: CARDIOLOGY CLINIC | Age: 74
End: 2024-07-30

## 2024-07-30 NOTE — TELEPHONE ENCOUNTER
Mary Silverio, 1950    Cardiac Risk Assessment    What type of procedure are you having?  Removal of SCS read D1 battery    When is your procedure scheduled for?  9/12/2024    Medications to be stopped.  Eliquis 3 days prior to procedure    What physician is performing your procedure?  Patricio Reaves MD    Phone Number:   [phone]    Fax number to send the letter:   [phone]    Cardiologist:   ANITA    Last Appointment:   05/02/2024    Next Appointment:   11/01/2024    Are you on any blood thinners?   eliquis    Anesthesia being used?  versed

## 2024-07-31 DIAGNOSIS — Z79.899 MEDICATION MANAGEMENT: Primary | ICD-10-CM

## 2024-08-01 NOTE — TELEPHONE ENCOUNTER
She is at an increased risk of perioperative complications due to multiple comorbidities. However, there are no absolute contraindications to proceeding. OK to hold Eliquis for three days if patient understands her stroke risk is higher if afib is recurrent off Eliquis. Would prefer to hold no longer than 48 hours.

## 2024-08-01 NOTE — TELEPHONE ENCOUNTER
Letter created and sent electronically to Dr. Reaves and also faxed successfully to provided number.

## 2024-08-06 ENCOUNTER — OFFICE VISIT (OUTPATIENT)
Dept: FAMILY MEDICINE CLINIC | Age: 74
End: 2024-08-06
Payer: MEDICARE

## 2024-08-06 VITALS
SYSTOLIC BLOOD PRESSURE: 110 MMHG | OXYGEN SATURATION: 98 % | HEART RATE: 82 BPM | BODY MASS INDEX: 43.41 KG/M2 | HEIGHT: 67 IN | DIASTOLIC BLOOD PRESSURE: 70 MMHG | WEIGHT: 276.6 LBS

## 2024-08-06 DIAGNOSIS — Z01.818 PREOP EXAMINATION: Primary | ICD-10-CM

## 2024-08-06 PROCEDURE — 1036F TOBACCO NON-USER: CPT

## 2024-08-06 PROCEDURE — G8399 PT W/DXA RESULTS DOCUMENT: HCPCS

## 2024-08-06 PROCEDURE — G8427 DOCREV CUR MEDS BY ELIG CLIN: HCPCS

## 2024-08-06 PROCEDURE — 3017F COLORECTAL CA SCREEN DOC REV: CPT

## 2024-08-06 PROCEDURE — 1124F ACP DISCUSS-NO DSCNMKR DOCD: CPT

## 2024-08-06 PROCEDURE — 1090F PRES/ABSN URINE INCON ASSESS: CPT

## 2024-08-06 PROCEDURE — 3078F DIAST BP <80 MM HG: CPT

## 2024-08-06 PROCEDURE — G8417 CALC BMI ABV UP PARAM F/U: HCPCS

## 2024-08-06 PROCEDURE — 99214 OFFICE O/P EST MOD 30 MIN: CPT

## 2024-08-06 PROCEDURE — 3074F SYST BP LT 130 MM HG: CPT

## 2024-08-06 ASSESSMENT — ENCOUNTER SYMPTOMS
CONSTIPATION: 0
BLOOD IN STOOL: 0
COUGH: 0
ABDOMINAL PAIN: 0
WHEEZING: 0
COLOR CHANGE: 0
DIARRHEA: 0
SHORTNESS OF BREATH: 0
SORE THROAT: 0

## 2024-08-06 NOTE — PROGRESS NOTES
blood in stool, constipation and diarrhea.   Endocrine: Negative for cold intolerance and heat intolerance.   Genitourinary:  Negative for difficulty urinating, hematuria and urgency.   Musculoskeletal:  Negative for arthralgias and gait problem.   Skin:  Negative for color change.   Neurological:  Negative for dizziness, seizures, light-headedness and headaches.   Psychiatric/Behavioral:  Negative for agitation and confusion. The patient is not nervous/anxious.        Physical Exam  Vitals:    08/06/24 1028   BP: 110/70   Site: Left Upper Arm   Position: Sitting   Pulse: 82   SpO2: 98%   Weight: 125.5 kg (276 lb 9.6 oz)   Height: 1.702 m (5' 7\")     Estimated body mass index is 43.32 kg/m² as calculated from the following:    Height as of this encounter: 1.702 m (5' 7\").    Weight as of this encounter: 125.5 kg (276 lb 9.6 oz).  Physical Exam  Vitals reviewed.   Constitutional:       General: She is not in acute distress.     Appearance: Normal appearance. She is obese.   HENT:      Right Ear: Tympanic membrane normal.      Left Ear: Tympanic membrane normal.   Eyes:      Pupils: Pupils are equal, round, and reactive to light.   Cardiovascular:      Rate and Rhythm: Normal rate and regular rhythm.   Pulmonary:      Effort: Pulmonary effort is normal.      Breath sounds: Normal breath sounds.   Abdominal:      General: Abdomen is flat. Bowel sounds are normal.      Palpations: Abdomen is soft.   Musculoskeletal:         General: Normal range of motion.   Skin:     General: Skin is warm.   Neurological:      General: No focal deficit present.      Mental Status: She is alert.   Psychiatric:         Mood and Affect: Mood normal.         Behavior: Behavior normal.         Judgment: Judgment normal.         Labs and Studies  Lab Results   Component Value Date     07/29/2024    K 5.1 07/29/2024     07/29/2024    CO2 19 (L) 07/29/2024    BUN 50 (H) 07/29/2024    CREATININE 2.3 (H) 07/29/2024    GLUCOSE 92

## 2024-08-08 ENCOUNTER — OFFICE VISIT (OUTPATIENT)
Dept: FAMILY MEDICINE CLINIC | Age: 74
End: 2024-08-08
Payer: MEDICARE

## 2024-08-08 VITALS
SYSTOLIC BLOOD PRESSURE: 106 MMHG | WEIGHT: 276 LBS | BODY MASS INDEX: 43.32 KG/M2 | DIASTOLIC BLOOD PRESSURE: 68 MMHG | OXYGEN SATURATION: 98 % | HEIGHT: 67 IN | HEART RATE: 76 BPM

## 2024-08-08 DIAGNOSIS — E66.01 CLASS 3 SEVERE OBESITY DUE TO EXCESS CALORIES WITH SERIOUS COMORBIDITY AND BODY MASS INDEX (BMI) OF 40.0 TO 44.9 IN ADULT (HCC): Primary | ICD-10-CM

## 2024-08-08 PROCEDURE — 99213 OFFICE O/P EST LOW 20 MIN: CPT

## 2024-08-08 PROCEDURE — 3017F COLORECTAL CA SCREEN DOC REV: CPT

## 2024-08-08 PROCEDURE — 1124F ACP DISCUSS-NO DSCNMKR DOCD: CPT

## 2024-08-08 PROCEDURE — 3074F SYST BP LT 130 MM HG: CPT

## 2024-08-08 PROCEDURE — G8417 CALC BMI ABV UP PARAM F/U: HCPCS

## 2024-08-08 PROCEDURE — 1036F TOBACCO NON-USER: CPT

## 2024-08-08 PROCEDURE — G8427 DOCREV CUR MEDS BY ELIG CLIN: HCPCS

## 2024-08-08 PROCEDURE — G8399 PT W/DXA RESULTS DOCUMENT: HCPCS

## 2024-08-08 PROCEDURE — 1090F PRES/ABSN URINE INCON ASSESS: CPT

## 2024-08-08 PROCEDURE — 3078F DIAST BP <80 MM HG: CPT

## 2024-08-08 ASSESSMENT — ENCOUNTER SYMPTOMS
DIARRHEA: 0
CONSTIPATION: 0
BLOOD IN STOOL: 0
COLOR CHANGE: 0
COUGH: 0
SORE THROAT: 0
ABDOMINAL PAIN: 0
SHORTNESS OF BREATH: 0
WHEEZING: 0

## 2024-08-08 NOTE — PROGRESS NOTES
Mary Silverio (:  1950) is a 74 y.o. female,Established patient, here for evaluation of the following chief complaint(s):  Follow-up (Patient wants to discuss weight loss medication )      Assessment & Plan   ASSESSMENT/PLAN:  1. Class 3 severe obesity due to excess calories with serious comorbidity and body mass index (BMI) of 40.0 to 44.9 in adult (HCC)  -     Semaglutide-Weight Management (WEGOVY) 0.25 MG/0.5ML SOAJ SC injection; Inject 0.25 mg into the skin every 7 days, Disp-2 mL, R-0Okay for compoundingNormal  1.Discussed risks, benefits and alternatives of Wegovy.  Patient meets BMI criteria, denies any personal or family history of medullary thyroid cancer, denies a history of multiple endocrine neoplasia syndrome type II, pancreatitis, alcoholism, very high blood triglyceride levels, and is not pregnant or planning to become pregnant.     Counseled patient on proper use and potential side effects including nausea, vomiting, diarrhea, constipation, hypoglycemia, headache, decreased appetite, dyspepsia, fatigue, dizziness, abdominal pain, and increased lipase levels.     Patient advised to report any side effects or if she develops a mass in the neck, dysphagia, dyspnea, or persistent hoarseness.      Heavily counseled on the importance of therapeutic lifestyle changes through diet and exercise.          2. Dietary counseling and surveillance  Avoid skipping meals.   Avoid evening/nighttime snacking.   Use distraction techniques to avoid boredom/stress eating.  Plan/prep meals ahead of time.   Avoid soda/juice/sugary drinks.   Be mindful of portion sizes.      3. Hyperlipidemia  Encourage low carb/aliyah diet and exercise.   Labs again in 3-6 months.      Nutrition Plan: LCHF/Ketogenic, Modified low-calorie diet (low carb/low-aliyah), Low-calorie diet, Maintenance                                    Exercise: Cardio Resistance/strength training, ACSM recommendations (150 minutes/week in active weight loss)     n/a

## 2024-08-12 ENCOUNTER — TELEPHONE (OUTPATIENT)
Dept: FAMILY MEDICINE CLINIC | Age: 74
End: 2024-08-12

## 2024-08-12 NOTE — TELEPHONE ENCOUNTER
Deacon called to clarify  if patient needs to see nephrology before surgery clearance or not... in your pre-op note you stated      Further recommendations from consultants: Cardiology and nephrology.  Patient already has cardiac clearance.  Patient will stop Eliquis 3 days prior to surgery.  Patient's kidney function is also in the 20s with a creatinine of 2.3.  Would recommend that patient see nephrology for their approval before surgery however patient is having minor procedure.  Would ultimately recommend nephrology approval     Patient has not seen nephrology yet. Is she cleared or will she need to see nephrology first?

## 2024-08-13 ENCOUNTER — HOSPITAL ENCOUNTER (OUTPATIENT)
Age: 74
Discharge: HOME | End: 2024-08-13
Payer: MEDICARE

## 2024-08-13 DIAGNOSIS — Z01.818 ENCOUNTER FOR PREOPERATIVE EXAMINATION FOR GENERAL SURGICAL PROCEDURE: ICD-10-CM

## 2024-08-13 DIAGNOSIS — Z79.899 MEDICATION MANAGEMENT: ICD-10-CM

## 2024-08-13 LAB
ANION GAP SERPL CALCULATED.3IONS-SCNC: 13 MMOL/L (ref 3–16)
BASOPHILS # BLD: 0 K/UL (ref 0–0.2)
BASOPHILS NFR BLD: 0.3 %
BUN SERPL-MCNC: 32 MG/DL (ref 7–20)
CALCIUM SERPL-MCNC: 9.9 MG/DL (ref 8.3–10.6)
CHLORIDE SERPL-SCNC: 105 MMOL/L (ref 99–110)
CO2 SERPL-SCNC: 22 MMOL/L (ref 21–32)
CREAT SERPL-MCNC: 2.2 MG/DL (ref 0.6–1.2)
DEPRECATED RDW RBC AUTO: 16.4 % (ref 12.4–15.4)
EOSINOPHIL # BLD: 0.3 K/UL (ref 0–0.6)
EOSINOPHIL NFR BLD: 3.1 %
GFR SERPLBLD CREATININE-BSD FMLA CKD-EPI: 23 ML/MIN/{1.73_M2}
GLUCOSE SERPL-MCNC: 89 MG/DL (ref 70–99)
HCT VFR BLD AUTO: 37.6 % (ref 36–48)
HGB BLD-MCNC: 12.1 G/DL (ref 12–16)
LYMPHOCYTES # BLD: 2.2 K/UL (ref 1–5.1)
LYMPHOCYTES NFR BLD: 24 %
MCH RBC QN AUTO: 29.6 PG (ref 26–34)
MCHC RBC AUTO-ENTMCNC: 32.1 G/DL (ref 31–36)
MCV RBC AUTO: 92.3 FL (ref 80–100)
MONOCYTES # BLD: 0.7 K/UL (ref 0–1.3)
MONOCYTES NFR BLD: 7.1 %
NEUTROPHILS # BLD: 6 K/UL (ref 1.7–7.7)
NEUTROPHILS NFR BLD: 65.5 %
PLATELET # BLD AUTO: 229 K/UL (ref 135–450)
PMV BLD AUTO: 7.5 FL (ref 5–10.5)
POTASSIUM SERPL-SCNC: 5.1 MMOL/L (ref 3.5–5.1)
RBC # BLD AUTO: 4.08 M/UL (ref 4–5.2)
SODIUM SERPL-SCNC: 140 MMOL/L (ref 136–145)
WBC # BLD AUTO: 9.2 K/UL (ref 4–11)

## 2024-08-13 PROCEDURE — 80048 BASIC METABOLIC PNL TOTAL CA: CPT

## 2024-08-13 PROCEDURE — 36415 COLL VENOUS BLD VENIPUNCTURE: CPT

## 2024-08-13 PROCEDURE — 85025 COMPLETE CBC W/AUTO DIFF WBC: CPT

## 2024-08-14 DIAGNOSIS — R00.2 PALPITATIONS: ICD-10-CM

## 2024-08-14 DIAGNOSIS — I10 ESSENTIAL HYPERTENSION: Primary | ICD-10-CM

## 2024-08-14 DIAGNOSIS — I48.0 PAF (PAROXYSMAL ATRIAL FIBRILLATION) (HCC): ICD-10-CM

## 2024-08-14 DIAGNOSIS — I50.9 ACUTE DECOMPENSATED HEART FAILURE (HCC): ICD-10-CM

## 2024-08-14 NOTE — RESULT ENCOUNTER NOTE
Attempted to reach pt, left vm with results per HIPAA form. Labs ordered.          Marilynn Rodgers, APRN - CNP  8/14/2024  7:31 AM EDT Back to Top    Please notify patient results. Kidney function is remains abnormal. Electrolytes are stable.    Need to make the following changes:  Hold the entresto, jardiance, Spironolactone (aldactone) and torsemide for 2 days    Then restart only entresto, jardiance and only 25mg of the Spironolactone (aldactone)  Repeat BMP on MOnday.

## 2024-08-19 ENCOUNTER — TELEPHONE (OUTPATIENT)
Dept: FAMILY MEDICINE CLINIC | Age: 74
End: 2024-08-19

## 2024-08-19 DIAGNOSIS — E66.01 CLASS 3 SEVERE OBESITY DUE TO EXCESS CALORIES WITH SERIOUS COMORBIDITY AND BODY MASS INDEX (BMI) OF 40.0 TO 44.9 IN ADULT (HCC): ICD-10-CM

## 2024-08-19 NOTE — TELEPHONE ENCOUNTER
Pt calling in regarding the Wegovy, please send to [facility] pharmacy

## 2024-08-19 NOTE — PROGRESS NOTES
Date and time of surgery : 8/22/24 at 0730             Arrival Time: 0600      Bring Picture ID and insurance card.  Please wear simple, loose fitting clothing to the hospital.   Do not bring valuables (money, credit cards, checkbooks, etc.)   Do not wear any makeup (including  eye makeup) and no nail polish or artificial nails on your fingers or toes.  DO NOT wear any jewelry or piercings on day of surgery.  All body piercing jewelry must be removed.  If you have dentures, they will be removed before going to the OR; we will provide you a container.  If you wear contact lenses or glasses, they will be removed; please bring a case for them.  Shower the evening before or morning of surgery with antibacterial soap.  Nothing to eat or drink after midnight the day before surgery.   You may brush your teeth and gargle the morning of surgery.  DO NOT SWALLOW WATER.   The morning of your surgery take only Amiodarone, Coreg and Protonix with a sip of water and use Breztri inhaler prior to coming.  Aspirin, Ibuprofen, Advil, Naproxen, Vitamin E and other Anti-inflammatory products and supplements should be stopped for 5 -7days before surgery or as directed by your physician.  Last dose Eliquis 8/17/24 and last dose Jardiance 8/19/24. Has not started Wegovy yet  Do not smoke or drink any alcoholic beverages 24 hours prior to surgery.  This includes NA Beer. Refrain from the usage of any recreational drugs, including non-prescribed prescription drugs.   You MUST plan for a responsible adult to stay on site while you are here and take you home after your surgery. You will not be allowed to leave alone or drive yourself home. It is strongly suggested someone stay with you the first 24 hrs. Your surgery will be cancelled if you do not have a ride home.  To help prevent infection, change your sheets the night before surgery.   If you  have a Living Will and Durable Power of  for Healthcare, please bring in a

## 2024-08-19 NOTE — PROGRESS NOTES
Mary A Silverio    Age 74 y.o.    female    1950    [facility] [id number]    8/22/2024  Arrival Time_____________  OR Time____________45 Min     Procedure(s):  REMOVAL OF PERCUTANEOUS SPINAL CORD STIMULATOR LEAD AND BATTERY                      General    Surgeon(s):  Patricio Reaves, MD       Phone [phone] (home)     In\[facility] [place] [facility]\""  Date  Info Source  Home  Cell         Work  _____________________________________________________________________  _____________________________________________________________________  _____________________________________________________________________  _____________________________________________________________________  _____________________________________________________________________    PCP _____________________________ Phone_________________     H&P  ________________  Bringing      Chart              Epic      DOS      Called________  EKG ________________   Bringing      Chart              Epic      DOS      Called________  LABS________________   Bringing     Chart              Epic      DOS      Called________  Cardiac Clearance ______ Bringing      Chart              Epic      DOS      Called________  Pulmonary Clearance____ Bringing      Chart              Epic      DOS      Called________    Cardiologist________________________ Phone___________________________  Pulmonologist_______________________Phone___________________________    ? Advance Directives   ? Buddhist concerns / Waiver on Chart            PAT Communications________________  ? Pre-op Instructions Given /Understood          _________________________________  ? Directions to Surgery Center                          _________________________________  ? Transportation Home_______________      __________________________________  ? Crutches/Walker__________________        __________________________________    Orders: Hard copy/ EPIC                 Transcribed/ EPIC              _______Wt.    ________Pharmacy

## 2024-08-20 ENCOUNTER — HOSPITAL ENCOUNTER (OUTPATIENT)
Age: 74
Discharge: HOME | End: 2024-08-20
Payer: MEDICARE

## 2024-08-20 DIAGNOSIS — I48.0 PAF (PAROXYSMAL ATRIAL FIBRILLATION) (HCC): ICD-10-CM

## 2024-08-20 DIAGNOSIS — I10 ESSENTIAL HYPERTENSION: ICD-10-CM

## 2024-08-20 DIAGNOSIS — R00.2 PALPITATIONS: ICD-10-CM

## 2024-08-20 DIAGNOSIS — I50.9 ACUTE DECOMPENSATED HEART FAILURE (HCC): ICD-10-CM

## 2024-08-20 LAB
ANION GAP SERPL CALCULATED.3IONS-SCNC: 16 MMOL/L (ref 3–16)
BUN SERPL-MCNC: 35 MG/DL (ref 7–20)
CALCIUM SERPL-MCNC: 9.2 MG/DL (ref 8.3–10.6)
CHLORIDE SERPL-SCNC: 104 MMOL/L (ref 99–110)
CO2 SERPL-SCNC: 23 MMOL/L (ref 21–32)
CREAT SERPL-MCNC: 1.8 MG/DL (ref 0.6–1.2)
GFR SERPLBLD CREATININE-BSD FMLA CKD-EPI: 29 ML/MIN/{1.73_M2}
GLUCOSE SERPL-MCNC: 95 MG/DL (ref 70–99)
POTASSIUM SERPL-SCNC: 4.8 MMOL/L (ref 3.5–5.1)
SODIUM SERPL-SCNC: 143 MMOL/L (ref 136–145)

## 2024-08-20 PROCEDURE — 36415 COLL VENOUS BLD VENIPUNCTURE: CPT

## 2024-08-20 PROCEDURE — 80048 BASIC METABOLIC PNL TOTAL CA: CPT

## 2024-08-21 ENCOUNTER — ANESTHESIA EVENT (OUTPATIENT)
Dept: OPERATING ROOM | Age: 74
End: 2024-08-21
Payer: MEDICARE

## 2024-08-22 ENCOUNTER — APPOINTMENT (OUTPATIENT)
Dept: GENERAL RADIOLOGY | Age: 74
End: 2024-08-22
Attending: ANESTHESIOLOGY
Payer: MEDICARE

## 2024-08-22 ENCOUNTER — HOSPITAL ENCOUNTER (OUTPATIENT)
Age: 74
Setting detail: OUTPATIENT SURGERY
Discharge: HOME | End: 2024-08-22
Attending: ANESTHESIOLOGY | Admitting: ANESTHESIOLOGY
Payer: MEDICARE

## 2024-08-22 ENCOUNTER — ANESTHESIA (OUTPATIENT)
Dept: OPERATING ROOM | Age: 74
End: 2024-08-22
Payer: MEDICARE

## 2024-08-22 VITALS
TEMPERATURE: 97.5 F | SYSTOLIC BLOOD PRESSURE: 103 MMHG | WEIGHT: 269 LBS | HEART RATE: 61 BPM | RESPIRATION RATE: 18 BRPM | HEIGHT: 67 IN | OXYGEN SATURATION: 96 % | BODY MASS INDEX: 42.22 KG/M2 | DIASTOLIC BLOOD PRESSURE: 47 MMHG

## 2024-08-22 PROBLEM — M54.16 LUMBAR RADICULOPATHY: Status: ACTIVE | Noted: 2024-08-22

## 2024-08-22 PROCEDURE — 72100 X-RAY EXAM L-S SPINE 2/3 VWS: CPT

## 2024-08-22 PROCEDURE — 3700000001 HC ADD 15 MINUTES (ANESTHESIA): Performed by: ANESTHESIOLOGY

## 2024-08-22 PROCEDURE — A4217 STERILE WATER/SALINE, 500 ML: HCPCS | Performed by: ANESTHESIOLOGY

## 2024-08-22 PROCEDURE — 2580000003 HC RX 258: Performed by: NURSE ANESTHETIST, CERTIFIED REGISTERED

## 2024-08-22 PROCEDURE — 2580000003 HC RX 258: Performed by: ANESTHESIOLOGY

## 2024-08-22 PROCEDURE — 3600000013 HC SURGERY LEVEL 3 ADDTL 15MIN: Performed by: ANESTHESIOLOGY

## 2024-08-22 PROCEDURE — 6360000002 HC RX W HCPCS: Performed by: ANESTHESIOLOGY

## 2024-08-22 PROCEDURE — 3700000000 HC ANESTHESIA ATTENDED CARE: Performed by: ANESTHESIOLOGY

## 2024-08-22 PROCEDURE — 7100000011 HC PHASE II RECOVERY - ADDTL 15 MIN: Performed by: ANESTHESIOLOGY

## 2024-08-22 PROCEDURE — 2709999900 HC NON-CHARGEABLE SUPPLY: Performed by: ANESTHESIOLOGY

## 2024-08-22 PROCEDURE — 3600000003 HC SURGERY LEVEL 3 BASE: Performed by: ANESTHESIOLOGY

## 2024-08-22 PROCEDURE — 6360000002 HC RX W HCPCS: Performed by: NURSE ANESTHETIST, CERTIFIED REGISTERED

## 2024-08-22 PROCEDURE — 7100000010 HC PHASE II RECOVERY - FIRST 15 MIN: Performed by: ANESTHESIOLOGY

## 2024-08-22 PROCEDURE — 2500000003 HC RX 250 WO HCPCS: Performed by: NURSE ANESTHETIST, CERTIFIED REGISTERED

## 2024-08-22 PROCEDURE — 2500000003 HC RX 250 WO HCPCS: Performed by: ANESTHESIOLOGY

## 2024-08-22 RX ORDER — SODIUM CHLORIDE 0.9 % (FLUSH) 0.9 %
5-40 SYRINGE (ML) INJECTION EVERY 12 HOURS SCHEDULED
Status: DISCONTINUED | OUTPATIENT
Start: 2024-08-22 | End: 2024-08-22 | Stop reason: HOSPADM

## 2024-08-22 RX ORDER — SODIUM CHLORIDE 0.9 % (FLUSH) 0.9 %
5-40 SYRINGE (ML) INJECTION PRN
Status: DISCONTINUED | OUTPATIENT
Start: 2024-08-22 | End: 2024-08-22 | Stop reason: HOSPADM

## 2024-08-22 RX ORDER — SODIUM CHLORIDE, SODIUM LACTATE, POTASSIUM CHLORIDE, CALCIUM CHLORIDE 600; 310; 30; 20 MG/100ML; MG/100ML; MG/100ML; MG/100ML
INJECTION, SOLUTION INTRAVENOUS CONTINUOUS
Status: DISCONTINUED | OUTPATIENT
Start: 2024-08-22 | End: 2024-08-22 | Stop reason: HOSPADM

## 2024-08-22 RX ORDER — ONDANSETRON 4 MG/1
4 TABLET, ORALLY DISINTEGRATING ORAL EVERY 8 HOURS PRN
Status: DISCONTINUED | OUTPATIENT
Start: 2024-08-22 | End: 2024-08-22 | Stop reason: HOSPADM

## 2024-08-22 RX ORDER — NALOXONE HYDROCHLORIDE 0.4 MG/ML
INJECTION, SOLUTION INTRAMUSCULAR; INTRAVENOUS; SUBCUTANEOUS PRN
Status: DISCONTINUED | OUTPATIENT
Start: 2024-08-22 | End: 2024-08-22 | Stop reason: HOSPADM

## 2024-08-22 RX ORDER — LABETALOL HYDROCHLORIDE 5 MG/ML
10 INJECTION, SOLUTION INTRAVENOUS
Status: DISCONTINUED | OUTPATIENT
Start: 2024-08-22 | End: 2024-08-22 | Stop reason: HOSPADM

## 2024-08-22 RX ORDER — OXYCODONE HYDROCHLORIDE 5 MG/1
5 TABLET ORAL EVERY 4 HOURS PRN
Status: DISCONTINUED | OUTPATIENT
Start: 2024-08-22 | End: 2024-08-22 | Stop reason: HOSPADM

## 2024-08-22 RX ORDER — ONDANSETRON 2 MG/ML
4 INJECTION INTRAMUSCULAR; INTRAVENOUS
Status: DISCONTINUED | OUTPATIENT
Start: 2024-08-22 | End: 2024-08-22 | Stop reason: HOSPADM

## 2024-08-22 RX ORDER — SODIUM CHLORIDE 9 MG/ML
INJECTION, SOLUTION INTRAVENOUS PRN
Status: DISCONTINUED | OUTPATIENT
Start: 2024-08-22 | End: 2024-08-22 | Stop reason: HOSPADM

## 2024-08-22 RX ORDER — SODIUM CHLORIDE, SODIUM LACTATE, POTASSIUM CHLORIDE, CALCIUM CHLORIDE 600; 310; 30; 20 MG/100ML; MG/100ML; MG/100ML; MG/100ML
INJECTION, SOLUTION INTRAVENOUS CONTINUOUS PRN
Status: DISCONTINUED | OUTPATIENT
Start: 2024-08-22 | End: 2024-08-22 | Stop reason: SDUPTHER

## 2024-08-22 RX ORDER — LIDOCAINE HYDROCHLORIDE 10 MG/ML
1 INJECTION, SOLUTION EPIDURAL; INFILTRATION; INTRACAUDAL; PERINEURAL
Status: DISCONTINUED | OUTPATIENT
Start: 2024-08-22 | End: 2024-08-22 | Stop reason: HOSPADM

## 2024-08-22 RX ORDER — OXYCODONE HYDROCHLORIDE 5 MG/1
10 TABLET ORAL EVERY 4 HOURS PRN
Status: DISCONTINUED | OUTPATIENT
Start: 2024-08-22 | End: 2024-08-22 | Stop reason: HOSPADM

## 2024-08-22 RX ORDER — ONDANSETRON 2 MG/ML
4 INJECTION INTRAMUSCULAR; INTRAVENOUS EVERY 6 HOURS PRN
Status: DISCONTINUED | OUTPATIENT
Start: 2024-08-22 | End: 2024-08-22 | Stop reason: HOSPADM

## 2024-08-22 RX ORDER — MIDAZOLAM HYDROCHLORIDE 1 MG/ML
INJECTION INTRAMUSCULAR; INTRAVENOUS PRN
Status: DISCONTINUED | OUTPATIENT
Start: 2024-08-22 | End: 2024-08-22 | Stop reason: SDUPTHER

## 2024-08-22 RX ORDER — DIPHENHYDRAMINE HYDROCHLORIDE 50 MG/ML
12.5 INJECTION INTRAMUSCULAR; INTRAVENOUS
Status: DISCONTINUED | OUTPATIENT
Start: 2024-08-22 | End: 2024-08-22 | Stop reason: HOSPADM

## 2024-08-22 RX ORDER — OXYCODONE HYDROCHLORIDE 5 MG/1
10 TABLET ORAL PRN
Status: DISCONTINUED | OUTPATIENT
Start: 2024-08-22 | End: 2024-08-22 | Stop reason: HOSPADM

## 2024-08-22 RX ORDER — PROPOFOL 10 MG/ML
INJECTION, EMULSION INTRAVENOUS PRN
Status: DISCONTINUED | OUTPATIENT
Start: 2024-08-22 | End: 2024-08-22 | Stop reason: SDUPTHER

## 2024-08-22 RX ORDER — OXYCODONE HYDROCHLORIDE 5 MG/1
5 TABLET ORAL PRN
Status: DISCONTINUED | OUTPATIENT
Start: 2024-08-22 | End: 2024-08-22 | Stop reason: HOSPADM

## 2024-08-22 RX ORDER — MAGNESIUM HYDROXIDE 1200 MG/15ML
LIQUID ORAL CONTINUOUS PRN
Status: COMPLETED | OUTPATIENT
Start: 2024-08-22 | End: 2024-08-22

## 2024-08-22 RX ADMIN — SODIUM CHLORIDE, SODIUM LACTATE, POTASSIUM CHLORIDE, AND CALCIUM CHLORIDE: .6; .31; .03; .02 INJECTION, SOLUTION INTRAVENOUS at 07:26

## 2024-08-22 RX ADMIN — Medication 10 MG: at 07:26

## 2024-08-22 RX ADMIN — Medication 3000 MG: at 07:26

## 2024-08-22 RX ADMIN — MIDAZOLAM 2 MG: 1 INJECTION INTRAMUSCULAR; INTRAVENOUS at 07:26

## 2024-08-22 RX ADMIN — SODIUM CHLORIDE, POTASSIUM CHLORIDE, SODIUM LACTATE AND CALCIUM CHLORIDE: 600; 310; 30; 20 INJECTION, SOLUTION INTRAVENOUS at 07:09

## 2024-08-22 RX ADMIN — PROPOFOL 25 MG: 10 INJECTION, EMULSION INTRAVENOUS at 07:26

## 2024-08-22 RX ADMIN — Medication 10 MG: at 07:45

## 2024-08-22 ASSESSMENT — COPD QUESTIONNAIRES: CAT_SEVERITY: MODERATE

## 2024-08-22 ASSESSMENT — PAIN - FUNCTIONAL ASSESSMENT: PAIN_FUNCTIONAL_ASSESSMENT: 0-10

## 2024-08-22 ASSESSMENT — PAIN SCALES - GENERAL
PAINLEVEL_OUTOF10: 0
PAINLEVEL_OUTOF10: 0

## 2024-08-22 ASSESSMENT — ENCOUNTER SYMPTOMS: DYSPNEA ACTIVITY LEVEL: AFTER AMBULATING 2 FLIGHTS OF STAIRS

## 2024-08-22 NOTE — ANESTHESIA POSTPROCEDURE EVALUATION
Department of Anesthesiology  Postprocedure Note    Patient: Mary Silverio  MRN: [id number]  YOB: 1950  Date of evaluation: 8/22/2024    Procedure Summary       Date: 08/22/24 Room / Location: [address]    Anesthesia Start: 0726 Anesthesia Stop: 0824    Procedure: REMOVAL OF PERCUTANEOUS SPINAL CORD STIMULATOR LEAD AND BATTERY Diagnosis:       Presence of neurostimulator      (Presence of neurostimulator [Z96.82])    Surgeons: Patricio Reaves MD Responsible Provider: Kuldip Lee DO    Anesthesia Type: general ASA Status: 4            Anesthesia Type: No value filed.    Debo Phase I: Debo Score: 10    Debo Phase II: Debo Score: 10    Anesthesia Post Evaluation    Patient location during evaluation: PACU  Patient participation: complete - patient participated  Level of consciousness: awake  Pain score: 0  Airway patency: patent  Nausea & Vomiting: no nausea and no vomiting  Cardiovascular status: blood pressure returned to baseline and hemodynamically stable  Respiratory status: acceptable and room air  Hydration status: stable  Comments: AWAKE, AWARE, ORIENTED  ANSWERED ALL QUESTIONS  PAIN AND VOLUME STATUS STABLE  VITAL SIGNS STABLE  Pain management: adequate    No notable events documented.

## 2024-08-22 NOTE — H&P
Update History & Physical    The patient's History and Physical of August 22, 2024 was reviewed with the patient and I examined the patient. There was no change. The surgical site was confirmed by the patient and me.       Plan: The risks, benefits, expected outcome, and alternative to the recommended procedure have been discussed with the patient. Patient understands and wants to proceed with the procedure.     Electronically signed by Patricio Reaves MD on 8/22/2024 at 7:03 AM

## 2024-08-22 NOTE — PROCEDURES
PROCEDURE NOTE  Date: 8/22/2024   Name: Mary Silverio  YOB: 1950    Procedures    PREOPERATIVE DIAGNOSIS:  1. Lumbar radiculopathy      POSTOPERATIVE DIAGNOSIS:  Same    OPERATIVE PROCEDURES PERFORMED:  1) Removal of epidural neurostimulator electrodes  2) Removal of left paralumbar neurostimulator pulse generator/battery    ANESTHESIA: MAC  ESTIMATED BLOOD LOSS: minimal  INTRAOPERATIVE FLUIDS: 1000 cc NS  BLOOD/BLOOD PRODUCTS: None.  SPECIMENS SENT TO LAB: None  COMPLICATIONS: None    DESCRIPTION OF OPERATION:   The patient was met in the holding area.  Informed consent was obtained and opportunity for questions and answers provided. The surgical site was marked per hospital protocol.  The patient was taken to the operating room and placed prone on the OR table with appropriate padding.  A \"time out\" was performed per hospital protocol identifying the patient, procedure and surgical site. The mid to lower back and upper buttocks was prepped and draped under sterile fashion and IV sedation administered. Under fluoroscopic guidance with the C arm in the AP view, the epidural leads and generator were identified. Both incisions were infiltrated with a mixture of Lidocaine 2% w/ epinephrine and Bupivicaine 0.25%.  A total of ~10 cc was infiltrated into this area. A vertical incision was made in the skin in the lumbar region carried down to the subcutaneous tissues. The subcutaneous tissues were divided utilizing electrocautery to the overlying spinous process fascia. This was also done for the generator pocket. The generator was removed without difficulty along with anchoring sutures. The epidural leads were also removed intact and without difficulty along with anchoring sutures. Both wounds were then irrigated with sterile saline and bacitracin solution.  The wounds were then closed with simple interrupted 2-0 Vicryl suture in the deep layer and 4.0 Monocryl in the dermal layer.  Both incisions were  None

## 2024-08-22 NOTE — DISCHARGE INSTRUCTIONS
Pain medication e prescribed to pharmacy    Leave dressing on until seen in office    ANESTHESIA DISCHARGE INSTRUCTIONS    You are under the influence of drugs- do not drink alcohol, drive a car, operate machinery(such as power tools, kitchen appliances, etc), sign legal documents, or make any important decisions for 24 hours (or while on pain medications).   Children should not ride bikes or skate boards or play on gym sets  for 24 hours after surgery.  A responsible adult should be with you for 24 hours.  Rest at home today- increase activity as tolerated.  Progress slowly to a regular diet unless your physician has instructed you otherwise. Drink plenty of water.    CALL YOUR DOCTOR IF YOU:  Have moderate to severe nausea or vomiting AND are unable to hold down fluids or prescribed medications.  Have bright red bloody drainage from your dressing that won't stop oozing.  Do not get relief with your pain medication    NORMAL (POSSIBLE) SIDE EFFECTS FROM ANESTHESIA:     Confusion, temporary memory loss, delayed reaction times in the first 24 hours  Lightheadedness, dizziness, difficulty focusing, blurred vision  Nausea/vomiting can happen  Shivering, feeling cold, sore throat, cough and muscle aches should stop within 24-48 hours  Trouble urinating - call your surgeon if it has been more than 8 hrs  Bruising or soreness at the IV site - call if it remains red, firm or there is drainage             The following instructions are to be followed if you have a known history or diagnosis of sleep apnea:  For all sleep apnea patients:  ? Sleep on your side or sitting up in a chair whenever possible, especially the first 24 hours after surgery.  ? Use only medicines prescribed by your doctor.    ? Do not drink alcohol.  ? If you have a dental device to assist you while at rest, use it at all times for the first 24 hours.  For patients using CPAP machines:  ? Use your CPAP machine during all periods of sleep as usual.  ?

## 2024-08-22 NOTE — ANESTHESIA PRE PROCEDURE
Department of Anesthesiology  Preprocedure Note       Name:  Mary Silverio   Age:  74 y.o.  :  1950                                          MRN:  [id number]         Date:  2024      Surgeon: Surgeon(s):  Patricio Reaves MD    Procedure: Procedure(s):  REMOVAL OF PERCUTANEOUS SPINAL CORD STIMULATOR LEAD AND BATTERY    Medications prior to admission:   Prior to Admission medications    Medication Sig Start Date End Date Taking? Authorizing Provider   Semaglutide-Weight Management (WEGOVY) 0.25 MG/0.5ML SOAJ SC injection Inject 0.25 mg into the skin every 7 days 24   Jewel Kong APRN - CNP   chlorhexidine gluconate (HIBICLENS) 4 % SOLN external solution Shower with for 7 days prior to procedure. Start using on 8/15/24! 24   Patricio Reaves MD   gabapentin (NEURONTIN) 100 MG capsule Take 1 capsule by mouth 2 times daily for 30 days. 24  Jewel Kong APRN - CNP   VITAMIN D PO Take 5,000 Units by mouth daily    ProviderJustine MD   lidocaine (LIDODERM) 5 % Place onto the skin as needed    ProviderJustine MD   empagliflozin (JARDIANCE) 10 MG tablet Take 1 tablet by mouth daily 24   Marilynn Rodgers APRN - CNP   spironolactone (ALDACTONE) 50 MG tablet Take 1 tablet by mouth daily 24   Marilynn Rodgers APRN - CNP   torsemide (DEMADEX) 20 MG tablet Take 1 tablet by mouth daily 24   Marilynn Rodgers APRN - CNP   oxyCODONE-acetaminophen (PERCOCET) 5-325 MG per tablet Take 1 tablet by mouth 2 times daily for 30 days. Max Daily Amount: 2 tablets 24  Angelika Byrd APRN - CNP   apixaban (ELIQUIS) 5 MG TABS tablet Take 1 tablet by mouth 2 times daily 24   Yaima Wood MD   diclofenac sodium (VOLTAREN) 1 % GEL Apply 4 g topically 4 times daily  Patient taking differently: Apply 4 g topically 4 times daily as needed 24   Dillow, Jewel, APRN - CNP   amiodarone (PACERONE) 100 MG tablet TAKE 1 TABLET DAILY 3/28/24   Jigna,

## 2024-08-28 DIAGNOSIS — I10 ESSENTIAL HYPERTENSION: ICD-10-CM

## 2024-08-28 DIAGNOSIS — M17.12 PRIMARY OSTEOARTHRITIS OF LEFT KNEE: ICD-10-CM

## 2024-08-28 RX ORDER — CARVEDILOL 12.5 MG/1
12.5 TABLET ORAL 2 TIMES DAILY
Qty: 180 TABLET | Refills: 3 | Status: SHIPPED | OUTPATIENT
Start: 2024-08-28

## 2024-08-28 RX ORDER — GABAPENTIN 100 MG/1
100 CAPSULE ORAL 2 TIMES DAILY
Qty: 60 CAPSULE | Refills: 0 | Status: SHIPPED | OUTPATIENT
Start: 2024-08-28 | End: 2024-09-27

## 2024-08-29 ENCOUNTER — HOSPITAL ENCOUNTER (OUTPATIENT)
Dept: GENERAL RADIOLOGY | Age: 74
Discharge: HOME | End: 2024-08-29
Attending: NEUROLOGICAL SURGERY
Payer: MEDICARE

## 2024-08-29 ENCOUNTER — HOSPITAL ENCOUNTER (OUTPATIENT)
Dept: MRI IMAGING | Age: 74
Discharge: HOME | End: 2024-08-29
Attending: NEUROLOGICAL SURGERY
Payer: MEDICARE

## 2024-08-29 DIAGNOSIS — M54.50 LOW BACK PAIN, UNSPECIFIED BACK PAIN LATERALITY, UNSPECIFIED CHRONICITY, UNSPECIFIED WHETHER SCIATICA PRESENT: ICD-10-CM

## 2024-08-29 PROCEDURE — A9579 GAD-BASE MR CONTRAST NOS,1ML: HCPCS | Performed by: NEUROLOGICAL SURGERY

## 2024-08-29 PROCEDURE — 72158 MRI LUMBAR SPINE W/O & W/DYE: CPT

## 2024-08-29 PROCEDURE — 6360000004 HC RX CONTRAST MEDICATION: Performed by: NEUROLOGICAL SURGERY

## 2024-08-29 PROCEDURE — 74018 RADEX ABDOMEN 1 VIEW: CPT

## 2024-08-29 RX ADMIN — GADOTERIDOL 20 ML: 279.3 INJECTION, SOLUTION INTRAVENOUS at 16:18

## 2024-09-05 ENCOUNTER — OFFICE VISIT (OUTPATIENT)
Dept: FAMILY MEDICINE CLINIC | Age: 74
End: 2024-09-05
Payer: MEDICARE

## 2024-09-05 VITALS
SYSTOLIC BLOOD PRESSURE: 118 MMHG | BODY MASS INDEX: 42.22 KG/M2 | WEIGHT: 269 LBS | DIASTOLIC BLOOD PRESSURE: 72 MMHG | HEART RATE: 68 BPM | HEIGHT: 67 IN | OXYGEN SATURATION: 96 %

## 2024-09-05 DIAGNOSIS — E66.01 CLASS 3 SEVERE OBESITY DUE TO EXCESS CALORIES WITH SERIOUS COMORBIDITY AND BODY MASS INDEX (BMI) OF 40.0 TO 44.9 IN ADULT (HCC): ICD-10-CM

## 2024-09-05 DIAGNOSIS — M54.6 PAIN IN THORACIC SPINE: ICD-10-CM

## 2024-09-05 DIAGNOSIS — F51.01 PRIMARY INSOMNIA: ICD-10-CM

## 2024-09-05 DIAGNOSIS — M81.0 AGE-RELATED OSTEOPOROSIS WITHOUT CURRENT PATHOLOGICAL FRACTURE: ICD-10-CM

## 2024-09-05 DIAGNOSIS — J44.9 CHRONIC OBSTRUCTIVE PULMONARY DISEASE, UNSPECIFIED COPD TYPE (HCC): ICD-10-CM

## 2024-09-05 DIAGNOSIS — K21.9 GASTROESOPHAGEAL REFLUX DISEASE WITHOUT ESOPHAGITIS: ICD-10-CM

## 2024-09-05 PROCEDURE — 1124F ACP DISCUSS-NO DSCNMKR DOCD: CPT

## 2024-09-05 PROCEDURE — 3078F DIAST BP <80 MM HG: CPT

## 2024-09-05 PROCEDURE — 3017F COLORECTAL CA SCREEN DOC REV: CPT

## 2024-09-05 PROCEDURE — 3023F SPIROM DOC REV: CPT

## 2024-09-05 PROCEDURE — 1036F TOBACCO NON-USER: CPT

## 2024-09-05 PROCEDURE — 1090F PRES/ABSN URINE INCON ASSESS: CPT

## 2024-09-05 PROCEDURE — G8399 PT W/DXA RESULTS DOCUMENT: HCPCS

## 2024-09-05 PROCEDURE — 3074F SYST BP LT 130 MM HG: CPT

## 2024-09-05 PROCEDURE — 99213 OFFICE O/P EST LOW 20 MIN: CPT

## 2024-09-05 PROCEDURE — G8427 DOCREV CUR MEDS BY ELIG CLIN: HCPCS

## 2024-09-05 PROCEDURE — G8417 CALC BMI ABV UP PARAM F/U: HCPCS

## 2024-09-05 RX ORDER — CALCIUM CARBONATE 10GR (648 MG) 648 MG/1
1 TABLET ORAL 2 TIMES DAILY
Qty: 180 TABLET | Refills: 1 | Status: SHIPPED | OUTPATIENT
Start: 2024-09-05 | End: 2025-03-04

## 2024-09-05 RX ORDER — PANTOPRAZOLE SODIUM 40 MG/1
40 TABLET, DELAYED RELEASE ORAL DAILY
Qty: 90 TABLET | Refills: 1 | Status: SHIPPED | OUTPATIENT
Start: 2024-09-05 | End: 2025-03-04

## 2024-09-05 RX ORDER — HYDROXYZINE HYDROCHLORIDE 25 MG/1
25 TABLET, FILM COATED ORAL NIGHTLY
Qty: 90 TABLET | Refills: 1 | Status: SHIPPED | OUTPATIENT
Start: 2024-09-05

## 2024-09-05 RX ORDER — BUDESONIDE, GLYCOPYRROLATE, AND FORMOTEROL FUMARATE 160; 9; 4.8 UG/1; UG/1; UG/1
2 AEROSOL, METERED RESPIRATORY (INHALATION) 2 TIMES DAILY
Qty: 10.7 G | Refills: 1 | Status: SHIPPED | OUTPATIENT
Start: 2024-09-05

## 2024-09-05 ASSESSMENT — ENCOUNTER SYMPTOMS
WHEEZING: 0
BLOOD IN STOOL: 0
ABDOMINAL PAIN: 0
SORE THROAT: 0
CONSTIPATION: 0
COLOR CHANGE: 0
SHORTNESS OF BREATH: 0
COUGH: 0
DIARRHEA: 0

## 2024-09-05 NOTE — PROGRESS NOTES
Disp-180 tablet, R-1Normal  -Stable continue medications refills given continue to monitor DEXA scans    No follow-ups on file.         Subjective   SUBJECTIVE/OBJECTIVE:  HPI: Patient presents to the office today for follow-up regarding semaglutide injection.  Has been on it for 2 weeks and done really well.  She endorses that she has had minimal side effects a little nausea the first injection but has been doing well since with no acute concerns.  Patient has no other acute concerns in office today    Review of Systems   HENT:  Negative for congestion and sore throat.    Respiratory:  Negative for cough, shortness of breath and wheezing.    Cardiovascular:  Negative for chest pain and leg swelling.   Gastrointestinal:  Negative for abdominal pain, blood in stool, constipation and diarrhea.   Endocrine: Negative for cold intolerance and heat intolerance.   Genitourinary:  Negative for difficulty urinating, hematuria and urgency.   Musculoskeletal:  Negative for arthralgias and gait problem.   Skin:  Negative for color change.   Neurological:  Negative for dizziness, seizures, light-headedness and headaches.   Psychiatric/Behavioral:  Negative for agitation and confusion. The patient is not nervous/anxious.           Objective   /72 (Site: Left Upper Arm, Position: Sitting)   Pulse 68   Ht 1.702 m (5' 7\")   Wt 122 kg (269 lb)   SpO2 96%   BMI 42.13 kg/m²    Physical Exam  Vitals reviewed.   Constitutional:       General: She is not in acute distress.     Appearance: Normal appearance. She is obese.   HENT:      Right Ear: Tympanic membrane normal.      Left Ear: Tympanic membrane normal.   Eyes:      Pupils: Pupils are equal, round, and reactive to light.   Cardiovascular:      Rate and Rhythm: Normal rate and regular rhythm.   Pulmonary:      Effort: Pulmonary effort is normal.      Breath sounds: Normal breath sounds.   Abdominal:      General: Abdomen is flat. Bowel sounds are normal.      Palpations:

## 2024-09-11 ENCOUNTER — TELEPHONE (OUTPATIENT)
Dept: CARDIOLOGY CLINIC | Age: 74
End: 2024-09-11

## 2024-09-11 PROBLEM — M48.062 LUMBAR STENOSIS WITH NEUROGENIC CLAUDICATION: Status: ACTIVE | Noted: 2024-09-11

## 2024-09-11 PROBLEM — M47.816 SPONDYLOSIS OF LUMBAR REGION WITHOUT MYELOPATHY OR RADICULOPATHY: Status: ACTIVE | Noted: 2024-09-11

## 2024-10-01 ENCOUNTER — OFFICE VISIT (OUTPATIENT)
Dept: FAMILY MEDICINE CLINIC | Age: 74
End: 2024-10-01
Payer: MEDICARE

## 2024-10-01 VITALS
SYSTOLIC BLOOD PRESSURE: 130 MMHG | DIASTOLIC BLOOD PRESSURE: 74 MMHG | WEIGHT: 259 LBS | OXYGEN SATURATION: 97 % | BODY MASS INDEX: 40.65 KG/M2 | HEIGHT: 67 IN | HEART RATE: 80 BPM

## 2024-10-01 DIAGNOSIS — N18.31 STAGE 3A CHRONIC KIDNEY DISEASE (HCC): ICD-10-CM

## 2024-10-01 DIAGNOSIS — J42 CHRONIC BRONCHITIS, UNSPECIFIED CHRONIC BRONCHITIS TYPE (HCC): ICD-10-CM

## 2024-10-01 DIAGNOSIS — E66.01 CLASS 3 SEVERE OBESITY DUE TO EXCESS CALORIES WITH SERIOUS COMORBIDITY AND BODY MASS INDEX (BMI) OF 40.0 TO 44.9 IN ADULT: ICD-10-CM

## 2024-10-01 DIAGNOSIS — Z23 IMMUNIZATION DUE: Primary | ICD-10-CM

## 2024-10-01 DIAGNOSIS — E66.813 CLASS 3 SEVERE OBESITY DUE TO EXCESS CALORIES WITH SERIOUS COMORBIDITY AND BODY MASS INDEX (BMI) OF 40.0 TO 44.9 IN ADULT: ICD-10-CM

## 2024-10-01 PROCEDURE — 3075F SYST BP GE 130 - 139MM HG: CPT | Performed by: NURSE PRACTITIONER

## 2024-10-01 PROCEDURE — 1090F PRES/ABSN URINE INCON ASSESS: CPT | Performed by: NURSE PRACTITIONER

## 2024-10-01 PROCEDURE — 1036F TOBACCO NON-USER: CPT | Performed by: NURSE PRACTITIONER

## 2024-10-01 PROCEDURE — G8417 CALC BMI ABV UP PARAM F/U: HCPCS | Performed by: NURSE PRACTITIONER

## 2024-10-01 PROCEDURE — G8484 FLU IMMUNIZE NO ADMIN: HCPCS | Performed by: NURSE PRACTITIONER

## 2024-10-01 PROCEDURE — 3078F DIAST BP <80 MM HG: CPT | Performed by: NURSE PRACTITIONER

## 2024-10-01 PROCEDURE — 99213 OFFICE O/P EST LOW 20 MIN: CPT | Performed by: NURSE PRACTITIONER

## 2024-10-01 PROCEDURE — 3023F SPIROM DOC REV: CPT | Performed by: NURSE PRACTITIONER

## 2024-10-01 PROCEDURE — 1124F ACP DISCUSS-NO DSCNMKR DOCD: CPT | Performed by: NURSE PRACTITIONER

## 2024-10-01 PROCEDURE — G8399 PT W/DXA RESULTS DOCUMENT: HCPCS | Performed by: NURSE PRACTITIONER

## 2024-10-01 PROCEDURE — G8427 DOCREV CUR MEDS BY ELIG CLIN: HCPCS | Performed by: NURSE PRACTITIONER

## 2024-10-01 PROCEDURE — 3017F COLORECTAL CA SCREEN DOC REV: CPT | Performed by: NURSE PRACTITIONER

## 2024-10-01 ASSESSMENT — ENCOUNTER SYMPTOMS
SHORTNESS OF BREATH: 1
WHEEZING: 0
CHEST TIGHTNESS: 0
CHOKING: 0

## 2024-10-01 NOTE — ASSESSMENT & PLAN NOTE
Recent weight down 15 pounds with Wegovy  Increased to 1 mg weekly subcutaneous kidney panel CMP to be evaluated prior to any additional medications.

## 2024-10-01 NOTE — PROGRESS NOTES
daily 90 tablet 1    torsemide (DEMADEX) 20 MG tablet Take 1 tablet by mouth daily 30 tablet 3    apixaban (ELIQUIS) 5 MG TABS tablet Take 1 tablet by mouth 2 times daily 180 tablet 1    amiodarone (PACERONE) 100 MG tablet TAKE 1 TABLET DAILY 90 tablet 3    sacubitril-valsartan (ENTRESTO) 24-26 MG per tablet Take 1 tablet by mouth 2 times daily 180 tablet 3    atorvastatin (LIPITOR) 40 MG tablet TAKE 1 TABLET DAILY (Patient taking differently: Take 1 tablet by mouth nightly) 30 tablet 11    OXYGEN Inhale 2 L/min into the lungs at bedtime      rOPINIRole (REQUIP) 1 MG tablet TAKE 1 TABLET THREE TIMES A DAY (Patient taking differently: Take 1 tablet by mouth 2 times daily) 90 tablet 11    gabapentin (NEURONTIN) 100 MG capsule Take 1 capsule by mouth 2 times daily for 30 days. 60 capsule 0     No current facility-administered medications on file prior to visit.       Allergies   Allergen Reactions    Codeine Anaphylaxis    Iv Contrast [Iodides] Anaphylaxis     2002    Penicillins Other (See Comments)     AS A CHILD UNABLE TO RECALL REACTION-?RASH    Sulfa Antibiotics      Other reaction(s): Unknown-AS A CHILD UNABLE TO RECALL        Review of Systems   Respiratory:  Positive for shortness of breath. Negative for choking, chest tightness and wheezing.         Chronic has copd wears O2 at night. Sob with exertional activity    Cardiovascular:  Negative for chest pain and palpitations.       OBJECTIVE:  Vitals:    10/01/24 0909   BP: 130/74   Site: Right Upper Arm   Position: Sitting   Pulse: 80   SpO2: 97%   Weight: 117.5 kg (259 lb)   Height: 1.702 m (5' 7\")      Body mass index is 40.57 kg/m².   Physical Exam  Constitutional:       Appearance: She is obese.   Cardiovascular:      Rate and Rhythm: Normal rate and regular rhythm.      Pulses: Normal pulses.      Heart sounds: Normal heart sounds.   Pulmonary:      Effort: Pulmonary effort is normal.      Breath sounds: Normal breath sounds.   Musculoskeletal:

## 2024-10-01 NOTE — ASSESSMENT & PLAN NOTE
Followed by pulmonology.  CT of chest due in December.  O2 worn nightly.  Will receive RSV and immunizations as needed.

## 2024-10-02 DIAGNOSIS — E11.9 TYPE 2 DIABETES MELLITUS WITHOUT COMPLICATION, WITHOUT LONG-TERM CURRENT USE OF INSULIN (HCC): ICD-10-CM

## 2024-10-02 DIAGNOSIS — I50.23 ACUTE ON CHRONIC SYSTOLIC HEART FAILURE (HCC): ICD-10-CM

## 2024-10-02 DIAGNOSIS — I48.0 PAF (PAROXYSMAL ATRIAL FIBRILLATION) (HCC): ICD-10-CM

## 2024-10-02 DIAGNOSIS — E78.2 MIXED HYPERLIPIDEMIA: ICD-10-CM

## 2024-10-02 RX ORDER — SPIRONOLACTONE 50 MG/1
50 TABLET, FILM COATED ORAL DAILY
Qty: 90 TABLET | Refills: 1 | Status: SHIPPED | OUTPATIENT
Start: 2024-10-02

## 2024-10-02 RX ORDER — ATORVASTATIN CALCIUM 40 MG/1
40 TABLET, FILM COATED ORAL DAILY
Qty: 90 TABLET | Refills: 0 | Status: SHIPPED | OUTPATIENT
Start: 2024-10-02

## 2024-10-02 NOTE — TELEPHONE ENCOUNTER
Medication Refill    Medication needing refilled: apixaban (ELIQUIS) 5 MG TABS tablet [[id number]]        Dosage of the medication: 5mg     How are you taking this medication (QD, BID, TID, QID, PRN):   Sig: Take 1 tablet by mouth 2 times daily          30 or 90 day supply called in: 90    When will you run out of your medication:    Which Pharmacy are we sending the medication to?:  [facility] - [address] -  [phone] -  [phone]  [address] [place] [address]  Phone: [phone]  Fax: [phone]         Medication Refill    Medication needing refilled: atorvastatin (LIPITOR) 40 MG tablet [[id number]]        Dosage of the medication: 40mg    How are you taking this medication (QD, BID, TID, QID, PRN):   Patient taking differently: Take 1 tablet by mouth nightly              30 or 90 day supply called in: 90    When will you run out of your medication:    Which Pharmacy are we sending the medication to?:   [facility] - [place], [place] - [address] [place] [address] -  [phone] - F [phone]  [address] [place] [address]  Phone: [phone]  Fax: [phone]

## 2024-10-02 NOTE — TELEPHONE ENCOUNTER
Medication Refill    Medication needing refilled: empagliflozin (JARDIANCE) 10 MG tablet [[id number]]        Dosage of the medication: 10mg    How are you taking this medication (QD, BID, TID, QID, PRN):   Sig: Take 1 tablet by mouth daily              30 or 90 day supply called in: 90    When will you run out of your medication:    Which Pharmacy are we sending the medication to?:   [facility] - [address] -  [phone] - F [phone]  [address] [place] [address]  Phone: [phone]  Fax: [phone]           Medication Refill    Medication needing refilled: spironolactone (ALDACTONE) 50 MG tablet [[id number]]        Dosage of the medication: 50mg    How are you taking this medication (QD, BID, TID, QID, PRN):   Sig: Take 1 tablet by mouth daily              30 or 90 day supply called in: 90    When will you run out of your medication:    Which Pharmacy are we sending the medication to?:   [facility] - [place], [place] - [address] [place] [address] - P [phone] - F [phone]  [address] [place] [address]  Phone: [phone]  Fax: [phone]

## 2024-10-02 NOTE — TELEPHONE ENCOUNTER
Last OV 05/02/2024 NPAM  Last OV 11/10/2023  Upcoming OV 11/01/2024 NPAM  BMP 08/2024  CBC 08/2024  LFT 04/2024  LIPID 06/2023    ANITA MCKEONOT

## 2024-10-03 ENCOUNTER — TELEPHONE (OUTPATIENT)
Dept: CARDIOLOGY CLINIC | Age: 74
End: 2024-10-03

## 2024-10-03 DIAGNOSIS — E78.5 HYPERLIPIDEMIA, UNSPECIFIED HYPERLIPIDEMIA TYPE: Primary | ICD-10-CM

## 2024-10-03 NOTE — TELEPHONE ENCOUNTER
No lipid profile within the last year. Lipid panel ordered, patient should be fasting for this. LMOV for patient to relay the need for this testing.

## 2024-10-03 NOTE — TELEPHONE ENCOUNTER
----- Message from Dr. Yaima Wodo MD sent at 10/2/2024  8:53 PM EDT -----  Regarding: Needs lipid panel   I authorized one statin refill. Please make sure there is lipid profile in past 6-12 months

## 2024-10-15 ENCOUNTER — TELEPHONE (OUTPATIENT)
Dept: CARDIOLOGY CLINIC | Age: 74
End: 2024-10-15

## 2024-10-15 NOTE — TELEPHONE ENCOUNTER
She is at an increased risk of perioperative complications due to multiple comorbidities. However, there are no absolute contraindications to proceeding. OK to hold Eliquis for three days if patient understands her stroke risk is higher if afib is recurrent off Eliquis.

## 2024-10-15 NOTE — TELEPHONE ENCOUNTER
Lizzie from [facility] with Dr stevens' office. Needs clearance.  Epidural steroid injection not yet jamal'ed, needs to hold eliquis for three days prior, lizzie can be reached at   [phone] [id number]  Fax [phone]

## 2024-10-15 NOTE — TELEPHONE ENCOUNTER
Last OV 5/2/2024 NP AM  Next OV 11/01/2024 NP AM    Please advise on cardiac clearance and holding Eliquis for three days.

## 2024-10-15 NOTE — TELEPHONE ENCOUNTER
Lizzie from [facility] with Dr stevens' office. Needs clearance.  Epidural steroid injection not yet jamal'ed, needs to hold eliquis for three days prior, lizzie can be reached at   [phone] [id number]  Fax [phone]    Last OV 5/2/2024 NP AM  Next OV 11/01/2024 NP AM

## 2024-10-25 DIAGNOSIS — N28.9 LOW KIDNEY FUNCTION: Primary | ICD-10-CM

## 2024-11-06 ENCOUNTER — TELEPHONE (OUTPATIENT)
Dept: CARDIOLOGY CLINIC | Age: 74
End: 2024-11-06

## 2024-11-06 NOTE — TELEPHONE ENCOUNTER
Please call.   Fluid level is increased on device check.     If she has been taking her torsemide 20mg daily then she needs to take an extra dose of 2-3 days and then go back down. IF she has skipped any doses of the torsemide then recommend not skipping and taking an extra dose for 1 day.

## 2024-11-14 DIAGNOSIS — M17.12 PRIMARY OSTEOARTHRITIS OF LEFT KNEE: ICD-10-CM

## 2024-11-14 RX ORDER — GABAPENTIN 100 MG/1
100 CAPSULE ORAL 2 TIMES DAILY
Qty: 60 CAPSULE | Refills: 0 | Status: SHIPPED | OUTPATIENT
Start: 2024-11-14 | End: 2024-12-14

## 2024-11-14 RX ORDER — ROPINIROLE 1 MG/1
1 TABLET, FILM COATED ORAL 3 TIMES DAILY
Qty: 90 TABLET | Refills: 2 | Status: SHIPPED | OUTPATIENT
Start: 2024-11-14

## 2024-11-14 NOTE — TELEPHONE ENCOUNTER
Patient called office back, relayed RB NP results. She VU. She reports she has noticed the swelling to her legs has subsided within the last 2 days. She continues to have SOB with exertion with walking distances. Her weight today is 257 lbs reports since 01/30/24 has lost 7 pounds. She is taking Torsemide 40 mg daily (2 tablets of 20 mg prescription). She ran out of spirolactone and has not been taking for 1 week. She uses [facility] for her preferred pharmacy.    10

## 2024-12-08 DIAGNOSIS — M17.12 PRIMARY OSTEOARTHRITIS OF LEFT KNEE: ICD-10-CM

## 2024-12-09 PROCEDURE — 93297 REM INTERROG DEV EVAL ICPMS: CPT | Performed by: NURSE PRACTITIONER

## 2024-12-09 RX ORDER — GABAPENTIN 100 MG/1
100 CAPSULE ORAL 2 TIMES DAILY
Qty: 60 CAPSULE | Refills: 2 | Status: SHIPPED | OUTPATIENT
Start: 2024-12-09 | End: 2025-03-09

## 2024-12-16 ENCOUNTER — HOSPITAL ENCOUNTER (OUTPATIENT)
Dept: CT IMAGING | Age: 74
Discharge: HOME | End: 2024-12-16
Payer: MEDICARE

## 2024-12-16 DIAGNOSIS — R91.8 LUNG NODULES: ICD-10-CM

## 2024-12-16 PROCEDURE — 71250 CT THORAX DX C-: CPT

## 2024-12-30 ENCOUNTER — TELEPHONE (OUTPATIENT)
Dept: CARDIOLOGY CLINIC | Age: 74
End: 2024-12-30

## 2024-12-30 DIAGNOSIS — E11.9 TYPE 2 DIABETES MELLITUS WITHOUT COMPLICATION, WITHOUT LONG-TERM CURRENT USE OF INSULIN (HCC): ICD-10-CM

## 2024-12-30 DIAGNOSIS — I50.23 ACUTE ON CHRONIC SYSTOLIC CONGESTIVE HEART FAILURE (HCC): Primary | ICD-10-CM

## 2024-12-30 DIAGNOSIS — I50.23 ACUTE ON CHRONIC SYSTOLIC HEART FAILURE (HCC): ICD-10-CM

## 2024-12-30 DIAGNOSIS — Z79.899 MEDICATION MANAGEMENT: ICD-10-CM

## 2024-12-30 RX ORDER — TORSEMIDE 20 MG/1
TABLET ORAL
Qty: 30 TABLET | Refills: 1 | Status: SHIPPED | OUTPATIENT
Start: 2024-12-30

## 2024-12-30 RX ORDER — TORSEMIDE 20 MG/1
TABLET ORAL
Qty: 90 TABLET | Refills: 1 | Status: SHIPPED | OUTPATIENT
Start: 2024-12-30

## 2024-12-30 RX ORDER — SPIRONOLACTONE 50 MG/1
50 TABLET, FILM COATED ORAL DAILY
Qty: 90 TABLET | Refills: 2 | Status: SHIPPED | OUTPATIENT
Start: 2024-12-30

## 2024-12-30 RX ORDER — TORSEMIDE 20 MG/1
TABLET ORAL
Qty: 30 TABLET | Refills: 1 | Status: SHIPPED | OUTPATIENT
Start: 2024-12-30 | End: 2024-12-30 | Stop reason: SDUPTHER

## 2024-12-30 RX ORDER — TORSEMIDE 20 MG/1
TABLET ORAL
Qty: 90 TABLET | Refills: 1 | Status: SHIPPED | OUTPATIENT
Start: 2024-12-30 | End: 2024-12-30

## 2024-12-30 NOTE — TELEPHONE ENCOUNTER
Fluid level elevated on device check. Currently her medication list states she is taking torsemide 20 mg daily and spironolactone.    Attempted to call and discuss findings. Left message to return call.    Need to verify what she is currently taking and will adjust based on her current meds.

## 2024-12-30 NOTE — TELEPHONE ENCOUNTER
Called and spoke with patient verified medication list is up to date with patient administration. Patient reports she has not been taking toresemide 20 mg daily as she ran out of medication weeks ago.     Spoke with DE NP regarding above. DE NP states to have patient take torsemide 20 mg daily for 3 days and thereafter take torsemide 20 mg 3-4 times a week. BNP/BMP in 1 week.    Notified patient she VU. Refills sent per patient request for Jardiance, Spironolactone, and Torsemide. BMP and BNP ordered.

## 2025-01-13 ENCOUNTER — HOSPITAL ENCOUNTER (OUTPATIENT)
Age: 75
Discharge: HOME | End: 2025-01-13
Payer: MEDICARE

## 2025-01-13 ENCOUNTER — OFFICE VISIT (OUTPATIENT)
Dept: PULMONOLOGY | Age: 75
End: 2025-01-13
Payer: MEDICARE

## 2025-01-13 VITALS
RESPIRATION RATE: 17 BRPM | OXYGEN SATURATION: 95 % | HEIGHT: 67 IN | DIASTOLIC BLOOD PRESSURE: 70 MMHG | SYSTOLIC BLOOD PRESSURE: 124 MMHG | HEART RATE: 83 BPM | WEIGHT: 250.4 LBS | BODY MASS INDEX: 39.3 KG/M2

## 2025-01-13 DIAGNOSIS — I50.23 ACUTE ON CHRONIC SYSTOLIC CONGESTIVE HEART FAILURE (HCC): ICD-10-CM

## 2025-01-13 DIAGNOSIS — Z79.899 MEDICATION MANAGEMENT: ICD-10-CM

## 2025-01-13 DIAGNOSIS — Z87.891 PERSONAL HISTORY OF TOBACCO USE: Primary | ICD-10-CM

## 2025-01-13 LAB
ANION GAP SERPL CALCULATED.3IONS-SCNC: 16 MMOL/L (ref 3–16)
BUN SERPL-MCNC: 19 MG/DL (ref 7–20)
CALCIUM SERPL-MCNC: 9.7 MG/DL (ref 8.3–10.6)
CHLORIDE SERPL-SCNC: 107 MMOL/L (ref 99–110)
CO2 SERPL-SCNC: 21 MMOL/L (ref 21–32)
CREAT SERPL-MCNC: 1.7 MG/DL (ref 0.6–1.2)
GFR SERPLBLD CREATININE-BSD FMLA CKD-EPI: 31 ML/MIN/{1.73_M2}
GLUCOSE SERPL-MCNC: 109 MG/DL (ref 70–99)
NT-PROBNP SERPL-MCNC: 279 PG/ML (ref 0–449)
POTASSIUM SERPL-SCNC: 4.7 MMOL/L (ref 3.5–5.1)
SODIUM SERPL-SCNC: 144 MMOL/L (ref 136–145)

## 2025-01-13 PROCEDURE — G8427 DOCREV CUR MEDS BY ELIG CLIN: HCPCS | Performed by: INTERNAL MEDICINE

## 2025-01-13 PROCEDURE — 1124F ACP DISCUSS-NO DSCNMKR DOCD: CPT | Performed by: INTERNAL MEDICINE

## 2025-01-13 PROCEDURE — 3074F SYST BP LT 130 MM HG: CPT | Performed by: INTERNAL MEDICINE

## 2025-01-13 PROCEDURE — 1090F PRES/ABSN URINE INCON ASSESS: CPT | Performed by: INTERNAL MEDICINE

## 2025-01-13 PROCEDURE — 83880 ASSAY OF NATRIURETIC PEPTIDE: CPT

## 2025-01-13 PROCEDURE — G8417 CALC BMI ABV UP PARAM F/U: HCPCS | Performed by: INTERNAL MEDICINE

## 2025-01-13 PROCEDURE — 3017F COLORECTAL CA SCREEN DOC REV: CPT | Performed by: INTERNAL MEDICINE

## 2025-01-13 PROCEDURE — 80048 BASIC METABOLIC PNL TOTAL CA: CPT

## 2025-01-13 PROCEDURE — 1159F MED LIST DOCD IN RCRD: CPT | Performed by: INTERNAL MEDICINE

## 2025-01-13 PROCEDURE — 36415 COLL VENOUS BLD VENIPUNCTURE: CPT

## 2025-01-13 PROCEDURE — 1036F TOBACCO NON-USER: CPT | Performed by: INTERNAL MEDICINE

## 2025-01-13 PROCEDURE — 3078F DIAST BP <80 MM HG: CPT | Performed by: INTERNAL MEDICINE

## 2025-01-13 PROCEDURE — G8399 PT W/DXA RESULTS DOCUMENT: HCPCS | Performed by: INTERNAL MEDICINE

## 2025-01-13 PROCEDURE — 99214 OFFICE O/P EST MOD 30 MIN: CPT | Performed by: INTERNAL MEDICINE

## 2025-01-13 PROCEDURE — G0296 VISIT TO DETERM LDCT ELIG: HCPCS | Performed by: INTERNAL MEDICINE

## 2025-01-13 NOTE — PROGRESS NOTES
torsemide (DEMADEX) 20 MG tablet Take 1 tablet by mouth daily for 3 days; thereafter take 1 tablet by mouth 3-4 days per week. 30 tablet 1   • torsemide (DEMADEX) 20 MG tablet Take 1 tablet by mouth 3-4 days per week 90 tablet 1   • gabapentin (NEURONTIN) 100 MG capsule Take 1 capsule by mouth 2 times daily for 90 days. 60 capsule 2   • rOPINIRole (REQUIP) 1 MG tablet Take 1 tablet by mouth 3 times daily 90 tablet 2   • oxyCODONE-acetaminophen (PERCOCET) 5-325 MG per tablet Take 1 tablet by mouth 2 times daily for 30 days. Max Daily Amount: 2 tablets 60 tablet 0   • atorvastatin (LIPITOR) 40 MG tablet Take 1 tablet by mouth daily 90 tablet 0   • apixaban (ELIQUIS) 5 MG TABS tablet Take 1 tablet by mouth 2 times daily 180 tablet 1   • Semaglutide-Weight Management (WEGOVY) 1 MG/0.5ML SOAJ SC injection Inject 1 mg into the skin every 7 days 2 mL 1   • Budeson-Glycopyrrol-Formoterol (BREZTRI AEROSPHERE) 160-9-4.8 MCG/ACT AERO Inhale 2 puffs into the lungs 2 times daily 10.7 g 1   • pantoprazole (PROTONIX) 40 MG tablet Take 1 tablet by mouth daily 90 tablet 1   • diclofenac sodium (VOLTAREN) 1 % GEL Apply 4 g topically 4 times daily 350 g 0   • hydrOXYzine HCl (ATARAX) 25 MG tablet Take 1 tablet by mouth nightly 90 tablet 1   • calcium carbonate 648 MG TABS Take 1 tablet by mouth 2 times daily 180 tablet 1   • carvedilol (COREG) 12.5 MG tablet TAKE 1 TABLET TWICE A  tablet 3   • chlorhexidine gluconate (HIBICLENS) 4 % SOLN external solution Shower with for 7 days prior to procedure. Start using on 8/15/24! 15 mL 0   • VITAMIN D PO Take 5,000 Units by mouth daily     • lidocaine (LIDODERM) 5 % Place onto the skin as needed     • amiodarone (PACERONE) 100 MG tablet TAKE 1 TABLET DAILY 90 tablet 3   • sacubitril-valsartan (ENTRESTO) 24-26 MG per tablet Take 1 tablet by mouth 2 times daily 180 tablet 3   • OXYGEN Inhale 2 L/min into the lungs at bedtime       No current facility-administered medications for this

## 2025-01-14 NOTE — RESULT ENCOUNTER NOTE
Attempted to reach pt, lvm relaying normal results per HIPAA. Left call back number if any questions or concerns.

## 2025-01-15 ENCOUNTER — TELEPHONE (OUTPATIENT)
Dept: CARDIOLOGY CLINIC | Age: 75
End: 2025-01-15

## 2025-01-15 NOTE — TELEPHONE ENCOUNTER
CARDIAC CLEARANCE REQUEST    What type of procedure are you having: Epidural Steroid Injection    Are you taking any blood thinners:  Eliquis  Type on anesthesia:  NA  When is your procedure scheduled for:  TBD  What physician is performing your procedure: Dr Reaves    Phone Number:[phone]    Fax number to send the letter: [phone]

## 2025-01-15 NOTE — TELEPHONE ENCOUNTER
She is at an increased risk of perioperative complications due to multiple comorbidities. However, there are no absolute contraindications to proceeding. OK to hold Eliquis for 48 hours if patient understands her stroke risk is higher if afib is recurrent off Eliquis.

## 2025-01-20 ENCOUNTER — NURSE ONLY (OUTPATIENT)
Dept: CARDIOLOGY CLINIC | Age: 75
End: 2025-01-20

## 2025-01-20 ENCOUNTER — OFFICE VISIT (OUTPATIENT)
Dept: CARDIOLOGY CLINIC | Age: 75
End: 2025-01-20
Payer: MEDICARE

## 2025-01-20 VITALS
WEIGHT: 242 LBS | HEIGHT: 67 IN | SYSTOLIC BLOOD PRESSURE: 118 MMHG | OXYGEN SATURATION: 95 % | HEART RATE: 78 BPM | BODY MASS INDEX: 37.98 KG/M2 | DIASTOLIC BLOOD PRESSURE: 62 MMHG

## 2025-01-20 DIAGNOSIS — I42.8 NICM (NONISCHEMIC CARDIOMYOPATHY) (HCC): ICD-10-CM

## 2025-01-20 DIAGNOSIS — Z95.810 ICD (IMPLANTABLE CARDIOVERTER-DEFIBRILLATOR), DUAL, IN SITU: Primary | ICD-10-CM

## 2025-01-20 DIAGNOSIS — I48.0 PAF (PAROXYSMAL ATRIAL FIBRILLATION) (HCC): Primary | ICD-10-CM

## 2025-01-20 PROCEDURE — 1124F ACP DISCUSS-NO DSCNMKR DOCD: CPT | Performed by: NURSE PRACTITIONER

## 2025-01-20 PROCEDURE — G8427 DOCREV CUR MEDS BY ELIG CLIN: HCPCS | Performed by: NURSE PRACTITIONER

## 2025-01-20 PROCEDURE — 3078F DIAST BP <80 MM HG: CPT | Performed by: NURSE PRACTITIONER

## 2025-01-20 PROCEDURE — 1090F PRES/ABSN URINE INCON ASSESS: CPT | Performed by: NURSE PRACTITIONER

## 2025-01-20 PROCEDURE — G8417 CALC BMI ABV UP PARAM F/U: HCPCS | Performed by: NURSE PRACTITIONER

## 2025-01-20 PROCEDURE — 1160F RVW MEDS BY RX/DR IN RCRD: CPT | Performed by: NURSE PRACTITIONER

## 2025-01-20 PROCEDURE — 1036F TOBACCO NON-USER: CPT | Performed by: NURSE PRACTITIONER

## 2025-01-20 PROCEDURE — G8399 PT W/DXA RESULTS DOCUMENT: HCPCS | Performed by: NURSE PRACTITIONER

## 2025-01-20 PROCEDURE — 3074F SYST BP LT 130 MM HG: CPT | Performed by: NURSE PRACTITIONER

## 2025-01-20 PROCEDURE — 1159F MED LIST DOCD IN RCRD: CPT | Performed by: NURSE PRACTITIONER

## 2025-01-20 PROCEDURE — 3017F COLORECTAL CA SCREEN DOC REV: CPT | Performed by: NURSE PRACTITIONER

## 2025-01-20 PROCEDURE — 93000 ELECTROCARDIOGRAM COMPLETE: CPT | Performed by: NURSE PRACTITIONER

## 2025-01-20 NOTE — PATIENT INSTRUCTIONS
No changes today     Update labs in February or March     Remote device transmissions every three months     Follow up in 6 months

## 2025-01-20 NOTE — PROGRESS NOTES
results for input(s): \"NA\", \"K\", \"CO2\", \"BUN\", \"CREATININE\", \"LABGLOM\", \"GLUCOSE\" in the last 72 hours.  PT/INR: No results for input(s): \"PROTIME\", \"INR\" in the last 72 hours.  APTT:No results for input(s): \"APTT\" in the last 72 hours.  FASTING LIPID PANEL:  Lab Results   Component Value Date/Time    HDL 45 06/16/2023 04:54 AM    TRIG 172 06/16/2023 04:54 AM     LIVER PROFILE:No results for input(s): \"AST\", \"ALT\" in the last 72 hours.    Invalid input(s): \"ALB\"    IMPRESSION:    Patient Active Problem List   Diagnosis    Allergic rhinitis    Chronic pain of left knee    Astigmatism    Backache    Cervicalgia    Costochondritis    Cyst of eyelid    Depression    Esophagitis    Flexor hallucis longus tendinitis    Essential hypertension    Hyperlipidemia    Hematuria    Lateral epicondylitis    Left upper quadrant abdominal pain    Mild intermittent asthma    Motion sickness    Obesity    Nicotine dependence    Other seborrheic keratosis    Overweight    Pain in thoracic spine    Palpitations    Persistent insomnia    Senile lentigo    Presbyopia    Symptomatic menopausal or female climacteric states    Status post hysterectomy    Subacromial bursitis    Former smoker    Acute decompensated heart failure (HCC)    COPD (chronic obstructive pulmonary disease) ([facility])    PAF (paroxysmal atrial fibrillation) ([facility])    Pulmonary vascular congestion    Elevated brain natriuretic peptide (BNP) level    Premature atrial contraction    Acute on chronic systolic congestive heart failure ([facility])    Hypokalemia    ICD (implantable cardioverter-defibrillator), dual, in situ    SBO (small bowel obstruction) ([facility])    Type 2 diabetes mellitus    Mild episode of recurrent major depressive disorder ([facility])    Primary osteoarthritis of left knee    Hypotension    Atrial fibrillation with RVR ([facility])    Chronic pain of both knees    Opioid dependence with current use ([facility])    Chronic renal disease, stage III ([facility]) [613706]    Closed fracture of

## 2025-01-22 ENCOUNTER — OFFICE VISIT (OUTPATIENT)
Dept: FAMILY MEDICINE CLINIC | Age: 75
End: 2025-01-22

## 2025-01-22 VITALS
HEIGHT: 67 IN | HEART RATE: 68 BPM | OXYGEN SATURATION: 98 % | WEIGHT: 242 LBS | SYSTOLIC BLOOD PRESSURE: 120 MMHG | DIASTOLIC BLOOD PRESSURE: 68 MMHG | BODY MASS INDEX: 37.98 KG/M2

## 2025-01-22 DIAGNOSIS — F51.01 PRIMARY INSOMNIA: ICD-10-CM

## 2025-01-22 DIAGNOSIS — M17.0 BILATERAL PRIMARY OSTEOARTHRITIS OF KNEE: ICD-10-CM

## 2025-01-22 DIAGNOSIS — E66.812 CLASS 2 OBESITY DUE TO EXCESS CALORIES WITH BODY MASS INDEX (BMI) OF 37.0 TO 37.9 IN ADULT, UNSPECIFIED WHETHER SERIOUS COMORBIDITY PRESENT: ICD-10-CM

## 2025-01-22 DIAGNOSIS — I10 ESSENTIAL HYPERTENSION: ICD-10-CM

## 2025-01-22 DIAGNOSIS — Z23 IMMUNIZATION DUE: ICD-10-CM

## 2025-01-22 DIAGNOSIS — M79.672 LEFT FOOT PAIN: ICD-10-CM

## 2025-01-22 DIAGNOSIS — F32.89 OTHER DEPRESSION: ICD-10-CM

## 2025-01-22 DIAGNOSIS — M81.0 AGE-RELATED OSTEOPOROSIS WITHOUT CURRENT PATHOLOGICAL FRACTURE: ICD-10-CM

## 2025-01-22 DIAGNOSIS — Z00.00 MEDICARE ANNUAL WELLNESS VISIT, SUBSEQUENT: Primary | ICD-10-CM

## 2025-01-22 DIAGNOSIS — J42 CHRONIC BRONCHITIS, UNSPECIFIED CHRONIC BRONCHITIS TYPE (HCC): ICD-10-CM

## 2025-01-22 DIAGNOSIS — E66.09 CLASS 2 OBESITY DUE TO EXCESS CALORIES WITH BODY MASS INDEX (BMI) OF 37.0 TO 37.9 IN ADULT, UNSPECIFIED WHETHER SERIOUS COMORBIDITY PRESENT: ICD-10-CM

## 2025-01-22 DIAGNOSIS — F11.20 OPIOID DEPENDENCE WITH CURRENT USE (HCC): ICD-10-CM

## 2025-01-22 DIAGNOSIS — I50.9 ACUTE DECOMPENSATED HEART FAILURE (HCC): ICD-10-CM

## 2025-01-22 RX ORDER — CALCIUM CARBONATE 10GR (648 MG) 648 MG/1
1 TABLET ORAL 2 TIMES DAILY
Qty: 180 TABLET | Refills: 1 | Status: SHIPPED | OUTPATIENT
Start: 2025-01-22 | End: 2025-07-21

## 2025-01-22 RX ORDER — GABAPENTIN 300 MG/1
300 CAPSULE ORAL 2 TIMES DAILY
Qty: 180 CAPSULE | Refills: 1 | Status: SHIPPED | OUTPATIENT
Start: 2025-01-22 | End: 2025-04-22

## 2025-01-22 RX ORDER — HYDROXYZINE HYDROCHLORIDE 25 MG/1
25 TABLET, FILM COATED ORAL NIGHTLY
Qty: 90 TABLET | Refills: 1 | Status: SHIPPED | OUTPATIENT
Start: 2025-01-22

## 2025-01-22 SDOH — ECONOMIC STABILITY: FOOD INSECURITY: WITHIN THE PAST 12 MONTHS, THE FOOD YOU BOUGHT JUST DIDN'T LAST AND YOU DIDN'T HAVE MONEY TO GET MORE.: NEVER TRUE

## 2025-01-22 SDOH — ECONOMIC STABILITY: FOOD INSECURITY: WITHIN THE PAST 12 MONTHS, YOU WORRIED THAT YOUR FOOD WOULD RUN OUT BEFORE YOU GOT MONEY TO BUY MORE.: NEVER TRUE

## 2025-01-22 ASSESSMENT — PATIENT HEALTH QUESTIONNAIRE - PHQ9
9. THOUGHTS THAT YOU WOULD BE BETTER OFF DEAD, OR OF HURTING YOURSELF: NOT AT ALL
3. TROUBLE FALLING OR STAYING ASLEEP: NOT AT ALL
SUM OF ALL RESPONSES TO PHQ QUESTIONS 1-9: 0
8. MOVING OR SPEAKING SO SLOWLY THAT OTHER PEOPLE COULD HAVE NOTICED. OR THE OPPOSITE, BEING SO FIGETY OR RESTLESS THAT YOU HAVE BEEN MOVING AROUND A LOT MORE THAN USUAL: NOT AT ALL
SUM OF ALL RESPONSES TO PHQ9 QUESTIONS 1 & 2: 0
6. FEELING BAD ABOUT YOURSELF - OR THAT YOU ARE A FAILURE OR HAVE LET YOURSELF OR YOUR FAMILY DOWN: NOT AT ALL
SUM OF ALL RESPONSES TO PHQ QUESTIONS 1-9: 0
1. LITTLE INTEREST OR PLEASURE IN DOING THINGS: NOT AT ALL
4. FEELING TIRED OR HAVING LITTLE ENERGY: NOT AT ALL
7. TROUBLE CONCENTRATING ON THINGS, SUCH AS READING THE NEWSPAPER OR WATCHING TELEVISION: NOT AT ALL
5. POOR APPETITE OR OVEREATING: NOT AT ALL
2. FEELING DOWN, DEPRESSED OR HOPELESS: NOT AT ALL
SUM OF ALL RESPONSES TO PHQ QUESTIONS 1-9: 0
10. IF YOU CHECKED OFF ANY PROBLEMS, HOW DIFFICULT HAVE THESE PROBLEMS MADE IT FOR YOU TO DO YOUR WORK, TAKE CARE OF THINGS AT HOME, OR GET ALONG WITH OTHER PEOPLE: NOT DIFFICULT AT ALL
SUM OF ALL RESPONSES TO PHQ QUESTIONS 1-9: 0

## 2025-01-22 ASSESSMENT — LIFESTYLE VARIABLES
HOW OFTEN DO YOU HAVE A DRINK CONTAINING ALCOHOL: NEVER
HOW MANY STANDARD DRINKS CONTAINING ALCOHOL DO YOU HAVE ON A TYPICAL DAY: PATIENT DOES NOT DRINK

## 2025-01-22 NOTE — ASSESSMENT & PLAN NOTE
Blood pressure 120/68 in office today no shortness of breath or chest pain takes medications daily as directed

## 2025-01-22 NOTE — ASSESSMENT & PLAN NOTE
Impression: Vitreous degeneration, bilateral: H43.813. OCT ordered and performed Plan: Posterior vitreous detachments (PVD) usually diminish with time, but it may take several months. They rarely disappear entirely. PVD is a normal aging change due to vitreous jelly pulling away from the retinal lining of the eye. They may accompany retinal tears, retinal holes, or retinal detachments, so a dilated retinal exam is important. Contact office if symptoms worsen, including increased floaters, flashing light, loss of vision or a black curtain blocking your field of vision. Is followed by pain management

## 2025-01-22 NOTE — ASSESSMENT & PLAN NOTE
Followed by cardiology weight stable no shortness of breath chest pain takes medications as directed

## 2025-01-22 NOTE — PROGRESS NOTES
242Medicare Annual Wellness Visit    Mary Silverio is here for Follow-up (Patient wants to discuss something different other than wegvoy due vomiting daily stopped around Thanksgiving )    Assessment & Plan   Medicare annual wellness visit, subsequent  -     Ambulatory Referral to [facility] Clinical Specialist  Age-related osteoporosis without current pathological fracture  Assessment & Plan:  Continue supplement   Orders:  -     calcium carbonate 648 MG TABS; Take 1 tablet by mouth 2 times daily, Disp-180 tablet, R-1Normal  -     Ambulatory Referral to [facility] Clinical Specialist  Left foot pain  Assessment & Plan:  Increased gabapentin   Orders:  -     gabapentin (NEURONTIN) 300 MG capsule; Take 1 capsule by mouth in the morning and 1 capsule in the evening. Do all this for 180 doses. Intended supply: 90 days., Disp-180 capsule, R-1Normal  -     Ambulatory Referral to [facility] Clinical Specialist  Acute decompensated heart failure (HCC)  Assessment & Plan:  Followed by cardiology weight stable no shortness of breath chest pain takes medications as directed   Orders:  -     Ambulatory Referral to [facility] Clinical Specialist  Bilateral primary osteoarthritis of knee  Assessment & Plan:  Followed by pain management is trying to walk when weather is improved   Orders:  -     Ambulatory Referral to [facility] Clinical Specialist  Chronic bronchitis, unspecified chronic bronchitis type (HCC)  Assessment & Plan:  Followed by pulmonology had repeat CT scan in December no changes   Orders:  -     Ambulatory Referral to [facility] Clinical Specialist  Other depression  Assessment & Plan:  Feels well-controlled no thoughts of self-harm or harming others   Orders:  -     Ambulatory Referral to [facility] Clinical Specialist  Essential hypertension  Assessment & Plan:  Blood pressure 120/68 in office today no shortness of breath or chest pain takes medications daily as directed   Orders:  -     Ambulatory Referral to [facility] Clinical Specialist  Class 2 obesity due to

## 2025-01-23 ENCOUNTER — CLINICAL DOCUMENTATION (OUTPATIENT)
Dept: SPIRITUAL SERVICES | Age: 75
End: 2025-01-23

## 2025-01-23 NOTE — ACP (ADVANCE CARE PLANNING)
Advance Care Planning   Ambulatory ACP Specialist Patient Outreach    Date:  1/23/2025    ACP Specialist:  Radha Moctezuma LPN    Outreach call to patient in follow-up to ACP Specialist referral from:Kaylene Goodman APRN - CNP    [] PCP  [] Provider   [] Ambulatory Care Management [] Other     For:                  [x] Advance Directive Assistance              [] Complete Portable DNR order              [] Complete POST/POLST/MOST              [] Code Status Discussion             [] Discuss Goals of Care             [] Early ACP Decision-Making              [] Other (Specify)    Date Referral Received:1/22/25    Next Step:   [] ACP scheduled conversation  [x] Outreach again in one week               [] Email / Mail ACP Info Sheets  [] Email / Mail Advance Directive   [] Closing referral.  Routing closure to referring provider/staff and to ACP Specialist .    [] Closure letter mailed to patient with invitation to contact ACP Specialist if / when ready.   [] Other (Specify here):       [x] At this time, Healthcare Decision Maker Is:     Primary Decision Maker: Jean ClaudeOllie - Spouse - [phone]    Secondary Decision Maker: Monico Silverio - Child - [phone]    Secondary Decision Maker: Davi Silverio - Child - [phone]          [] Primary agent named in scanned advance directive.    [x] Legal Next of Kin.     [] Unable to determine legal decision maker at this time.    Outreaches:       [x] 1st -  Date:  1/23/25               Intervention:  [] Spoke with Patient   [x] Left Voice mail [] Email / Mail    [x] [employer]  [] Other (Specify) :     Outcomes:Writer attempted ACP outreach to patient's preferred number ([phone]).  Writer left voicemail requesting return call including call back number.  MyChart message sent.  ACP outreach will be attempted in about one week if return call not received.             [] 2nd -  Date:                 Intervention:  [] Spoke with Patient  [] Left Voice mail []

## 2025-01-28 ENCOUNTER — TELEPHONE (OUTPATIENT)
Dept: CARDIOLOGY CLINIC | Age: 75
End: 2025-01-28

## 2025-01-28 NOTE — TELEPHONE ENCOUNTER
Please get update on her weights and verify how much and how often she is taking her torsemide?    Thanks!

## 2025-01-29 DIAGNOSIS — I50.23 ACUTE ON CHRONIC SYSTOLIC CONGESTIVE HEART FAILURE (HCC): ICD-10-CM

## 2025-01-29 RX ORDER — TORSEMIDE 20 MG/1
TABLET ORAL
Qty: 90 TABLET | Refills: 1 | Status: SHIPPED | OUTPATIENT
Start: 2025-01-29

## 2025-01-29 NOTE — TELEPHONE ENCOUNTER
Please have patient take torsemide 20mg daily for 1 week and then reduce back to 20mg three times a week

## 2025-01-29 NOTE — TELEPHONE ENCOUNTER
Please verify she is taking torsemide 20mg twice a day?     If so then we need to check her labs since she was only taking it 1 tab 3-4 days per week before.

## 2025-01-29 NOTE — TELEPHONE ENCOUNTER
Pt returned call. Message given. V/u. She had her current weight at 239lb. She is taking Tosemide 20mg 2 times a day. She is not having any symptoms of swelling or sob.

## 2025-01-29 NOTE — TELEPHONE ENCOUNTER
LM for patient to return call.  Needing to know current weights.  Atleast 4 or 5.  Any symtoms?  And verify torsemide dose and how often she is taking this.

## 2025-01-29 NOTE — TELEPHONE ENCOUNTER
Pt returned call. Message given. V/u. She had her current weight at 239lb. She is taking Tosemide 20mg 2 times a day.

## 2025-01-29 NOTE — TELEPHONE ENCOUNTER
Pt stated that [facility] did not get our refill request for Torsemide 20mg when we sent it in on 12/30/24. Please advise.

## 2025-02-14 ENCOUNTER — OFFICE VISIT (OUTPATIENT)
Dept: CARDIOLOGY CLINIC | Age: 75
End: 2025-02-14
Payer: MEDICARE

## 2025-02-14 VITALS
DIASTOLIC BLOOD PRESSURE: 70 MMHG | BODY MASS INDEX: 38.77 KG/M2 | WEIGHT: 247 LBS | OXYGEN SATURATION: 95 % | SYSTOLIC BLOOD PRESSURE: 122 MMHG | HEART RATE: 80 BPM | HEIGHT: 67 IN

## 2025-02-14 DIAGNOSIS — I50.20 HFREF (HEART FAILURE WITH REDUCED EJECTION FRACTION) (HCC): Primary | ICD-10-CM

## 2025-02-14 DIAGNOSIS — I42.8 NICM (NONISCHEMIC CARDIOMYOPATHY) (HCC): ICD-10-CM

## 2025-02-14 PROCEDURE — G8427 DOCREV CUR MEDS BY ELIG CLIN: HCPCS | Performed by: NURSE PRACTITIONER

## 2025-02-14 PROCEDURE — 1124F ACP DISCUSS-NO DSCNMKR DOCD: CPT | Performed by: NURSE PRACTITIONER

## 2025-02-14 PROCEDURE — G8399 PT W/DXA RESULTS DOCUMENT: HCPCS | Performed by: NURSE PRACTITIONER

## 2025-02-14 PROCEDURE — 1159F MED LIST DOCD IN RCRD: CPT | Performed by: NURSE PRACTITIONER

## 2025-02-14 PROCEDURE — G8417 CALC BMI ABV UP PARAM F/U: HCPCS | Performed by: NURSE PRACTITIONER

## 2025-02-14 PROCEDURE — 99214 OFFICE O/P EST MOD 30 MIN: CPT | Performed by: NURSE PRACTITIONER

## 2025-02-14 PROCEDURE — 1090F PRES/ABSN URINE INCON ASSESS: CPT | Performed by: NURSE PRACTITIONER

## 2025-02-14 PROCEDURE — 3074F SYST BP LT 130 MM HG: CPT | Performed by: NURSE PRACTITIONER

## 2025-02-14 PROCEDURE — 3078F DIAST BP <80 MM HG: CPT | Performed by: NURSE PRACTITIONER

## 2025-02-14 PROCEDURE — 1036F TOBACCO NON-USER: CPT | Performed by: NURSE PRACTITIONER

## 2025-02-14 PROCEDURE — 1160F RVW MEDS BY RX/DR IN RCRD: CPT | Performed by: NURSE PRACTITIONER

## 2025-02-14 PROCEDURE — 3017F COLORECTAL CA SCREEN DOC REV: CPT | Performed by: NURSE PRACTITIONER

## 2025-02-14 ASSESSMENT — ENCOUNTER SYMPTOMS
GASTROINTESTINAL NEGATIVE: 1
SHORTNESS OF BREATH: 1

## 2025-02-14 NOTE — PROGRESS NOTES
[facility]   Cardiology Note              Date:  February 14, 2025  Patientname: Mary Silverio  YOB: 1950    Primary Care physician: Kaylene Goodman APRN - CNP    HISTORY OF PRESENT ILLNESS: Mary Silverio is a 74 y.o. female with a history of HFrEF, nonischemic cardiomyopathy, AF, HLD, COPD.  She previously followed with cardiology in [place] for atrial fibrillation and nonischemic cardiomyopathy, EF 25%.  [facility] 2018 showed no significant CAD.  She had AF ablation and TONY clip.  She had dual-chamber ICD 2019.  Echo 2023 showed EF 25%.  Stress test 3/2024 showed no ischemia.    Today she presents for follow-up for HFrEF, nonischemic cardiomyopathy.  Overall she feels well, denies any chest pain, palpitations, syncope, edema.  She does have mild shortness of breath, improved on torsemide.  Mild dizziness upon standing.  She lost weight on GLP-1 but could not tolerate long-term due to GI side effects.  Plans to start exercising in the warmer months.      Office weight 1/2025: 242 lbs  Office weight 7/2024: 271 lbs    Cardiologist: Dr. Wood and CHF     Past Medical History:   has a past medical history of Arthritis, Atrial fibrillation (HCC), CHF (congestive heart failure) (HCC), Congenital heart disease, COPD (chronic obstructive pulmonary disease) (HCC), GERD (gastroesophageal reflux disease), Hypertension, Irritable bowel syndrome, Obesity, On home oxygen therapy, and Restless legs syndrome.    Past Surgical History:   has a past surgical history that includes pacemaker placement; Hysterectomy, vaginal; Stimulator Surgery; Cholecystectomy, open; hip surgery (Left, 05/04/2023); Pain management procedure (Left, 05/25/2023); Knee Arthrocentesis (Left, 07/13/2023); Colonoscopy; ablation of dysrhythmic focus; Wrist fracture surgery (Right, 11/06/2023); hip surgery (Bilateral, 02/08/2024); and Stimulator Surgery (N/A, 8/22/2024).     Home Medications:    Prior to Admission medications

## 2025-02-14 NOTE — PATIENT INSTRUCTIONS
Everything looks great today, good job!  Recheck kidney function in 3 months to ensure stable  Continue current medications  Stay active along with a healthy diet  Check BP at home and call the office if consistently out of goal range  Follow up with Malena in 6 months

## 2025-02-21 PROBLEM — Z00.00 MEDICARE ANNUAL WELLNESS VISIT, SUBSEQUENT: Status: RESOLVED | Noted: 2025-01-22 | Resolved: 2025-02-21

## 2025-03-01 DIAGNOSIS — I50.23 ACUTE ON CHRONIC SYSTOLIC CONGESTIVE HEART FAILURE (HCC): Primary | ICD-10-CM

## 2025-03-01 DIAGNOSIS — E78.2 MIXED HYPERLIPIDEMIA: ICD-10-CM

## 2025-03-04 RX ORDER — ATORVASTATIN CALCIUM 40 MG/1
40 TABLET, FILM COATED ORAL DAILY
Qty: 90 TABLET | Refills: 3 | Status: SHIPPED | OUTPATIENT
Start: 2025-03-04

## 2025-03-04 RX ORDER — SACUBITRIL AND VALSARTAN 24; 26 MG/1; MG/1
1 TABLET, FILM COATED ORAL 2 TIMES DAILY
Qty: 180 TABLET | Refills: 3 | Status: SHIPPED | OUTPATIENT
Start: 2025-03-04

## 2025-03-28 ENCOUNTER — TELEPHONE (OUTPATIENT)
Dept: CARDIOLOGY CLINIC | Age: 75
End: 2025-03-28

## 2025-03-28 PROCEDURE — 93297 REM INTERROG DEV EVAL ICPMS: CPT | Performed by: NURSE PRACTITIONER

## 2025-03-28 NOTE — TELEPHONE ENCOUNTER
Current Weight- 243  Do you have any swelling in your LE? no  Any Shortness of breath? No  Pt taking sandro 50mg and torsemide 3 times weekly    NPRB please advise

## 2025-03-28 NOTE — TELEPHONE ENCOUNTER
Thank you for the updated info.   Recommend we adjust the torsemide to taking 4 times a week for  now and then repeat BMP as already ordered.

## 2025-03-28 NOTE — TELEPHONE ENCOUNTER
Please call and get update on her weights and symptoms.   Her device check shows her fluid level is up.

## 2025-04-02 ENCOUNTER — APPOINTMENT (OUTPATIENT)
Dept: GENERAL RADIOLOGY | Age: 75
End: 2025-04-02
Payer: MEDICARE

## 2025-04-02 ENCOUNTER — HOSPITAL ENCOUNTER (EMERGENCY)
Age: 75
Discharge: HOME | End: 2025-04-02
Attending: STUDENT IN AN ORGANIZED HEALTH CARE EDUCATION/TRAINING PROGRAM
Payer: MEDICARE

## 2025-04-02 VITALS
DIASTOLIC BLOOD PRESSURE: 74 MMHG | HEART RATE: 71 BPM | RESPIRATION RATE: 16 BRPM | TEMPERATURE: 98 F | HEIGHT: 67 IN | WEIGHT: 243 LBS | OXYGEN SATURATION: 96 % | SYSTOLIC BLOOD PRESSURE: 132 MMHG | BODY MASS INDEX: 38.14 KG/M2

## 2025-04-02 DIAGNOSIS — S92.351A CLOSED DISPLACED FRACTURE OF FIFTH METATARSAL BONE OF RIGHT FOOT, INITIAL ENCOUNTER: Primary | ICD-10-CM

## 2025-04-02 PROCEDURE — 73630 X-RAY EXAM OF FOOT: CPT

## 2025-04-02 PROCEDURE — 99283 EMERGENCY DEPT VISIT LOW MDM: CPT

## 2025-04-02 PROCEDURE — 73610 X-RAY EXAM OF ANKLE: CPT

## 2025-04-02 ASSESSMENT — PAIN - FUNCTIONAL ASSESSMENT: PAIN_FUNCTIONAL_ASSESSMENT: 0-10

## 2025-04-02 ASSESSMENT — PAIN SCALES - GENERAL: PAINLEVEL_OUTOF10: 9

## 2025-04-02 NOTE — ED PROVIDER NOTES
[facility] EMERGENCY DEPARTMENT     EMERGENCY DEPARTMENT ENCOUNTER         Pt Name: Mary Silverio   MRN: [id number]   Birthdate 1950   Date of evaluation: 4/2/2025   Provider: Esequiel Leary MD   PCP: Kaylene Goodman APRN - CNP   Note Started: 2:39 PM EDT 4/2/25       Chief Complaint     Ankle Pain (Right ankle pain after rolling it getting out of chair on Monday. Increased pain and swelling since.)      History of Present Illness     Mary Silverio is a 75 y.o. female who presents with right foot injury.  The patient suffered an inversion injury of the right ankle and foot 2 days ago.  Since that time she has had pain and swelling and some difficulty with ambulation she has been using crutches at home.  Given the persistent pain she presents for evaluation.  She did not suffer a fall or syncope no other traumatic injuries or acute complaints.      Review of Systems     Positives and pertinent negatives as per HPI.    Past Medical, Surgical, Family, and Social History     She has a past medical history of Arthritis, Atrial fibrillation ([facility]), CHF (congestive heart failure) ([facility]), Congenital heart disease, COPD (chronic obstructive pulmonary disease) (HCC), GERD (gastroesophageal reflux disease), Hypertension, Irritable bowel syndrome, Obesity, On home oxygen therapy, and Restless legs syndrome.  She has a past surgical history that includes pacemaker placement; Hysterectomy, vaginal; Stimulator Surgery; Cholecystectomy, open; hip surgery (Left, 05/04/2023); Pain management procedure (Left, 05/25/2023); Knee Arthrocentesis (Left, 07/13/2023); Colonoscopy; ablation of dysrhythmic focus; Wrist fracture surgery (Right, 11/06/2023); hip surgery (Bilateral, 02/08/2024); and Stimulator Surgery (N/A, 8/22/2024).  Her family history includes Heart Attack in her brother; No Known Problems in her father and mother.  She reports that she quit smoking about 5 years ago. Her smoking use included cigarettes.

## 2025-04-02 NOTE — DISCHARGE INSTRUCTIONS
You were evaluated in the emergency department for foot injury. Assessments and testing completed during your visit were reassuring and at this time there is no indication for further testing, treatment or admission to the hospital. Given this it is appropriate to discharge you from the emergency department. At the time of discharge we discussed the following:    You have broken the fifth bone of the right foot in a fashion that requires you to avoid placing any weight on the injury please keep the splint in place keep it dry use the crutches and follow-up to orthopedics in the next 5 to 7 days for expert guidance.  You may use Tylenol and ibuprofen for pain.    Please note that sometimes it is difficult to diagnose a medical condition early in the disease process before the disease is fully manifest. Because of this, should you develop any new or worsening symptoms, you may return at any time to the emergency department for another evaluation. If available you are also recommended to review this visit with your primary care physician or other medical provider in the next 7 days. Thank you for allowing us to care for you today.

## 2025-04-04 ENCOUNTER — TELEPHONE (OUTPATIENT)
Dept: ORTHOPEDIC SURGERY | Age: 75
End: 2025-04-04

## 2025-04-04 NOTE — TELEPHONE ENCOUNTER
LVM for patient to see if she could change her appointment to 4/7/25 at 8am at our [place] office. Advised to call back to confirm.

## 2025-04-07 ENCOUNTER — OFFICE VISIT (OUTPATIENT)
Dept: ORTHOPEDIC SURGERY | Age: 75
End: 2025-04-07
Payer: MEDICARE

## 2025-04-07 VITALS — BODY MASS INDEX: 38.14 KG/M2 | HEIGHT: 67 IN | WEIGHT: 243 LBS | HEART RATE: 82 BPM | OXYGEN SATURATION: 95 %

## 2025-04-07 DIAGNOSIS — M79.671 RIGHT FOOT PAIN: Primary | ICD-10-CM

## 2025-04-07 DIAGNOSIS — S92.351A CLOSED DISPLACED FRACTURE OF FIFTH METATARSAL BONE OF RIGHT FOOT, INITIAL ENCOUNTER: ICD-10-CM

## 2025-04-07 PROCEDURE — G8399 PT W/DXA RESULTS DOCUMENT: HCPCS | Performed by: ORTHOPAEDIC SURGERY

## 2025-04-07 PROCEDURE — L4361 PNEUMA/VAC WALK BOOT PRE OTS: HCPCS | Performed by: ORTHOPAEDIC SURGERY

## 2025-04-07 PROCEDURE — 1036F TOBACCO NON-USER: CPT | Performed by: ORTHOPAEDIC SURGERY

## 2025-04-07 PROCEDURE — G8417 CALC BMI ABV UP PARAM F/U: HCPCS | Performed by: ORTHOPAEDIC SURGERY

## 2025-04-07 PROCEDURE — 1090F PRES/ABSN URINE INCON ASSESS: CPT | Performed by: ORTHOPAEDIC SURGERY

## 2025-04-07 PROCEDURE — 1124F ACP DISCUSS-NO DSCNMKR DOCD: CPT | Performed by: ORTHOPAEDIC SURGERY

## 2025-04-07 PROCEDURE — 99204 OFFICE O/P NEW MOD 45 MIN: CPT | Performed by: ORTHOPAEDIC SURGERY

## 2025-04-07 PROCEDURE — G8428 CUR MEDS NOT DOCUMENT: HCPCS | Performed by: ORTHOPAEDIC SURGERY

## 2025-04-07 RX ORDER — LIDOCAINE 4 G/G
PATCH TOPICAL
Qty: 14 PATCH | Refills: 0 | Status: SHIPPED | OUTPATIENT
Start: 2025-04-07

## 2025-04-07 NOTE — PROGRESS NOTES
[facility] PHYSICIANS [place] SPECIALTY CARE [facility]  [address]  [facility] [address]  Dept: [phone]  Loc: [phone]    Ambulatory Orthopedic Consult      CHIEF COMPLAINT:    Chief Complaint   Patient presents with    Foot Pain     Right       HISTORY OF PRESENT ILLNESS:      The patient is a 75 y.o. female who is being seen for evaluation of the above, which began 3/31/2025 secondary to a twisting injury . At today's visit, she is using a splint and a rolling knee scooter.     History is obtained today from:   [x]  the patient     [x]  EMR     []  one family member/friend    []  multiple family members/friends    []  other:      At today's visit, the patient localizes pain to the right lateral midfoot .  The patient is here today with her .    REVIEW OF SYSTEMS:  Musculoskeletal: See HPI for pertinent positives     Past Medical History:    She  has a past medical history of Arthritis, Atrial fibrillation ([facility]), CHF (congestive heart failure) ([facility]), Congenital heart disease, COPD (chronic obstructive pulmonary disease) ([facility]), GERD (gastroesophageal reflux disease), Hypertension, Irritable bowel syndrome, Obesity, On home oxygen therapy, and Restless legs syndrome.     Past Surgical History:    She  has a past surgical history that includes pacemaker placement; Hysterectomy, vaginal; Stimulator Surgery; Cholecystectomy, open; hip surgery (Left, 05/04/2023); Pain management procedure (Left, 05/25/2023); Knee Arthrocentesis (Left, 07/13/2023); Colonoscopy; ablation of dysrhythmic focus; Wrist fracture surgery (Right, 11/06/2023); hip surgery (Bilateral, 02/08/2024); and Stimulator Surgery (N/A, 8/22/2024).     Current Medications:     Current Outpatient Medications:     oxyCODONE-acetaminophen (PERCOCET) 5-325 MG per tablet, Take 1 tablet by mouth 2 times daily as needed for Pain for up to 30 days. Max Daily Amount: 2 tablets, Disp: 60 tablet, Rfl: 0

## 2025-04-24 ENCOUNTER — OFFICE VISIT (OUTPATIENT)
Dept: FAMILY MEDICINE CLINIC | Age: 75
End: 2025-04-24
Payer: MEDICARE

## 2025-04-24 VITALS
DIASTOLIC BLOOD PRESSURE: 76 MMHG | WEIGHT: 243 LBS | HEIGHT: 67 IN | OXYGEN SATURATION: 97 % | BODY MASS INDEX: 38.14 KG/M2 | HEART RATE: 78 BPM | SYSTOLIC BLOOD PRESSURE: 128 MMHG

## 2025-04-24 DIAGNOSIS — E11.22 TYPE 2 DIABETES MELLITUS WITH STAGE 3 CHRONIC KIDNEY DISEASE, WITHOUT LONG-TERM CURRENT USE OF INSULIN, UNSPECIFIED WHETHER STAGE 3A OR 3B CKD (HCC): ICD-10-CM

## 2025-04-24 DIAGNOSIS — N18.30 TYPE 2 DIABETES MELLITUS WITH STAGE 3 CHRONIC KIDNEY DISEASE, WITHOUT LONG-TERM CURRENT USE OF INSULIN, UNSPECIFIED WHETHER STAGE 3A OR 3B CKD (HCC): ICD-10-CM

## 2025-04-24 DIAGNOSIS — G25.81 RESTLESS LEGS: Primary | ICD-10-CM

## 2025-04-24 DIAGNOSIS — I50.23 ACUTE ON CHRONIC SYSTOLIC HEART FAILURE (HCC): ICD-10-CM

## 2025-04-24 DIAGNOSIS — M25.512 LEFT SHOULDER PAIN, UNSPECIFIED CHRONICITY: ICD-10-CM

## 2025-04-24 DIAGNOSIS — N18.31 STAGE 3A CHRONIC KIDNEY DISEASE (HCC): ICD-10-CM

## 2025-04-24 DIAGNOSIS — K21.9 GASTROESOPHAGEAL REFLUX DISEASE WITHOUT ESOPHAGITIS: ICD-10-CM

## 2025-04-24 LAB
ALBUMIN SERPL-MCNC: 4.3 G/DL (ref 3.4–5)
ALBUMIN/GLOB SERPL: 1.8 {RATIO} (ref 1.1–2.2)
ALP SERPL-CCNC: 109 U/L (ref 40–129)
ALT SERPL-CCNC: 26 U/L (ref 10–40)
ANION GAP SERPL CALCULATED.3IONS-SCNC: 13 MMOL/L (ref 3–16)
AST SERPL-CCNC: 27 U/L (ref 15–37)
BILIRUB SERPL-MCNC: 0.5 MG/DL (ref 0–1)
BUN SERPL-MCNC: 26 MG/DL (ref 7–20)
CALCIUM SERPL-MCNC: 9.7 MG/DL (ref 8.3–10.6)
CHLORIDE SERPL-SCNC: 103 MMOL/L (ref 99–110)
CO2 SERPL-SCNC: 23 MMOL/L (ref 21–32)
CREAT SERPL-MCNC: 1.5 MG/DL (ref 0.6–1.2)
GFR SERPLBLD CREATININE-BSD FMLA CKD-EPI: 36 ML/MIN/{1.73_M2}
GLUCOSE SERPL-MCNC: 100 MG/DL (ref 70–99)
POTASSIUM SERPL-SCNC: 4.8 MMOL/L (ref 3.5–5.1)
PROT SERPL-MCNC: 6.7 G/DL (ref 6.4–8.2)
SODIUM SERPL-SCNC: 139 MMOL/L (ref 136–145)

## 2025-04-24 PROCEDURE — G8399 PT W/DXA RESULTS DOCUMENT: HCPCS | Performed by: NURSE PRACTITIONER

## 2025-04-24 PROCEDURE — 1036F TOBACCO NON-USER: CPT | Performed by: NURSE PRACTITIONER

## 2025-04-24 PROCEDURE — 1124F ACP DISCUSS-NO DSCNMKR DOCD: CPT | Performed by: NURSE PRACTITIONER

## 2025-04-24 PROCEDURE — 99213 OFFICE O/P EST LOW 20 MIN: CPT | Performed by: NURSE PRACTITIONER

## 2025-04-24 PROCEDURE — 3078F DIAST BP <80 MM HG: CPT | Performed by: NURSE PRACTITIONER

## 2025-04-24 PROCEDURE — 1160F RVW MEDS BY RX/DR IN RCRD: CPT | Performed by: NURSE PRACTITIONER

## 2025-04-24 PROCEDURE — 36415 COLL VENOUS BLD VENIPUNCTURE: CPT | Performed by: NURSE PRACTITIONER

## 2025-04-24 PROCEDURE — G8417 CALC BMI ABV UP PARAM F/U: HCPCS | Performed by: NURSE PRACTITIONER

## 2025-04-24 PROCEDURE — 3074F SYST BP LT 130 MM HG: CPT | Performed by: NURSE PRACTITIONER

## 2025-04-24 PROCEDURE — 1159F MED LIST DOCD IN RCRD: CPT | Performed by: NURSE PRACTITIONER

## 2025-04-24 PROCEDURE — G8427 DOCREV CUR MEDS BY ELIG CLIN: HCPCS | Performed by: NURSE PRACTITIONER

## 2025-04-24 PROCEDURE — 1090F PRES/ABSN URINE INCON ASSESS: CPT | Performed by: NURSE PRACTITIONER

## 2025-04-24 RX ORDER — PANTOPRAZOLE SODIUM 40 MG/1
40 TABLET, DELAYED RELEASE ORAL DAILY
Qty: 90 TABLET | Refills: 1 | Status: SHIPPED | OUTPATIENT
Start: 2025-04-24 | End: 2025-10-21

## 2025-04-24 RX ORDER — ROPINIROLE 1 MG/1
1 TABLET, FILM COATED ORAL 3 TIMES DAILY
Qty: 90 TABLET | Refills: 2 | Status: SHIPPED | OUTPATIENT
Start: 2025-04-24

## 2025-04-24 RX ORDER — ROPINIROLE 1 MG/1
1 TABLET, FILM COATED ORAL 3 TIMES DAILY
Qty: 90 TABLET | Refills: 11 | OUTPATIENT
Start: 2025-04-24

## 2025-04-24 ASSESSMENT — ENCOUNTER SYMPTOMS
BACK PAIN: 1
DIARRHEA: 0
NAUSEA: 0
COLOR CHANGE: 0
VOMITING: 0

## 2025-04-24 NOTE — ASSESSMENT & PLAN NOTE
Repeat CMP next step is referral to nephrology did not fu from request in October discussed medications based on labs

## 2025-04-24 NOTE — TELEPHONE ENCOUNTER
Refill Request     Last Seen: Last Seen Department: 4/24/2025  Last Seen by PCP: 4/24/2025    Last Written: 09/05/2024    Next Appointment:   Future Appointments   Date Time Provider Department Center   4/25/2025 10:30 AM Kulwinder Prabhakar MD [facility]   4/28/2025 10:00 AM Sawyer Syed MD [facility]   5/1/2025  8:50 AM Patricio Reaves MD [facility] [place]   5/20/2025  2:40 PM Patricio Reaves MD [facility] [place]   7/21/2025  1:00 PM SCHEDULE, LINDA DEVICE CHECK Linda Car [facility]   7/21/2025  1:00 PM Chloe Benito, APRN - CNP [facility]   8/14/2025  1:00 PM Marilynn Rodgers APRN - CNP [facility]   12/8/2025 10:30 AM [facility]   12/11/2025 10:30 AM Agapito Brandon MD [facility]       Requested Prescriptions     Pending Prescriptions Disp Refills    pantoprazole (PROTONIX) 40 MG tablet 90 tablet 1     Sig: Take 1 tablet by mouth daily

## 2025-04-24 NOTE — ASSESSMENT & PLAN NOTE
Followed by cardiology labs drawn in office today  No sob no cp no wheezing or edema   Chronic at goal

## 2025-04-24 NOTE — PROGRESS NOTES
PROGRESS NOTE  Date of Service:  4/24/2025    SUBJECTIVE:  Patient ID: Mary Silverio is a 75 y.o. female    HPI:   Has left shoulder pain for greater than 2 months no known injury pain is getting worse. No numbness or tingling in hand does have pain that radiates from shoulder to elbow.  Has not tried any treatment ice heat or OTC medications   Takes oxy for back this does not help shoulder pain .   Fx 5th metatarsal in boot on right foot followed by ortho for this      Patient's medications, allergies, past medical, surgical, social and family histories were reviewed and updated as appropriate.     Review of Systems   Constitutional:  Negative for chills and fever.   HENT:  Negative for congestion.    Gastrointestinal:  Negative for diarrhea, nausea and vomiting.   Musculoskeletal:  Positive for arthralgias, back pain and gait problem.   Skin:  Negative for color change.   Neurological:  Negative for light-headedness, numbness and headaches.       OBJECTIVE:  Vitals:    04/24/25 1019   BP: 128/76   BP Site: Right Upper Arm   Patient Position: Sitting   Pulse: 78   SpO2: 97%   Weight: 110.2 kg (243 lb)   Height: 1.702 m (5' 7\")      Body mass index is 38.06 kg/m².   Physical Exam  Constitutional:       Appearance: Normal appearance. She is obese.   Cardiovascular:      Rate and Rhythm: Normal rate and regular rhythm.      Pulses: Normal pulses.      Heart sounds: Normal heart sounds.   Pulmonary:      Effort: Pulmonary effort is normal.      Breath sounds: Normal breath sounds.   Musculoskeletal:         General: Tenderness present.      Left shoulder: Tenderness and bony tenderness present. Decreased range of motion. Normal strength. Normal pulse.   Skin:     General: Skin is warm and dry.   Neurological:      Mental Status: She is alert.         ASSESSMENT  1. Restless legs    2. Acute on chronic systolic heart failure (HCC)    3. Type 2 diabetes mellitus with stage 3 chronic kidney disease, without long-term

## 2025-04-25 ENCOUNTER — OFFICE VISIT (OUTPATIENT)
Dept: ORTHOPEDIC SURGERY | Age: 75
End: 2025-04-25

## 2025-04-25 ENCOUNTER — RESULTS FOLLOW-UP (OUTPATIENT)
Dept: FAMILY MEDICINE CLINIC | Age: 75
End: 2025-04-25

## 2025-04-25 VITALS — HEIGHT: 67 IN | BODY MASS INDEX: 38.14 KG/M2 | WEIGHT: 243 LBS

## 2025-04-25 DIAGNOSIS — M75.22 LEFT BICIPITAL TENOSYNOVITIS: Primary | ICD-10-CM

## 2025-04-25 DIAGNOSIS — E11.9 TYPE 2 DIABETES MELLITUS WITHOUT COMPLICATION, WITHOUT LONG-TERM CURRENT USE OF INSULIN (HCC): ICD-10-CM

## 2025-04-25 DIAGNOSIS — M25.512 LEFT SHOULDER PAIN, UNSPECIFIED CHRONICITY: ICD-10-CM

## 2025-04-25 DIAGNOSIS — M25.552 LEFT HIP PAIN: ICD-10-CM

## 2025-04-25 DIAGNOSIS — M75.52 SUBACROMIAL BURSITIS OF LEFT SHOULDER JOINT: ICD-10-CM

## 2025-04-25 DIAGNOSIS — M19.019 OSTEOARTHRITIS OF ACROMIOCLAVICULAR JOINT: ICD-10-CM

## 2025-04-25 DIAGNOSIS — N18.31 STAGE 3A CHRONIC KIDNEY DISEASE (HCC): Primary | ICD-10-CM

## 2025-04-25 RX ORDER — TRIAMCINOLONE ACETONIDE 40 MG/ML
40 INJECTION, SUSPENSION INTRA-ARTICULAR; INTRAMUSCULAR ONCE
Status: COMPLETED | OUTPATIENT
Start: 2025-04-25 | End: 2025-04-25

## 2025-04-25 RX ORDER — ROPIVACAINE HYDROCHLORIDE 5 MG/ML
2 INJECTION, SOLUTION EPIDURAL; INFILTRATION; PERINEURAL ONCE
Status: COMPLETED | OUTPATIENT
Start: 2025-04-25 | End: 2025-04-25

## 2025-04-25 RX ADMIN — TRIAMCINOLONE ACETONIDE 40 MG: 40 INJECTION, SUSPENSION INTRA-ARTICULAR; INTRAMUSCULAR at 10:55

## 2025-04-25 RX ADMIN — ROPIVACAINE HYDROCHLORIDE 2 ML: 5 INJECTION, SOLUTION EPIDURAL; INFILTRATION; PERINEURAL at 10:55

## 2025-04-25 NOTE — PROGRESS NOTES
wishes to proceed with conservative nonoperative treatment options at this time  - Patient is unable to take oral anti-inflammatory so recommended Voltaren topical gel with prescription placed and she may  whichever is financially feasible to include OTC and to be used as needed pain and OTC Tylenol per bottle as needed discomfort   - Formal physical therapy has been prescribed to work on [place] reconditioning and strengthening to include pain modalities  -I recommended activity modification to include low impact activity   - I did offer a corticosteroid injection of the left shoulder biceps tendon groove area.  Understand risks and benefits of this, the patient wishes to proceed  --The patient was educated to the risks (infection and skin blanching to name a few) and benefits of the injection and understanding these risks and benefits, the patient wished to proceed and a verbal consent was obtained.  If the patient verbalized the presence of diabetes or rheumatologic condition on chronic immunosuppresseants as a comorbidity upon direct questioning, an additional discussion was had detailing the potential increased risk of infection and potential increase in FSBG and to monitor FSBG and adjust medications as needed.  -After sterile prep of the left shoulder, a 2.5cc corticosteroid injection (2cc ropivicaine and 1cc kenolog 40) was administered into the left shoulder biceps tendon groove area.  The patient tolerated the procedure well and started to have immediate improvement in pain/symptoms.  -All questions answered to the patient's satisfaction and the patient expressed understanding and agreement with the above listed treatment plan  -Follow up in 2 weeks time for consideration of additional diagnostic and therapeutic corticosteroid injection pending overall symptomatic relief  -Thank you for the clinical consultation and allowing me to participate in the patient's care.      Electronically signed by

## 2025-04-28 ENCOUNTER — OFFICE VISIT (OUTPATIENT)
Dept: ORTHOPEDIC SURGERY | Age: 75
End: 2025-04-28
Payer: MEDICARE

## 2025-04-28 VITALS — HEIGHT: 67 IN | HEART RATE: 78 BPM | BODY MASS INDEX: 38.14 KG/M2 | OXYGEN SATURATION: 98 % | WEIGHT: 243 LBS

## 2025-04-28 DIAGNOSIS — M79.671 RIGHT FOOT PAIN: Primary | ICD-10-CM

## 2025-04-28 DIAGNOSIS — S92.351D CLOSED DISPLACED FRACTURE OF FIFTH METATARSAL BONE OF RIGHT FOOT WITH ROUTINE HEALING, SUBSEQUENT ENCOUNTER: ICD-10-CM

## 2025-04-28 PROCEDURE — 1124F ACP DISCUSS-NO DSCNMKR DOCD: CPT | Performed by: ORTHOPAEDIC SURGERY

## 2025-04-28 PROCEDURE — 1090F PRES/ABSN URINE INCON ASSESS: CPT | Performed by: ORTHOPAEDIC SURGERY

## 2025-04-28 PROCEDURE — G8417 CALC BMI ABV UP PARAM F/U: HCPCS | Performed by: ORTHOPAEDIC SURGERY

## 2025-04-28 PROCEDURE — 1036F TOBACCO NON-USER: CPT | Performed by: ORTHOPAEDIC SURGERY

## 2025-04-28 PROCEDURE — G8428 CUR MEDS NOT DOCUMENT: HCPCS | Performed by: ORTHOPAEDIC SURGERY

## 2025-04-28 PROCEDURE — 99212 OFFICE O/P EST SF 10 MIN: CPT | Performed by: ORTHOPAEDIC SURGERY

## 2025-04-28 PROCEDURE — G8399 PT W/DXA RESULTS DOCUMENT: HCPCS | Performed by: ORTHOPAEDIC SURGERY

## 2025-04-28 NOTE — PROGRESS NOTES
[facility] PHYSICIANS [place] SPECIALTY CARE [facility]  [address]  [facility] [address]  Dept: [phone]  Loc: [phone]    Ambulatory Orthopedic Consult      CHIEF COMPLAINT:    Chief Complaint   Patient presents with    Foot Pain     Right       HISTORY OF PRESENT ILLNESS:      The patient is a 75 y.o. female who is being seen for evaluation of the above, which began 3/31/2025 secondary to a twisting injury . At today's visit, she is using a splint and a rolling knee scooter.     History is obtained today from:   [x]  the patient     [x]  EMR     []  one family member/friend    []  multiple family members/friends    []  other:      At today's visit, the patient localizes pain to the right lateral midfoot .  The patient is here today with her .    INTERVAL HISTORY 4/28/2025:  She is seen again today in the office for follow up of a previous issue (as above). Since being seen last, the patient is doing better. At today's visit, she is using a CAM boot and a walker..     History is obtained today from:   [x]  the patient     []  EMR     []  one family member/friend    []  multiple family members/friends    []  other:      She is here today with her .    REVIEW OF SYSTEMS:  Musculoskeletal: See HPI for pertinent positives     Past Medical History:    She  has a past medical history of Arthritis, Atrial fibrillation (HCC), CHF (congestive heart failure) ([facility]), Congenital heart disease, COPD (chronic obstructive pulmonary disease) ([facility]), GERD (gastroesophageal reflux disease), Hypertension, Irritable bowel syndrome, Obesity, On home oxygen therapy, and Restless legs syndrome.     Past Surgical History:    She  has a past surgical history that includes pacemaker placement; Hysterectomy, vaginal; Stimulator Surgery; Cholecystectomy, open; hip surgery (Left, 05/04/2023); Pain management procedure (Left, 05/25/2023); Knee Arthrocentesis (Left, 07/13/2023);

## 2025-04-29 ENCOUNTER — OFFICE VISIT (OUTPATIENT)
Dept: ORTHOPEDIC SURGERY | Age: 75
End: 2025-04-29
Payer: MEDICARE

## 2025-04-29 VITALS — WEIGHT: 243 LBS | HEIGHT: 67 IN | BODY MASS INDEX: 38.14 KG/M2

## 2025-04-29 DIAGNOSIS — M70.61 TROCHANTERIC BURSITIS OF RIGHT HIP: Primary | ICD-10-CM

## 2025-04-29 DIAGNOSIS — M25.551 RIGHT HIP PAIN: ICD-10-CM

## 2025-04-29 PROCEDURE — 1036F TOBACCO NON-USER: CPT | Performed by: ORTHOPAEDIC SURGERY

## 2025-04-29 PROCEDURE — 1124F ACP DISCUSS-NO DSCNMKR DOCD: CPT | Performed by: ORTHOPAEDIC SURGERY

## 2025-04-29 PROCEDURE — G8399 PT W/DXA RESULTS DOCUMENT: HCPCS | Performed by: ORTHOPAEDIC SURGERY

## 2025-04-29 PROCEDURE — G8417 CALC BMI ABV UP PARAM F/U: HCPCS | Performed by: ORTHOPAEDIC SURGERY

## 2025-04-29 PROCEDURE — G8427 DOCREV CUR MEDS BY ELIG CLIN: HCPCS | Performed by: ORTHOPAEDIC SURGERY

## 2025-04-29 PROCEDURE — 99213 OFFICE O/P EST LOW 20 MIN: CPT | Performed by: ORTHOPAEDIC SURGERY

## 2025-04-29 PROCEDURE — 1090F PRES/ABSN URINE INCON ASSESS: CPT | Performed by: ORTHOPAEDIC SURGERY

## 2025-04-30 ENCOUNTER — HOSPITAL ENCOUNTER (OUTPATIENT)
Dept: PHYSICAL THERAPY | Age: 75
Setting detail: THERAPIES SERIES
Discharge: HOME | End: 2025-04-30
Attending: ORTHOPAEDIC SURGERY
Payer: MEDICARE

## 2025-04-30 DIAGNOSIS — R53.1 WEAKNESS: ICD-10-CM

## 2025-04-30 DIAGNOSIS — G89.29 CHRONIC LEFT SHOULDER PAIN: Primary | ICD-10-CM

## 2025-04-30 DIAGNOSIS — M25.60 JOINT STIFFNESS: ICD-10-CM

## 2025-04-30 DIAGNOSIS — M75.22 BICEPS TENDINITIS OF LEFT UPPER EXTREMITY: ICD-10-CM

## 2025-04-30 DIAGNOSIS — M25.512 CHRONIC LEFT SHOULDER PAIN: Primary | ICD-10-CM

## 2025-04-30 PROCEDURE — 97530 THERAPEUTIC ACTIVITIES: CPT

## 2025-04-30 PROCEDURE — 97110 THERAPEUTIC EXERCISES: CPT

## 2025-04-30 PROCEDURE — 97161 PT EVAL LOW COMPLEX 20 MIN: CPT

## 2025-04-30 NOTE — PLAN OF CARE
listed.  [] Progression has been slowed due to co-morbidities.  [x] Plan just implemented, too soon (<30days) to assess goals progression   [] Goals require adjustment due to lack of progress  [] Patient is not progressing as expected and requires additional follow up with physician  [] Other:     TREATMENT PLAN     Frequency/Duration: 1-2x/week for 8-10 weeks for the following treatment interventions:    Interventions:  Therapeutic Exercise (08794) including: strength training, ROM, and functional mobility  Therapeutic Activities (38627) including: functional mobility training and education.  Neuromuscular Re-education (71290) activation and proprioception, including postural re-education.    Manual Therapy (94173) as indicated to include: Passive Range of Motion, Gr I-IV mobilizations, Soft Tissue Mobilization, and Dry Needling/IASTM  Modalities as needed that may include: Cryotherapy and Electrical Stimulation  Patient education on joint protection, postural re-education, activity modification, and progression of HEP    Plan: POC initiated as per evaluation    Electronically Signed by Walter Macdonald, PT  Date: 04/30/2025     Note: Portions of this note have been templated and/or copied from initial evaluation, reassessments and prior notes for documentation efficiency.    Note: If patient does not return for scheduled/recommended follow up visits, this note will serve as a discharge from care along with the most recent update on progress.    Ortho Evaluation

## 2025-05-01 DIAGNOSIS — G25.81 RESTLESS LEGS: ICD-10-CM

## 2025-05-02 RX ORDER — ROPINIROLE 1 MG/1
TABLET, FILM COATED ORAL
Refills: 0 | OUTPATIENT
Start: 2025-05-02

## 2025-05-02 NOTE — PROGRESS NOTES
ORTHOPAEDIC SURGERY INITIAL EVALUATION NOTE  Chief Complaint   Patient presents with    Hip Pain     NP R HIP PAIN      HISTORY OF PRESENT ILLNESS:  75-year-old female presents for evaluation of a 2-month history of right hip pain.  She reports that this has been insidious in onset.  She has pain over the lateral hip.  This is worse with weightbearing and lying on that side.  She reports a dull aching pain.  She has numerous sites of pain and is currently undergoing PT for her shoulder.  She is also ambulating in a cam boot on the right side.  She has not had prior therapy or injections.  She is in pain management takes oxycodone 10 mg twice daily.    Past Medical History:   Diagnosis Date    Arthritis     BACK    Atrial fibrillation (HCC)     CHF (congestive heart failure) ([facility])     Congenital heart disease     COPD (chronic obstructive pulmonary disease) (HCC)     GERD (gastroesophageal reflux disease)     Hypertension     Irritable bowel syndrome     Obesity     On home oxygen therapy     O2 2L at night    Restless legs syndrome        Current Outpatient Medications   Medication Sig Dispense Refill    diclofenac sodium (VOLTAREN) 1 % GEL Apply 4 g topically 4 times daily 100 g 0    rOPINIRole (REQUIP) 1 MG tablet Take 1 tablet by mouth 3 times daily 90 tablet 2    pantoprazole (PROTONIX) 40 MG tablet Take 1 tablet by mouth daily 90 tablet 1    [START ON 6/2/2025] oxyCODONE-acetaminophen (PERCOCET) 5-325 MG per tablet Take 1 tablet by mouth 2 times daily as needed for Pain for up to 30 days. Max Daily Amount: 2 tablets 60 tablet 0    [START ON 5/4/2025] oxyCODONE-acetaminophen (PERCOCET) 5-325 MG per tablet Take 1 tablet by mouth 2 times daily as needed for Pain for up to 30 days. Max Daily Amount: 2 tablets 60 tablet 0    lidocaine 4 % external patch Apply to affected area; leave in place no longer than 12 hrs then remove the patch; use no longer than 12 hrs/day total 14 patch 0    ENTRESTO 24-26 MG per

## 2025-05-07 ENCOUNTER — HOSPITAL ENCOUNTER (OUTPATIENT)
Dept: PHYSICAL THERAPY | Age: 75
Setting detail: THERAPIES SERIES
Discharge: HOME | End: 2025-05-07
Attending: ORTHOPAEDIC SURGERY
Payer: MEDICARE

## 2025-05-07 PROCEDURE — 97140 MANUAL THERAPY 1/> REGIONS: CPT

## 2025-05-07 PROCEDURE — 97110 THERAPEUTIC EXERCISES: CPT

## 2025-05-07 NOTE — FLOWSHEET NOTE
[facility] - Outpatient Rehabilitation and Therapy: [address], [place] [address] office: [phone] fax: [phone]      Physical Therapy: TREATMENT/PROGRESS NOTE   Patient: Mary Silverio (75 y.o. female)   Examination Date: 2025   :  1950 MRN: [id number]   Visit #: 2   Insurance Allowable Auth Needed    []Yes    []No    Insurance: Payor: MEDICARE / Plan: MEDICARE PART A AND B / Product Type: *No Product type* /   Insurance ID: [id number] - (Medicare)  Secondary Insurance (if applicable):    Treatment Diagnosis:     ICD-10-CM    1. Chronic left shoulder pain  M25.512     G89.29       2. Biceps tendinitis of left upper extremity  M75.22       3. Weakness  R53.1       4. Joint stiffness  M25.60          Medical Diagnosis:  Left bicipital tenosynovitis [M75.22]  Osteoarthritis of acromioclavicular joint [M19.019]  Subacromial bursitis of left shoulder joint [M75.52]  Left shoulder pain, unspecified chronicity [M25.512]   Referring Physician: Kulwinder Prabhakar MD  PCP: Kaylene Goodman APRN - CNP     Plan of care signed (Y/N): YES    Date of Patient follow up with Physician:      Plan of Care Report: NO  POC update due: (10 visits /OR AUTH LIMITS, whichever is less)  2025                                             Medical History:  Comorbidities:  Diabetes (Type I or II)  Hypertension  Pacemaker  Osteoporosis/Osteopenia  Relevant Medical History: gall bladder sx, hysterectomy, chronic opoid use with oxycodone 10mg 2x/day                                         Precautions/ Contra-indications:           Latex allergy:  NO  Pacemaker:    YES, 2019 on L side   Contraindications for Manipulation: None  Date of Surgery: na  Other:  Per MD Syed,   She has a right fifth metatarsal distal shaft fracture, sustained on 3/31/2025.   At her visit on 2025, we reviewed and discussed her x-rays.  She appears to be healing appropriately with nonsurgical

## 2025-05-09 ENCOUNTER — HOSPITAL ENCOUNTER (OUTPATIENT)
Dept: PHYSICAL THERAPY | Age: 75
Setting detail: THERAPIES SERIES
Discharge: HOME | End: 2025-05-09
Attending: ORTHOPAEDIC SURGERY
Payer: MEDICARE

## 2025-05-11 ENCOUNTER — APPOINTMENT (OUTPATIENT)
Dept: CT IMAGING | Age: 75
DRG: 392 | End: 2025-05-11
Payer: MEDICARE

## 2025-05-11 ENCOUNTER — HOSPITAL ENCOUNTER (INPATIENT)
Age: 75
LOS: 3 days | Discharge: HOME | DRG: 392 | End: 2025-05-14
Attending: STUDENT IN AN ORGANIZED HEALTH CARE EDUCATION/TRAINING PROGRAM | Admitting: INTERNAL MEDICINE
Payer: MEDICARE

## 2025-05-11 DIAGNOSIS — K57.32 DIVERTICULITIS OF COLON: Primary | ICD-10-CM

## 2025-05-11 DIAGNOSIS — E87.5 HYPERKALEMIA: ICD-10-CM

## 2025-05-11 PROBLEM — I50.42 CHRONIC COMBINED SYSTOLIC AND DIASTOLIC CHF (CONGESTIVE HEART FAILURE) (HCC): Status: ACTIVE | Noted: 2025-05-11

## 2025-05-11 PROBLEM — K57.92 ACUTE DIVERTICULITIS: Status: ACTIVE | Noted: 2025-05-11

## 2025-05-11 LAB
ALBUMIN SERPL-MCNC: 4.1 G/DL (ref 3.4–5)
ALBUMIN/GLOB SERPL: 1.2 {RATIO} (ref 1.1–2.2)
ALP SERPL-CCNC: 107 U/L (ref 40–129)
ALT SERPL-CCNC: 19 U/L (ref 10–40)
ANION GAP SERPL CALCULATED.3IONS-SCNC: 13 MMOL/L (ref 3–16)
AST SERPL-CCNC: 22 U/L (ref 15–37)
BACTERIA URNS QL MICRO: ABNORMAL /HPF
BASOPHILS # BLD: 0.1 K/UL (ref 0–0.2)
BASOPHILS NFR BLD: 0.4 %
BILIRUB SERPL-MCNC: 0.7 MG/DL (ref 0–1)
BILIRUB UR QL STRIP.AUTO: NEGATIVE
BUN SERPL-MCNC: 28 MG/DL (ref 7–20)
CALCIUM SERPL-MCNC: 9.5 MG/DL (ref 8.3–10.6)
CHLORIDE SERPL-SCNC: 103 MMOL/L (ref 99–110)
CLARITY UR: CLEAR
CO2 SERPL-SCNC: 21 MMOL/L (ref 21–32)
COLOR UR: YELLOW
CREAT SERPL-MCNC: 1.6 MG/DL (ref 0.6–1.2)
DEPRECATED RDW RBC AUTO: 15.7 % (ref 12.4–15.4)
EKG ATRIAL RATE: 69 BPM
EKG DIAGNOSIS: NORMAL
EKG P AXIS: 67 DEGREES
EKG P-R INTERVAL: 194 MS
EKG Q-T INTERVAL: 420 MS
EKG QRS DURATION: 128 MS
EKG QTC CALCULATION (BAZETT): 450 MS
EKG R AXIS: -49 DEGREES
EKG T AXIS: 127 DEGREES
EKG VENTRICULAR RATE: 69 BPM
EOSINOPHIL # BLD: 0.1 K/UL (ref 0–0.6)
EOSINOPHIL NFR BLD: 0.3 %
EPI CELLS #/AREA URNS HPF: ABNORMAL /HPF (ref 0–5)
GFR SERPLBLD CREATININE-BSD FMLA CKD-EPI: 33 ML/MIN/{1.73_M2}
GLUCOSE BLD-MCNC: 102 MG/DL (ref 70–99)
GLUCOSE BLD-MCNC: 99 MG/DL (ref 70–99)
GLUCOSE SERPL-MCNC: 90 MG/DL (ref 70–99)
GLUCOSE UR STRIP.AUTO-MCNC: 250 MG/DL
HCT VFR BLD AUTO: 38.5 % (ref 36–48)
HGB BLD-MCNC: 12.5 G/DL (ref 12–16)
HGB UR QL STRIP.AUTO: ABNORMAL
KETONES UR STRIP.AUTO-MCNC: NEGATIVE MG/DL
LEUKOCYTE ESTERASE UR QL STRIP.AUTO: ABNORMAL
LIPASE SERPL-CCNC: 28 U/L (ref 13–60)
LYMPHOCYTES # BLD: 2.3 K/UL (ref 1–5.1)
LYMPHOCYTES NFR BLD: 12.9 %
MCH RBC QN AUTO: 29.8 PG (ref 26–34)
MCHC RBC AUTO-ENTMCNC: 32.6 G/DL (ref 31–36)
MCV RBC AUTO: 91.3 FL (ref 80–100)
MONOCYTES # BLD: 1.5 K/UL (ref 0–1.3)
MONOCYTES NFR BLD: 8.5 %
NEUTROPHILS # BLD: 13.9 K/UL (ref 1.7–7.7)
NEUTROPHILS NFR BLD: 77.9 %
NITRITE UR QL STRIP.AUTO: NEGATIVE
PERFORMED ON: ABNORMAL
PERFORMED ON: NORMAL
PH UR STRIP.AUTO: 5.5 [PH] (ref 5–8)
PLATELET # BLD AUTO: 310 K/UL (ref 135–450)
PMV BLD AUTO: 6.9 FL (ref 5–10.5)
POTASSIUM SERPL-SCNC: 5.3 MMOL/L (ref 3.5–5.1)
POTASSIUM SERPL-SCNC: 6.1 MMOL/L (ref 3.5–5.1)
POTASSIUM SERPL-SCNC: 6.3 MMOL/L (ref 3.5–5.1)
PROT SERPL-MCNC: 7.4 G/DL (ref 6.4–8.2)
PROT UR STRIP.AUTO-MCNC: 30 MG/DL
RBC # BLD AUTO: 4.22 M/UL (ref 4–5.2)
RBC #/AREA URNS HPF: ABNORMAL /HPF (ref 0–4)
SODIUM SERPL-SCNC: 137 MMOL/L (ref 136–145)
SP GR UR STRIP.AUTO: 1.01 (ref 1–1.03)
UA COMPLETE W REFLEX CULTURE PNL UR: ABNORMAL
UA DIPSTICK W REFLEX MICRO PNL UR: YES
URN SPEC COLLECT METH UR: ABNORMAL
UROBILINOGEN UR STRIP-ACNC: 1 E.U./DL
WBC # BLD AUTO: 17.8 K/UL (ref 4–11)
WBC #/AREA URNS HPF: ABNORMAL /HPF (ref 0–5)

## 2025-05-11 PROCEDURE — 83540 ASSAY OF IRON: CPT

## 2025-05-11 PROCEDURE — 99285 EMERGENCY DEPT VISIT HI MDM: CPT

## 2025-05-11 PROCEDURE — 6370000000 HC RX 637 (ALT 250 FOR IP): Performed by: INTERNAL MEDICINE

## 2025-05-11 PROCEDURE — 2580000003 HC RX 258: Performed by: INTERNAL MEDICINE

## 2025-05-11 PROCEDURE — 82728 ASSAY OF FERRITIN: CPT

## 2025-05-11 PROCEDURE — 84132 ASSAY OF SERUM POTASSIUM: CPT

## 2025-05-11 PROCEDURE — 6360000002 HC RX W HCPCS: Performed by: INTERNAL MEDICINE

## 2025-05-11 PROCEDURE — 6360000002 HC RX W HCPCS

## 2025-05-11 PROCEDURE — 93010 ELECTROCARDIOGRAM REPORT: CPT | Performed by: INTERNAL MEDICINE

## 2025-05-11 PROCEDURE — 80053 COMPREHEN METABOLIC PANEL: CPT

## 2025-05-11 PROCEDURE — 2500000003 HC RX 250 WO HCPCS

## 2025-05-11 PROCEDURE — 6370000000 HC RX 637 (ALT 250 FOR IP): Performed by: STUDENT IN AN ORGANIZED HEALTH CARE EDUCATION/TRAINING PROGRAM

## 2025-05-11 PROCEDURE — 96375 TX/PRO/DX INJ NEW DRUG ADDON: CPT

## 2025-05-11 PROCEDURE — 1200000000 HC SEMI PRIVATE

## 2025-05-11 PROCEDURE — 94640 AIRWAY INHALATION TREATMENT: CPT

## 2025-05-11 PROCEDURE — 74176 CT ABD & PELVIS W/O CONTRAST: CPT

## 2025-05-11 PROCEDURE — 2580000003 HC RX 258

## 2025-05-11 PROCEDURE — 83690 ASSAY OF LIPASE: CPT

## 2025-05-11 PROCEDURE — 83550 IRON BINDING TEST: CPT

## 2025-05-11 PROCEDURE — 96365 THER/PROPH/DIAG IV INF INIT: CPT

## 2025-05-11 PROCEDURE — 81001 URINALYSIS AUTO W/SCOPE: CPT

## 2025-05-11 PROCEDURE — 36415 COLL VENOUS BLD VENIPUNCTURE: CPT

## 2025-05-11 PROCEDURE — 93005 ELECTROCARDIOGRAM TRACING: CPT

## 2025-05-11 PROCEDURE — 6370000000 HC RX 637 (ALT 250 FOR IP)

## 2025-05-11 PROCEDURE — 85025 COMPLETE CBC W/AUTO DIFF WBC: CPT

## 2025-05-11 RX ORDER — DEXTROSE MONOHYDRATE 100 MG/ML
INJECTION, SOLUTION INTRAVENOUS CONTINUOUS PRN
Status: DISCONTINUED | OUTPATIENT
Start: 2025-05-11 | End: 2025-05-14 | Stop reason: HOSPADM

## 2025-05-11 RX ORDER — ATORVASTATIN CALCIUM 40 MG/1
40 TABLET, FILM COATED ORAL DAILY
Status: DISCONTINUED | OUTPATIENT
Start: 2025-05-11 | End: 2025-05-14 | Stop reason: HOSPADM

## 2025-05-11 RX ORDER — ACETAMINOPHEN 325 MG/1
650 TABLET ORAL EVERY 6 HOURS PRN
Status: DISCONTINUED | OUTPATIENT
Start: 2025-05-11 | End: 2025-05-14 | Stop reason: HOSPADM

## 2025-05-11 RX ORDER — METRONIDAZOLE 250 MG/1
500 TABLET ORAL ONCE
Status: COMPLETED | OUTPATIENT
Start: 2025-05-11 | End: 2025-05-11

## 2025-05-11 RX ORDER — MORPHINE SULFATE 2 MG/ML
2 INJECTION, SOLUTION INTRAMUSCULAR; INTRAVENOUS EVERY 4 HOURS PRN
Refills: 0 | Status: DISCONTINUED | OUTPATIENT
Start: 2025-05-11 | End: 2025-05-14 | Stop reason: HOSPADM

## 2025-05-11 RX ORDER — CALCIUM GLUCONATE 20 MG/ML
1000 INJECTION, SOLUTION INTRAVENOUS ONCE
Status: COMPLETED | OUTPATIENT
Start: 2025-05-11 | End: 2025-05-11

## 2025-05-11 RX ORDER — OXYCODONE AND ACETAMINOPHEN 5; 325 MG/1; MG/1
1 TABLET ORAL 2 TIMES DAILY PRN
Status: DISCONTINUED | OUTPATIENT
Start: 2025-05-11 | End: 2025-05-11

## 2025-05-11 RX ORDER — BUDESONIDE AND FORMOTEROL FUMARATE DIHYDRATE 160; 4.5 UG/1; UG/1
2 AEROSOL RESPIRATORY (INHALATION)
Status: DISCONTINUED | OUTPATIENT
Start: 2025-05-11 | End: 2025-05-14 | Stop reason: HOSPADM

## 2025-05-11 RX ORDER — OXYCODONE AND ACETAMINOPHEN 5; 325 MG/1; MG/1
1 TABLET ORAL EVERY 6 HOURS PRN
Refills: 0 | Status: DISCONTINUED | OUTPATIENT
Start: 2025-05-11 | End: 2025-05-14 | Stop reason: HOSPADM

## 2025-05-11 RX ORDER — INDOMETHACIN 25 MG/1
50 CAPSULE ORAL ONCE
Status: COMPLETED | OUTPATIENT
Start: 2025-05-11 | End: 2025-05-11

## 2025-05-11 RX ORDER — SODIUM CHLORIDE 9 MG/ML
INJECTION, SOLUTION INTRAVENOUS PRN
Status: DISCONTINUED | OUTPATIENT
Start: 2025-05-11 | End: 2025-05-13 | Stop reason: SDUPTHER

## 2025-05-11 RX ORDER — ACETAMINOPHEN 650 MG/1
650 SUPPOSITORY RECTAL EVERY 6 HOURS PRN
Status: DISCONTINUED | OUTPATIENT
Start: 2025-05-11 | End: 2025-05-14 | Stop reason: HOSPADM

## 2025-05-11 RX ORDER — CIPROFLOXACIN 500 MG/1
500 TABLET, FILM COATED ORAL EVERY 12 HOURS SCHEDULED
Status: DISCONTINUED | OUTPATIENT
Start: 2025-05-11 | End: 2025-05-12

## 2025-05-11 RX ORDER — METRONIDAZOLE 250 MG/1
250 TABLET ORAL 3 TIMES DAILY
Qty: 30 TABLET | Refills: 0 | Status: SHIPPED | OUTPATIENT
Start: 2025-05-11 | End: 2025-05-21

## 2025-05-11 RX ORDER — METRONIDAZOLE 250 MG/1
500 TABLET ORAL EVERY 8 HOURS SCHEDULED
Status: DISCONTINUED | OUTPATIENT
Start: 2025-05-11 | End: 2025-05-12

## 2025-05-11 RX ORDER — CIPROFLOXACIN 2 MG/ML
400 INJECTION, SOLUTION INTRAVENOUS EVERY 12 HOURS
Status: DISCONTINUED | OUTPATIENT
Start: 2025-05-11 | End: 2025-05-12

## 2025-05-11 RX ORDER — ROPINIROLE 0.5 MG/1
1 TABLET, FILM COATED ORAL 3 TIMES DAILY
Status: DISCONTINUED | OUTPATIENT
Start: 2025-05-11 | End: 2025-05-14 | Stop reason: HOSPADM

## 2025-05-11 RX ORDER — FENTANYL CITRATE 50 UG/ML
25 INJECTION, SOLUTION INTRAMUSCULAR; INTRAVENOUS ONCE
Refills: 0 | Status: DISCONTINUED | OUTPATIENT
Start: 2025-05-11 | End: 2025-05-11

## 2025-05-11 RX ORDER — GABAPENTIN 300 MG/1
300 CAPSULE ORAL 2 TIMES DAILY
Status: DISCONTINUED | OUTPATIENT
Start: 2025-05-11 | End: 2025-05-14 | Stop reason: HOSPADM

## 2025-05-11 RX ORDER — CARVEDILOL 6.25 MG/1
12.5 TABLET ORAL 2 TIMES DAILY
Status: DISCONTINUED | OUTPATIENT
Start: 2025-05-11 | End: 2025-05-14 | Stop reason: HOSPADM

## 2025-05-11 RX ORDER — CIPROFLOXACIN 500 MG/1
500 TABLET, FILM COATED ORAL 2 TIMES DAILY
Qty: 20 TABLET | Refills: 0 | Status: SHIPPED | OUTPATIENT
Start: 2025-05-11 | End: 2025-05-21

## 2025-05-11 RX ORDER — PANTOPRAZOLE SODIUM 40 MG/1
40 TABLET, DELAYED RELEASE ORAL DAILY
Status: DISCONTINUED | OUTPATIENT
Start: 2025-05-11 | End: 2025-05-14 | Stop reason: HOSPADM

## 2025-05-11 RX ORDER — SODIUM CHLORIDE 0.9 % (FLUSH) 0.9 %
5-40 SYRINGE (ML) INJECTION EVERY 12 HOURS SCHEDULED
Status: DISCONTINUED | OUTPATIENT
Start: 2025-05-11 | End: 2025-05-13 | Stop reason: SDUPTHER

## 2025-05-11 RX ORDER — ONDANSETRON 4 MG/1
4 TABLET, ORALLY DISINTEGRATING ORAL EVERY 8 HOURS PRN
Status: DISCONTINUED | OUTPATIENT
Start: 2025-05-11 | End: 2025-05-14 | Stop reason: HOSPADM

## 2025-05-11 RX ORDER — VITAMIN B COMPLEX
5000 TABLET ORAL DAILY
Status: DISCONTINUED | OUTPATIENT
Start: 2025-05-11 | End: 2025-05-14 | Stop reason: HOSPADM

## 2025-05-11 RX ORDER — GLUCAGON 1 MG/ML
1 KIT INJECTION PRN
Status: DISCONTINUED | OUTPATIENT
Start: 2025-05-11 | End: 2025-05-14 | Stop reason: HOSPADM

## 2025-05-11 RX ORDER — ONDANSETRON 2 MG/ML
4 INJECTION INTRAMUSCULAR; INTRAVENOUS EVERY 6 HOURS PRN
Status: DISCONTINUED | OUTPATIENT
Start: 2025-05-11 | End: 2025-05-14 | Stop reason: HOSPADM

## 2025-05-11 RX ORDER — CIPROFLOXACIN 500 MG/1
500 TABLET, FILM COATED ORAL ONCE
Status: COMPLETED | OUTPATIENT
Start: 2025-05-11 | End: 2025-05-11

## 2025-05-11 RX ORDER — OXYCODONE HYDROCHLORIDE 5 MG/1
5 TABLET ORAL ONCE
Refills: 0 | Status: COMPLETED | OUTPATIENT
Start: 2025-05-11 | End: 2025-05-11

## 2025-05-11 RX ORDER — HYDROXYZINE HYDROCHLORIDE 25 MG/1
25 TABLET, FILM COATED ORAL NIGHTLY
Status: DISCONTINUED | OUTPATIENT
Start: 2025-05-11 | End: 2025-05-14 | Stop reason: HOSPADM

## 2025-05-11 RX ORDER — SODIUM CHLORIDE 0.9 % (FLUSH) 0.9 %
5-40 SYRINGE (ML) INJECTION PRN
Status: DISCONTINUED | OUTPATIENT
Start: 2025-05-11 | End: 2025-05-13 | Stop reason: SDUPTHER

## 2025-05-11 RX ORDER — METRONIDAZOLE 500 MG/100ML
500 INJECTION, SOLUTION INTRAVENOUS EVERY 8 HOURS
Status: DISCONTINUED | OUTPATIENT
Start: 2025-05-11 | End: 2025-05-12

## 2025-05-11 RX ORDER — AMIODARONE HYDROCHLORIDE 200 MG/1
100 TABLET ORAL DAILY
Status: DISCONTINUED | OUTPATIENT
Start: 2025-05-11 | End: 2025-05-14 | Stop reason: HOSPADM

## 2025-05-11 RX ORDER — POLYETHYLENE GLYCOL 3350 17 G/17G
17 POWDER, FOR SOLUTION ORAL DAILY PRN
Status: DISCONTINUED | OUTPATIENT
Start: 2025-05-11 | End: 2025-05-12

## 2025-05-11 RX ORDER — LIDOCAINE 4 G/G
1 PATCH TOPICAL DAILY
Status: DISCONTINUED | OUTPATIENT
Start: 2025-05-11 | End: 2025-05-14 | Stop reason: HOSPADM

## 2025-05-11 RX ORDER — ONDANSETRON 2 MG/ML
4 INJECTION INTRAMUSCULAR; INTRAVENOUS ONCE
Status: COMPLETED | OUTPATIENT
Start: 2025-05-11 | End: 2025-05-11

## 2025-05-11 RX ADMIN — SODIUM BICARBONATE 50 MEQ: 84 INJECTION INTRAVENOUS at 12:08

## 2025-05-11 RX ADMIN — HYDROXYZINE HYDROCHLORIDE 25 MG: 25 TABLET, FILM COATED ORAL at 20:41

## 2025-05-11 RX ADMIN — GABAPENTIN 300 MG: 300 CAPSULE ORAL at 16:39

## 2025-05-11 RX ADMIN — CALCIUM GLUCONATE 1000 MG: 20 INJECTION, SOLUTION INTRAVENOUS at 12:07

## 2025-05-11 RX ADMIN — ONDANSETRON 4 MG: 2 INJECTION, SOLUTION INTRAMUSCULAR; INTRAVENOUS at 10:37

## 2025-05-11 RX ADMIN — METRONIDAZOLE 500 MG: 500 INJECTION, SOLUTION INTRAVENOUS at 20:28

## 2025-05-11 RX ADMIN — ROPINIROLE HYDROCHLORIDE 1 MG: 0.5 TABLET, FILM COATED ORAL at 20:41

## 2025-05-11 RX ADMIN — ROPINIROLE HYDROCHLORIDE 1 MG: 0.5 TABLET, FILM COATED ORAL at 14:39

## 2025-05-11 RX ADMIN — METRONIDAZOLE 500 MG: 250 TABLET ORAL at 10:42

## 2025-05-11 RX ADMIN — AMIODARONE HYDROCHLORIDE 100 MG: 200 TABLET ORAL at 14:39

## 2025-05-11 RX ADMIN — APIXABAN 5 MG: 5 TABLET, FILM COATED ORAL at 20:41

## 2025-05-11 RX ADMIN — Medication 5000 UNITS: at 14:39

## 2025-05-11 RX ADMIN — Medication 2 PUFF: at 20:09

## 2025-05-11 RX ADMIN — OXYCODONE AND ACETAMINOPHEN 1 TABLET: 5; 325 TABLET ORAL at 16:39

## 2025-05-11 RX ADMIN — INSULIN HUMAN 10 UNITS: 100 INJECTION, SOLUTION PARENTERAL at 12:13

## 2025-05-11 RX ADMIN — CIPROFLOXACIN 400 MG: 400 INJECTION, SOLUTION INTRAVENOUS at 22:38

## 2025-05-11 RX ADMIN — SODIUM ZIRCONIUM CYCLOSILICATE 10 G: 10 POWDER, FOR SUSPENSION ORAL at 12:12

## 2025-05-11 RX ADMIN — EMPAGLIFLOZIN 10 MG: 10 TABLET, FILM COATED ORAL at 14:39

## 2025-05-11 RX ADMIN — ATORVASTATIN CALCIUM 40 MG: 40 TABLET, FILM COATED ORAL at 20:41

## 2025-05-11 RX ADMIN — CIPROFLOXACIN HYDROCHLORIDE 500 MG: 500 TABLET, FILM COATED ORAL at 10:42

## 2025-05-11 RX ADMIN — PANTOPRAZOLE SODIUM 40 MG: 40 TABLET, DELAYED RELEASE ORAL at 14:40

## 2025-05-11 RX ADMIN — SODIUM CHLORIDE: 0.9 INJECTION, SOLUTION INTRAVENOUS at 20:28

## 2025-05-11 RX ADMIN — OXYCODONE HYDROCHLORIDE 5 MG: 5 TABLET ORAL at 10:42

## 2025-05-11 RX ADMIN — DEXTROSE 250 ML: 10 SOLUTION INTRAVENOUS at 12:08

## 2025-05-11 RX ADMIN — CARVEDILOL 12.5 MG: 6.25 TABLET, FILM COATED ORAL at 20:41

## 2025-05-11 ASSESSMENT — PAIN DESCRIPTION - ORIENTATION
ORIENTATION: MID
ORIENTATION: RIGHT;LEFT;MID
ORIENTATION: LEFT;RIGHT
ORIENTATION: RIGHT;LEFT

## 2025-05-11 ASSESSMENT — PAIN SCALES - GENERAL
PAINLEVEL_OUTOF10: 9
PAINLEVEL_OUTOF10: 8
PAINLEVEL_OUTOF10: 9
PAINLEVEL_OUTOF10: 5

## 2025-05-11 ASSESSMENT — PAIN DESCRIPTION - PAIN TYPE: TYPE: ACUTE PAIN

## 2025-05-11 ASSESSMENT — PAIN DESCRIPTION - LOCATION
LOCATION: ABDOMEN
LOCATION: ABDOMEN;BACK

## 2025-05-11 ASSESSMENT — PAIN DESCRIPTION - DESCRIPTORS
DESCRIPTORS: ACHING
DESCRIPTORS: ACHING

## 2025-05-11 ASSESSMENT — PAIN - FUNCTIONAL ASSESSMENT: PAIN_FUNCTIONAL_ASSESSMENT: 0-10

## 2025-05-11 NOTE — H&P
V2.0  Internal Medicine History and Physical      Name:  Mary Silverio /Age/Sex: 1950  (75 y.o. female)   MRN & CSN:  [id number] & [id number] Encounter Date/Time: 2025 1:01 PM EDT   Location:  [facility]- PCP: Kaylene Goodman APRN - CNP       Hospital Day: 1    Assessment and Plan:   Mary Silverio is a 75 y.o. female with a pmh of CHF, AICD,  who presents with Acute diverticulitis.        Hospital Problems           Last Modified POA    * (Principal) Acute diverticulitis 2025 Yes    Essential hypertension 2025 Yes    Overview Signed 2022  7:56 AM by Jewel Kong APRN - CNP   controled         COPD (chronic obstructive pulmonary disease) ([facility]) 2025 Yes    PAF (paroxysmal atrial fibrillation) ([facility]) 2025 Yes    ICD (implantable cardioverter-defibrillator), dual, in situ 2025 Yes    Opioid dependence with current use ([facility]) 2025 Yes    Chronic renal disease, stage III ([facility]) [318472] 2025 Yes    Type 2 diabetes mellitus with chronic kidney disease 2025 Yes    Restless legs 2025 Yes    Chronic combined systolic and diastolic CHF (congestive heart failure) ([facility]) 2025 Yes     PHYSICIAN CERTIFICATION    I certify that Mary Silverio is expected to be hospitalized for >2 midnights based on the following assessment and plan:    Plan:  Acute sigmoid diverticulitis - cipro and flagyl IV - clear liquid diet - consult GI -   Hyperkalemia - continuous telemetry 0   CKD3 =- likely at baseline  CHF - chronic combined systolic-diastolic w/ reduced EF 25% by Echo dated .  Likely due to Hypertensive heart disease.  Patient is euvolemic w/ no evidence of acute decompensation.  Continue current medical mgt.  Consider GDMT at discharge unless contraindicated.  Currently on Beta Blocker - Toprol XL, ACEi/ARB/ARNI - Entresto, and SGLT2i - Jardiance.     DM2 with CKD3 -   Atrial Fibrillation - chronic paroxysmal, of unspecified and clinically unable to    diclofenac sodium (VOLTAREN) 1 % GEL Apply 4 g topically 4 times daily 4/25/25   Kulwinder Prabhakar MD   pantoprazole (PROTONIX) 40 MG tablet Take 1 tablet by mouth daily 4/24/25 10/21/25  Kaylene Goodman APRN - CNP   oxyCODONE-acetaminophen (PERCOCET) 5-325 MG per tablet Take 1 tablet by mouth 2 times daily as needed for Pain for up to 30 days. Max Daily Amount: 2 tablets 5/4/25 6/3/25  Patricio Reaves MD   lidocaine 4 % external patch Apply to affected area; leave in place no longer than 12 hrs then remove the patch; use no longer than 12 hrs/day total 4/7/25   Sawyer Syed MD   torsemide (DEMADEX) 20 MG tablet Take 1 tablet by mouth 3-4 days per week 1/29/25   Marilynn Rodgers APRN - CNP   calcium carbonate 648 MG TABS Take 1 tablet by mouth 2 times daily  Patient not taking: Reported on 5/11/2025 1/22/25 7/21/25  Kaylene Goodman APRN - CNP   gabapentin (NEURONTIN) 300 MG capsule Take 1 capsule by mouth in the morning and 1 capsule in the evening. Do all this for 180 doses. Intended supply: 90 days. 1/22/25 4/22/25  Kaylene Goodman APRN - CNP   spironolactone (ALDACTONE) 50 MG tablet Take 1 tablet by mouth daily  Patient not taking: Reported on 5/11/2025 12/30/24   Enzweiler, Diane M, APRN - CNP   Semaglutide-Weight Management (WEGOVY) 1 MG/0.5ML SOAJ SC injection Inject 1 mg into the skin every 7 days  Patient not taking: Reported on 5/11/2025 10/1/24   Kaylene Goodman APRN - CNP   Budeson-Glycopyrrol-Formoterol (BREZTRI AEROSPHERE) 160-9-4.8 MCG/ACT AERO Inhale 2 puffs into the lungs 2 times daily 9/5/24   Jewel Kong APRN - CNP   chlorhexidine gluconate (HIBICLENS) 4 % SOLN external solution Shower with for 7 days prior to procedure. Start using on 8/15/24! 8/6/24   Patricio Reaves MD   lidocaine (LIDODERM) 5 % Place onto the skin as needed    Provider, MD Justine       Physical Exam:    Physical Exam      Vitals:  /64   Pulse 74   Temp 98.1 °F (36.7 °C) (Oral)   Problems in her father and mother.     Soc HX:   Social History     Socioeconomic History    Marital status:      Spouse name: None    Number of children: None    Years of education: None    Highest education level: None   Tobacco Use    Smoking status: Former     Current packs/day: 0.00     Average packs/day: 1.5 packs/day for 45.8 years (68.7 ttl pk-yrs)     Types: Cigarettes     Start date: 1974     Quit date: 10/28/2019     Years since quittin.5    Smokeless tobacco: Never   Vaping Use    Vaping status: Former    Start date: 2018    Quit date: 2019    Substances: Nicotine, Flavoring    Devices: Pre-filled or refillable cartridge, Refillable tank   Substance and Sexual Activity    Alcohol use: Yes     Comment: occasional    Drug use: Never    Sexual activity: Not Currently     Partners: Female, Male     Social Drivers of Health     Financial Resource Strain: Low Risk  (2024)    Overall Financial Resource Strain (CARDIA)     Difficulty of Paying Living Expenses: Not hard at all   Food Insecurity: No Food Insecurity (2025)    Hunger Vital Sign     Worried About Running Out of Food in the Last Year: Never true     Ran Out of Food in the Last Year: Never true   Transportation Needs: No Transportation Needs (2025)    PRAPARE - Transportation     Lack of Transportation (Medical): No     Lack of Transportation (Non-Medical): No   Physical Activity: Inactive (2024)    Exercise Vital Sign     Days of Exercise per Week: 0 days     Minutes of Exercise per Session: 0 min   Housing Stability: Low Risk  (2025)    Housing Stability Vital Sign     Unable to Pay for Housing in the Last Year: No     Number of Times Moved in the Last Year: 0     Homeless in the Last Year: No       Medications:   Medications:    amiodarone  100 mg Oral Daily    apixaban  5 mg Oral BID    atorvastatin  40 mg Oral Daily    budesonide-formoterol  2 puff Inhalation BID RT    And    tiotropium  2 puff

## 2025-05-11 NOTE — ED PROVIDER NOTES
DIFFERENTIAL - Abnormal; Notable for the following components:       Result Value    WBC 17.8 (*)     RDW 15.7 (*)     Neutrophils Absolute 13.9 (*)     Monocytes Absolute 1.5 (*)     All other components within normal limits   COMPREHENSIVE METABOLIC PANEL W/ REFLEX TO MG FOR LOW K - Abnormal; Notable for the following components:    Potassium reflex Magnesium 6.3 (*)     BUN 28 (*)     Creatinine 1.6 (*)     Est, Glom Filt Rate 33 (*)     All other components within normal limits    Narrative:     CALL  [place]  [employer] tel. [phone],  Chemistry results called to and read back by roc king rn, 05/11/2025  10:37, by CHERYLE   URINALYSIS WITH REFLEX TO CULTURE - Abnormal; Notable for the following components:    Glucose, Ur 250 (*)     Blood, Urine SMALL (*)     Protein, UA 30 (*)     Leukocyte Esterase, Urine TRACE (*)     All other components within normal limits   MICROSCOPIC URINALYSIS - Abnormal; Notable for the following components:    Epithelial Cells, UA 11-20 (*)     Bacteria, UA Rare (*)     All other components within normal limits   POTASSIUM - Abnormal; Notable for the following components:    Potassium 6.1 (*)     All other components within normal limits    Narrative:     CALL  [place]  [employer] tel. [phone],  Chemistry results called to and read back by kitty hodge rn, 05/11/2025 11:33, by  CHERYLE   POCT GLUCOSE - Abnormal; Notable for the following components:    POC Glucose 102 (*)     All other components within normal limits   LIPASE    Narrative:     CALL  [place]  [employer] tel. [phone],  Chemistry results called to and read back by roc king rn, 05/11/2025  10:37, by CHERYLE   BLOOD OCCULT STOOL SCREEN #1   FERRITIN   IRON AND TIBC   POTASSIUM   POCT GLUCOSE   POCT GLUCOSE   POCT GLUCOSE   POCT GLUCOSE   POCT GLUCOSE   POCT GLUCOSE   POCT GLUCOSE       When ordered only abnormal lab results are displayed. All other labs were within normal range or not returned as of this dictation.    EKG: When  ordered, EKG's are interpreted by the Emergency Department Physician in the absence of a cardiologist.  Please see their note for interpretation of EKG.    RADIOLOGY:   Non-plain film images such as CT, Ultrasound and MRI are read by the radiologist. Plain radiographic images are visualized and preliminarily interpreted by the ED Provider with the below findings:      Interpretation per the Radiologist below, if available at the time of this note:      No results found.    No results found.    PROCEDURES   Unless otherwise noted below, none     Procedures    CRITICAL CARE TIME (.cctime)   None    PAST MEDICAL HISTORY      has a past medical history of Arthritis, Atrial fibrillation (HCC), CHF (congestive heart failure) (HCC), Congenital heart disease, COPD (chronic obstructive pulmonary disease) (HCC), GERD (gastroesophageal reflux disease), Hypertension, Irritable bowel syndrome, Obesity, On home oxygen therapy, and Restless legs syndrome.     Chronic Conditions affecting Care: History of CHF, atrial fibrillation, hypertension, irritable bowel syndrome    EMERGENCY DEPARTMENT COURSE and DIFFERENTIAL DIAGNOSIS/MDM:   Vitals:    Vitals:    05/11/25 1242 05/11/25 1244 05/11/25 1313 05/11/25 1355   BP: (!) 107/33  117/61 117/64   Pulse: 69 64 68 74   Resp: 16  16 18   Temp:    98.1 °F (36.7 °C)   TempSrc:    Oral   SpO2: 99%  99% 95%   Weight:       Height:           Patient was given the following medications:  Medications   glucose chewable tablet 16 g (has no administration in time range)   dextrose bolus 10% 125 mL (has no administration in time range)     Or   dextrose bolus 10% 250 mL (has no administration in time range)   glucagon injection 1 mg (has no administration in time range)   dextrose 10 % infusion (has no administration in time range)   amiodarone (CORDARONE) tablet 100 mg (100 mg Oral Given 5/11/25 1439)   apixaban (ELIQUIS) tablet 5 mg (has no administration in time range)   atorvastatin (LIPITOR)  Given 5/11/25 1037)   ciprofloxacin (CIPRO) tablet 500 mg (500 mg Oral Given 5/11/25 1042)   metroNIDAZOLE (FLAGYL) tablet 500 mg (500 mg Oral Given 5/11/25 1042)   oxyCODONE (ROXICODONE) immediate release tablet 5 mg (5 mg Oral Given 5/11/25 1042)   calcium gluconate 1,000 mg in sodium chloride 50 mL (0 mg IntraVENous Stopped 5/11/25 1220)   insulin regular (HumuLIN R;NovoLIN R) injection 10 Units (10 Units IntraVENous Given 5/11/25 1213)     And   dextrose bolus 10% 250 mL (0 mLs IntraVENous Stopped 5/11/25 1230)   sodium bicarbonate 8.4 % injection 50 mEq (50 mEq IntraVENous Given 5/11/25 1208)   sodium zirconium cyclosilicate (LOKELMA) oral suspension 10 g (10 g Oral Given 5/11/25 1212)       ED Course as of 05/11/25 1527   Sun May 11, 2025   1045 Potassium(!!): 6.3  Hemolyzed, redraw [TR]      ED Course User Index  [TR] Demetrius Leija, DO        Is this patient to be included in the SEP-1 core measure? No   Exclusion criteria - the patient is NOT to be included for SEP-1 Core Measure due to:  2+ SIRS criteria are not met      CONSULTS: (Who and What was discussed)  IP CONSULT TO HOSPITALIST  IP CONSULT TO GI  IP CONSULT TO HEART FAILURE NURSE/COORDINATOR  Discussion with Other Profesionals : None    Social Determinants : None    Records Reviewed : None    Ddx:   Diverticulitis, bowel obstruction, colitis, enteritis    Medical Decision Making:   This is a 75-year-old female presenting to the emergency department complaints of left lower quadrant abdominal pain that started couple of days ago.  She has had some looser stools as well but has not noticed any hematochezia or melanotic stools.  Prior history of diverticulitis to which this current episode feels similar.  She is mildly hypertensive but afebrile nontachycardic nontachypneic and nonhypoxic.  Left lower quadrant tenderness on exam with no rebound rigidity peritoneal signs or guarding.  Will obtain lab work, provide symptom control and CT imaging.    CBC  with a moderate leukocytosis of 17.8, CMP with a hyperkalemia at 6.3.  This was confirmed with a dedicated potassium draw at 6.1.  Mild elevation creatinine 1.6.  Urinalysis unremarkable.  Lipase unremarkable.  CT imaging did indicate acute diverticulitis flareup with no abscess or perforation.  Initiated p.o. Cipro and Flagyl however, considering the hyperkalemia she was started on calcium gluconate, sodium bicarb, insulin and D10.  Lasix was not given because the patient's blood pressure was a little soft after having Roxicodone.  She is otherwise stable at this time.  No cardiac arrhythmia on the monitor.      I am the Primary Clinician of Record.    FINAL IMPRESSION      1. Diverticulitis of colon    2. Hyperkalemia          DISPOSITION/PLAN     DISPOSITION Admitted    PATIENT REFERRED TO:  No follow-up provider specified.    DISCHARGE MEDICATIONS:  Current Discharge Medication List        START taking these medications    Details   ciprofloxacin (CIPRO) 500 MG tablet Take 1 tablet by mouth 2 times daily for 10 days  Qty: 20 tablet, Refills: 0      metroNIDAZOLE (FLAGYL) 250 MG tablet Take 1 tablet by mouth 3 times daily for 10 days  Qty: 30 tablet, Refills: 0             DISCONTINUED MEDICATIONS:  Current Discharge Medication List                 (Please note that portions of this note were completed with a voice recognition program.  Efforts were made to edit the dictations but occasionally words are mis-transcribed.)    PAVITHRA Medina Jr (electronically signed)           Reyes Moore Jr., PA  05/11/25 7380

## 2025-05-11 NOTE — ED NOTES
Patient ambulated to the restroom to provider urine. Patient ambulated with walker and back to stretcher. Monitors applied.    Teaching / education initiated regarding perioperative experience, expectations, and pain management during stay. Patient verbalized understanding.

## 2025-05-11 NOTE — CONSULTS
Consult Placed   Consult Called, left message with answering service  Who: DIONY CISNEROS  Date:5/11/2025  Time:2:02 PM

## 2025-05-11 NOTE — ED NOTES
Mary Silverio is a 75 y.o. female admitted for  Principal Problem:    Acute diverticulitis  Resolved Problems:    * No resolved hospital problems. *  .   Patient Home via family with   Chief Complaint   Patient presents with    Abdominal Pain     Patient reports lower left flank and abdominal pain that started 3 days ago. Patient reports last bowel movement was diarrhea on Thursday and small BM yesterday. Pain is 9 on 1-10 scale    .  Patient is alert and Person, Place, Time, and Situation  Patient's baseline mobility: Baseline Mobility: Independent   Code Status: Prior   Cardiac Rhythm:       Is patient on baseline Oxygen: no how many Liters:   Abnormal Assessment Findings: none    Isolation: None      NIH Score:    C-SSRS: Risk of Suicide: No Risk  Bedside swallow:        Active LDA's:   Peripheral IV 05/11/25 Right Forearm (Active)   Site Assessment Clean, dry & intact 05/11/25 1230   Line Status Blood return noted;Flushed 05/11/25 1230   Phlebitis Assessment No symptoms 05/11/25 1230   Infiltration Assessment 0 05/11/25 1230   Dressing Status Clean, dry & intact 05/11/25 1230   Dressing Type Transparent 05/11/25 1230     Patient admitted with a torres:  If the torres is chronic was it exchanged:  Reason for torres:   Patient admitted with Central Line:  . PICC line placement confirmed:   Reason for Central line:   Was central line Inserted from an outside facility:        Family/Caregiver Present yes Any Concerns: no   Restraints no  Sitter no         Vitals: MEWS Score: 2    Vitals:    05/11/25 1220 05/11/25 1242 05/11/25 1244 05/11/25 1313   BP: (!) 128/56 (!) 107/33  117/61   Pulse: 61 69 64 68   Resp: 10 16  16   Temp:       TempSrc:       SpO2: 99% 99%  99%   Weight:       Height:           Last documented pain score (0-10 scale) Pain Level: 5  Pain medication administered Yes- see MAR.    Pertinent or High Risk Medications/Drips: No.    Pending Blood Product Administration: no    Abnormal labs:   Abnormal Labs  right now. Noted with hyperkalemia 6.1 mmol; no chest pain or shortness of breath as claimed. Her walking boot on right leg was due to recent injury, patient is independent ambulatory.  If any further questions, please call [place] RN at [phone]

## 2025-05-11 NOTE — ED NOTES
Received patient awake and alert in bed; with mild back pain and abdominal pain as claimed. Denies ant chest pain, shortness of breath. Noted with boot cast on right leg from a recent trauma.

## 2025-05-11 NOTE — ED PROVIDER NOTES
Emergency Department Attending Provider Note  Location: [facility] ED  5/11/2025   Note Started: 9:52 AM EDT 5/11/25       Patient Identification  Mary Silverio is a 75 y.o. female      HPI:Mary Silverio was evaluated in the Emergency Department for left lower quadrant abdominal pain and constipation.  Patient states she had an explosive bowel movement on Thursday but since then is not had a bowel movement.  She states that she thinks she might have diverticulitis that she had this approximately 20 years ago.  She has not been running fevers.  States she is having some waves of nausea without vomiting.  Denies any vaginal bleeding or discharge.  No chest pain or shortness of breath.  No other complaints at this time. Although initial history and physical exam information was obtained by SALIMA/NPP/MD/DO (who also dictated a record of this visit), I personally saw the patient and performed a substantive portion of the visit including all aspects of the medical decision making.      PHYSICAL EXAM:  Physical Exam  Constitutional:       General: She is not in acute distress.     Appearance: Normal appearance. She is obese. She is not ill-appearing.   HENT:      Head: Normocephalic and atraumatic.      Right Ear: External ear normal.      Left Ear: External ear normal.      Nose: Nose normal.      Mouth/Throat:      Mouth: Mucous membranes are moist.      Pharynx: Oropharynx is clear.   Cardiovascular:      Rate and Rhythm: Normal rate and regular rhythm.      Pulses: Normal pulses.      Heart sounds: Normal heart sounds.   Pulmonary:      Effort: Pulmonary effort is normal.      Breath sounds: Normal breath sounds.   Abdominal:      General: Abdomen is flat. There is no distension.      Palpations: Abdomen is soft.      Tenderness: There is abdominal tenderness in the left lower quadrant.   Musculoskeletal:         General: No tenderness, deformity or signs of injury. Normal range of motion.      Cervical back: Normal range of  motion.   Skin:     General: Skin is warm and dry.      Capillary Refill: Capillary refill takes less than 2 seconds.   Neurological:      General: No focal deficit present.      Mental Status: She is alert and oriented to person, place, and time.      Sensory: No sensory deficit.      Motor: No weakness.   Psychiatric:         Mood and Affect: Mood normal.         Behavior: Behavior normal.          EKG Interpretation      Radiology Interpretation      Outside Chart Review      Patient seen and evaluated.  Relevant records reviewed.  MDM    Overall well-appearing on examination.  Unfortunately due to an anaphylactic contrast dye allergy we will have to obtain a noncontrasted CT.  Workup also include CBC CMP lipase and urinalysis.  Workup in the emergency department was grossly unremarkable with the exception of her potassium which returned at 6.3.  This was hemolyzed and thus redraw him returned at 6.1.  Creatinine roughly at baseline of 1.6.  CT abdomen pelvis without contrast due to anaphylaxis to contrast dye showed uncomplicated diverticulitis without evidence of abscess or perforation.  White blood cell count at 18.  Unfortunately given her hyperkalemia the patient required admission to the hospital for further management of her pain control as well as her diverticulitis.  She was given pain medications as well as Cipro and Flagyl here in the emergency department due to anaphylaxis to penicillins.     This includes multiple reevaluations, vital sign monitoring, pulse oximetry monitoring, telemetry monitoring, clinical response to the IV medications, reviewing the nursing notes, consultation time, dictation/documentation time, and interpretation of the labwork. (This time excludes time spent performing procedures).         ED Course as of 05/11/25 1453   Sun May 11, 2025   1045 Potassium(!!): 6.3  Hemolyzed, redraw [TR]      ED Course User Index  [TR] Demetrius Leija, DO       Is this patient to be included in

## 2025-05-12 PROBLEM — K57.32 DIVERTICULITIS OF COLON: Status: ACTIVE | Noted: 2025-05-12

## 2025-05-12 PROBLEM — E87.5 HYPERKALEMIA: Status: ACTIVE | Noted: 2025-05-12

## 2025-05-12 PROBLEM — N18.4 CKD (CHRONIC KIDNEY DISEASE) STAGE 4, GFR 15-29 ML/MIN (HCC): Status: ACTIVE | Noted: 2025-05-12

## 2025-05-12 LAB
ANION GAP SERPL CALCULATED.3IONS-SCNC: 11 MMOL/L (ref 3–16)
ANION GAP SERPL CALCULATED.3IONS-SCNC: 9 MMOL/L (ref 3–16)
BASOPHILS # BLD: 0 K/UL (ref 0–0.2)
BASOPHILS NFR BLD: 0.4 %
BUN SERPL-MCNC: 26 MG/DL (ref 7–20)
BUN SERPL-MCNC: 26 MG/DL (ref 7–20)
CALCIUM SERPL-MCNC: 8.7 MG/DL (ref 8.3–10.6)
CALCIUM SERPL-MCNC: 8.8 MG/DL (ref 8.3–10.6)
CHLORIDE SERPL-SCNC: 101 MMOL/L (ref 99–110)
CHLORIDE SERPL-SCNC: 102 MMOL/L (ref 99–110)
CO2 SERPL-SCNC: 23 MMOL/L (ref 21–32)
CO2 SERPL-SCNC: 23 MMOL/L (ref 21–32)
CREAT SERPL-MCNC: 1.8 MG/DL (ref 0.6–1.2)
CREAT SERPL-MCNC: 1.8 MG/DL (ref 0.6–1.2)
DEPRECATED RDW RBC AUTO: 16.1 % (ref 12.4–15.4)
EOSINOPHIL # BLD: 0 K/UL (ref 0–0.6)
EOSINOPHIL NFR BLD: 0.2 %
FERRITIN SERPL IA-MCNC: 102 NG/ML (ref 15–150)
GFR SERPLBLD CREATININE-BSD FMLA CKD-EPI: 29 ML/MIN/{1.73_M2}
GFR SERPLBLD CREATININE-BSD FMLA CKD-EPI: 29 ML/MIN/{1.73_M2}
GLUCOSE BLD-MCNC: 103 MG/DL (ref 70–99)
GLUCOSE BLD-MCNC: 104 MG/DL (ref 70–99)
GLUCOSE BLD-MCNC: 110 MG/DL (ref 70–99)
GLUCOSE BLD-MCNC: 153 MG/DL (ref 70–99)
GLUCOSE BLD-MCNC: 92 MG/DL (ref 70–99)
GLUCOSE SERPL-MCNC: 106 MG/DL (ref 70–99)
GLUCOSE SERPL-MCNC: 99 MG/DL (ref 70–99)
HCT VFR BLD AUTO: 31.8 % (ref 36–48)
HGB BLD-MCNC: 10.5 G/DL (ref 12–16)
IRON SATN MFR SERPL: 10 % (ref 15–50)
IRON SERPL-MCNC: 34 UG/DL (ref 37–145)
LYMPHOCYTES # BLD: 1.9 K/UL (ref 1–5.1)
LYMPHOCYTES NFR BLD: 15.2 %
MAGNESIUM SERPL-MCNC: 2.54 MG/DL (ref 1.8–2.4)
MCH RBC QN AUTO: 30 PG (ref 26–34)
MCHC RBC AUTO-ENTMCNC: 33 G/DL (ref 31–36)
MCV RBC AUTO: 90.8 FL (ref 80–100)
MONOCYTES # BLD: 1.2 K/UL (ref 0–1.3)
MONOCYTES NFR BLD: 9.7 %
NEUTROPHILS # BLD: 9.3 K/UL (ref 1.7–7.7)
NEUTROPHILS NFR BLD: 74.5 %
PERFORMED ON: ABNORMAL
PERFORMED ON: NORMAL
PLATELET # BLD AUTO: 270 K/UL (ref 135–450)
PMV BLD AUTO: 7 FL (ref 5–10.5)
POTASSIUM SERPL-SCNC: 4.7 MMOL/L (ref 3.5–5.1)
POTASSIUM SERPL-SCNC: 5.6 MMOL/L (ref 3.5–5.1)
RBC # BLD AUTO: 3.5 M/UL (ref 4–5.2)
SODIUM SERPL-SCNC: 134 MMOL/L (ref 136–145)
SODIUM SERPL-SCNC: 135 MMOL/L (ref 136–145)
TIBC SERPL-MCNC: 340 UG/DL (ref 260–445)
WBC # BLD AUTO: 12.4 K/UL (ref 4–11)

## 2025-05-12 PROCEDURE — 6360000002 HC RX W HCPCS: Performed by: INTERNAL MEDICINE

## 2025-05-12 PROCEDURE — 80048 BASIC METABOLIC PNL TOTAL CA: CPT

## 2025-05-12 PROCEDURE — 36415 COLL VENOUS BLD VENIPUNCTURE: CPT

## 2025-05-12 PROCEDURE — 2500000003 HC RX 250 WO HCPCS: Performed by: INTERNAL MEDICINE

## 2025-05-12 PROCEDURE — 94640 AIRWAY INHALATION TREATMENT: CPT

## 2025-05-12 PROCEDURE — 6370000000 HC RX 637 (ALT 250 FOR IP): Performed by: INTERNAL MEDICINE

## 2025-05-12 PROCEDURE — 2580000003 HC RX 258: Performed by: INTERNAL MEDICINE

## 2025-05-12 PROCEDURE — 99222 1ST HOSP IP/OBS MODERATE 55: CPT | Performed by: INTERNAL MEDICINE

## 2025-05-12 PROCEDURE — 85025 COMPLETE CBC W/AUTO DIFF WBC: CPT

## 2025-05-12 PROCEDURE — 83735 ASSAY OF MAGNESIUM: CPT

## 2025-05-12 PROCEDURE — 1200000000 HC SEMI PRIVATE

## 2025-05-12 RX ORDER — SODIUM CHLORIDE 0.9 % (FLUSH) 0.9 %
5-40 SYRINGE (ML) INJECTION EVERY 12 HOURS SCHEDULED
Status: DISCONTINUED | OUTPATIENT
Start: 2025-05-12 | End: 2025-05-14 | Stop reason: HOSPADM

## 2025-05-12 RX ORDER — DOCUSATE SODIUM 100 MG/1
100 CAPSULE, LIQUID FILLED ORAL DAILY
Status: DISCONTINUED | OUTPATIENT
Start: 2025-05-12 | End: 2025-05-14 | Stop reason: HOSPADM

## 2025-05-12 RX ORDER — DICYCLOMINE HCL 20 MG
20 TABLET ORAL
Status: DISCONTINUED | OUTPATIENT
Start: 2025-05-12 | End: 2025-05-14 | Stop reason: HOSPADM

## 2025-05-12 RX ORDER — SODIUM CHLORIDE 0.9 % (FLUSH) 0.9 %
5-40 SYRINGE (ML) INJECTION PRN
Status: DISCONTINUED | OUTPATIENT
Start: 2025-05-12 | End: 2025-05-14 | Stop reason: HOSPADM

## 2025-05-12 RX ORDER — METRONIDAZOLE 250 MG/1
500 TABLET ORAL EVERY 8 HOURS SCHEDULED
Status: DISCONTINUED | OUTPATIENT
Start: 2025-05-12 | End: 2025-05-14 | Stop reason: HOSPADM

## 2025-05-12 RX ORDER — CIPROFLOXACIN 500 MG/1
500 TABLET, FILM COATED ORAL EVERY 12 HOURS
Status: DISCONTINUED | OUTPATIENT
Start: 2025-05-12 | End: 2025-05-14 | Stop reason: HOSPADM

## 2025-05-12 RX ORDER — POLYETHYLENE GLYCOL 3350 17 G/17G
17 POWDER, FOR SOLUTION ORAL DAILY
Status: DISCONTINUED | OUTPATIENT
Start: 2025-05-12 | End: 2025-05-14 | Stop reason: HOSPADM

## 2025-05-12 RX ORDER — LIDOCAINE HYDROCHLORIDE 10 MG/ML
50 INJECTION, SOLUTION INFILTRATION; PERINEURAL ONCE
Status: DISCONTINUED | OUTPATIENT
Start: 2025-05-12 | End: 2025-05-14 | Stop reason: HOSPADM

## 2025-05-12 RX ORDER — SODIUM CHLORIDE 9 MG/ML
INJECTION, SOLUTION INTRAVENOUS PRN
Status: DISCONTINUED | OUTPATIENT
Start: 2025-05-12 | End: 2025-05-14 | Stop reason: HOSPADM

## 2025-05-12 RX ORDER — SODIUM CHLORIDE 9 MG/ML
INJECTION, SOLUTION INTRAVENOUS CONTINUOUS
Status: ACTIVE | OUTPATIENT
Start: 2025-05-12 | End: 2025-05-12

## 2025-05-12 RX ADMIN — GABAPENTIN 300 MG: 300 CAPSULE ORAL at 08:34

## 2025-05-12 RX ADMIN — AMIODARONE HYDROCHLORIDE 100 MG: 200 TABLET ORAL at 08:34

## 2025-05-12 RX ADMIN — PANTOPRAZOLE SODIUM 40 MG: 40 TABLET, DELAYED RELEASE ORAL at 08:34

## 2025-05-12 RX ADMIN — HYDROXYZINE HYDROCHLORIDE 25 MG: 25 TABLET, FILM COATED ORAL at 20:40

## 2025-05-12 RX ADMIN — CIPROFLOXACIN 400 MG: 400 INJECTION, SOLUTION INTRAVENOUS at 09:56

## 2025-05-12 RX ADMIN — SODIUM CHLORIDE, PRESERVATIVE FREE 5 ML: 5 INJECTION INTRAVENOUS at 08:47

## 2025-05-12 RX ADMIN — ROPINIROLE HYDROCHLORIDE 1 MG: 0.5 TABLET, FILM COATED ORAL at 13:06

## 2025-05-12 RX ADMIN — SODIUM CHLORIDE, PRESERVATIVE FREE 10 ML: 5 INJECTION INTRAVENOUS at 20:41

## 2025-05-12 RX ADMIN — METRONIDAZOLE 500 MG: 500 INJECTION, SOLUTION INTRAVENOUS at 13:09

## 2025-05-12 RX ADMIN — ROPINIROLE HYDROCHLORIDE 1 MG: 0.5 TABLET, FILM COATED ORAL at 20:40

## 2025-05-12 RX ADMIN — SODIUM ZIRCONIUM CYCLOSILICATE 10 G: 10 POWDER, FOR SUSPENSION ORAL at 17:55

## 2025-05-12 RX ADMIN — APIXABAN 5 MG: 5 TABLET, FILM COATED ORAL at 08:34

## 2025-05-12 RX ADMIN — SODIUM CHLORIDE, PRESERVATIVE FREE 10 ML: 5 INJECTION INTRAVENOUS at 20:39

## 2025-05-12 RX ADMIN — DICYCLOMINE HYDROCHLORIDE 20 MG: 20 TABLET ORAL at 17:55

## 2025-05-12 RX ADMIN — GABAPENTIN 300 MG: 300 CAPSULE ORAL at 17:55

## 2025-05-12 RX ADMIN — Medication 10 ML: at 03:33

## 2025-05-12 RX ADMIN — APIXABAN 5 MG: 5 TABLET, FILM COATED ORAL at 20:40

## 2025-05-12 RX ADMIN — DOCUSATE SODIUM 100 MG: 100 CAPSULE, LIQUID FILLED ORAL at 17:55

## 2025-05-12 RX ADMIN — ATORVASTATIN CALCIUM 40 MG: 40 TABLET, FILM COATED ORAL at 20:40

## 2025-05-12 RX ADMIN — POLYETHYLENE GLYCOL 3350 17 G: 17 POWDER, FOR SOLUTION ORAL at 17:55

## 2025-05-12 RX ADMIN — ROPINIROLE HYDROCHLORIDE 1 MG: 0.5 TABLET, FILM COATED ORAL at 08:34

## 2025-05-12 RX ADMIN — DICYCLOMINE HYDROCHLORIDE 20 MG: 20 TABLET ORAL at 20:40

## 2025-05-12 RX ADMIN — CIPROFLOXACIN HYDROCHLORIDE 500 MG: 500 TABLET, FILM COATED ORAL at 20:40

## 2025-05-12 RX ADMIN — Medication 2 PUFF: at 21:26

## 2025-05-12 RX ADMIN — METRONIDAZOLE 500 MG: 500 INJECTION, SOLUTION INTRAVENOUS at 03:35

## 2025-05-12 RX ADMIN — OXYCODONE AND ACETAMINOPHEN 1 TABLET: 5; 325 TABLET ORAL at 04:55

## 2025-05-12 RX ADMIN — METRONIDAZOLE 500 MG: 250 TABLET ORAL at 20:40

## 2025-05-12 RX ADMIN — Medication 2 PUFF: at 08:24

## 2025-05-12 RX ADMIN — EMPAGLIFLOZIN 10 MG: 10 TABLET, FILM COATED ORAL at 08:35

## 2025-05-12 RX ADMIN — Medication 5000 UNITS: at 08:34

## 2025-05-12 RX ADMIN — TIOTROPIUM BROMIDE INHALATION SPRAY 2 PUFF: 3.12 SPRAY, METERED RESPIRATORY (INHALATION) at 08:24

## 2025-05-12 RX ADMIN — CARVEDILOL 12.5 MG: 6.25 TABLET, FILM COATED ORAL at 20:40

## 2025-05-12 RX ADMIN — SODIUM CHLORIDE: 0.9 INJECTION, SOLUTION INTRAVENOUS at 09:47

## 2025-05-12 ASSESSMENT — PAIN DESCRIPTION - LOCATION
LOCATION: ABDOMEN
LOCATION: ABDOMEN

## 2025-05-12 ASSESSMENT — PAIN SCALES - GENERAL
PAINLEVEL_OUTOF10: 6
PAINLEVEL_OUTOF10: 0
PAINLEVEL_OUTOF10: 5

## 2025-05-12 ASSESSMENT — PAIN DESCRIPTION - ORIENTATION: ORIENTATION: LEFT

## 2025-05-12 ASSESSMENT — PAIN DESCRIPTION - DESCRIPTORS: DESCRIPTORS: SHARP

## 2025-05-12 NOTE — DISCHARGE INSTRUCTIONS
Heart Failure Resources:  Heart Failure Interactive Workbook:  Go to [email or web address]=684638 for a Free Heart Failure Interactive Workbook provided by The American [employer] Association. This interactive workbook will provide information on Healthier Living with Heart Failure. Please copy and paste link into search bar. Use your mouse to scroll through the pages.    HF [place] khari:   Heart Failure Free smart phone khari available for iPhone and Android download. Use your phone to track sodium intake, fluid intake, symptoms, and weight.     Low Sodium Diet / Recipes:  Go to www.[employer] website for “renal” diet which is Low Sodium! [employer] is a dialysis company, but this website offers free seasonal cookbooks. Each quarter, they will release 25-30 new recipes with a breakdown of calories, sodium, and glucose. You can also go to www.[employer]/recipes website for free recipes.     Home Exercise Program:   Identification of Green/Yellow/Red zones:  You should be able to identify when you feel good (green zone), if you have 1-2 symptoms of HF (yellow zone), or if you are in need of medical attention (red zone).  In your CHF education folder you were provided a “stop light tool” to outline this information.     We want to you to rate your exertion levels:    Our therapy team has discussed means of identification with you such as the \"Ramon scale.\"  The Ramon rating scale ranges from 6 to 20, where 6 means \"no exertion at all\" and 20 means \"maximal exertion.\" The goal is to use this to gauge how much effort it is taking for you to do your normal daily tasks.   You should be able to recognize when too much exertion is being expended.    Elements of Energy Conservation:   Prioritize/Plan: Decide what needs to be done today, and what can wait for a later date, write to do lists, plan ahead to avoid extra trips, and gather supplies and equipment needed before starting an activity.   Position:

## 2025-05-12 NOTE — PLAN OF CARE
Problem: Chronic Conditions and Co-morbidities  Goal: Patient's chronic conditions and co-morbidity symptoms are monitored and maintained or improved  5/12/2025 1922 by Erika Narvaez RN  Outcome: Progressing  Flowsheets (Taken 5/12/2025 1922)  Care Plan - Patient's Chronic Conditions and Co-Morbidity Symptoms are Monitored and Maintained or Improved:   Monitor and assess patient's chronic conditions and comorbid symptoms for stability, deterioration, or improvement   Collaborate with multidisciplinary team to address chronic and comorbid conditions and prevent exacerbation or deterioration     Problem: Pain  Goal: Verbalizes/displays adequate comfort level or baseline comfort level  5/12/2025 1922 by Erika Narvaez RN  Outcome: Progressing  Flowsheets (Taken 5/12/2025 1922)  Verbalizes/displays adequate comfort level or baseline comfort level:   Encourage patient to monitor pain and request assistance   Assess pain using appropriate pain scale   Administer analgesics based on type and severity of pain and evaluate response     Problem: Safety - Adult  Goal: Free from fall injury  5/12/2025 1922 by Erika Narvaez RN  Outcome: Progressing  Flowsheets (Taken 5/12/2025 1922)  Free From Fall Injury: Instruct family/caregiver on patient safety     Problem: Skin/Tissue Integrity  Goal: Skin integrity remains intact  Description: 1.  Monitor for areas of redness and/or skin breakdown2.  Assess vascular access sites hourly3.  Every 4-6 hours minimum:  Change oxygen saturation probe site4.  Every 4-6 hours:  If on nasal continuous positive airway pressure, respiratory therapy assess nares and determine need for appliance change or resting period  5/12/2025 1922 by Erika Narvaez, RN  Outcome: Progressing  Flowsheets (Taken 5/12/2025 1922)  Skin Integrity Remains Intact: Monitor for areas of redness and/or skin breakdown

## 2025-05-12 NOTE — CONSULTS
Nephrology Consult Note                                                                                                                                                                                                                                                                                                                                                               Office : [phone]     Fax :[phone]    Patient's Name: Mary Silverio  5:38 PM  5/12/2025    Reason for Consult:  CKD, hyperK, need for midline   Requesting Physician:  Kaylene Goodman APRN - CNP  Chief Complaint:    Chief Complaint   Patient presents with    Abdominal Pain     Patient reports lower left flank and abdominal pain that started 3 days ago. Patient reports last bowel movement was diarrhea on Thursday and small BM yesterday. Pain is 9 on 1-10 scale        Assessment/Plan     # Acute diverticulitis   On Cipro Flagyl  Consideration for I.V ABx  If no access to PIV and oral Tx not adequate - ok to place midline (dominant hand)    # CKD IV   2/2 DKD ischemic   Failry stable   No NELIA  Plan;  No nephrotoxins   Monitor BMP   Low K diet   4. Low threshold to hold SGLT-2 if BP lower     # HTN-now BP low- see above     # Anemia  Acute mild   No role for PEDRITO    # Acid- base/ Electrolyte imbalance   Hyper- K   Given Lokelma   Recheck BMP later   Low K diet     # DM   Tx per IM team   Needs good glucose control  Low threshold to hold SGLT-2 if BP lower     History of Present Ilness:    Mary Silverio is a 75 y.o. 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  Pt. Last had diverticulitis about 40 years ago.  AT that time she was told if she continued to have diverticulitis, that she would likely need a partial colectomy.  Pt admitted with severe L lowe abdominal pain   Pt denies

## 2025-05-12 NOTE — PROGRESS NOTES
Nephrology at bedside. Recommends to attempt PIV again before placing midline. 20 g right hand placed, blood return noted, flushed and saline locked. Page nephrology for midline if PIV fails.

## 2025-05-12 NOTE — CONSULTS
Consult Call Back    Who:Wilberto Darling MD   Date:5/11/2025,  Time:10:16 PM    Electronically signed by Sandy Alexander on 5/11/25 at 10:16 PM EDT

## 2025-05-12 NOTE — PROGRESS NOTES
Perfect Serve sent to Dr. Moyer at 1448    \"pts IV access is being lost. c/o pain with IV meds, no s/s of inflitration but has bruising. She is a very hard stick, will need midline or picc. Potassium still elevated at 5.6 this AM, please advise. Need Callback: NO CALLBACK REQ C3 ONCOLOGY ROUTINE\"    Read at 3:31, no new orders at this time.

## 2025-05-12 NOTE — PROGRESS NOTES
Assessment complete and charted. Pt denies needs overnight beyond PRN pain meds x1. VSS on RA. IV access secured and IV abx restarted early in shift. Call light within reach, will continue to monitor.

## 2025-05-12 NOTE — PROGRESS NOTES
Progress Note    Patient Mary Silverio  MRN: [id number]  YOB: 1950 Age: 75 y.o. Sex: female  Room: [address] [place] [address]       Admitting Physician: Deborah Moyer MD   Date of Admission: 5/11/2025  9:21 AM   Primary Care Physician: Kaylene Goodman, APRN - CNP     Subjective:  Mary Silverio was seen and examined. We are following for acute diverticulitis.  -- The patient states the abdominal pain is improving.  It is a 7/10.  No nausea or vomiting, no fever    ROS:  Constitutional: Denies fever, no change in appetite  Respiratory: Denies cough or shortness of breath  Cardiovascular: Denies chest pain or edema    Objective:  Vital Signs:   Vitals:    05/12/25 1134   BP: 94/64   Pulse: 62   Resp: 18   Temp: 97.7 °F (36.5 °C)   SpO2: 91%         Physical Exam:  Constitutional: Alert and oriented x 4. No acute distress.   HEENT: Sclera anicteric, mucosal membranes moist  Cardiovascular: Regular rate and rhythm.  No murmurs.  Respiratory: Respirations nonlabored, no crepitus  GI: Abdomen nondistended, soft, and tender left lower quadrant. Normal active bowel sounds.  No masses palpable.   Rectal: Deferred  Musculoskeletal:  No pitting edema of the lower legs.  Neurological: Gross memory appears intact.     Intake/Output:    Intake/Output Summary (Last 24 hours) at 5/12/2025 1619  Last data filed at 5/12/2025 1309  Gross per 24 hour   Intake 2683.9 ml   Output 950 ml   Net 1733.9 ml        Current Medications:  Current Facility-Administered Medications   Medication Dose Route Frequency Provider Last Rate Last Admin    ciprofloxacin (CIPRO) tablet 500 mg  500 mg Oral 2 times per day Idalmis Villagomez MD        metroNIDAZOLE (FLAGYL) tablet 500 mg  500 mg Oral 3 times per day Idalmis Villagomez MD        polyethylene glycol (GLYCOLAX) packet 17 g  17 g Oral Daily Idalmis Villagomez MD        docusate sodium (COLACE) capsule 100 mg  100 mg Oral Daily Idalmis Villagomez MD        dicyclomine (BENTYL) tablet 20 mg  20 mg  Oral 4x Daily AC & HS Idalmis Villagomez MD        glucose chewable tablet 16 g  4 tablet Oral PRN Deborah Moyer MD        dextrose bolus 10% 125 mL  125 mL IntraVENous PRN Deborah Moyer MD        Or    dextrose bolus 10% 250 mL  250 mL IntraVENous PRN Deborah Moyer MD        glucagon injection 1 mg  1 mg SubCUTAneous PRN Deborah Moyer MD        dextrose 10 % infusion   IntraVENous Continuous PRN Deborah Moyer MD        amiodarone (CORDARONE) tablet 100 mg  100 mg Oral Daily Deborah Moyer MD   100 mg at 05/12/25 0834    apixaban (ELIQUIS) tablet 5 mg  5 mg Oral BID Deborah Moyer MD   5 mg at 05/12/25 0834    atorvastatin (LIPITOR) tablet 40 mg  40 mg Oral Daily Deborah Moyer MD   40 mg at 05/11/25 2041    budesonide-formoterol (SYMBICORT) 160-4.5 MCG/ACT inhaler 2 puff  2 puff Inhalation BID RT Deborah Moyer MD   2 puff at 05/12/25 0824    And    tiotropium (SPIRIVA RESPIMAT) 2.5 MCG/ACT inhaler 2 puff  2 puff Inhalation Daily RT Deborah Moyer MD   2 puff at 05/12/25 0824    carvedilol (COREG) tablet 12.5 mg  12.5 mg Oral BID Deborah Moyer MD   12.5 mg at 05/11/25 2041    empagliflozin (JARDIANCE) tablet 10 mg  10 mg Oral Daily Deborah Moyer MD   10 mg at 05/12/25 0835    gabapentin (NEURONTIN) capsule 300 mg  300 mg Oral BID Deborah Moyer MD   300 mg at 05/12/25 0834    hydrOXYzine HCl (ATARAX) tablet 25 mg  25 mg Oral Nightly Deborah Moyer MD   25 mg at 05/11/25 2041    lidocaine 4 % external patch 1 patch  1 patch TransDERmal Daily Deborah Moyer MD   1 patch at 05/12/25 0835    pantoprazole (PROTONIX) tablet 40 mg  40 mg Oral Daily Deborah Moyer MD   40 mg at 05/12/25 0834    rOPINIRole (REQUIP) tablet 1 mg  1 mg Oral TID Deborah Moyer MD   1 mg at 05/12/25 1306    Vitamin D (CHOLECALCIFEROL) tablet 5,000 Units  5,000 Units Oral Daily Deborah Moyer MD    5,000 Units at 05/12/25 0834    sodium chloride flush 0.9 % injection 5-40 mL  5-40 mL IntraVENous 2 times per day Deborah Moyer MD   5 mL at 05/12/25 0847    sodium chloride flush 0.9 % injection 5-40 mL  5-40 mL IntraVENous PRN Deborah Moyer MD   10 mL at 05/12/25 0333    0.9 % sodium chloride infusion   IntraVENous PRN Deborah Moyer MD   Stopped at 05/12/25 0009    ondansetron (ZOFRAN-ODT) disintegrating tablet 4 mg  4 mg Oral Q8H PRN Deborah Moyer MD        Or    ondansetron (ZOFRAN) injection 4 mg  4 mg IntraVENous Q6H PRN Deborah Moyer MD        acetaminophen (TYLENOL) tablet 650 mg  650 mg Oral Q6H PRN Deborah Moyer MD        Or    acetaminophen (TYLENOL) suppository 650 mg  650 mg Rectal Q6H PRN Deborah Moyer MD        morphine (PF) injection 2 mg  2 mg IntraVENous Q4H PRN Deborah Moyer MD        oxyCODONE-acetaminophen (PERCOCET) 5-325 MG per tablet 1 tablet  1 tablet Oral Q6H PRN Deborah Moyer MD   1 tablet at 05/12/25 0455                Labs:   Recent Labs     05/11/25  0954 05/11/25  1046 05/11/25  1519 05/12/25  0536   WBC 17.8*  --   --  12.4*   HGB 12.5  --   --  10.5*     --   --  270   ALKPHOS 107  --   --   --    ALT 19  --   --   --    AST 22  --   --   --    BILITOT 0.7  --   --   --    BUN 28*  --   --  26*   CREATININE 1.6*  --   --  1.8*     --   --  135*   K 6.3* 6.1* 5.3* 5.6*          Assessment:  Hospital Problems           Last Modified POA    * (Principal) Acute diverticulitis 5/11/2025 Yes    Essential hypertension 1/22/2025 Yes    Overview Signed 11/9/2022  7:56 AM by Jewel Kong APRN - TERESA   controled         COPD (chronic obstructive pulmonary disease) ([facility]) 1/22/2025 Yes    PAF (paroxysmal atrial fibrillation) ([facility]) 5/11/2025 Yes    ICD (implantable cardioverter-defibrillator), dual, in situ 5/11/2025 Yes    Opioid dependence with current use ([facility]) 1/22/2025 Yes    Chronic renal

## 2025-05-12 NOTE — PLAN OF CARE
CHF Care Plan      Patient's EF (Ejection Fraction) is less than 40%    Heart Failure Medications:  Diuretics:: None    (One of the following REQUIRED for EF </= 40%/SYSTOLIC FAILURE but MAY be used in EF% >40%/DIASTOLIC FAILURE)        ACE:: None        ARB:: None         ARNI:: None    (Beta Blockers)  NON- Evidenced Based Beta Blocker (for EF% >40%/DIASTOLIC FAILURE): None    Evidenced Based Beta Blocker::(REQUIRED for EF% <40%/SYSTOLIC FAILURE) Carvedilol- Coreg  ...................................................................................................................................................    Failed to redirect to the Timeline version of the [employer] SmartLink.      Patient's weights and intake/output reviewed    Daily Weight log at bedside, patient/family participation in use of log: no    Patient's current weight today shows a difference of 1 lbs less than last documented weight.      Intake/Output Summary (Last 24 hours) at 5/12/2025 0638  Last data filed at 5/12/2025 0335  Gross per 24 hour   Intake 2003.9 ml   Output 500 ml   Net 1503.9 ml       Education Booklet Provided: no    Comorbidities Reviewed Yes    Patient has a past medical history of Arthritis, Atrial fibrillation (HCC), CHF (congestive heart failure) (HCC), Congenital heart disease, COPD (chronic obstructive pulmonary disease) (HCC), GERD (gastroesophageal reflux disease), Hypertension, Irritable bowel syndrome, Obesity, On home oxygen therapy, and Restless legs syndrome.     >>For CHF and Comorbidity documentation on Education Time and Topics, please see Education Tab      CHF Education    Learners:  Patient  Readineess:   Eager  Method:   Explanation  Response:   Verbalizes Understanding  Comments:     Time Spent: 10 min      Pt resting in bed at this time on room air. Pt denies shortness of breath. Pt without lower extremity edema.     Patient and/or Family's stated Goal of Care this Admission: increase activity tolerance,  better understand heart failure and disease management, be more comfortable, and reduce lower extremity edema prior to discharge        :

## 2025-05-12 NOTE — CARE COORDINATION
Case Management Assessment  Initial Evaluation    Date/Time of Evaluation: 5/12/2025 3:52 PM  Assessment Completed by: Tony Robertson RN    If patient is discharged prior to next notation, then this note serves as note for discharge by case management.    Patient Name: Mary Silverio                   YOB: 1950  Diagnosis: Hyperkalemia [E87.5]  Diverticulitis of colon [K57.32]  Acute diverticulitis [K57.92]                   Date / Time: 5/11/2025  9:21 AM    Patient Admission Status: Inpatient   Readmission Risk (Low < 19, Mod (19-27), High > 27): Readmission Risk Score: 16.6    Current PCP: Kaylene Goodman APRN - CNP  PCP verified by CM? Yes    Chart Reviewed: Yes      History Provided by:    Patient Orientation: Person, Alert and Oriented, Place, Situation    Patient Cognition: Alert    Hospitalization in the last 30 days (Readmission):  No    If yes, Readmission Assessment in  Navigator will be completed.    Advance Directives:      Code Status: Full Code   Patient's Primary Decision Maker is: Legal Next of Kin    Primary Decision Maker: Ollie Silverio - Spouse - [phone]    Secondary Decision Maker: Silverio,Monico - Child - [phone]    Secondary Decision Maker: Davi Silverio - Child - [phone]    Discharge Planning:    Patient lives with: Spouse/Significant Other Type of Home: House  Primary Care Giver: Self  Patient Support Systems include: Spouse/Significant Other   Current Financial resources: Medicare  Current community resources: None  Current services prior to admission: None, Durable Medical Equipment            Current DME: walker, Oxygen Therapy (Comment) (has a stationary concentrator that she uses HS only at [employer]. was provided to her by a company years ago in [place].)            Type of Home Care services:  None    ADLS  Prior functional level: Independent in ADLs/IADLs, Mobility (with a walker, has a bad foot.)  Current functional level: Independent in ADLs/IADLs, Mobility    PT

## 2025-05-12 NOTE — PROGRESS NOTES
Shift assessment completed. Patient is AOx4.  VSS on RA, IV site clean/dry/intact, flushed and saline locked. Patient ambulates by self to bathroom with steady gait. Bed locked and at lowest setting, bedside table and call light within reach, nonskid footwear applied. Denies any needs at this time.

## 2025-05-12 NOTE — PROGRESS NOTES
CHF Care Plan        Patient's EF (Ejection Fraction) is less than 40%     Heart Failure Medications:  Diuretics:: none     (One of the following REQUIRED for EF </= 40%/SYSTOLIC FAILURE but MAY be used in EF% >40%/DIASTOLIC FAILURE)        ACE:: None        ARB:: None         ARNI:: None     (Beta Blockers)  NON- Evidenced Based Beta Blocker (for EF% >40%/DIASTOLIC FAILURE):        Evidenced Based Beta Blocker::(REQUIRED for EF% <40%/SYSTOLIC FAILURE) Carvedilol- Coreg  ...................................................................................................................................................     Failed to redirect to the Timeline version of the [employer] SmartLink.        Patient's weights and intake/output reviewed     Daily Weight log at bedside, patient/family participation in use of log: \"yes     Patient's current weight today shows a difference of 1 lbs less than last documented weight.        Intake/Output Summary (Last 24 hours) at 5/12/2025 1908  Last data filed at 5/12/2025 1759      Gross per 24 hour   Intake 2753.9 ml   Output 950 ml   Net 1803.9 ml         Education Booklet Provided: yes     Comorbidities Reviewed Yes     Patient has a past medical history of Arthritis, Atrial fibrillation (HCC), CHF (congestive heart failure) (HCC), Congenital heart disease, COPD (chronic obstructive pulmonary disease) (HCC), GERD (gastroesophageal reflux disease), Hypertension, Irritable bowel syndrome, Obesity, On home oxygen therapy, and Restless legs syndrome.      >>For CHF and Comorbidity documentation on Education Time and Topics, please see Education Tab        CHF Education     Learners:  Patient  Readineess:   Acceptance  Method:   Explanation  Response:   Verbalizes Understanding  Comments:      Time Spent:         Pt sitting in bed at this time on room air. Pt denies shortness of breath. Pt with nonpitting lower extremity edema.      Patient and/or Family's stated Goal of Care this

## 2025-05-12 NOTE — PLAN OF CARE
Problem: Chronic Conditions and Co-morbidities  Goal: Patient's chronic conditions and co-morbidity symptoms are monitored and maintained or improved  5/12/2025 1331 by Kaylene Valenzuela LPN  Outcome: Progressing  Flowsheets (Taken 5/12/2025 1331)  Care Plan - Patient's Chronic Conditions and Co-Morbidity Symptoms are Monitored and Maintained or Improved:   Monitor and assess patient's chronic conditions and comorbid symptoms for stability, deterioration, or improvement   Collaborate with multidisciplinary team to address chronic and comorbid conditions and prevent exacerbation or deterioration   Update acute care plan with appropriate goals if chronic or comorbid symptoms are exacerbated and prevent overall improvement and discharge     Problem: Pain  Goal: Verbalizes/displays adequate comfort level or baseline comfort level  Outcome: Progressing  Flowsheets (Taken 5/12/2025 1331)  Verbalizes/displays adequate comfort level or baseline comfort level:   Encourage patient to monitor pain and request assistance   Assess pain using appropriate pain scale   Administer analgesics based on type and severity of pain and evaluate response   Implement non-pharmacological measures as appropriate and evaluate response   Consider cultural and social influences on pain and pain management   Notify Licensed Independent Practitioner if interventions unsuccessful or patient reports new pain     Problem: Safety - Adult  Goal: Free from fall injury  Outcome: Progressing  Flowsheets (Taken 5/12/2025 1331)  Free From Fall Injury:   Instruct family/caregiver on patient safety   Based on caregiver fall risk screen, instruct family/caregiver to ask for assistance with transferring infant if caregiver noted to have fall risk factors     Problem: Skin/Tissue Integrity  Goal: Skin integrity remains intact  Description: 1.  Monitor for areas of redness and/or skin breakdown2.  Assess vascular access sites hourly3.  Every 4-6 hours minimum:

## 2025-05-12 NOTE — PROGRESS NOTES
[facility] Medicine Progress Note  V 5.1      Date of Admission: 5/11/2025    Hospital Day: 2  Status:  Inpatient     Chief Admission Complaint:  abd pain    Presenting Admission History:       75 y.o. female with pmh of restless legs, chronic systolic and diastolic CHF with an EF of 25%, DM2 with CKD3, broken R foot, chronic back pain on oxycodone, AICD  who presents with abd pain.  Pt. Last had diverticulitis about 40 years ago.  AT that time she was told if she continued to have diverticulitis, that she would likely need a partial colectomy.  Then she no longer had issues until she started to eat nuts recently.    24 Hour Events: potassium continues to remain somewhat elevated          Subjective:  BP was on lower side this morning - will give 5 hours of IVF at 50ml per hour - continue with IV abx - pt still with some abd pain - tolerating IV pain medications and that is helping pain  She is supposed to see Dr. Heredia with [facility] in June - we will consult them here to see her today due to ongoing issues with hyperkalemia - I will also order a dose of lokelma        Assessment/Plan:      Current Principal Problem:  Principal Problem:    Acute diverticulitis  Active Problems:    Essential hypertension    COPD (chronic obstructive pulmonary disease) ([facility])    PAF (paroxysmal atrial fibrillation) ([facility])    ICD (implantable cardioverter-defibrillator), dual, in situ    Opioid dependence with current use ([facility])    Chronic renal disease, stage III ([facility]) [971783]    Type 2 diabetes mellitus with chronic kidney disease    Restless legs    Chronic combined systolic and diastolic CHF (congestive heart failure) ([facility])  Resolved Problems:    * No resolved hospital problems. *      Hyperkalemia - give dose of lokelma - consult Dr. Castillo who rounds here for Dr. Heredia for assistance with management of her stage III CKD  CHF - chronic combined systolic-diastolic w/

## 2025-05-12 NOTE — PROGRESS NOTES
Shift assessment completed. Patient is A&O x4.  VSS.  Denies Pain.  IV site patent/ flushed, and saline locked. Patient on RA.  Patient's last BM- 5/6/25. Patient admits to passing gas. Patient ambulates by self to bathroom.  Medication given per MAR.     Safety Measures in place:   Denies any needs at this time.   Bed/ Chair alarm on for safety. -Refused patient moves about independently and will call if help is needed.   Bed locked and in lowest position.    Call light within reach.   Gripper socks applied.   Patient in stable condition when RN leaving room.     Electronically signed by Erika Narvaez RN on 5/12/2025 at 9:34 PM

## 2025-05-12 NOTE — PROGRESS NOTES
CHF Care Plan      Patient's EF (Ejection Fraction) is less than 40%    Heart Failure Medications:  Diuretics:: none    (One of the following REQUIRED for EF </= 40%/SYSTOLIC FAILURE but MAY be used in EF% >40%/DIASTOLIC FAILURE)        ACE:: None        ARB:: None         ARNI:: None    (Beta Blockers)  NON- Evidenced Based Beta Blocker (for EF% >40%/DIASTOLIC FAILURE):       Evidenced Based Beta Blocker::(REQUIRED for EF% <40%/SYSTOLIC FAILURE) Carvedilol- Coreg  ...................................................................................................................................................    Failed to redirect to the Timeline version of the [employer] SmartLink.      Patient's weights and intake/output reviewed    Daily Weight log at bedside, patient/family participation in use of log: \"yes    Patient's current weight today shows a difference of 1 lbs less than last documented weight.      Intake/Output Summary (Last 24 hours) at 5/12/2025 1908  Last data filed at 5/12/2025 1759  Gross per 24 hour   Intake 2753.9 ml   Output 950 ml   Net 1803.9 ml       Education Booklet Provided: yes    Comorbidities Reviewed Yes    Patient has a past medical history of Arthritis, Atrial fibrillation (HCC), CHF (congestive heart failure) (HCC), Congenital heart disease, COPD (chronic obstructive pulmonary disease) (HCC), GERD (gastroesophageal reflux disease), Hypertension, Irritable bowel syndrome, Obesity, On home oxygen therapy, and Restless legs syndrome.     >>For CHF and Comorbidity documentation on Education Time and Topics, please see Education Tab      CHF Education    Learners:  Patient  Readineess:   Acceptance  Method:   Explanation  Response:   Verbalizes Understanding  Comments:     Time Spent:       Pt sitting in bed at this time on room air. Pt denies shortness of breath. Pt with nonpitting lower extremity edema.     Patient and/or Family's stated Goal of Care this Admission: reduce shortness of

## 2025-05-12 NOTE — PROGRESS NOTES
Perfect Serve sent to Dr. Pagan at 1713    \"C3 ONCOLOGY NEW CONSULT ROUTINE [phone] From: [facility] or Facility: [facility] 1950 RE: JOSS FOX RM: [id number] Requesting Doctor: Dr. Moyer New Consult Reason for Consult: New consult placed. Lost IV access needs midline GFR 29. Are you ok with this?\"    Read at 1714, no new orders at this time.

## 2025-05-12 NOTE — CONSULTS
[facility]  GI Consultation                                                                 Patient: Mary Silverio  : 1950       Date:  2025    Subjective:       History of Present Illness  Patient is a 75 y.o.  female admitted with Hyperkalemia [E87.5]  Diverticulitis of colon [K57.32]  Acute diverticulitis [K57.92] who is seen in consult for abdominal pain.  Patient with history of CHF, atrial fibrillation, COPD, obesity who developed onset of abdominal pain starting 2025.  Pain noted in the left lower quadrant and she developed progressive constipation and abdominal bloating with her last bowel movement 2025.  She is noting some improvement in abdominal pain today after starting antibiotics.  CT scan demonstrating diverticulitis.  She has had a prior episode of diverticulitis though reports this was more than 20 years ago.  She denies nausea, vomiting, fevers, chills.  Her last colonoscopy was performed about 10 years ago; she states she had a negative Cologuard 3 years ago.      Past Medical History:   Diagnosis Date    Arthritis     BACK    Atrial fibrillation (HCC)     CHF (congestive heart failure) (HCC)     Congenital heart disease     COPD (chronic obstructive pulmonary disease) (HCC)     GERD (gastroesophageal reflux disease)     Hypertension     Irritable bowel syndrome     Obesity     On home oxygen therapy     O2 2L at night    Restless legs syndrome       Past Surgical History:   Procedure Laterality Date    ABLATION OF DYSRHYTHMIC FOCUS      afib    CHOLECYSTECTOMY, OPEN      COLONOSCOPY      HIP SURGERY Left 2023    LEFT KNEE INTRA ARTICULAR INJECTION SITE CONFIRMED BY FLUOROSCOPY performed by Leif Deluna MD at [facility]    HIP SURGERY Bilateral 2024    BILATERAL KNEE INTRA ARTICULAR INJECTION SITE CONFIRMED BY FLUOROSCOPY performed by Patricio Reaves MD at [facility]    HYSTERECTOMY, VAGINAL      KNEE ARTHROCENTESIS Left 2023  packs/day for 45.8 years (68.7 ttl pk-yrs)     Types: Cigarettes     Start date: 1974     Quit date: 10/28/2019     Years since quittin.5    Smokeless tobacco: Never   Substance Use Topics    Alcohol use: Yes     Comment: occasional        Family History   Problem Relation Age of Onset    No Known Problems Mother     No Known Problems Father     Heart Attack Brother           Review of Systems  Review of Systems   Constitutional:  Negative for activity change, appetite change, chills, fever and unexpected weight change.   HENT:  Negative for mouth sores, postnasal drip, rhinorrhea and trouble swallowing.    Eyes:  Negative for pain and redness.   Respiratory:  Negative for cough and shortness of breath.    Cardiovascular:  Negative for chest pain and leg swelling.   Gastrointestinal:         Per the HPI   Genitourinary:  Negative for difficulty urinating and dysuria.   Musculoskeletal:  Negative for arthralgias and back pain.   Neurological:  Negative for dizziness and light-headedness.   Hematological:  Negative for adenopathy. Does not bruise/bleed easily.   Psychiatric/Behavioral:  Negative for confusion. The patient is not nervous/anxious.             Physical Exam    /63   Pulse 76   Temp 98.4 °F (36.9 °C) (Oral)   Resp 16   Ht 1.702 m (5' 7\")   Wt 113.2 kg (249 lb 9.6 oz)   SpO2 94%   BMI 39.09 kg/m²   General appearance: alert, cooperative, no distress, appears stated age  Anicteric, No Jaundice  Head: Normocephalic, without obvious abnormality  Lungs: clear to auscultation bilaterally, Normal Effort  Heart: regular rate and rhythm, normal S1 and S2, no murmurs or rubs  Abdomen: Soft, nondistended with tenderness to palpation left lower quadrant  Extremities: atraumatic, no cyanosis or edema  Skin: warm and dry  Neuro: intact  AAOX3      Data Review:    Recent Labs     25  0954   WBC 17.8*   HGB 12.5   HCT 38.5   MCV 91.3        Recent Labs     25  0954 25  1046

## 2025-05-13 LAB
ANION GAP SERPL CALCULATED.3IONS-SCNC: 10 MMOL/L (ref 3–16)
BASOPHILS # BLD: 0 K/UL (ref 0–0.2)
BASOPHILS NFR BLD: 0.4 %
BUN SERPL-MCNC: 24 MG/DL (ref 7–20)
CALCIUM SERPL-MCNC: 8.8 MG/DL (ref 8.3–10.6)
CHLORIDE SERPL-SCNC: 105 MMOL/L (ref 99–110)
CO2 SERPL-SCNC: 25 MMOL/L (ref 21–32)
CREAT SERPL-MCNC: 1.9 MG/DL (ref 0.6–1.2)
DEPRECATED RDW RBC AUTO: 15.9 % (ref 12.4–15.4)
EOSINOPHIL # BLD: 0 K/UL (ref 0–0.6)
EOSINOPHIL NFR BLD: 0.4 %
GFR SERPLBLD CREATININE-BSD FMLA CKD-EPI: 27 ML/MIN/{1.73_M2}
GLUCOSE BLD-MCNC: 102 MG/DL (ref 70–99)
GLUCOSE BLD-MCNC: 102 MG/DL (ref 70–99)
GLUCOSE BLD-MCNC: 119 MG/DL (ref 70–99)
GLUCOSE BLD-MCNC: 92 MG/DL (ref 70–99)
GLUCOSE SERPL-MCNC: 90 MG/DL (ref 70–99)
HCT VFR BLD AUTO: 31.2 % (ref 36–48)
HGB BLD-MCNC: 10.4 G/DL (ref 12–16)
LYMPHOCYTES # BLD: 1.7 K/UL (ref 1–5.1)
LYMPHOCYTES NFR BLD: 21 %
MAGNESIUM SERPL-MCNC: 2.71 MG/DL (ref 1.8–2.4)
MCH RBC QN AUTO: 30.4 PG (ref 26–34)
MCHC RBC AUTO-ENTMCNC: 33.4 G/DL (ref 31–36)
MCV RBC AUTO: 91 FL (ref 80–100)
MONOCYTES # BLD: 0.7 K/UL (ref 0–1.3)
MONOCYTES NFR BLD: 8.5 %
NEUTROPHILS # BLD: 5.5 K/UL (ref 1.7–7.7)
NEUTROPHILS NFR BLD: 69.7 %
NT-PROBNP SERPL-MCNC: 339 PG/ML (ref 0–449)
PERFORMED ON: ABNORMAL
PERFORMED ON: NORMAL
PLATELET # BLD AUTO: 245 K/UL (ref 135–450)
PMV BLD AUTO: 6.8 FL (ref 5–10.5)
POTASSIUM SERPL-SCNC: 4.9 MMOL/L (ref 3.5–5.1)
RBC # BLD AUTO: 3.43 M/UL (ref 4–5.2)
SODIUM SERPL-SCNC: 140 MMOL/L (ref 136–145)
WBC # BLD AUTO: 7.9 K/UL (ref 4–11)

## 2025-05-13 PROCEDURE — 85025 COMPLETE CBC W/AUTO DIFF WBC: CPT

## 2025-05-13 PROCEDURE — 1200000000 HC SEMI PRIVATE

## 2025-05-13 PROCEDURE — 99232 SBSQ HOSP IP/OBS MODERATE 35: CPT | Performed by: INTERNAL MEDICINE

## 2025-05-13 PROCEDURE — 6360000002 HC RX W HCPCS: Performed by: INTERNAL MEDICINE

## 2025-05-13 PROCEDURE — 6370000000 HC RX 637 (ALT 250 FOR IP): Performed by: INTERNAL MEDICINE

## 2025-05-13 PROCEDURE — 94640 AIRWAY INHALATION TREATMENT: CPT

## 2025-05-13 PROCEDURE — 36415 COLL VENOUS BLD VENIPUNCTURE: CPT

## 2025-05-13 PROCEDURE — 2500000003 HC RX 250 WO HCPCS: Performed by: INTERNAL MEDICINE

## 2025-05-13 PROCEDURE — 83735 ASSAY OF MAGNESIUM: CPT

## 2025-05-13 PROCEDURE — 80048 BASIC METABOLIC PNL TOTAL CA: CPT

## 2025-05-13 PROCEDURE — 83880 ASSAY OF NATRIURETIC PEPTIDE: CPT

## 2025-05-13 RX ADMIN — DOCUSATE SODIUM 100 MG: 100 CAPSULE, LIQUID FILLED ORAL at 08:55

## 2025-05-13 RX ADMIN — Medication 2 PUFF: at 21:31

## 2025-05-13 RX ADMIN — GABAPENTIN 300 MG: 300 CAPSULE ORAL at 08:55

## 2025-05-13 RX ADMIN — Medication 5000 UNITS: at 09:00

## 2025-05-13 RX ADMIN — CIPROFLOXACIN HYDROCHLORIDE 500 MG: 500 TABLET, FILM COATED ORAL at 08:55

## 2025-05-13 RX ADMIN — DICYCLOMINE HYDROCHLORIDE 20 MG: 20 TABLET ORAL at 10:54

## 2025-05-13 RX ADMIN — ROPINIROLE HYDROCHLORIDE 1 MG: 0.5 TABLET, FILM COATED ORAL at 20:09

## 2025-05-13 RX ADMIN — ROPINIROLE HYDROCHLORIDE 1 MG: 0.5 TABLET, FILM COATED ORAL at 14:09

## 2025-05-13 RX ADMIN — GABAPENTIN 300 MG: 300 CAPSULE ORAL at 17:53

## 2025-05-13 RX ADMIN — DICYCLOMINE HYDROCHLORIDE 20 MG: 20 TABLET ORAL at 06:36

## 2025-05-13 RX ADMIN — METRONIDAZOLE 500 MG: 250 TABLET ORAL at 22:22

## 2025-05-13 RX ADMIN — METRONIDAZOLE 500 MG: 250 TABLET ORAL at 14:09

## 2025-05-13 RX ADMIN — ATORVASTATIN CALCIUM 40 MG: 40 TABLET, FILM COATED ORAL at 20:09

## 2025-05-13 RX ADMIN — DICYCLOMINE HYDROCHLORIDE 20 MG: 20 TABLET ORAL at 17:53

## 2025-05-13 RX ADMIN — POLYETHYLENE GLYCOL 3350 17 G: 17 POWDER, FOR SOLUTION ORAL at 08:54

## 2025-05-13 RX ADMIN — HYDROXYZINE HYDROCHLORIDE 25 MG: 25 TABLET, FILM COATED ORAL at 20:09

## 2025-05-13 RX ADMIN — DICYCLOMINE HYDROCHLORIDE 20 MG: 20 TABLET ORAL at 20:10

## 2025-05-13 RX ADMIN — CIPROFLOXACIN HYDROCHLORIDE 500 MG: 500 TABLET, FILM COATED ORAL at 22:22

## 2025-05-13 RX ADMIN — Medication 2 PUFF: at 08:20

## 2025-05-13 RX ADMIN — AMIODARONE HYDROCHLORIDE 100 MG: 200 TABLET ORAL at 08:54

## 2025-05-13 RX ADMIN — PANTOPRAZOLE SODIUM 40 MG: 40 TABLET, DELAYED RELEASE ORAL at 08:55

## 2025-05-13 RX ADMIN — SODIUM CHLORIDE, PRESERVATIVE FREE 10 ML: 5 INJECTION INTRAVENOUS at 19:29

## 2025-05-13 RX ADMIN — METRONIDAZOLE 500 MG: 250 TABLET ORAL at 06:36

## 2025-05-13 RX ADMIN — EMPAGLIFLOZIN 10 MG: 10 TABLET, FILM COATED ORAL at 08:54

## 2025-05-13 RX ADMIN — APIXABAN 5 MG: 5 TABLET, FILM COATED ORAL at 20:09

## 2025-05-13 RX ADMIN — APIXABAN 5 MG: 5 TABLET, FILM COATED ORAL at 08:55

## 2025-05-13 RX ADMIN — TIOTROPIUM BROMIDE INHALATION SPRAY 2 PUFF: 3.12 SPRAY, METERED RESPIRATORY (INHALATION) at 08:20

## 2025-05-13 RX ADMIN — CARVEDILOL 12.5 MG: 6.25 TABLET, FILM COATED ORAL at 08:54

## 2025-05-13 RX ADMIN — ONDANSETRON 4 MG: 2 INJECTION, SOLUTION INTRAMUSCULAR; INTRAVENOUS at 10:51

## 2025-05-13 RX ADMIN — CARVEDILOL 12.5 MG: 6.25 TABLET, FILM COATED ORAL at 20:09

## 2025-05-13 RX ADMIN — ROPINIROLE HYDROCHLORIDE 1 MG: 0.5 TABLET, FILM COATED ORAL at 08:54

## 2025-05-13 NOTE — PROGRESS NOTES
[facility] Medicine Progress Note  V 5.1      Date of Admission: 5/11/2025    Hospital Day: 3  Status:  Inpatient     Chief Admission Complaint:  abd pain    Presenting Admission History:       75 y.o. female with pmh of restless legs, chronic systolic and diastolic CHF with an EF of 25%, DM2 with CKD3, broken R foot, chronic back pain on oxycodone, AICD  who presents with abd pain.  Pt. Last had diverticulitis about 40 years ago.  AT that time she was told if she continued to have diverticulitis, that she would likely need a partial colectomy.  Then she no longer had issues until she started to eat nuts recently.             Subjective:   ab pain resolved , tolerating liquid diet         Assessment/Plan:      Current Principal Problem:  Principal Problem:    Acute diverticulitis  Active Problems:    Essential hypertension    COPD (chronic obstructive pulmonary disease) ([facility])    PAF (paroxysmal atrial fibrillation) ([facility])    ICD (implantable cardioverter-defibrillator), dual, in situ    Opioid dependence with current use ([facility])    Chronic renal disease, stage III ([facility]) [943924]    Type 2 diabetes mellitus with chronic kidney disease    Restless legs    Chronic combined systolic and diastolic CHF (congestive heart failure) ([facility])    Diverticulitis of colon    CKD (chronic kidney disease) stage 4, GFR 15-29 ml/min ([facility])    Hyperkalemia  Resolved Problems:    * No resolved hospital problems. *      Hyperkalemia -  s/p  lokelma -  resolved , given h/o CKD 3 - nephrology is following   CHF - chronic combined systolic-diastolic w/ reduced EF 25% by Echo dated 10/2024.  Likely due to Hypertensive and Ischemic heart disease. close monitoring of BUN/Creatinine and electrolytes for ADR.  Holding diuretic and entresto at present time due to hypotension and hyperkalemia. Continue current medical management and follow input and output as well as daily weights as recorded and

## 2025-05-13 NOTE — PLAN OF CARE
Problem: Chronic Conditions and Co-morbidities  Goal: Patient's chronic conditions and co-morbidity symptoms are monitored and maintained or improved  Outcome: Progressing     Problem: Pain  Goal: Verbalizes/displays adequate comfort level or baseline comfort level  Outcome: Progressing     Problem: Safety - Adult  Goal: Free from fall injury  Outcome: Progressing     Problem: Skin/Tissue Integrity  Goal: Skin integrity remains intact  Description: 1.  Monitor for areas of redness and/or skin breakdown2.  Assess vascular access sites hourly3.  Every 4-6 hours minimum:  Change oxygen saturation probe site4.  Every 4-6 hours:  If on nasal continuous positive airway pressure, respiratory therapy assess nares and determine need for appliance change or resting period  Outcome: Progressing

## 2025-05-13 NOTE — CONSULTS
Consult Call Back    Who:Gopal Castillo MD   Date:5/13/2025,  Time:7:06 AM    Electronically signed by Kim Maier on 5/13/25 at 7:06 AM EDT

## 2025-05-13 NOTE — PROGRESS NOTES
Nephrology Consult Note                                                                                                                                                                                                                                                                                                                                                               Office : [phone]     Fax :[phone]    Patient's Name: Mary Silverio  9:13 AM  5/13/2025    Reason for Consult:  CKD, hyperK, need for midline   Requesting Physician:  Kaylene Goodman APRN - CNP  Chief Complaint:    Chief Complaint   Patient presents with    Abdominal Pain     Patient reports lower left flank and abdominal pain that started 3 days ago. Patient reports last bowel movement was diarrhea on Thursday and small BM yesterday. Pain is 9 on 1-10 scale        Assessment/Plan     # Acute diverticulitis   On Cipro Flagyl  Consideration for I.V ABx  If no access to PIV and oral Tx not adequate - ok to place midline (dominant hand)    # CKD IV   2/2 DKD ischemic   stable   No NELIA  Plan;  No nephrotoxins   Monitor BMP   Low K diet   4. Low threshold to hold SGLT-2 if BP lower     # HTN-now BP low- see above     # Anemia  Acute mild   No role for PEDRITO    # Acid- base/ Electrolyte imbalance   Hyper- K - resolved, keep low K diet   Given Lokelma     # DM   Tx per IM team   Needs good glucose control  Low threshold to hold SGLT-2 if BP lower     History of Present Ilness:    Mary Silverio is a 75 y.o. 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  Pt. Last had diverticulitis about 40 years ago.  AT that time she was told if she continued to have diverticulitis, that she would likely need a partial colectomy.  Pt admitted with severe L lowe abdominal pain   Pt denies CP/SOB/palpitations//N/V.   Pt  denies fever/ chills  Pt denies dysuria or hematuria   Pain is better now    Cr at admission 1.8 which is her base line   Hb 10.5  K 5.6   CT ABD: Acute sigmoid colon diverticulitis  without evidence for perforation or abscess , no hydro   BP soft   Pt given Lokelma     Interval hx   Pt feels better  Less abdominal pain   Pt denies CP/SOB/palpitations/N/V.   Pt denies fever/ chills  Pt denies dysuria or hematuria   Cr stable   Lytes ok   PIV working   Hyper- K resolved     Past Medical History:   Diagnosis Date    Arthritis     BACK    Atrial fibrillation (HCC)     CHF (congestive heart failure) (HCC)     Congenital heart disease     COPD (chronic obstructive pulmonary disease) (HCC)     GERD (gastroesophageal reflux disease)     Hypertension     Irritable bowel syndrome     Obesity     On home oxygen therapy     O2 2L at night    Restless legs syndrome        Past Surgical History:   Procedure Laterality Date    ABLATION OF DYSRHYTHMIC FOCUS      afib    CHOLECYSTECTOMY, OPEN      COLONOSCOPY      HIP SURGERY Left 05/04/2023    LEFT KNEE INTRA ARTICULAR INJECTION SITE CONFIRMED BY FLUOROSCOPY performed by Leif Deluna MD at [facility] OR    HIP SURGERY Bilateral 02/08/2024    BILATERAL KNEE INTRA ARTICULAR INJECTION SITE CONFIRMED BY FLUOROSCOPY performed by Patricio Reaves MD at [facility] OR    HYSTERECTOMY, VAGINAL      KNEE ARTHROCENTESIS Left 07/13/2023    LEFT KNEE INTRA ARTICULAR INJECTION SITE CONFIRMED BY FLUOROSCOPY performed by Leif Deluna MD at [facility] OR    PACEMAKER PLACEMENT      PAIN MANAGEMENT PROCEDURE Left 05/25/2023    LEFT KNEE INTRA ARTICULAR INJECTION SITE CONFIRMED BY FLUOROSCOPY performed by Leif Deluna MD at [facility] OR    STIMULATOR SURGERY      SPINAL    STIMULATOR SURGERY N/A 08/22/2024    REMOVAL OF PERCUTANEOUS SPINAL CORD STIMULATOR LEAD AND BATTERY performed by Patricio Reaves MD at [facility] OR    WRIST FRACTURE SURGERY Right 11/06/2023    RIGHT OPEN REDUCTION AND INTERNAL

## 2025-05-13 NOTE — PROGRESS NOTES
Patient's EF (Ejection Fraction) is less than 40%    Patient has a past medical history of Arthritis, Atrial fibrillation (HCC), CHF (congestive heart failure) (HCC), Congenital heart disease, COPD (chronic obstructive pulmonary disease) (HCC), GERD (gastroesophageal reflux disease), Hypertension, Irritable bowel syndrome, Obesity, On home oxygen therapy, and Restless legs syndrome. Comorbidities reviewed and education provided.    Patient and family's stated goal of care: be more comfortable prior to discharge    Patient's current functional capacity:  No limitation of physical activity. Ordinary physical activity does not cause undue fatigue, palpitation, dyspnea.    Pt sitting in bed at this time on RA   Pt denies shortness of breath. Pt with nonpitting lower extremity edema. Patient's last three weights and intake/output reviewed:    Patient Vitals for the past 96 hrs (Last 3 readings):   Weight   05/13/25 0508 112.1 kg (247 lb 1.6 oz)   05/12/25 0327 112.7 kg (248 lb 8 oz)   05/11/25 0931 113.2 kg (249 lb 9.6 oz)       Intake/Output Summary (Last 24 hours) at 5/13/2025 0939  Last data filed at 5/13/2025 0916  Gross per 24 hour   Intake 1110 ml   Output 1775 ml   Net -665 ml     Patient provided with education on CHF signs/symptoms, causes, discharge medications, daily weights, low sodium diet, activity, and follow-up. Notified patient to call the doctor post discharge if patient experiences shortness of breath, chest pain, swelling, cough, or weight gain of three pounds in a day/five pounds in a week. Also notified patient to call the doctor with dizziness, increased fatigue, decreased or difficulty urinating.     Pt verbalized understanding. No additional questions at this time.    Education Time: 10 Minutes

## 2025-05-13 NOTE — PROGRESS NOTES
INPATIENT PROGRESS NOTE        IDENTIFYING DATA/REASON FOR CONSULTATION   PATIENT:  Mary Silverio  MRN:  [id number]  ADMIT DATE: 5/11/2025  TIME OF EVALUATION: 5/13/2025 4:45 PM  HOSPITAL STAY:   LOS: 2 days   CONSULTING PHYSICIAN: Aviva Holcomb MD   REASON FOR CONSULTATION: acute diverticulitis     Subjective:    Patient seen in follow-up.  She was sleepy when I arrived but reports she is feeling better.  Tolerating liquid diet.    MEDICATIONS   SCHEDULED:  ciprofloxacin, 500 mg, Q12H  metroNIDAZOLE, 500 mg, 3 times per day  polyethylene glycol, 17 g, Daily  docusate sodium, 100 mg, Daily  dicyclomine, 20 mg, 4x Daily AC & HS  sodium chloride flush, 5-40 mL, 2 times per day  lidocaine 1 % injection, 50 mg, Once  amiodarone, 100 mg, Daily  apixaban, 5 mg, BID  atorvastatin, 40 mg, Daily  budesonide-formoterol, 2 puff, BID RT   And  tiotropium, 2 puff, Daily RT  carvedilol, 12.5 mg, BID  empagliflozin, 10 mg, Daily  gabapentin, 300 mg, BID  hydrOXYzine HCl, 25 mg, Nightly  lidocaine, 1 patch, Daily  pantoprazole, 40 mg, Daily  rOPINIRole, 1 mg, TID  Vitamin D, 5,000 Units, Daily      FLUIDS/DRIPS:     sodium chloride      dextrose       PRNs: sodium chloride flush, 5-40 mL, PRN  sodium chloride, , PRN  glucose, 4 tablet, PRN  dextrose bolus, 125 mL, PRN   Or  dextrose bolus, 250 mL, PRN  glucagon (rDNA), 1 mg, PRN  dextrose, , Continuous PRN  ondansetron, 4 mg, Q8H PRN   Or  ondansetron, 4 mg, Q6H PRN  acetaminophen, 650 mg, Q6H PRN   Or  acetaminophen, 650 mg, Q6H PRN  morphine, 2 mg, Q4H PRN  oxyCODONE-acetaminophen, 1 tablet, Q6H PRN      ALLERGIES:    Allergies   Allergen Reactions    Codeine Anaphylaxis    Iv Contrast [Iodides] Anaphylaxis     2002    Penicillins Other (See Comments)     AS A CHILD UNABLE TO RECALL REACTION-?RASH    Sulfa Antibiotics      Other reaction(s): Unknown-AS A CHILD UNABLE TO RECALL         PHYSICAL EXAM   VITALS:  BP 93/81   Pulse 64   Temp 97.7 °F (36.5 °C)   Resp 18   Ht

## 2025-05-14 VITALS
HEART RATE: 66 BPM | RESPIRATION RATE: 17 BRPM | DIASTOLIC BLOOD PRESSURE: 68 MMHG | OXYGEN SATURATION: 94 % | BODY MASS INDEX: 38.64 KG/M2 | TEMPERATURE: 97.3 F | HEIGHT: 67 IN | WEIGHT: 246.2 LBS | SYSTOLIC BLOOD PRESSURE: 108 MMHG

## 2025-05-14 LAB
ANION GAP SERPL CALCULATED.3IONS-SCNC: 13 MMOL/L (ref 3–16)
BASOPHILS # BLD: 0 K/UL (ref 0–0.2)
BASOPHILS NFR BLD: 0.4 %
BUN SERPL-MCNC: 26 MG/DL (ref 7–20)
CALCIUM SERPL-MCNC: 9 MG/DL (ref 8.3–10.6)
CHLORIDE SERPL-SCNC: 107 MMOL/L (ref 99–110)
CO2 SERPL-SCNC: 17 MMOL/L (ref 21–32)
CREAT SERPL-MCNC: 1.8 MG/DL (ref 0.6–1.2)
DEPRECATED RDW RBC AUTO: 16.6 % (ref 12.4–15.4)
EOSINOPHIL # BLD: 0.1 K/UL (ref 0–0.6)
EOSINOPHIL NFR BLD: 0.8 %
GFR SERPLBLD CREATININE-BSD FMLA CKD-EPI: 29 ML/MIN/{1.73_M2}
GLUCOSE BLD-MCNC: 134 MG/DL (ref 70–99)
GLUCOSE BLD-MCNC: 94 MG/DL (ref 70–99)
GLUCOSE SERPL-MCNC: 78 MG/DL (ref 70–99)
HCT VFR BLD AUTO: 32.4 % (ref 36–48)
HGB BLD-MCNC: 10.5 G/DL (ref 12–16)
LYMPHOCYTES # BLD: 1.9 K/UL (ref 1–5.1)
LYMPHOCYTES NFR BLD: 24 %
MAGNESIUM SERPL-MCNC: 2.98 MG/DL (ref 1.8–2.4)
MCH RBC QN AUTO: 30 PG (ref 26–34)
MCHC RBC AUTO-ENTMCNC: 32.5 G/DL (ref 31–36)
MCV RBC AUTO: 92.5 FL (ref 80–100)
MONOCYTES # BLD: 0.7 K/UL (ref 0–1.3)
MONOCYTES NFR BLD: 8.4 %
NEUTROPHILS # BLD: 5.2 K/UL (ref 1.7–7.7)
NEUTROPHILS NFR BLD: 66.4 %
PERFORMED ON: ABNORMAL
PERFORMED ON: NORMAL
PLATELET # BLD AUTO: 240 K/UL (ref 135–450)
PMV BLD AUTO: 6.8 FL (ref 5–10.5)
POTASSIUM SERPL-SCNC: 5 MMOL/L (ref 3.5–5.1)
RBC # BLD AUTO: 3.5 M/UL (ref 4–5.2)
SODIUM SERPL-SCNC: 137 MMOL/L (ref 136–145)
WBC # BLD AUTO: 7.9 K/UL (ref 4–11)

## 2025-05-14 PROCEDURE — 94761 N-INVAS EAR/PLS OXIMETRY MLT: CPT

## 2025-05-14 PROCEDURE — 2700000000 HC OXYGEN THERAPY PER DAY

## 2025-05-14 PROCEDURE — 6370000000 HC RX 637 (ALT 250 FOR IP): Performed by: INTERNAL MEDICINE

## 2025-05-14 PROCEDURE — 85025 COMPLETE CBC W/AUTO DIFF WBC: CPT

## 2025-05-14 PROCEDURE — 94640 AIRWAY INHALATION TREATMENT: CPT

## 2025-05-14 PROCEDURE — 36415 COLL VENOUS BLD VENIPUNCTURE: CPT

## 2025-05-14 PROCEDURE — 80048 BASIC METABOLIC PNL TOTAL CA: CPT

## 2025-05-14 PROCEDURE — 83735 ASSAY OF MAGNESIUM: CPT

## 2025-05-14 PROCEDURE — 2500000003 HC RX 250 WO HCPCS: Performed by: INTERNAL MEDICINE

## 2025-05-14 RX ADMIN — AMIODARONE HYDROCHLORIDE 100 MG: 200 TABLET ORAL at 08:25

## 2025-05-14 RX ADMIN — TIOTROPIUM BROMIDE INHALATION SPRAY 2 PUFF: 3.12 SPRAY, METERED RESPIRATORY (INHALATION) at 08:23

## 2025-05-14 RX ADMIN — APIXABAN 5 MG: 5 TABLET, FILM COATED ORAL at 08:25

## 2025-05-14 RX ADMIN — Medication 5000 UNITS: at 08:25

## 2025-05-14 RX ADMIN — CIPROFLOXACIN HYDROCHLORIDE 500 MG: 500 TABLET, FILM COATED ORAL at 08:25

## 2025-05-14 RX ADMIN — GABAPENTIN 300 MG: 300 CAPSULE ORAL at 08:25

## 2025-05-14 RX ADMIN — PANTOPRAZOLE SODIUM 40 MG: 40 TABLET, DELAYED RELEASE ORAL at 08:25

## 2025-05-14 RX ADMIN — ROPINIROLE HYDROCHLORIDE 1 MG: 0.5 TABLET, FILM COATED ORAL at 08:25

## 2025-05-14 RX ADMIN — EMPAGLIFLOZIN 10 MG: 10 TABLET, FILM COATED ORAL at 08:25

## 2025-05-14 RX ADMIN — SODIUM CHLORIDE, PRESERVATIVE FREE 10 ML: 5 INJECTION INTRAVENOUS at 08:28

## 2025-05-14 RX ADMIN — CARVEDILOL 12.5 MG: 6.25 TABLET, FILM COATED ORAL at 08:25

## 2025-05-14 RX ADMIN — DOCUSATE SODIUM 100 MG: 100 CAPSULE, LIQUID FILLED ORAL at 08:25

## 2025-05-14 RX ADMIN — Medication 2 PUFF: at 08:23

## 2025-05-14 RX ADMIN — METRONIDAZOLE 500 MG: 250 TABLET ORAL at 05:48

## 2025-05-14 RX ADMIN — DICYCLOMINE HYDROCHLORIDE 20 MG: 20 TABLET ORAL at 05:48

## 2025-05-14 NOTE — DISCHARGE SUMMARY
Hospital Medicine Discharge Summary    Patient: Mary Silverio   : 1950     Hospital:  [facility]  Admit Date: 2025   Discharge Date: 2025    Disposition:  Home   Code status:  Full  Condition at Discharge: Stable  Primary Care Provider: Kaylene Goodman APRN - CNP    Admitting Provider: Deborah Moyer MD  Discharge Provider: Aviva Holcomb MD     Discharge Diagnoses:      Active Hospital Problems    Diagnosis     ICD (implantable cardioverter-defibrillator), dual, in situ [Z95.810]      Priority: Medium    COPD (chronic obstructive pulmonary disease) ([facility]) [J44.9]      Priority: Medium    Essential hypertension [I10]      Priority: Medium    PAF (paroxysmal atrial fibrillation) ([facility]) [I48.0]      Priority: Medium    Diverticulitis of colon [K57.32]     CKD (chronic kidney disease) stage 4, GFR 15-29 ml/min ([facility]) [N18.4]     Hyperkalemia [E87.5]     Acute diverticulitis [K57.92]     Chronic combined systolic and diastolic CHF (congestive heart failure) ([facility]) [I50.42]     Restless legs [G25.81]     Type 2 diabetes mellitus with chronic kidney disease [E11.22]     Chronic renal disease, stage III ([facility]) [023999] [N18.30]     Opioid dependence with current use ([facility]) [F11.20]        Presenting Admission History:      75 y.o. female with pmh of restless legs, chronic systolic and diastolic CHF with an EF of 25%, DM2 with CKD3, broken R foot, chronic back pain on oxycodone, AICD  who presents with abd pain.  Pt. Last had diverticulitis about 40 years ago.  AT that time she was told if she continued to have diverticulitis, that she would likely need a partial colectomy.  Then she no longer had issues until she started to eat nuts recently.        Assessment/Plan:      Hyperkalemia -  s/p  lokelma -  resolved , given h/o CKD 3 - nephrology  -ok for dc   CHF - chronic combined systolic-diastolic w/ reduced EF 25% by Echo dated 10/2024.  Likely due to Hypertensive and Ischemic

## 2025-05-14 NOTE — PROGRESS NOTES
Discharge instructions reviewed with patient. Reviewed all follow up appts. Meds to be picked up at [facility] in [place]. All questions answered.

## 2025-05-14 NOTE — PROGRESS NOTES
Physician Progress Note      PATIENT:               JOSS FOX  [facility] #:                  [id number]  :                       1950  ADMIT DATE:       2025 9:21 AM  DISCH DATE:  RESPONDING  PROVIDER #:        Aviva Holcomb MD          QUERY TEXT:    Acute sigmoid diverticulitis is documented in MD notes 25.  Please   specify the type of colitis such as:    The clinical indicators include:  CT  \": Acute sigmoid colon diverticulitis\"  -WBC=17.8  GI notes  \"Feeling better today after antibiotics and clear liquids. \"  -Meds- IV Cipro, IV Flagyl  Options provided:  -- Patient treated for Bacterial Colitis  -- Other - I will add my own diagnosis  -- Disagree - Not applicable / Not valid  -- Disagree - Clinically unable to determine / Unknown  -- Refer to Clinical Documentation Reviewer    PROVIDER RESPONSE TEXT:    Diverticulitis - acute    Query created by: Clementina Marie on 2025 12:32 PM      QUERY TEXT:    Anemia is documented in Nephrology notes 25. Please specify the type:    The clinical indicators include:  -MD notes \"Anemia-Acute mild. CKD IV \"  -CBC monitoring, Nephrology consult  Options provided:  -- Related to chronic disease, Please specify chronic disease such as CKD,   kidney failure, neoplastic disease.  -- Other - I will add my own diagnosis  -- Disagree - Not applicable / Not valid  -- Disagree - Clinically unable to determine / Unknown  -- Refer to Clinical Documentation Reviewer    PROVIDER RESPONSE TEXT:    The patient's anemia is related to chronic disease CKDIV    Query created by: Clementina Marie on 2025 10:27 AM      Electronically signed by:  Aviva Holcomb MD 2025 7:25 AM

## 2025-05-14 NOTE — PROGRESS NOTES
Pt assessment completed and charted. Pt A&O v4. VSS.   IV site patent/flushed, saline locked.   Medication given per MAR.      Safety Measures in place:   Bed in lowest position and wheels locked.   Call light and bedside table within reach.   Non-skid socks in place.   Pt denies any other needs at this time.    Pt calls out appropriately.  Patient in stable condition when RN left room.

## 2025-05-14 NOTE — CARE COORDINATION
Chart reviewed day 3. Care managed by nephrology and IM, GI signed off with recs. Per notes, feeling better. From hm w . Amb w walker due to bad foot. HS on O2 from co in [place]. Following for needs. Tony Robertson RN

## 2025-05-14 NOTE — PLAN OF CARE
Problem: Chronic Conditions and Co-morbidities  Goal: Patient's chronic conditions and co-morbidity symptoms are monitored and maintained or improved  Outcome: Adequate for Discharge     Problem: Pain  Goal: Verbalizes/displays adequate comfort level or baseline comfort level  Outcome: Adequate for Discharge     Problem: Safety - Adult  Goal: Free from fall injury  Outcome: Adequate for Discharge     Problem: Skin/Tissue Integrity  Goal: Skin integrity remains intact  Description: 1.  Monitor for areas of redness and/or skin breakdown2.  Assess vascular access sites hourly3.  Every 4-6 hours minimum:  Change oxygen saturation probe site4.  Every 4-6 hours:  If on nasal continuous positive airway pressure, respiratory therapy assess nares and determine need for appliance change or resting period  Outcome: Adequate for Discharge

## 2025-05-14 NOTE — PLAN OF CARE
Problem: Pain  Goal: Verbalizes/displays adequate comfort level or baseline comfort level  5/13/2025 2124 by Glenroy Santos, RN  Outcome: Progressing  Flowsheets (Taken 5/13/2025 2124)  Verbalizes/displays adequate comfort level or baseline comfort level:   Encourage patient to monitor pain and request assistance   Assess pain using appropriate pain scale   Administer analgesics based on type and severity of pain and evaluate response   Implement non-pharmacological measures as appropriate and evaluate response     Problem: Safety - Adult  Goal: Free from fall injury  5/13/2025 2124 by Glenroy Santos, RN  Outcome: Progressing     Problem: Skin/Tissue Integrity  Goal: Skin integrity remains intact  Description: 1.  Monitor for areas of redness and/or skin breakdown2.  Assess vascular access sites hourly3.  Every 4-6 hours minimum:  Change oxygen saturation probe site4.  Every 4-6 hours:  If on nasal continuous positive airway pressure, respiratory therapy assess nares and determine need for appliance change or resting period  5/13/2025 2124 by Glenroy Santos, RN  Outcome: Progressing

## 2025-05-15 ENCOUNTER — TELEPHONE (OUTPATIENT)
Dept: FAMILY MEDICINE CLINIC | Age: 75
End: 2025-05-15

## 2025-05-15 NOTE — TELEPHONE ENCOUNTER
Care Transitions Initial Follow Up Call    Outreach made within 2 business days of discharge: Yes    Patient: Mary Silverio Patient : 1950   MRN: [id number]  Reason for Admission: Diverticulitis  Discharge Date: 25       Spoke with: L/RODNEY for patient to return call     Discharge department/facility: [facility]    TCM Interactive Patient Contact:  Was patient able to fill all prescriptions:    Was patient instructed to bring all medications to the follow-up visit:   Is patient taking all medications as directed in the discharge summary?   Does patient understand their discharge instructions:   Does patient have questions or concerns that need addressed prior to 7-14 day follow up office visit:     Additional needs identified to be addressed with provider               Scheduled appointment with PCP within 7-14 days    Follow Up  Future Appointments   Date Time Provider Department Center   2025 10:40 AM Walter Macdonald, PT MHAZ AND PT Linda HOD   2025 11:30 AM Kaylene Goodman APRN - CNP [place] ECC DEP   2025  2:40 PM Patricio Reaves MD AFL TSP AND AFL Tri Stat   2025 10:20 AM Walter Macdonald, PT [facility] AND PT [facility] HOD   2025 10:40 AM Justine Reyes, PT [facility] AND PT [facility] HOD   2025 10:40 AM Justine Reyes, PT M[facility]Z AND PT [facility] HOD   2025 10:15 AM Sawyer Syed MD AND ORTHO MMA   2025  1:45 PM Luciano Heredia MD AFL NEPH [place] AFL Nephrolo   2025  1:00 PM LINDA BLANK DEVICE CHECK Linda Car MMA   2025  1:00 PM Chloe Benito APRN - CNP [facility] Car MMA   2025  1:00 PM Marilynn Rodgers APRN - CNP [facility] Car MMA   2025 10:30 AM MHA CT VCT MHAZ CT Linda Rad   2025 10:30 AM Agapito Brandon MD CLERM PULM [facility]       Leslie Nath MA

## 2025-05-15 NOTE — TELEPHONE ENCOUNTER
Care Transitions Initial Follow Up Call    Outreach made within 2 business days of discharge: Yes    Patient: Mary Silverio Patient : 1950   MRN: [id number]  Reason for Admission: Divertculitis  Discharge Date: 25       Spoke with: Mary Silverio     Discharge department/facility: [facility]    TCM Interactive Patient Contact:  Was patient able to fill all prescriptions: Yes  Was patient instructed to bring all medications to the follow-up visit: Yes  Is patient taking all medications as directed in the discharge summary? Yes  Does patient understand their discharge instructions: Yes  Does patient have questions or concerns that need addressed prior to 7-14 day follow up office visit: no    Additional needs identified to be addressed with provider  No needs identified             Scheduled appointment with PCP within 7-14 days    Follow Up  Future Appointments   Date Time Provider Department Center   2025 10:40 AM Walter Macdonald, PT [facility] AND PT [facility] HOD   2025 11:30 AM Kaylene Goodman APRN - CNP [place] ECC DEP   2025  2:40 PM Patricio Reaves MD AFL TSP AND AFL [facility] [place]   2025 10:20 AM Walter Macdonald, PT [facility]Z AND PT [facility] HOD   2025 10:40 AM Justine Reyes, PT M[facility]Z AND PT [facility] HOD   2025 10:40 AM Justine Reyes, PT MHAZ AND PT [facility] HOD   2025 10:15 AM Sawyer Syed MD AND ORTHO MMA   2025  1:45 PM Luciano Heredia MD AFL NEPH [place] AFL Nephrolo   2025  1:00 PM LINDA BLANK DEVICE CHECK Linda Car MMA   2025  1:00 PM Chloe Benito APRN - CNP [facility] Car MMA   2025  1:00 PM Marilynn Rodgers APRN - CNP [facility] Car MMA   2025 10:30 AM MHA CT VCT MHAZ CT Linda Rad   2025 10:30 AM Agapito Brandon MD CLERM PULM [facility]       Leslie Nath MA

## 2025-05-19 ENCOUNTER — OFFICE VISIT (OUTPATIENT)
Dept: FAMILY MEDICINE CLINIC | Age: 75
End: 2025-05-19
Payer: MEDICARE

## 2025-05-19 ENCOUNTER — APPOINTMENT (OUTPATIENT)
Dept: PHYSICAL THERAPY | Age: 75
DRG: 392 | End: 2025-05-19
Attending: ORTHOPAEDIC SURGERY
Payer: MEDICARE

## 2025-05-19 VITALS
SYSTOLIC BLOOD PRESSURE: 118 MMHG | HEIGHT: 67 IN | HEART RATE: 72 BPM | DIASTOLIC BLOOD PRESSURE: 70 MMHG | OXYGEN SATURATION: 98 % | WEIGHT: 252 LBS | BODY MASS INDEX: 39.55 KG/M2

## 2025-05-19 DIAGNOSIS — I50.23 ACUTE ON CHRONIC SYSTOLIC HEART FAILURE (HCC): Primary | ICD-10-CM

## 2025-05-19 DIAGNOSIS — N18.31 STAGE 3A CHRONIC KIDNEY DISEASE (HCC): ICD-10-CM

## 2025-05-19 LAB
ANION GAP SERPL CALCULATED.3IONS-SCNC: 12 MMOL/L (ref 3–16)
BUN SERPL-MCNC: 19 MG/DL (ref 7–20)
CALCIUM SERPL-MCNC: 9.1 MG/DL (ref 8.3–10.6)
CHLORIDE SERPL-SCNC: 111 MMOL/L (ref 99–110)
CO2 SERPL-SCNC: 22 MMOL/L (ref 21–32)
CREAT SERPL-MCNC: 1.7 MG/DL (ref 0.6–1.2)
GFR SERPLBLD CREATININE-BSD FMLA CKD-EPI: 31 ML/MIN/{1.73_M2}
GLUCOSE SERPL-MCNC: 81 MG/DL (ref 70–99)
POTASSIUM SERPL-SCNC: 4.7 MMOL/L (ref 3.5–5.1)
SODIUM SERPL-SCNC: 145 MMOL/L (ref 136–145)

## 2025-05-19 PROCEDURE — 3074F SYST BP LT 130 MM HG: CPT | Performed by: NURSE PRACTITIONER

## 2025-05-19 PROCEDURE — G8399 PT W/DXA RESULTS DOCUMENT: HCPCS | Performed by: NURSE PRACTITIONER

## 2025-05-19 PROCEDURE — 1111F DSCHRG MED/CURRENT MED MERGE: CPT | Performed by: NURSE PRACTITIONER

## 2025-05-19 PROCEDURE — G8417 CALC BMI ABV UP PARAM F/U: HCPCS | Performed by: NURSE PRACTITIONER

## 2025-05-19 PROCEDURE — 36415 COLL VENOUS BLD VENIPUNCTURE: CPT | Performed by: NURSE PRACTITIONER

## 2025-05-19 PROCEDURE — 1123F ACP DISCUSS/DSCN MKR DOCD: CPT | Performed by: NURSE PRACTITIONER

## 2025-05-19 PROCEDURE — 1159F MED LIST DOCD IN RCRD: CPT | Performed by: NURSE PRACTITIONER

## 2025-05-19 PROCEDURE — G8427 DOCREV CUR MEDS BY ELIG CLIN: HCPCS | Performed by: NURSE PRACTITIONER

## 2025-05-19 PROCEDURE — 1090F PRES/ABSN URINE INCON ASSESS: CPT | Performed by: NURSE PRACTITIONER

## 2025-05-19 PROCEDURE — 1036F TOBACCO NON-USER: CPT | Performed by: NURSE PRACTITIONER

## 2025-05-19 PROCEDURE — 3078F DIAST BP <80 MM HG: CPT | Performed by: NURSE PRACTITIONER

## 2025-05-19 PROCEDURE — 99213 OFFICE O/P EST LOW 20 MIN: CPT | Performed by: NURSE PRACTITIONER

## 2025-05-19 PROCEDURE — 1160F RVW MEDS BY RX/DR IN RCRD: CPT | Performed by: NURSE PRACTITIONER

## 2025-05-19 ASSESSMENT — ENCOUNTER SYMPTOMS
STRIDOR: 0
WHEEZING: 0
COLOR CHANGE: 0
SHORTNESS OF BREATH: 0
SORE THROAT: 0
COUGH: 0
CHEST TIGHTNESS: 0

## 2025-05-19 NOTE — ASSESSMENT & PLAN NOTE
Followed by cardiology per patient weight is up 5 pounds and she did take furosemide this morning.  Labs drawn today to evaluate possible additional medications.  Patient will continue to monitor weight daily at home same scale same time of day for evaluation of weight gain.  Patient will go to the emergency room for shortness of breath chest pain or difficulty breathing   Conversations may occur with other providers to determine best course of action for patient based on fluid status kidney function and cardiovascular status

## 2025-05-19 NOTE — PROGRESS NOTES
Amount: 2 tablets 60 tablet 0    atorvastatin (LIPITOR) 40 MG tablet Take 1 tablet by mouth daily 90 tablet 3    hydrOXYzine HCl (ATARAX) 25 MG tablet Take 1 tablet by mouth nightly 90 tablet 1    empagliflozin (JARDIANCE) 10 MG tablet Take 1 tablet by mouth daily 90 tablet 2    apixaban (ELIQUIS) 5 MG TABS tablet Take 1 tablet by mouth 2 times daily 180 tablet 1    Budeson-Glycopyrrol-Formoterol (BREZTRI AEROSPHERE) 160-9-4.8 MCG/ACT AERO Inhale 2 puffs into the lungs 2 times daily 10.7 g 1    diclofenac sodium (VOLTAREN) 1 % GEL Apply 4 g topically 4 times daily 350 g 0    carvedilol (COREG) 12.5 MG tablet TAKE 1 TABLET TWICE A  tablet 3    chlorhexidine gluconate (HIBICLENS) 4 % SOLN external solution Shower with for 7 days prior to procedure. Start using on 8/15/24! 15 mL 0    VITAMIN D PO Take 5,000 Units by mouth daily      lidocaine (LIDODERM) 5 % Place onto the skin as needed      amiodarone (PACERONE) 100 MG tablet TAKE 1 TABLET DAILY 90 tablet 3    OXYGEN Inhale 2 L/min into the lungs at bedtime          Medications patient taking as of now reconciled against medications ordered at time of hospital discharge: Yes    Chief Complaint   Patient presents with    Follow-Up from Hospital       HPI    Inpatient course: Discharge summary reviewed- see chart.    Interval history/Current status: At home using walker for ambulation has boot on right foot    Review of Systems   Constitutional:  Positive for unexpected weight change.   HENT:  Negative for sore throat.    Respiratory:  Negative for cough, chest tightness, shortness of breath, wheezing and stridor.    Cardiovascular:  Negative for chest pain and palpitations.   Musculoskeletal:  Positive for gait problem.   Skin:  Negative for color change.   Neurological:  Negative for facial asymmetry, light-headedness and headaches.       Reviewed. See MA note.    Vitals:    05/19/25 1122   BP: 118/70   BP Site: Left Upper Arm   Patient Position: Sitting

## 2025-05-20 ENCOUNTER — RESULTS FOLLOW-UP (OUTPATIENT)
Dept: FAMILY MEDICINE CLINIC | Age: 75
End: 2025-05-20

## 2025-05-21 ENCOUNTER — APPOINTMENT (OUTPATIENT)
Dept: PHYSICAL THERAPY | Age: 75
DRG: 392 | End: 2025-05-21
Attending: ORTHOPAEDIC SURGERY
Payer: MEDICARE

## 2025-05-27 ENCOUNTER — HOSPITAL ENCOUNTER (OUTPATIENT)
Dept: PHYSICAL THERAPY | Age: 75
Setting detail: THERAPIES SERIES
DRG: 392 | End: 2025-05-27
Attending: ORTHOPAEDIC SURGERY
Payer: MEDICARE

## 2025-05-29 ENCOUNTER — APPOINTMENT (OUTPATIENT)
Dept: PHYSICAL THERAPY | Age: 75
DRG: 392 | End: 2025-05-29
Attending: ORTHOPAEDIC SURGERY
Payer: MEDICARE

## 2025-06-02 ENCOUNTER — OFFICE VISIT (OUTPATIENT)
Dept: ORTHOPEDIC SURGERY | Age: 75
End: 2025-06-02
Payer: MEDICARE

## 2025-06-02 VITALS — HEART RATE: 73 BPM | HEIGHT: 67 IN | BODY MASS INDEX: 38.92 KG/M2 | WEIGHT: 248 LBS | OXYGEN SATURATION: 99 %

## 2025-06-02 DIAGNOSIS — M79.671 RIGHT FOOT PAIN: ICD-10-CM

## 2025-06-02 DIAGNOSIS — S92.351D CLOSED DISPLACED FRACTURE OF FIFTH METATARSAL BONE OF RIGHT FOOT WITH ROUTINE HEALING, SUBSEQUENT ENCOUNTER: Primary | ICD-10-CM

## 2025-06-02 PROCEDURE — G8427 DOCREV CUR MEDS BY ELIG CLIN: HCPCS | Performed by: ORTHOPAEDIC SURGERY

## 2025-06-02 PROCEDURE — 1090F PRES/ABSN URINE INCON ASSESS: CPT | Performed by: ORTHOPAEDIC SURGERY

## 2025-06-02 PROCEDURE — 1159F MED LIST DOCD IN RCRD: CPT | Performed by: ORTHOPAEDIC SURGERY

## 2025-06-02 PROCEDURE — 1036F TOBACCO NON-USER: CPT | Performed by: ORTHOPAEDIC SURGERY

## 2025-06-02 PROCEDURE — 99212 OFFICE O/P EST SF 10 MIN: CPT | Performed by: ORTHOPAEDIC SURGERY

## 2025-06-02 PROCEDURE — 1123F ACP DISCUSS/DSCN MKR DOCD: CPT | Performed by: ORTHOPAEDIC SURGERY

## 2025-06-02 PROCEDURE — G8399 PT W/DXA RESULTS DOCUMENT: HCPCS | Performed by: ORTHOPAEDIC SURGERY

## 2025-06-02 PROCEDURE — G8417 CALC BMI ABV UP PARAM F/U: HCPCS | Performed by: ORTHOPAEDIC SURGERY

## 2025-06-02 PROCEDURE — 1111F DSCHRG MED/CURRENT MED MERGE: CPT | Performed by: ORTHOPAEDIC SURGERY

## 2025-06-02 NOTE — PROGRESS NOTES
[facility] PHYSICIANS [place] SPECIALTY CARE [facility] ORTHO CLINIC  [address]  [facility] [address]  Dept: [phone]  Loc: [phone]    Ambulatory Orthopedic Consult      CHIEF COMPLAINT:    Chief Complaint   Patient presents with    Foot Pain     Right       HISTORY OF PRESENT ILLNESS:      The patient is a 75 y.o. female who is being seen for evaluation of the above, which began 3/31/2025 secondary to a twisting injury . At today's visit, she is using a splint and a rolling knee scooter.     History is obtained today from:   [x]  the patient     [x]  EMR     []  one family member/friend    []  multiple family members/friends    []  other:      At today's visit, the patient localizes pain to the right lateral midfoot .  The patient is here today with her .    INTERVAL HISTORY 4/28/2025:  She is seen again today in the office for follow up of a previous issue (as above). Since being seen last, the patient is doing better. At today's visit, she is using a CAM boot and a walker..     History is obtained today from:   [x]  the patient     []  EMR     []  one family member/friend    []  multiple family members/friends    []  other:      She is here today with her .    INTERVAL HISTORY 6/2/2025:  She is seen again today in the office for follow up of a previous issue (as above). Since being seen last, the patient is doing better. At today's visit, she is using a CAM boot and a walker.     History is obtained today from:   [x]  the patient     []  EMR     []  one family member/friend    []  multiple family members/friends    []  other:      She is here today with her .    REVIEW OF SYSTEMS:  Musculoskeletal: See HPI for pertinent positives     Past Medical History:    She  has a past medical history of Arthritis, Atrial fibrillation ([facility]), CHF (congestive heart failure) ([facility]), Congenital heart disease, COPD (chronic obstructive pulmonary disease) ([facility]), GERD

## 2025-06-05 DIAGNOSIS — I48.91 ATRIAL FIBRILLATION WITH RVR (HCC): ICD-10-CM

## 2025-06-05 DIAGNOSIS — I48.0 PAF (PAROXYSMAL ATRIAL FIBRILLATION) (HCC): ICD-10-CM

## 2025-06-05 RX ORDER — AMIODARONE HYDROCHLORIDE 100 MG/1
100 TABLET ORAL DAILY
Qty: 90 TABLET | Refills: 1 | Status: SHIPPED | OUTPATIENT
Start: 2025-06-05

## 2025-06-05 NOTE — TELEPHONE ENCOUNTER
Called pt and made her aware that TSH needs updated pt v/u and will have completed. Order placed and signed.

## 2025-06-05 NOTE — TELEPHONE ENCOUNTER
Last Office Visit: 01/20/2025 Provider: ANITA  **Is provider OOT? No    Next Office Visit: 7/21/2025 Provider: ANITA  **Has patient already had 30 day supply? No    Lab orders needed? Yes- TSH  Encounter provider correct? Yes If not, change provider  Script changes since last refill? no    LAST LABS:   TSH:  Lab Results   Component Value Date    TSH 2.26 06/23/2023    TSHFT4 2.91 07/15/2024     BMP:  Lab Results   Component Value Date/Time     05/19/2025 12:04 PM    K 4.7 05/19/2025 12:04 PM    K 4.7 05/12/2025 08:11 PM     05/19/2025 12:04 PM    CO2 22 05/19/2025 12:04 PM    BUN 19 05/19/2025 12:04 PM    CREATININE 1.7 05/19/2025 12:04 PM    GLUCOSE 81 05/19/2025 12:04 PM    CALCIUM 9.1 05/19/2025 12:04 PM    LABGLOM 31 05/19/2025 12:04 PM    LABGLOM 39 04/17/2024 01:53 PM      EKG 05/11/2025  LFT 05/11/2025    **Care Everywhere? no

## 2025-06-17 DIAGNOSIS — I50.20 HFREF (HEART FAILURE WITH REDUCED EJECTION FRACTION) (HCC): ICD-10-CM

## 2025-06-17 DIAGNOSIS — I48.0 PAF (PAROXYSMAL ATRIAL FIBRILLATION) (HCC): ICD-10-CM

## 2025-06-18 ENCOUNTER — RESULTS FOLLOW-UP (OUTPATIENT)
Dept: CARDIOLOGY | Age: 75
End: 2025-06-18

## 2025-06-18 LAB
ALBUMIN SERPL-MCNC: 4.1 G/DL (ref 3.4–5)
ALP SERPL-CCNC: 90 U/L (ref 40–129)
ALT SERPL-CCNC: 12 U/L (ref 10–40)
ANION GAP SERPL CALCULATED.3IONS-SCNC: 15 MMOL/L (ref 3–16)
AST SERPL-CCNC: 18 U/L (ref 15–37)
BILIRUB DIRECT SERPL-MCNC: 0.2 MG/DL (ref 0–0.3)
BILIRUB INDIRECT SERPL-MCNC: 0.2 MG/DL (ref 0–1)
BILIRUB SERPL-MCNC: 0.4 MG/DL (ref 0–1)
BUN SERPL-MCNC: 38 MG/DL (ref 7–20)
CALCIUM SERPL-MCNC: 9.1 MG/DL (ref 8.3–10.6)
CHLORIDE SERPL-SCNC: 104 MMOL/L (ref 99–110)
CO2 SERPL-SCNC: 20 MMOL/L (ref 21–32)
CREAT SERPL-MCNC: 2.2 MG/DL (ref 0.6–1.2)
GFR SERPLBLD CREATININE-BSD FMLA CKD-EPI: 23 ML/MIN/{1.73_M2}
GLUCOSE SERPL-MCNC: 122 MG/DL (ref 70–99)
POTASSIUM SERPL-SCNC: 4.7 MMOL/L (ref 3.5–5.1)
PROT SERPL-MCNC: 6.6 G/DL (ref 6.4–8.2)
SODIUM SERPL-SCNC: 139 MMOL/L (ref 136–145)
TSH SERPL DL<=0.005 MIU/L-ACNC: 2.7 UIU/ML (ref 0.27–4.2)

## 2025-06-19 ENCOUNTER — RESULTS FOLLOW-UP (OUTPATIENT)
Dept: CARDIOLOGY CLINIC | Age: 75
End: 2025-06-19

## 2025-06-23 RX ORDER — SACUBITRIL AND VALSARTAN 24; 26 MG/1; MG/1
TABLET, FILM COATED ORAL
Refills: 0 | OUTPATIENT
Start: 2025-06-23

## 2025-06-23 NOTE — TELEPHONE ENCOUNTER
Pt called the office stating that she spoke with [facility] and they stated her prescription for Entresto was discontinued. Pt stated that since she is currently out, that she would like for the first prescription be sent to [facility] in [place], then [facility] after that.     Medication Refill    Medication needing refilled:  ENTRESTO   Dosage of the medication:  24-26 MG  How are you taking this medication (QD, BID, TID, QID, PRN):  BID  30 or 90 day supply called in:    When will you run out of your medication:  Currently out  Which Pharmacy are we sending the medication to?:  [id number] - [place], [place] - [address] [phone] -  [phone] [[id number]]       [facility] - [address] [phone] - F [phone] [[id number]]

## 2025-06-28 ENCOUNTER — APPOINTMENT (OUTPATIENT)
Dept: CT IMAGING | Age: 75
DRG: 683 | End: 2025-06-28
Payer: MEDICARE

## 2025-06-28 ENCOUNTER — APPOINTMENT (OUTPATIENT)
Dept: GENERAL RADIOLOGY | Age: 75
DRG: 683 | End: 2025-06-28
Payer: MEDICARE

## 2025-06-28 ENCOUNTER — HOSPITAL ENCOUNTER (INPATIENT)
Age: 75
LOS: 4 days | Discharge: HOME | DRG: 683 | End: 2025-07-02
Attending: EMERGENCY MEDICINE | Admitting: INTERNAL MEDICINE
Payer: MEDICARE

## 2025-06-28 DIAGNOSIS — N17.9 AKI (ACUTE KIDNEY INJURY): Primary | ICD-10-CM

## 2025-06-28 DIAGNOSIS — E87.5 HYPERKALEMIA: ICD-10-CM

## 2025-06-28 DIAGNOSIS — I50.23 ACUTE ON CHRONIC SYSTOLIC CONGESTIVE HEART FAILURE (HCC): ICD-10-CM

## 2025-06-28 DIAGNOSIS — R53.1 GENERAL WEAKNESS: ICD-10-CM

## 2025-06-28 DIAGNOSIS — R29.6 FREQUENT FALLS: ICD-10-CM

## 2025-06-28 PROBLEM — N18.32 ANEMIA OF CHRONIC RENAL FAILURE, STAGE 3B (HCC): Status: ACTIVE | Noted: 2025-06-28

## 2025-06-28 PROBLEM — D63.1 ANEMIA OF CHRONIC RENAL FAILURE, STAGE 3B (HCC): Status: ACTIVE | Noted: 2025-06-28

## 2025-06-28 PROBLEM — I50.22 CHRONIC HFREF (HEART FAILURE WITH REDUCED EJECTION FRACTION) (HCC): Status: ACTIVE | Noted: 2025-05-11

## 2025-06-28 LAB
ALBUMIN SERPL-MCNC: 3.9 G/DL (ref 3.4–5)
ALBUMIN/GLOB SERPL: 1.3 {RATIO} (ref 1.1–2.2)
ALP SERPL-CCNC: 96 U/L (ref 40–129)
ALT SERPL-CCNC: 11 U/L (ref 10–40)
ANION GAP SERPL CALCULATED.3IONS-SCNC: 12 MMOL/L (ref 3–16)
AST SERPL-CCNC: 16 U/L (ref 15–37)
BACTERIA URNS QL MICRO: ABNORMAL /HPF
BASOPHILS # BLD: 0 K/UL (ref 0–0.2)
BASOPHILS NFR BLD: 0.4 %
BILIRUB SERPL-MCNC: 0.4 MG/DL (ref 0–1)
BILIRUB UR QL STRIP.AUTO: NEGATIVE
BUN SERPL-MCNC: 55 MG/DL (ref 7–20)
CALCIUM SERPL-MCNC: 8.9 MG/DL (ref 8.3–10.6)
CHLORIDE SERPL-SCNC: 104 MMOL/L (ref 99–110)
CLARITY UR: ABNORMAL
CO2 SERPL-SCNC: 21 MMOL/L (ref 21–32)
COLOR UR: ABNORMAL
CREAT SERPL-MCNC: 3.2 MG/DL (ref 0.6–1.2)
DEPRECATED RDW RBC AUTO: 15.9 % (ref 12.4–15.4)
EOSINOPHIL # BLD: 0.2 K/UL (ref 0–0.6)
EOSINOPHIL NFR BLD: 2 %
EPI CELLS #/AREA URNS HPF: ABNORMAL /HPF (ref 0–5)
GFR SERPLBLD CREATININE-BSD FMLA CKD-EPI: 14 ML/MIN/{1.73_M2}
GLUCOSE BLD-MCNC: 102 MG/DL
GLUCOSE BLD-MCNC: 102 MG/DL (ref 70–99)
GLUCOSE BLD-MCNC: 110 MG/DL (ref 70–99)
GLUCOSE BLD-MCNC: 140 MG/DL (ref 70–99)
GLUCOSE BLD-MCNC: 163 MG/DL (ref 70–99)
GLUCOSE SERPL-MCNC: 90 MG/DL (ref 70–99)
GLUCOSE UR STRIP.AUTO-MCNC: 250 MG/DL
HCT VFR BLD AUTO: 33.5 % (ref 36–48)
HGB BLD-MCNC: 11.1 G/DL (ref 12–16)
HGB UR QL STRIP.AUTO: ABNORMAL
KETONES UR STRIP.AUTO-MCNC: NEGATIVE MG/DL
LEUKOCYTE ESTERASE UR QL STRIP.AUTO: NEGATIVE
LYMPHOCYTES # BLD: 1.7 K/UL (ref 1–5.1)
LYMPHOCYTES NFR BLD: 15.7 %
MCH RBC QN AUTO: 30 PG (ref 26–34)
MCHC RBC AUTO-ENTMCNC: 33.1 G/DL (ref 31–36)
MCV RBC AUTO: 90.5 FL (ref 80–100)
MONOCYTES # BLD: 0.7 K/UL (ref 0–1.3)
MONOCYTES NFR BLD: 6.8 %
NEUTROPHILS # BLD: 8.1 K/UL (ref 1.7–7.7)
NEUTROPHILS NFR BLD: 75.1 %
NITRITE UR QL STRIP.AUTO: NEGATIVE
PERFORMED ON: ABNORMAL
PH UR STRIP.AUTO: 6 [PH] (ref 5–8)
PLATELET # BLD AUTO: 238 K/UL (ref 135–450)
PMV BLD AUTO: 6.9 FL (ref 5–10.5)
POTASSIUM SERPL-SCNC: 5.8 MMOL/L (ref 3.5–5.1)
PROT SERPL-MCNC: 7 G/DL (ref 6.4–8.2)
PROT UR STRIP.AUTO-MCNC: NEGATIVE MG/DL
RBC # BLD AUTO: 3.71 M/UL (ref 4–5.2)
RBC #/AREA URNS HPF: ABNORMAL /HPF (ref 0–4)
RENAL EPI CELLS #/AREA UR COMP ASSIST: ABNORMAL /HPF (ref 0–1)
SODIUM SERPL-SCNC: 137 MMOL/L (ref 136–145)
SP GR UR STRIP.AUTO: <=1.005 (ref 1–1.03)
TROPONIN, HIGH SENSITIVITY: 15 NG/L (ref 0–14)
TROPONIN, HIGH SENSITIVITY: 17 NG/L (ref 0–14)
UA COMPLETE W REFLEX CULTURE PNL UR: ABNORMAL
UA DIPSTICK W REFLEX MICRO PNL UR: YES
URN SPEC COLLECT METH UR: ABNORMAL
UROBILINOGEN UR STRIP-ACNC: 0.2 E.U./DL
WBC # BLD AUTO: 10.8 K/UL (ref 4–11)
WBC #/AREA URNS HPF: ABNORMAL /HPF (ref 0–5)

## 2025-06-28 PROCEDURE — 96365 THER/PROPH/DIAG IV INF INIT: CPT

## 2025-06-28 PROCEDURE — 82306 VITAMIN D 25 HYDROXY: CPT

## 2025-06-28 PROCEDURE — 70450 CT HEAD/BRAIN W/O DYE: CPT

## 2025-06-28 PROCEDURE — 6370000000 HC RX 637 (ALT 250 FOR IP): Performed by: PHYSICIAN ASSISTANT

## 2025-06-28 PROCEDURE — 94644 CONT INHLJ TX 1ST HOUR: CPT

## 2025-06-28 PROCEDURE — 2500000003 HC RX 250 WO HCPCS

## 2025-06-28 PROCEDURE — 81001 URINALYSIS AUTO W/SCOPE: CPT

## 2025-06-28 PROCEDURE — 6360000002 HC RX W HCPCS: Performed by: PHYSICIAN ASSISTANT

## 2025-06-28 PROCEDURE — 94640 AIRWAY INHALATION TREATMENT: CPT

## 2025-06-28 PROCEDURE — 99285 EMERGENCY DEPT VISIT HI MDM: CPT

## 2025-06-28 PROCEDURE — 93005 ELECTROCARDIOGRAM TRACING: CPT | Performed by: PHYSICIAN ASSISTANT

## 2025-06-28 PROCEDURE — 82607 VITAMIN B-12: CPT

## 2025-06-28 PROCEDURE — 71045 X-RAY EXAM CHEST 1 VIEW: CPT

## 2025-06-28 PROCEDURE — 85025 COMPLETE CBC W/AUTO DIFF WBC: CPT

## 2025-06-28 PROCEDURE — 80053 COMPREHEN METABOLIC PANEL: CPT

## 2025-06-28 PROCEDURE — 2580000003 HC RX 258: Performed by: PHYSICIAN ASSISTANT

## 2025-06-28 PROCEDURE — 6370000000 HC RX 637 (ALT 250 FOR IP)

## 2025-06-28 PROCEDURE — 36415 COLL VENOUS BLD VENIPUNCTURE: CPT

## 2025-06-28 PROCEDURE — 82728 ASSAY OF FERRITIN: CPT

## 2025-06-28 PROCEDURE — 2500000003 HC RX 250 WO HCPCS: Performed by: PHYSICIAN ASSISTANT

## 2025-06-28 PROCEDURE — 83550 IRON BINDING TEST: CPT

## 2025-06-28 PROCEDURE — 84484 ASSAY OF TROPONIN QUANT: CPT

## 2025-06-28 PROCEDURE — 82746 ASSAY OF FOLIC ACID SERUM: CPT

## 2025-06-28 PROCEDURE — 73630 X-RAY EXAM OF FOOT: CPT

## 2025-06-28 PROCEDURE — 83540 ASSAY OF IRON: CPT

## 2025-06-28 PROCEDURE — 1200000000 HC SEMI PRIVATE

## 2025-06-28 PROCEDURE — 96375 TX/PRO/DX INJ NEW DRUG ADDON: CPT

## 2025-06-28 RX ORDER — SODIUM CHLORIDE 0.9 % (FLUSH) 0.9 %
5-40 SYRINGE (ML) INJECTION PRN
Status: DISCONTINUED | OUTPATIENT
Start: 2025-06-28 | End: 2025-07-02 | Stop reason: HOSPADM

## 2025-06-28 RX ORDER — ACETAMINOPHEN 325 MG/1
650 TABLET ORAL EVERY 6 HOURS PRN
Status: DISCONTINUED | OUTPATIENT
Start: 2025-06-28 | End: 2025-07-02 | Stop reason: HOSPADM

## 2025-06-28 RX ORDER — ONDANSETRON 2 MG/ML
4 INJECTION INTRAMUSCULAR; INTRAVENOUS EVERY 6 HOURS PRN
Status: DISCONTINUED | OUTPATIENT
Start: 2025-06-28 | End: 2025-07-02 | Stop reason: HOSPADM

## 2025-06-28 RX ORDER — CALCIUM GLUCONATE 20 MG/ML
1000 INJECTION, SOLUTION INTRAVENOUS ONCE
Status: COMPLETED | OUTPATIENT
Start: 2025-06-28 | End: 2025-06-28

## 2025-06-28 RX ORDER — PANTOPRAZOLE SODIUM 40 MG/1
40 TABLET, DELAYED RELEASE ORAL DAILY
Status: DISCONTINUED | OUTPATIENT
Start: 2025-06-28 | End: 2025-07-02 | Stop reason: HOSPADM

## 2025-06-28 RX ORDER — CARVEDILOL 3.12 MG/1
12.5 TABLET ORAL 2 TIMES DAILY
Status: DISCONTINUED | OUTPATIENT
Start: 2025-06-28 | End: 2025-07-02 | Stop reason: HOSPADM

## 2025-06-28 RX ORDER — HYDROXYZINE HYDROCHLORIDE 25 MG/1
25 TABLET, FILM COATED ORAL NIGHTLY
Status: DISCONTINUED | OUTPATIENT
Start: 2025-06-28 | End: 2025-07-02 | Stop reason: HOSPADM

## 2025-06-28 RX ORDER — OXYCODONE AND ACETAMINOPHEN 5; 325 MG/1; MG/1
1 TABLET ORAL 2 TIMES DAILY PRN
Refills: 0 | Status: DISCONTINUED | OUTPATIENT
Start: 2025-06-28 | End: 2025-07-02 | Stop reason: HOSPADM

## 2025-06-28 RX ORDER — TORSEMIDE 20 MG/1
20 TABLET ORAL DAILY
Status: DISCONTINUED | OUTPATIENT
Start: 2025-06-28 | End: 2025-07-02

## 2025-06-28 RX ORDER — SODIUM CHLORIDE 0.9 % (FLUSH) 0.9 %
5-40 SYRINGE (ML) INJECTION EVERY 12 HOURS SCHEDULED
Status: DISCONTINUED | OUTPATIENT
Start: 2025-06-28 | End: 2025-07-02 | Stop reason: HOSPADM

## 2025-06-28 RX ORDER — ACETAMINOPHEN 650 MG/1
650 SUPPOSITORY RECTAL EVERY 6 HOURS PRN
Status: DISCONTINUED | OUTPATIENT
Start: 2025-06-28 | End: 2025-07-02 | Stop reason: HOSPADM

## 2025-06-28 RX ORDER — ROPINIROLE 0.5 MG/1
1 TABLET, FILM COATED ORAL 3 TIMES DAILY
Status: DISCONTINUED | OUTPATIENT
Start: 2025-06-28 | End: 2025-07-02 | Stop reason: HOSPADM

## 2025-06-28 RX ORDER — INDOMETHACIN 25 MG/1
50 CAPSULE ORAL ONCE
Status: COMPLETED | OUTPATIENT
Start: 2025-06-28 | End: 2025-06-28

## 2025-06-28 RX ORDER — GLUCAGON 1 MG/ML
1 KIT INJECTION PRN
Status: DISCONTINUED | OUTPATIENT
Start: 2025-06-28 | End: 2025-07-02 | Stop reason: HOSPADM

## 2025-06-28 RX ORDER — AMIODARONE HYDROCHLORIDE 200 MG/1
100 TABLET ORAL DAILY
Status: DISCONTINUED | OUTPATIENT
Start: 2025-06-28 | End: 2025-07-02 | Stop reason: HOSPADM

## 2025-06-28 RX ORDER — POLYETHYLENE GLYCOL 3350 17 G/17G
17 POWDER, FOR SOLUTION ORAL DAILY PRN
Status: DISCONTINUED | OUTPATIENT
Start: 2025-06-28 | End: 2025-07-02 | Stop reason: HOSPADM

## 2025-06-28 RX ORDER — 0.9 % SODIUM CHLORIDE 0.9 %
1000 INTRAVENOUS SOLUTION INTRAVENOUS ONCE
Status: COMPLETED | OUTPATIENT
Start: 2025-06-28 | End: 2025-06-28

## 2025-06-28 RX ORDER — ONDANSETRON 4 MG/1
4 TABLET, ORALLY DISINTEGRATING ORAL EVERY 8 HOURS PRN
Status: DISCONTINUED | OUTPATIENT
Start: 2025-06-28 | End: 2025-07-02 | Stop reason: HOSPADM

## 2025-06-28 RX ORDER — GABAPENTIN 300 MG/1
300 CAPSULE ORAL 2 TIMES DAILY
Status: DISCONTINUED | OUTPATIENT
Start: 2025-06-28 | End: 2025-07-02 | Stop reason: HOSPADM

## 2025-06-28 RX ORDER — SPIRONOLACTONE 25 MG/1
50 TABLET ORAL DAILY
Status: DISCONTINUED | OUTPATIENT
Start: 2025-06-28 | End: 2025-07-02 | Stop reason: HOSPADM

## 2025-06-28 RX ORDER — SODIUM CHLORIDE 9 MG/ML
INJECTION, SOLUTION INTRAVENOUS PRN
Status: DISCONTINUED | OUTPATIENT
Start: 2025-06-28 | End: 2025-07-02 | Stop reason: HOSPADM

## 2025-06-28 RX ORDER — ALBUTEROL SULFATE 0.83 MG/ML
10 SOLUTION RESPIRATORY (INHALATION) ONCE
Status: COMPLETED | OUTPATIENT
Start: 2025-06-28 | End: 2025-06-28

## 2025-06-28 RX ORDER — DEXTROSE MONOHYDRATE 100 MG/ML
INJECTION, SOLUTION INTRAVENOUS CONTINUOUS PRN
Status: DISCONTINUED | OUTPATIENT
Start: 2025-06-28 | End: 2025-07-02 | Stop reason: HOSPADM

## 2025-06-28 RX ORDER — BUDESONIDE AND FORMOTEROL FUMARATE DIHYDRATE 160; 4.5 UG/1; UG/1
2 AEROSOL RESPIRATORY (INHALATION)
Status: DISCONTINUED | OUTPATIENT
Start: 2025-06-28 | End: 2025-07-02 | Stop reason: HOSPADM

## 2025-06-28 RX ORDER — FUROSEMIDE 10 MG/ML
40 INJECTION INTRAMUSCULAR; INTRAVENOUS ONCE
Status: COMPLETED | OUTPATIENT
Start: 2025-06-28 | End: 2025-06-28

## 2025-06-28 RX ORDER — ATORVASTATIN CALCIUM 40 MG/1
40 TABLET, FILM COATED ORAL DAILY
Status: DISCONTINUED | OUTPATIENT
Start: 2025-06-28 | End: 2025-07-02 | Stop reason: HOSPADM

## 2025-06-28 RX ADMIN — ROPINIROLE HYDROCHLORIDE 1 MG: 0.5 TABLET, FILM COATED ORAL at 21:00

## 2025-06-28 RX ADMIN — APIXABAN 5 MG: 5 TABLET, FILM COATED ORAL at 21:01

## 2025-06-28 RX ADMIN — CALCIUM GLUCONATE 1000 MG: 20 INJECTION, SOLUTION INTRAVENOUS at 13:59

## 2025-06-28 RX ADMIN — SODIUM BICARBONATE 50 MEQ: 84 INJECTION INTRAVENOUS at 13:59

## 2025-06-28 RX ADMIN — OXYCODONE AND ACETAMINOPHEN 1 TABLET: 5; 325 TABLET ORAL at 23:54

## 2025-06-28 RX ADMIN — SACUBITRIL AND VALSARTAN 1 TABLET: 24; 26 TABLET, FILM COATED ORAL at 21:01

## 2025-06-28 RX ADMIN — FUROSEMIDE 40 MG: 10 INJECTION, SOLUTION INTRAMUSCULAR; INTRAVENOUS at 14:10

## 2025-06-28 RX ADMIN — Medication 2 PUFF: at 19:37

## 2025-06-28 RX ADMIN — SODIUM CHLORIDE 1000 ML: 0.9 INJECTION, SOLUTION INTRAVENOUS at 14:24

## 2025-06-28 RX ADMIN — GABAPENTIN 300 MG: 300 CAPSULE ORAL at 17:12

## 2025-06-28 RX ADMIN — PANTOPRAZOLE SODIUM 40 MG: 40 TABLET, DELAYED RELEASE ORAL at 17:12

## 2025-06-28 RX ADMIN — ATORVASTATIN CALCIUM 40 MG: 40 TABLET, FILM COATED ORAL at 17:12

## 2025-06-28 RX ADMIN — SODIUM CHLORIDE, PRESERVATIVE FREE 10 ML: 5 INJECTION INTRAVENOUS at 21:06

## 2025-06-28 RX ADMIN — AMIODARONE HYDROCHLORIDE 100 MG: 200 TABLET ORAL at 17:12

## 2025-06-28 RX ADMIN — SODIUM ZIRCONIUM CYCLOSILICATE 10 G: 10 POWDER, FOR SUSPENSION ORAL at 14:22

## 2025-06-28 RX ADMIN — ROPINIROLE HYDROCHLORIDE 1 MG: 0.5 TABLET, FILM COATED ORAL at 17:12

## 2025-06-28 RX ADMIN — HYDROXYZINE HYDROCHLORIDE 25 MG: 25 TABLET, FILM COATED ORAL at 21:00

## 2025-06-28 RX ADMIN — CARVEDILOL 12.5 MG: 3.12 TABLET, FILM COATED ORAL at 21:00

## 2025-06-28 RX ADMIN — ALBUTEROL SULFATE 10 MG: 2.5 SOLUTION RESPIRATORY (INHALATION) at 14:25

## 2025-06-28 RX ADMIN — INSULIN HUMAN 10 UNITS: 100 INJECTION, SOLUTION PARENTERAL at 14:16

## 2025-06-28 RX ADMIN — DEXTROSE 250 ML: 10 SOLUTION INTRAVENOUS at 14:07

## 2025-06-28 ASSESSMENT — PAIN DESCRIPTION - ORIENTATION
ORIENTATION: LEFT
ORIENTATION: RIGHT;LEFT
ORIENTATION: RIGHT

## 2025-06-28 ASSESSMENT — PAIN SCALES - GENERAL
PAINLEVEL_OUTOF10: 9
PAINLEVEL_OUTOF10: 8
PAINLEVEL_OUTOF10: 8
PAINLEVEL_OUTOF10: 5

## 2025-06-28 ASSESSMENT — PAIN DESCRIPTION - LOCATION
LOCATION: SHOULDER
LOCATION: ARM;KNEE
LOCATION: FOOT

## 2025-06-28 ASSESSMENT — PAIN DESCRIPTION - DESCRIPTORS
DESCRIPTORS: ACHING
DESCRIPTORS: ACHING

## 2025-06-28 ASSESSMENT — PAIN DESCRIPTION - PAIN TYPE: TYPE: ACUTE PAIN

## 2025-06-28 ASSESSMENT — PAIN - FUNCTIONAL ASSESSMENT: PAIN_FUNCTIONAL_ASSESSMENT: 0-10

## 2025-06-28 ASSESSMENT — PAIN DESCRIPTION - FREQUENCY: FREQUENCY: INTERMITTENT

## 2025-06-28 NOTE — PLAN OF CARE
Received Emergency Dept page for admission.  Sent to NP Kristine Arredondo admitting/consulting hospitalist. Current listed PCP is Kaylene Goodman APRN - CNP.

## 2025-06-28 NOTE — CONSULTS
Nephrology Consult Note                                                                                                                                                                                                                                                                                                                                                               Office : [phone]     Fax :[phone]    Patient's Name: Mary Silverio  4:40 PM  6/28/2025    Reason for Consult:  NELIA/ CKD  Requesting Physician:  Kaylene Goodman APRN - CNP  Chief Complaint:  toe pain   Chief Complaint   Patient presents with    Toe Pain     Pt fell yesterday and injured right great toe.     Dizziness     For a couple months    Fatigue     Weakness and pain in hips, knees and shoulder       Assessment/Plan       # NELIA on CKD 3B   NELIA 2/2 subacute diarrhea, low oral intake and NSAIDS  Possibly also 2/2 relative hypotension   No evidence of pre-renal though high risk   Plan:  Agree with limted fluids   Recheck BMp in PM   Hold Torsemide, Jardiance and Entresto  today   BMP and strict I./O  Discussed w/ attending     # HTN  soft  Hold Torsemide, jardaince and Entresto    # Anemia  Of CKD  No PEDRITO    # Acid- base/ Electrolyte imbalance   Hyperkalemia 2/2 NELIA/ CKD  Plan:  Low K diet  Lokelma   Fluids   Monitor BMP   Hold Entresto    # HFrEF  Compensated  Holding Torsemide, Jardiance and Entresto  t  Monitor Sx     History of Present Ilness:    Mary Silverio is a 75 y.o. female with   75 y.o. female with past medical history significant for HFrEF with ICD in place, a-fib, hyperlipidemia, obesity, weakness .  Presented to [facility] with complaints of generalized weakness and diffuse joint  pain that has been ongoing for the past couple of months.  States her legs gave out last night, and when she fell she twisted her toe.  Denies dyspnea, chest pain, n/v, headache.  Does report dizziness w/ standing.   She did

## 2025-06-28 NOTE — ED PROVIDER NOTES
THIS IS MY SALIMA SUPERVISORY AND SHARED VISIT NOTE:    I personally saw the patient and made/approved the management plan and take responsibility for the patient management.    History: 75-year-old female presents valuation of multiple complaints including dizziness, toe pain, fall.  Patient reports that she has had some general weakness, feels like her legs are giving out.  She denies any specific head injury.  She denies any focal weakness.  During her fall, injured right great toe.  Has been lightheaded for the past several months    Exam: There is no focal weakness, cranials 2 through 12 are grossly intact, there is pain to the right big toe.  No obvious deformity.    MDM: 75-year-old female presenting for ration of fall, general fatigue, lightheadedness.  She has no focal neurologic deficit, NIH of scale score 0.  Will obtain laboratory evaluation, CT head imaging for further evaluation.  Patient is will likely require evaluation and admission for continued lightheadedness, general fatigue and weakness.  X-ray imaging of the toe reveals no acute fracture of the great toe.  No significant leukocytosis.  Unfortunately, renal function does show significant worsening of creatinine to 3.2, baseline around 2.3.  This could be an explanation for her symptoms.      I personally saw the patient and independently provided 30 minutes of non-concurrent critical care out of the total shared critical care time provided.     EKG  The Ekg interpreted by myself in the emergency department in the absence of a cardiologist.  normal sinus rhythm with a rate of 64  Axis is   Left axis deviation  QTc is  462  Intervals and Durations are unremarkable.      No specific ST-T wave changes appreciated.  Left bundle branch block present  No evidence of acute ischemia.   No significant change from prior EKG dated 5/11/2025    No results found.      I, Dr. Mauricio, am the primary clinician of record.     Comment: Please note this report has

## 2025-06-28 NOTE — H&P
Hospital Medicine History & Physical    V 5.1    Date of Admission: 6/28/2025    Date of Service:  Pt seen/examined on 6/28/25     [x]Admitted to Inpatient with expected LOS greater than two midnights due to medical therapy.  []Placed in Observation status.    Chief Admission Complaint:  toe pain    Presenting Admission History:  75 y.o. female with past medical history significant for HFrEF with ICD in place, a-fib, hyperlipidemia, obesity, weakness .  Presented to [facility] with complaints of generalized weakness and pain that has been ongoing for the past couple of months.  States her legs gave out last night, and when she fell she twisted her toe.  This is why she initially reported to the ED.  There are no aggravating or alleviating symptoms.  No radiation.  Denies dyspnea, chest pain, n/v, headache.  Does report dizziness w/ standing, and feeling like she is in a \"fog.\"    While in the ED, she was found to have NELIA and hyperkalemia, which is why she is being admitted.      Assessment/Plan:      Acute kidney injury on CKD stage 3b.  Possibly due to diuretic use.  Baseline creat appears to be around 1.5-1.8.  On admission it was 3.2.  Nephrology has been consulted.  Given EF 25%, will defer IVF to nephrology.  Avoid hypotension, nephrotoxics as able.  Monitor daily renal panel.      Hyperkalemia.  K was 5.8 on arrival.  Likely due to above.  Treated with D10, Ca gluc, IV insulin, lokelma    Chronic HFrEF.  Non-ischemic in etiology, per review of cardiology note.  No evidence of exacerbation at this time.  Continue home regimen of carvedilol, entresto.  Holding spironolactone, torsemide in setting of NELIA.  Echo 6/2023: EF 25%; hypokinesis of apex, anteroseptum, anterior and apical walls; G2 DD.  Repeat ordered 6/28.  ICD in place.  Pt home weight is 245lb.  Follows with [facility] cardiology as outpatient.  Monitor daily weights, I&O.   on admission, no peripheral edema, clear lungs,

## 2025-06-28 NOTE — ED NOTES
Mary Silverio is a 75 y.o. female admitted for  Principal Problem:    Generalized weakness  Resolved Problems:    * No resolved hospital problems. *  .   Patient Home via self with   Chief Complaint   Patient presents with    Toe Pain     Pt fell yesterday and injured right great toe.     Dizziness     For a couple months    Fatigue     Weakness and pain in hips, knees and shoulder   .  Patient is alert and Person, Place, Time, and Situation  Patient's baseline mobility: Baseline Mobility: Walker  Code Status: Prior   Cardiac Rhythm:       Is patient on baseline Oxygen: no how many Liters:   Abnormal Assessment Findings:  toe pain, dizziness, general weakness    Isolation: None      NIH Score:    C-SSRS: Risk of Suicide: No Risk  Bedside swallow:        Active LDA's:   Peripheral IV 06/28/25 Left Antecubital (Active)   Site Assessment Clean, dry & intact 06/28/25 1241   Line Status Blood return noted;Brisk blood return;Flushed;Normal saline locked;Specimen collected 06/28/25 1241   Phlebitis Assessment No symptoms 06/28/25 1241   Infiltration Assessment 0 06/28/25 1241   Dressing Status New dressing applied;Clean, dry & intact 06/28/25 1241   Dressing Type Transparent 06/28/25 1241   Dressing Intervention New 06/28/25 1241     Patient admitted with a torres:  If the torres is chronic was it exchanged:  Reason for torres:  Patient admitted with Central Line:  . PICC line placement confirmed: YES OR NO:[id number]}   Reason for Central line:   Was central line Inserted from an outside facility:        Family/Caregiver Present yes Any Concerns: no   Restraints no  Sitter no         Vitals: MEWS Score: 1    Vitals:    06/28/25 1329 06/28/25 1410 06/28/25 1414 06/28/25 1431   BP: 104/77 101/68 101/68    Pulse: 60  70 60   Resp: 15  16 15   Temp:       TempSrc:       SpO2: 99%  98% 98%   Weight:       Height:           Last documented pain score (0-10 scale) Pain Level: 9  Pain medication administered No.    Pertinent or High Risk

## 2025-06-28 NOTE — ED PROVIDER NOTES
[facility] EMERGENCY DEPARTMENT  EMERGENCY DEPARTMENT ENCOUNTER        Pt Name: Mary Silverio  MRN: [id number]  Birthdate 1950  Date of evaluation: 6/28/2025  Provider: KAILA GOMEZ PA-C  PCP: Kaylene Goodman APRN - [facility]  ED Attending: Jordan Mauricio MD       I have seen and evaluated this patient with my supervising physician Jordan Mauricio MD.    CHIEF COMPLAINT:     Chief Complaint   Patient presents with    Toe Pain     Pt fell yesterday and injured right great toe.     Dizziness     For a couple months    Fatigue     Weakness and pain in hips, knees and shoulder       HISTORY OF PRESENT ILLNESS:      History provided by the patient. No limitations.    Mary Silverio is a 75 y.o. female who arrives to the ED by private vehicle.  Patient here with family.  Patient states she has been feeling worse and worse for the last 2 months.  She has been weak.  She states her legs have given out on her multiple times causing her to fall.  She describes feeling lightheaded and dizzy.  She reports fracturing her right foot back in March and recently fell and reinjured her foot, at a different location than the fifth metatarsal which was previously fractured.  No pain is more so to the great toe and first metatarsal.  She is tired of feeling bad, tired of being weak and falling and is here seeking evaluation.    Nursing Notes were reviewed     REVIEW OF SYSTEMS:     Review of Systems  Positives and pertinent negatives as per HPI.      PAST MEDICAL HISTORY:     Past Medical History:   Diagnosis Date    Arthritis     BACK    Atrial fibrillation (HCC)     CHF (congestive heart failure) (HCC)     Congenital heart disease     COPD (chronic obstructive pulmonary disease) (HCC)     GERD (gastroesophageal reflux disease)     Heart disease 2019    Hypertension     Irritable bowel syndrome     Obesity     On home oxygen therapy     O2 2L at night    Restless legs syndrome        SURGICAL HISTORY:      Past

## 2025-06-28 NOTE — PLAN OF CARE
Problem: Chronic Conditions and Co-morbidities  Goal: Patient's chronic conditions and co-morbidity symptoms are monitored and maintained or improved  Outcome: Progressing  Flowsheets  Taken 6/28/2025 1751  Care Plan - Patient's Chronic Conditions and Co-Morbidity Symptoms are Monitored and Maintained or Improved:   Monitor and assess patient's chronic conditions and comorbid symptoms for stability, deterioration, or improvement   Collaborate with multidisciplinary team to address chronic and comorbid conditions and prevent exacerbation or deterioration  Taken 6/28/2025 3903  Care Plan - Patient's Chronic Conditions and Co-Morbidity Symptoms are Monitored and Maintained or Improved: Monitor and assess patient's chronic conditions and comorbid symptoms for stability, deterioration, or improvement     Problem: Discharge Planning  Goal: Discharge to home or other facility with appropriate resources  Outcome: Progressing  Flowsheets (Taken 6/28/2025 1751)  Discharge to home or other facility with appropriate resources:   Identify barriers to discharge with patient and caregiver   Arrange for needed discharge resources and transportation as appropriate   Identify discharge learning needs (meds, wound care, etc)

## 2025-06-29 LAB
25(OH)D3 SERPL-MCNC: 20.5 NG/ML
ALBUMIN SERPL-MCNC: 3.8 G/DL (ref 3.4–5)
ALP SERPL-CCNC: 76 U/L (ref 40–129)
ALT SERPL-CCNC: 9 U/L (ref 10–40)
ANION GAP SERPL CALCULATED.3IONS-SCNC: 9 MMOL/L (ref 3–16)
AST SERPL-CCNC: 13 U/L (ref 15–37)
BILIRUB DIRECT SERPL-MCNC: 0.2 MG/DL (ref 0–0.3)
BILIRUB INDIRECT SERPL-MCNC: 0.2 MG/DL (ref 0–1)
BILIRUB SERPL-MCNC: 0.4 MG/DL (ref 0–1)
BUN SERPL-MCNC: 46 MG/DL (ref 7–20)
CALCIUM SERPL-MCNC: 9.1 MG/DL (ref 8.3–10.6)
CHLORIDE SERPL-SCNC: 106 MMOL/L (ref 99–110)
CHOLEST SERPL-MCNC: 146 MG/DL (ref 0–199)
CO2 SERPL-SCNC: 25 MMOL/L (ref 21–32)
CREAT SERPL-MCNC: 2.4 MG/DL (ref 0.6–1.2)
EKG DIAGNOSIS: NORMAL
EKG Q-T INTERVAL: 448 MS
EKG QRS DURATION: 140 MS
EKG QTC CALCULATION (BAZETT): 462 MS
EKG R AXIS: -42 DEGREES
EKG T AXIS: 103 DEGREES
EKG VENTRICULAR RATE: 64 BPM
FERRITIN SERPL IA-MCNC: 71.8 NG/ML (ref 15–150)
FOLATE SERPL-MCNC: 7.73 NG/ML (ref 4.78–24.2)
GFR SERPLBLD CREATININE-BSD FMLA CKD-EPI: 20 ML/MIN/{1.73_M2}
GLUCOSE BLD-MCNC: 102 MG/DL (ref 70–99)
GLUCOSE BLD-MCNC: 107 MG/DL (ref 70–99)
GLUCOSE BLD-MCNC: 163 MG/DL (ref 70–99)
GLUCOSE BLD-MCNC: 165 MG/DL (ref 70–99)
GLUCOSE SERPL-MCNC: 88 MG/DL (ref 70–99)
HDLC SERPL-MCNC: 57 MG/DL (ref 40–60)
IRON SATN MFR SERPL: 26 % (ref 15–50)
IRON SERPL-MCNC: 91 UG/DL (ref 37–145)
LDLC SERPL CALC-MCNC: 68 MG/DL
MAGNESIUM SERPL-MCNC: 2.64 MG/DL (ref 1.8–2.4)
PERFORMED ON: ABNORMAL
POTASSIUM SERPL-SCNC: 4.8 MMOL/L (ref 3.5–5.1)
PROT SERPL-MCNC: 6.1 G/DL (ref 6.4–8.2)
SODIUM SERPL-SCNC: 140 MMOL/L (ref 136–145)
TIBC SERPL-MCNC: 349 UG/DL (ref 260–445)
TRIGL SERPL-MCNC: 105 MG/DL (ref 0–150)
VIT B12 SERPL-MCNC: 544 PG/ML (ref 211–911)
VLDLC SERPL CALC-MCNC: 21 MG/DL

## 2025-06-29 PROCEDURE — 80061 LIPID PANEL: CPT

## 2025-06-29 PROCEDURE — 80076 HEPATIC FUNCTION PANEL: CPT

## 2025-06-29 PROCEDURE — 97530 THERAPEUTIC ACTIVITIES: CPT

## 2025-06-29 PROCEDURE — 97110 THERAPEUTIC EXERCISES: CPT

## 2025-06-29 PROCEDURE — 2500000003 HC RX 250 WO HCPCS

## 2025-06-29 PROCEDURE — 97161 PT EVAL LOW COMPLEX 20 MIN: CPT

## 2025-06-29 PROCEDURE — 36415 COLL VENOUS BLD VENIPUNCTURE: CPT

## 2025-06-29 PROCEDURE — 94640 AIRWAY INHALATION TREATMENT: CPT

## 2025-06-29 PROCEDURE — 6370000000 HC RX 637 (ALT 250 FOR IP)

## 2025-06-29 PROCEDURE — 2580000003 HC RX 258: Performed by: INTERNAL MEDICINE

## 2025-06-29 PROCEDURE — 83735 ASSAY OF MAGNESIUM: CPT

## 2025-06-29 PROCEDURE — 1200000000 HC SEMI PRIVATE

## 2025-06-29 PROCEDURE — 97165 OT EVAL LOW COMPLEX 30 MIN: CPT

## 2025-06-29 PROCEDURE — 93010 ELECTROCARDIOGRAM REPORT: CPT | Performed by: INTERNAL MEDICINE

## 2025-06-29 PROCEDURE — 80048 BASIC METABOLIC PNL TOTAL CA: CPT

## 2025-06-29 RX ORDER — SODIUM CHLORIDE, SODIUM LACTATE, POTASSIUM CHLORIDE, CALCIUM CHLORIDE 600; 310; 30; 20 MG/100ML; MG/100ML; MG/100ML; MG/100ML
INJECTION, SOLUTION INTRAVENOUS CONTINUOUS
Status: DISCONTINUED | OUTPATIENT
Start: 2025-06-29 | End: 2025-06-30

## 2025-06-29 RX ORDER — ERGOCALCIFEROL 1.25 MG/1
50000 CAPSULE, LIQUID FILLED ORAL WEEKLY
Status: DISCONTINUED | OUTPATIENT
Start: 2025-06-29 | End: 2025-07-02 | Stop reason: HOSPADM

## 2025-06-29 RX ADMIN — SACUBITRIL AND VALSARTAN 1 TABLET: 24; 26 TABLET, FILM COATED ORAL at 22:02

## 2025-06-29 RX ADMIN — ROPINIROLE HYDROCHLORIDE 1 MG: 0.5 TABLET, FILM COATED ORAL at 09:30

## 2025-06-29 RX ADMIN — APIXABAN 5 MG: 5 TABLET, FILM COATED ORAL at 22:02

## 2025-06-29 RX ADMIN — Medication 2 PUFF: at 07:32

## 2025-06-29 RX ADMIN — SACUBITRIL AND VALSARTAN 1 TABLET: 24; 26 TABLET, FILM COATED ORAL at 09:36

## 2025-06-29 RX ADMIN — ERGOCALCIFEROL 50000 UNITS: 1.25 CAPSULE ORAL at 09:36

## 2025-06-29 RX ADMIN — APIXABAN 5 MG: 5 TABLET, FILM COATED ORAL at 09:30

## 2025-06-29 RX ADMIN — PANTOPRAZOLE SODIUM 40 MG: 40 TABLET, DELAYED RELEASE ORAL at 09:36

## 2025-06-29 RX ADMIN — ROPINIROLE HYDROCHLORIDE 1 MG: 0.5 TABLET, FILM COATED ORAL at 14:43

## 2025-06-29 RX ADMIN — SODIUM CHLORIDE, POTASSIUM CHLORIDE, SODIUM LACTATE AND CALCIUM CHLORIDE: 600; 310; 30; 20 INJECTION, SOLUTION INTRAVENOUS at 07:39

## 2025-06-29 RX ADMIN — Medication 2 PUFF: at 20:42

## 2025-06-29 RX ADMIN — ROPINIROLE HYDROCHLORIDE 1 MG: 0.5 TABLET, FILM COATED ORAL at 22:01

## 2025-06-29 RX ADMIN — SODIUM CHLORIDE, POTASSIUM CHLORIDE, SODIUM LACTATE AND CALCIUM CHLORIDE: 600; 310; 30; 20 INJECTION, SOLUTION INTRAVENOUS at 19:51

## 2025-06-29 RX ADMIN — CARVEDILOL 12.5 MG: 3.12 TABLET, FILM COATED ORAL at 09:35

## 2025-06-29 RX ADMIN — OXYCODONE AND ACETAMINOPHEN 1 TABLET: 5; 325 TABLET ORAL at 09:44

## 2025-06-29 RX ADMIN — GABAPENTIN 300 MG: 300 CAPSULE ORAL at 18:01

## 2025-06-29 RX ADMIN — TIOTROPIUM BROMIDE INHALATION SPRAY 5 MCG: 3.12 SPRAY, METERED RESPIRATORY (INHALATION) at 07:32

## 2025-06-29 RX ADMIN — OXYCODONE AND ACETAMINOPHEN 1 TABLET: 5; 325 TABLET ORAL at 22:02

## 2025-06-29 RX ADMIN — SODIUM CHLORIDE, PRESERVATIVE FREE 10 ML: 5 INJECTION INTRAVENOUS at 22:02

## 2025-06-29 RX ADMIN — CARVEDILOL 12.5 MG: 3.12 TABLET, FILM COATED ORAL at 22:01

## 2025-06-29 RX ADMIN — HYDROXYZINE HYDROCHLORIDE 25 MG: 25 TABLET, FILM COATED ORAL at 22:02

## 2025-06-29 RX ADMIN — AMIODARONE HYDROCHLORIDE 100 MG: 200 TABLET ORAL at 09:34

## 2025-06-29 RX ADMIN — ATORVASTATIN CALCIUM 40 MG: 40 TABLET, FILM COATED ORAL at 09:35

## 2025-06-29 RX ADMIN — GABAPENTIN 300 MG: 300 CAPSULE ORAL at 09:30

## 2025-06-29 ASSESSMENT — PAIN DESCRIPTION - LOCATION
LOCATION: SHOULDER
LOCATION: FOOT
LOCATION: SHOULDER

## 2025-06-29 ASSESSMENT — PAIN SCALES - GENERAL
PAINLEVEL_OUTOF10: 7
PAINLEVEL_OUTOF10: 9
PAINLEVEL_OUTOF10: 1
PAINLEVEL_OUTOF10: 3
PAINLEVEL_OUTOF10: 6

## 2025-06-29 ASSESSMENT — PAIN DESCRIPTION - FREQUENCY: FREQUENCY: INTERMITTENT

## 2025-06-29 ASSESSMENT — PAIN DESCRIPTION - DESCRIPTORS: DESCRIPTORS: ACHING

## 2025-06-29 ASSESSMENT — PAIN DESCRIPTION - ORIENTATION
ORIENTATION: LEFT
ORIENTATION: LEFT

## 2025-06-29 ASSESSMENT — PAIN - FUNCTIONAL ASSESSMENT: PAIN_FUNCTIONAL_ASSESSMENT: PREVENTS OR INTERFERES SOME ACTIVE ACTIVITIES AND ADLS

## 2025-06-29 NOTE — PLAN OF CARE
Problem: Chronic Conditions and Co-morbidities  Goal: Patient's chronic conditions and co-morbidity symptoms are monitored and maintained or improved  6/29/2025 1023 by Nuha Cota RN  Outcome: Progressing  Flowsheets (Taken 6/29/2025 0830)  Care Plan - Patient's Chronic Conditions and Co-Morbidity Symptoms are Monitored and Maintained or Improved: Monitor and assess patient's chronic conditions and comorbid symptoms for stability, deterioration, or improvement       Problem: Pain  Goal: Verbalizes/displays adequate comfort level or baseline comfort level  6/29/2025 1023 by Nuha Cota, RN  Flowsheets (Taken 6/29/2025 1023)  Verbalizes/displays adequate comfort level or baseline comfort level:   Encourage patient to monitor pain and request assistance   Assess pain using appropriate pain scale   Administer analgesics based on type and severity of pain and evaluate response   Implement non-pharmacological measures as appropriate and evaluate response

## 2025-06-29 NOTE — PLAN OF CARE
Problem: Chronic Conditions and Co-morbidities  Goal: Patient's chronic conditions and co-morbidity symptoms are monitored and maintained or improved  6/28/2025 2247 by Abena Douglass RN  Outcome: Progressing  6/28/2025 1751 by Nuha Cota RN  Outcome: Progressing  Flowsheets  Taken 6/28/2025 1751  Care Plan - Patient's Chronic Conditions and Co-Morbidity Symptoms are Monitored and Maintained or Improved:   Monitor and assess patient's chronic conditions and comorbid symptoms for stability, deterioration, or improvement   Collaborate with multidisciplinary team to address chronic and comorbid conditions and prevent exacerbation or deterioration  Taken 6/28/2025 1743  Care Plan - Patient's Chronic Conditions and Co-Morbidity Symptoms are Monitored and Maintained or Improved: Monitor and assess patient's chronic conditions and comorbid symptoms for stability, deterioration, or improvement     Problem: Discharge Planning  Goal: Discharge to home or other facility with appropriate resources  6/28/2025 2247 by Abena Douglass RN  Outcome: Progressing  6/28/2025 1751 by Nuha Cota RN  Outcome: Progressing  Flowsheets (Taken 6/28/2025 1751)  Discharge to home or other facility with appropriate resources:   Identify barriers to discharge with patient and caregiver   Arrange for needed discharge resources and transportation as appropriate   Identify discharge learning needs (meds, wound care, etc)     Problem: Pain  Goal: Verbalizes/displays adequate comfort level or baseline comfort level  Outcome: Progressing     Problem: ABCDS Injury Assessment  Goal: Absence of physical injury  Outcome: Progressing     Problem: Safety - Adult  Goal: Free from fall injury  Outcome: Progressing

## 2025-06-30 ENCOUNTER — APPOINTMENT (OUTPATIENT)
Age: 75
DRG: 683 | End: 2025-06-30
Payer: MEDICARE

## 2025-06-30 LAB
ANION GAP SERPL CALCULATED.3IONS-SCNC: 10 MMOL/L (ref 3–16)
BUN SERPL-MCNC: 37 MG/DL (ref 7–20)
CALCIUM SERPL-MCNC: 9 MG/DL (ref 8.3–10.6)
CHLORIDE SERPL-SCNC: 110 MMOL/L (ref 99–110)
CO2 SERPL-SCNC: 24 MMOL/L (ref 21–32)
CREAT SERPL-MCNC: 1.9 MG/DL (ref 0.6–1.2)
ECHO AO ARCH DIAM: 4.3 CM
ECHO AO ASC DIAM: 4.6 CM
ECHO AO ASCENDING AORTA INDEX: 2.07 CM/M2
ECHO AO DESC DIAM: 4.8 CM
ECHO AO DESCENDING AORTA INDEX: 2.16 CM/M2
ECHO AO ROOT DIAM: 4.5 CM
ECHO AO ROOT INDEX: 2.03 CM/M2
ECHO AV AREA PEAK VELOCITY: 3.4 CM2
ECHO AV AREA PISA: 0.3 CM2
ECHO AV AREA VTI: 3.1 CM2
ECHO AV AREA/BSA PEAK VELOCITY: 1.5 CM2/M2
ECHO AV AREA/BSA VTI: 1.4 CM2/M2
ECHO AV MEAN GRADIENT: 4 MMHG
ECHO AV MEAN VELOCITY: 0.9 M/S
ECHO AV PEAK GRADIENT: 7 MMHG
ECHO AV PEAK GRADIENT: 7 MMHG
ECHO AV PEAK VELOCITY: 1.3 M/S
ECHO AV VELOCITY RATIO: 0.77
ECHO AV VTI: 26.6 CM
ECHO BSA: 2.32 M2
ECHO EST RA PRESSURE: 8 MMHG
ECHO IVC EXP: 2.5 CM
ECHO IVC INSP: 0.7 CM
ECHO LA AREA 2C: 23.3 CM2
ECHO LA AREA 4C: 28.1 CM2
ECHO LA MAJOR AXIS: 7.5 CM
ECHO LA MINOR AXIS: 6.5 CM
ECHO LA VOL BP: 79 ML (ref 22–52)
ECHO LA VOL MOD A2C: 69 ML (ref 22–52)
ECHO LA VOL MOD A4C: 88 ML (ref 22–52)
ECHO LA VOL/BSA BIPLANE: 36 ML/M2 (ref 16–34)
ECHO LA VOLUME INDEX MOD A2C: 31 ML/M2 (ref 16–34)
ECHO LA VOLUME INDEX MOD A4C: 40 ML/M2 (ref 16–34)
ECHO LV E' LATERAL VELOCITY: 9.9 CM/S
ECHO LV E' SEPTAL VELOCITY: 4.95 CM/S
ECHO LV EDV A2C: 128 ML
ECHO LV EDV A4C: 117 ML
ECHO LV EDV INDEX A4C: 53 ML/M2
ECHO LV EDV NDEX A2C: 58 ML/M2
ECHO LV EF PHYSICIAN: 35 %
ECHO LV EJECTION FRACTION A2C: 40 %
ECHO LV EJECTION FRACTION A4C: 28 %
ECHO LV EJECTION FRACTION BIPLANE: 36 % (ref 55–100)
ECHO LV ESV A2C: 77 ML
ECHO LV ESV A4C: 84 ML
ECHO LV ESV INDEX A2C: 35 ML/M2
ECHO LV ESV INDEX A4C: 38 ML/M2
ECHO LVOT AREA: 4.5 CM2
ECHO LVOT AV VTI INDEX: 0.7
ECHO LVOT DIAM: 2.4 CM
ECHO LVOT MEAN GRADIENT: 2 MMHG
ECHO LVOT PEAK GRADIENT: 4 MMHG
ECHO LVOT PEAK VELOCITY: 1 M/S
ECHO LVOT STROKE VOLUME INDEX: 37.7 ML/M2
ECHO LVOT SV: 83.6 ML
ECHO LVOT VTI: 18.5 CM
ECHO MV A VELOCITY: 0.3 M/S
ECHO MV AREA VTI: 3.6 CM2
ECHO MV E DECELERATION TIME (DT): 283 MS
ECHO MV E VELOCITY: 0.43 M/S
ECHO MV E/A RATIO: 1.43
ECHO MV E/E' LATERAL: 4.34
ECHO MV E/E' RATIO (AVERAGED): 6.52
ECHO MV E/E' SEPTAL: 8.69
ECHO MV LVOT VTI INDEX: 1.26
ECHO MV MAX VELOCITY: 0.6 M/S
ECHO MV MEAN GRADIENT: 1 MMHG
ECHO MV MEAN VELOCITY: 0.4 M/S
ECHO MV PEAK GRADIENT: 2 MMHG
ECHO MV VTI: 23.4 CM
ECHO PULMONARY ARTERY END DIASTOLIC PRESSURE: 14 MMHG
ECHO PULMONARY ARTERY END DIASTOLIC PRESSURE: 8 MMHG
ECHO PV MAX VELOCITY: 1.9 M/S
ECHO PV REGURGITANT END DIASTOLIC VELOCITY: 1.5 M/S
ECHO RA AREA 4C: 26.4 CM2
ECHO RA END SYSTOLIC VOLUME APICAL 4 CHAMBER INDEX BSA: 40 ML/M2
ECHO RA VOLUME: 88 ML
ECHO RIGHT VENTRICULAR SYSTOLIC PRESSURE (RVSP): 13 MMHG
ECHO RV BASAL DIMENSION: 3.3 CM
ECHO RV MID DIMENSION: 2.4 CM
ECHO RV TAPSE: 1.6 CM (ref 1.7–?)
ECHO TV REGURGITANT MAX VELOCITY: 1.13 M/S
ECHO TV REGURGITANT PEAK GRADIENT: 5 MMHG
ECHO TV REGURGITANT PEAK GRADIENT: 5 MMHG
GFR SERPLBLD CREATININE-BSD FMLA CKD-EPI: 27 ML/MIN/{1.73_M2}
GLUCOSE BLD-MCNC: 107 MG/DL (ref 70–99)
GLUCOSE BLD-MCNC: 116 MG/DL (ref 70–99)
GLUCOSE BLD-MCNC: 182 MG/DL (ref 70–99)
GLUCOSE SERPL-MCNC: 90 MG/DL (ref 70–99)
MAGNESIUM SERPL-MCNC: 2.58 MG/DL (ref 1.8–2.4)
NT-PROBNP SERPL-MCNC: 226 PG/ML (ref 0–449)
PERFORMED ON: ABNORMAL
POTASSIUM SERPL-SCNC: 4.7 MMOL/L (ref 3.5–5.1)
SODIUM SERPL-SCNC: 144 MMOL/L (ref 136–145)

## 2025-06-30 PROCEDURE — 80048 BASIC METABOLIC PNL TOTAL CA: CPT

## 2025-06-30 PROCEDURE — 93306 TTE W/DOPPLER COMPLETE: CPT

## 2025-06-30 PROCEDURE — 6370000000 HC RX 637 (ALT 250 FOR IP)

## 2025-06-30 PROCEDURE — 83880 ASSAY OF NATRIURETIC PEPTIDE: CPT

## 2025-06-30 PROCEDURE — 36415 COLL VENOUS BLD VENIPUNCTURE: CPT

## 2025-06-30 PROCEDURE — 2500000003 HC RX 250 WO HCPCS

## 2025-06-30 PROCEDURE — 94640 AIRWAY INHALATION TREATMENT: CPT

## 2025-06-30 PROCEDURE — 83735 ASSAY OF MAGNESIUM: CPT

## 2025-06-30 PROCEDURE — 2580000003 HC RX 258: Performed by: INTERNAL MEDICINE

## 2025-06-30 PROCEDURE — 1200000000 HC SEMI PRIVATE

## 2025-06-30 PROCEDURE — 93306 TTE W/DOPPLER COMPLETE: CPT | Performed by: INTERNAL MEDICINE

## 2025-06-30 RX ORDER — LIDOCAINE 4 G/G
1 PATCH TOPICAL DAILY
Status: DISCONTINUED | OUTPATIENT
Start: 2025-06-30 | End: 2025-07-02 | Stop reason: HOSPADM

## 2025-06-30 RX ADMIN — PANTOPRAZOLE SODIUM 40 MG: 40 TABLET, DELAYED RELEASE ORAL at 09:38

## 2025-06-30 RX ADMIN — CARVEDILOL 12.5 MG: 3.12 TABLET, FILM COATED ORAL at 21:28

## 2025-06-30 RX ADMIN — SACUBITRIL AND VALSARTAN 1 TABLET: 24; 26 TABLET, FILM COATED ORAL at 21:28

## 2025-06-30 RX ADMIN — Medication 2 PUFF: at 20:34

## 2025-06-30 RX ADMIN — Medication 2 PUFF: at 07:37

## 2025-06-30 RX ADMIN — SODIUM CHLORIDE, PRESERVATIVE FREE 10 ML: 5 INJECTION INTRAVENOUS at 09:38

## 2025-06-30 RX ADMIN — SACUBITRIL AND VALSARTAN 1 TABLET: 24; 26 TABLET, FILM COATED ORAL at 09:37

## 2025-06-30 RX ADMIN — APIXABAN 5 MG: 5 TABLET, FILM COATED ORAL at 21:28

## 2025-06-30 RX ADMIN — AMIODARONE HYDROCHLORIDE 100 MG: 200 TABLET ORAL at 09:37

## 2025-06-30 RX ADMIN — SODIUM CHLORIDE, POTASSIUM CHLORIDE, SODIUM LACTATE AND CALCIUM CHLORIDE: 600; 310; 30; 20 INJECTION, SOLUTION INTRAVENOUS at 10:34

## 2025-06-30 RX ADMIN — ROPINIROLE HYDROCHLORIDE 1 MG: 0.5 TABLET, FILM COATED ORAL at 21:28

## 2025-06-30 RX ADMIN — APIXABAN 5 MG: 5 TABLET, FILM COATED ORAL at 09:38

## 2025-06-30 RX ADMIN — ROPINIROLE HYDROCHLORIDE 1 MG: 0.5 TABLET, FILM COATED ORAL at 15:33

## 2025-06-30 RX ADMIN — ROPINIROLE HYDROCHLORIDE 1 MG: 0.5 TABLET, FILM COATED ORAL at 09:37

## 2025-06-30 RX ADMIN — GABAPENTIN 300 MG: 300 CAPSULE ORAL at 09:38

## 2025-06-30 RX ADMIN — SODIUM CHLORIDE, PRESERVATIVE FREE 10 ML: 5 INJECTION INTRAVENOUS at 21:30

## 2025-06-30 RX ADMIN — TIOTROPIUM BROMIDE INHALATION SPRAY 5 MCG: 3.12 SPRAY, METERED RESPIRATORY (INHALATION) at 07:37

## 2025-06-30 RX ADMIN — CARVEDILOL 12.5 MG: 3.12 TABLET, FILM COATED ORAL at 09:37

## 2025-06-30 RX ADMIN — OXYCODONE AND ACETAMINOPHEN 1 TABLET: 5; 325 TABLET ORAL at 21:28

## 2025-06-30 RX ADMIN — GABAPENTIN 300 MG: 300 CAPSULE ORAL at 18:28

## 2025-06-30 RX ADMIN — ATORVASTATIN CALCIUM 40 MG: 40 TABLET, FILM COATED ORAL at 09:37

## 2025-06-30 RX ADMIN — HYDROXYZINE HYDROCHLORIDE 25 MG: 25 TABLET, FILM COATED ORAL at 21:27

## 2025-06-30 ASSESSMENT — PAIN SCALES - GENERAL
PAINLEVEL_OUTOF10: 9
PAINLEVEL_OUTOF10: 5

## 2025-06-30 NOTE — CARE COORDINATION
Case Management Assessment  Initial Evaluation    Date/Time of Evaluation: 6/30/2025 5:13 PM  Assessment Completed by: ROXANA Romero    If patient is discharged prior to next notation, then this note serves as note for discharge by case management.    Patient Name: Mary Silverio                   YOB: 1950  Diagnosis: Hyperkalemia [E87.5]  General weakness [R53.1]  Generalized weakness [R53.1]  NELIA (acute kidney injury) [N17.9]  Acute on chronic systolic congestive heart failure (HCC) [I50.23]  Frequent falls [R29.6]                   Date / Time: 6/28/2025 10:45 AM    Patient Admission Status: Inpatient   Readmission Risk (Low < 19, Mod (19-27), High > 27): Readmission Risk Score: 22.3    Current PCP: Kaylene Goodman APRN - CNP  PCP verified by CM? Yes    Chart Reviewed: Yes      History Provided by: Spouse  Patient Orientation: Alert and Oriented, Person, Place, Situation, Self    Patient Cognition: Alert    Hospitalization in the last 30 days (Readmission):  No    If yes, Readmission Assessment in CM Navigator will be completed.    Advance Directives:      Code Status: Full Code   Patient's Primary Decision Maker is: Legal Next of Kin    Primary Decision Maker: Ollie Silverio - Spouse - [phone]    Secondary Decision Maker: Monico Silverio - Child - [phone]    Secondary Decision Maker: Davi Silverio - Child - [phone]    Discharge Planning:    Patient lives with: Spouse/Significant Other Type of Home: House  Primary Care Giver: Self  Patient Support Systems include: Spouse/Significant Other   Current Financial resources: Medicare  Current community resources: None  Current services prior to admission: Oxygen Therapy            Current DME:              Type of Home Care services:  None    ADLS  Prior functional level: Independent in ADLs/IADLs  Current functional level: Independent in ADLs/IADLs    PT AM-PAC: 23 /24  OT AM-PAC: 21 /24    Family can provide assistance at DC: Yes  Would you

## 2025-06-30 NOTE — PLAN OF CARE
Problem: Chronic Conditions and Co-morbidities  Goal: Patient's chronic conditions and co-morbidity symptoms are monitored and maintained or improved  6/30/2025 0008 by Renee Jama RN  Outcome: Progressing     Problem: Discharge Planning  Goal: Discharge to home or other facility with appropriate resources  Outcome: Progressing     Problem: Pain  Goal: Verbalizes/displays adequate comfort level or baseline comfort level  6/30/2025 0008 by Renee Jama RN  Outcome: Progressing     Problem: Pain  Goal: Verbalizes/displays adequate comfort level or baseline comfort level  6/30/2025 0008 by Renee Jama RN  Outcome: Progressing     Problem: ABCDS Injury Assessment  Goal: Absence of physical injury  Outcome: Progressing     Problem: Safety - Adult  Goal: Free from fall injury  Outcome: Progressing

## 2025-07-01 ENCOUNTER — APPOINTMENT (OUTPATIENT)
Dept: GENERAL RADIOLOGY | Age: 75
DRG: 683 | End: 2025-07-01
Payer: MEDICARE

## 2025-07-01 LAB
ANION GAP SERPL CALCULATED.3IONS-SCNC: 11 MMOL/L (ref 3–16)
BUN SERPL-MCNC: 23 MG/DL (ref 7–20)
CALCIUM SERPL-MCNC: 9.3 MG/DL (ref 8.3–10.6)
CHLORIDE SERPL-SCNC: 109 MMOL/L (ref 99–110)
CO2 SERPL-SCNC: 22 MMOL/L (ref 21–32)
CREAT SERPL-MCNC: 1.6 MG/DL (ref 0.6–1.2)
GFR SERPLBLD CREATININE-BSD FMLA CKD-EPI: 33 ML/MIN/{1.73_M2}
GLUCOSE BLD-MCNC: 103 MG/DL (ref 70–99)
GLUCOSE BLD-MCNC: 114 MG/DL (ref 70–99)
GLUCOSE BLD-MCNC: 127 MG/DL (ref 70–99)
GLUCOSE BLD-MCNC: 91 MG/DL (ref 70–99)
GLUCOSE SERPL-MCNC: 92 MG/DL (ref 70–99)
MAGNESIUM SERPL-MCNC: 2.37 MG/DL (ref 1.8–2.4)
PERFORMED ON: ABNORMAL
PERFORMED ON: NORMAL
POTASSIUM SERPL-SCNC: 4.8 MMOL/L (ref 3.5–5.1)
SODIUM SERPL-SCNC: 142 MMOL/L (ref 136–145)

## 2025-07-01 PROCEDURE — 6370000000 HC RX 637 (ALT 250 FOR IP)

## 2025-07-01 PROCEDURE — 36415 COLL VENOUS BLD VENIPUNCTURE: CPT

## 2025-07-01 PROCEDURE — 94640 AIRWAY INHALATION TREATMENT: CPT

## 2025-07-01 PROCEDURE — 1200000000 HC SEMI PRIVATE

## 2025-07-01 PROCEDURE — 83735 ASSAY OF MAGNESIUM: CPT

## 2025-07-01 PROCEDURE — 80048 BASIC METABOLIC PNL TOTAL CA: CPT

## 2025-07-01 PROCEDURE — 2500000003 HC RX 250 WO HCPCS

## 2025-07-01 PROCEDURE — 73030 X-RAY EXAM OF SHOULDER: CPT

## 2025-07-01 RX ADMIN — GABAPENTIN 300 MG: 300 CAPSULE ORAL at 09:03

## 2025-07-01 RX ADMIN — PANTOPRAZOLE SODIUM 40 MG: 40 TABLET, DELAYED RELEASE ORAL at 09:04

## 2025-07-01 RX ADMIN — CARVEDILOL 12.5 MG: 3.12 TABLET, FILM COATED ORAL at 22:05

## 2025-07-01 RX ADMIN — SODIUM CHLORIDE, PRESERVATIVE FREE 10 ML: 5 INJECTION INTRAVENOUS at 22:06

## 2025-07-01 RX ADMIN — ROPINIROLE HYDROCHLORIDE 1 MG: 0.5 TABLET, FILM COATED ORAL at 14:38

## 2025-07-01 RX ADMIN — Medication 2 PUFF: at 07:37

## 2025-07-01 RX ADMIN — SODIUM CHLORIDE, PRESERVATIVE FREE 10 ML: 5 INJECTION INTRAVENOUS at 09:07

## 2025-07-01 RX ADMIN — CARVEDILOL 12.5 MG: 3.12 TABLET, FILM COATED ORAL at 09:06

## 2025-07-01 RX ADMIN — Medication 2 PUFF: at 19:08

## 2025-07-01 RX ADMIN — HYDROXYZINE HYDROCHLORIDE 25 MG: 25 TABLET, FILM COATED ORAL at 22:05

## 2025-07-01 RX ADMIN — TIOTROPIUM BROMIDE INHALATION SPRAY 5 MCG: 3.12 SPRAY, METERED RESPIRATORY (INHALATION) at 07:37

## 2025-07-01 RX ADMIN — ATORVASTATIN CALCIUM 40 MG: 40 TABLET, FILM COATED ORAL at 09:05

## 2025-07-01 RX ADMIN — ROPINIROLE HYDROCHLORIDE 1 MG: 0.5 TABLET, FILM COATED ORAL at 22:05

## 2025-07-01 RX ADMIN — AMIODARONE HYDROCHLORIDE 100 MG: 200 TABLET ORAL at 09:03

## 2025-07-01 RX ADMIN — SACUBITRIL AND VALSARTAN 1 TABLET: 24; 26 TABLET, FILM COATED ORAL at 22:05

## 2025-07-01 RX ADMIN — APIXABAN 5 MG: 5 TABLET, FILM COATED ORAL at 09:05

## 2025-07-01 RX ADMIN — APIXABAN 5 MG: 5 TABLET, FILM COATED ORAL at 22:05

## 2025-07-01 RX ADMIN — OXYCODONE AND ACETAMINOPHEN 1 TABLET: 5; 325 TABLET ORAL at 09:04

## 2025-07-01 RX ADMIN — ROPINIROLE HYDROCHLORIDE 1 MG: 0.5 TABLET, FILM COATED ORAL at 09:05

## 2025-07-01 RX ADMIN — OXYCODONE AND ACETAMINOPHEN 1 TABLET: 5; 325 TABLET ORAL at 22:05

## 2025-07-01 RX ADMIN — SACUBITRIL AND VALSARTAN 1 TABLET: 24; 26 TABLET, FILM COATED ORAL at 09:05

## 2025-07-01 RX ADMIN — GABAPENTIN 300 MG: 300 CAPSULE ORAL at 17:56

## 2025-07-01 ASSESSMENT — PAIN DESCRIPTION - FREQUENCY: FREQUENCY: INTERMITTENT

## 2025-07-01 ASSESSMENT — PAIN DESCRIPTION - LOCATION
LOCATION: SHOULDER
LOCATION: GENERALIZED
LOCATION: SHOULDER

## 2025-07-01 ASSESSMENT — PAIN DESCRIPTION - DESCRIPTORS
DESCRIPTORS: ACHING
DESCRIPTORS: ACHING;CRAMPING

## 2025-07-01 ASSESSMENT — PAIN SCALES - GENERAL
PAINLEVEL_OUTOF10: 5
PAINLEVEL_OUTOF10: 8
PAINLEVEL_OUTOF10: 9
PAINLEVEL_OUTOF10: 7

## 2025-07-01 ASSESSMENT — PAIN DESCRIPTION - ORIENTATION
ORIENTATION: LEFT
ORIENTATION: LEFT

## 2025-07-01 ASSESSMENT — PAIN DESCRIPTION - PAIN TYPE: TYPE: ACUTE PAIN

## 2025-07-01 NOTE — PLAN OF CARE
Problem: Chronic Conditions and Co-morbidities  Goal: Patient's chronic conditions and co-morbidity symptoms are monitored and maintained or improved  6/30/2025 2308 by Renee Jama RN  Outcome: Progressing     Problem: Discharge Planning  Goal: Discharge to home or other facility with appropriate resources  6/30/2025 2308 by Renee Jama RN  Outcome: Progressing     Problem: Pain  Goal: Verbalizes/displays adequate comfort level or baseline comfort level  6/30/2025 2308 by Renee Jama RN  Outcome: Progressing     Problem: ABCDS Injury Assessment  Goal: Absence of physical injury  6/30/2025 2308 by Renee Jama RN  Outcome: Progressing    Problem: Safety - Adult  Goal: Free from fall injury  6/30/2025 2308 by Renee Jama RN  Outcome: Progressing

## 2025-07-01 NOTE — CARE COORDINATION
Cm update; LOS # 3; Followed by IM, Nephrology, Therapies Therapies recommending outpatient PT. IPTA No needs at this time.Kristina Wright RN

## 2025-07-01 NOTE — PLAN OF CARE
Problem: Chronic Conditions and Co-morbidities  Goal: Patient's chronic conditions and co-morbidity symptoms are monitored and maintained or improved  7/1/2025 1035 by Echo Wiggins RN  Outcome: Progressing  6/30/2025 2308 by Renee Jama RN  Outcome: Progressing     Problem: Discharge Planning  Goal: Discharge to home or other facility with appropriate resources  7/1/2025 1035 by Echo Wigigns RN  Outcome: Progressing  Flowsheets (Taken 7/1/2025 1035)  Discharge to home or other facility with appropriate resources:   Identify barriers to discharge with patient and caregiver   Arrange for needed discharge resources and transportation as appropriate   Identify discharge learning needs (meds, wound care, etc)  6/30/2025 2308 by Renee Jama RN  Outcome: Progressing     Problem: Pain  Goal: Verbalizes/displays adequate comfort level or baseline comfort level  7/1/2025 1035 by Echo Wiggins RN  Outcome: Progressing  Flowsheets (Taken 7/1/2025 1035)  Verbalizes/displays adequate comfort level or baseline comfort level:   Encourage patient to monitor pain and request assistance   Assess pain using appropriate pain scale   Administer analgesics based on type and severity of pain and evaluate response   Implement non-pharmacological measures as appropriate and evaluate response  6/30/2025 2308 by Renee Jama RN  Outcome: Progressing     Problem: ABCDS Injury Assessment  Goal: Absence of physical injury  7/1/2025 1035 by Echo Wiggins RN  Outcome: Progressing  Flowsheets (Taken 7/1/2025 1035)  Absence of Physical Injury: Implement safety measures based on patient assessment  6/30/2025 2308 by Renee Jama RN  Outcome: Progressing     Problem: Safety - Adult  Goal: Free from fall injury  7/1/2025 1035 by Echo Wiggins RN  Outcome: Progressing  Flowsheets (Taken 7/1/2025 1035)  Free From Fall Injury: Instruct family/caregiver on patient safety  6/30/2025 2308 by Renee Jama RN  Outcome: Progressing

## 2025-07-01 NOTE — DISCHARGE INSTRUCTIONS
Heart Failure Resources:  Heart Failure Interactive Workbook:  Go to https://[employer]/publication/?l=990164 for a Free Heart Failure Interactive Workbook provided by The American Heart Association. This interactive workbook will provide information on Healthier Living with Heart Failure. Please copy and paste link into search bar. Use your mouse to scroll through the pages.    HF [place] khari:   Heart Failure Free smart phone khari available for iPhone and Android download. Use your phone to track sodium intake, fluid intake, symptoms, and weight.     Low Sodium Diet / Recipes:  Go to www.[employer].com website for “renal” diet which is Low Sodium! [employer] is a dialysis company, but this website offers free seasonal cookbooks. Each quarter, they will release 25-30 new recipes with a breakdown of calories, sodium, and glucose. You can also go to www.[employer]/recipes website for free recipes.     Home Exercise Program:   Identification of Green/Yellow/Red zones:  You should be able to identify when you feel good (green zone), if you have 1-2 symptoms of HF (yellow zone), or if you are in need of medical attention (red zone).  In your CHF education folder you were provided a “stop light tool” to outline this information.     We want to you to rate your exertion levels:    Our therapy team has discussed means of identification with you such as the \"Ramon scale.\"  The Ramon rating scale ranges from 6 to 20, where 6 means \"no exertion at all\" and 20 means \"maximal exertion.\" The goal is to use this to gauge how much effort it is taking for you to do your normal daily tasks.   You should be able to recognize when too much exertion is being expended.    Elements of Energy Conservation:   Prioritize/Plan: Decide what needs to be done today, and what can wait for a later date, write to do lists, plan ahead to avoid extra trips, and gather supplies and equipment needed before starting an activity.   Position:

## 2025-07-02 ENCOUNTER — APPOINTMENT (OUTPATIENT)
Dept: CT IMAGING | Age: 75
DRG: 683 | End: 2025-07-02
Payer: MEDICARE

## 2025-07-02 VITALS
HEART RATE: 63 BPM | TEMPERATURE: 97.3 F | RESPIRATION RATE: 14 BRPM | BODY MASS INDEX: 39.24 KG/M2 | HEIGHT: 67 IN | WEIGHT: 250 LBS | DIASTOLIC BLOOD PRESSURE: 63 MMHG | OXYGEN SATURATION: 94 % | SYSTOLIC BLOOD PRESSURE: 103 MMHG

## 2025-07-02 LAB
ANION GAP SERPL CALCULATED.3IONS-SCNC: 9 MMOL/L (ref 3–16)
BUN SERPL-MCNC: 19 MG/DL (ref 7–20)
CALCIUM SERPL-MCNC: 9.3 MG/DL (ref 8.3–10.6)
CHLORIDE SERPL-SCNC: 110 MMOL/L (ref 99–110)
CO2 SERPL-SCNC: 24 MMOL/L (ref 21–32)
CREAT SERPL-MCNC: 1.4 MG/DL (ref 0.6–1.2)
GFR SERPLBLD CREATININE-BSD FMLA CKD-EPI: 39 ML/MIN/{1.73_M2}
GLUCOSE BLD-MCNC: 103 MG/DL (ref 70–99)
GLUCOSE BLD-MCNC: 81 MG/DL (ref 70–99)
GLUCOSE BLD-MCNC: 88 MG/DL (ref 70–99)
GLUCOSE SERPL-MCNC: 84 MG/DL (ref 70–99)
MAGNESIUM SERPL-MCNC: 2.31 MG/DL (ref 1.8–2.4)
NT-PROBNP SERPL-MCNC: 755 PG/ML (ref 0–449)
PERFORMED ON: ABNORMAL
PERFORMED ON: NORMAL
PERFORMED ON: NORMAL
POTASSIUM SERPL-SCNC: 5.2 MMOL/L (ref 3.5–5.1)
SODIUM SERPL-SCNC: 143 MMOL/L (ref 136–145)

## 2025-07-02 PROCEDURE — 71250 CT THORAX DX C-: CPT

## 2025-07-02 PROCEDURE — 94640 AIRWAY INHALATION TREATMENT: CPT

## 2025-07-02 PROCEDURE — 83880 ASSAY OF NATRIURETIC PEPTIDE: CPT

## 2025-07-02 PROCEDURE — 6370000000 HC RX 637 (ALT 250 FOR IP)

## 2025-07-02 PROCEDURE — 2500000003 HC RX 250 WO HCPCS

## 2025-07-02 PROCEDURE — 80048 BASIC METABOLIC PNL TOTAL CA: CPT

## 2025-07-02 PROCEDURE — 83735 ASSAY OF MAGNESIUM: CPT

## 2025-07-02 PROCEDURE — 36415 COLL VENOUS BLD VENIPUNCTURE: CPT

## 2025-07-02 PROCEDURE — 6370000000 HC RX 637 (ALT 250 FOR IP): Performed by: INTERNAL MEDICINE

## 2025-07-02 RX ORDER — TORSEMIDE 20 MG/1
20 TABLET ORAL DAILY
Status: DISCONTINUED | OUTPATIENT
Start: 2025-07-02 | End: 2025-07-02

## 2025-07-02 RX ORDER — TORSEMIDE 20 MG/1
20 TABLET ORAL DAILY
Status: DISCONTINUED | OUTPATIENT
Start: 2025-07-03 | End: 2025-07-02 | Stop reason: HOSPADM

## 2025-07-02 RX ORDER — ERGOCALCIFEROL 1.25 MG/1
50000 CAPSULE ORAL WEEKLY
Qty: 5 CAPSULE | Refills: 0 | Status: SHIPPED | OUTPATIENT
Start: 2025-07-06

## 2025-07-02 RX ADMIN — APIXABAN 5 MG: 5 TABLET, FILM COATED ORAL at 09:56

## 2025-07-02 RX ADMIN — CARVEDILOL 12.5 MG: 3.12 TABLET, FILM COATED ORAL at 09:57

## 2025-07-02 RX ADMIN — OXYCODONE AND ACETAMINOPHEN 1 TABLET: 5; 325 TABLET ORAL at 09:56

## 2025-07-02 RX ADMIN — GABAPENTIN 300 MG: 300 CAPSULE ORAL at 09:56

## 2025-07-02 RX ADMIN — ROPINIROLE HYDROCHLORIDE 1 MG: 0.5 TABLET, FILM COATED ORAL at 13:35

## 2025-07-02 RX ADMIN — Medication 2 PUFF: at 08:46

## 2025-07-02 RX ADMIN — PANTOPRAZOLE SODIUM 40 MG: 40 TABLET, DELAYED RELEASE ORAL at 09:56

## 2025-07-02 RX ADMIN — ATORVASTATIN CALCIUM 40 MG: 40 TABLET, FILM COATED ORAL at 09:56

## 2025-07-02 RX ADMIN — TIOTROPIUM BROMIDE INHALATION SPRAY 5 MCG: 3.12 SPRAY, METERED RESPIRATORY (INHALATION) at 08:45

## 2025-07-02 RX ADMIN — AMIODARONE HYDROCHLORIDE 100 MG: 200 TABLET ORAL at 09:57

## 2025-07-02 RX ADMIN — SODIUM CHLORIDE, PRESERVATIVE FREE 10 ML: 5 INJECTION INTRAVENOUS at 10:00

## 2025-07-02 RX ADMIN — SODIUM ZIRCONIUM CYCLOSILICATE 5 G: 5 POWDER, FOR SUSPENSION ORAL at 09:57

## 2025-07-02 RX ADMIN — SACUBITRIL AND VALSARTAN 1 TABLET: 24; 26 TABLET, FILM COATED ORAL at 09:56

## 2025-07-02 RX ADMIN — ROPINIROLE HYDROCHLORIDE 1 MG: 0.5 TABLET, FILM COATED ORAL at 09:56

## 2025-07-02 ASSESSMENT — PAIN SCALES - GENERAL: PAINLEVEL_OUTOF10: 8

## 2025-07-02 NOTE — PLAN OF CARE
Problem: Chronic Conditions and Co-morbidities  Goal: Patient's chronic conditions and co-morbidity symptoms are monitored and maintained or improved  7/1/2025 2323 by Renee Jama RN  Outcome: Progressing     Problem: Discharge Planning  Goal: Discharge to home or other facility with appropriate resources  7/1/2025 2323 by Renee Jama RN  Outcome: Progressing     Problem: Pain  Goal: Verbalizes/displays adequate comfort level or baseline comfort level  7/1/2025 2323 by Renee Jama RN  Outcome: Progressing     Problem: ABCDS Injury Assessment  Goal: Absence of physical injury  7/1/2025 2323 by Renee Jama RN  Outcome: Progressing    Problem: Safety - Adult  Goal: Free from fall injury  7/1/2025 2323 by Renee Jama RN  Outcome: Progressing

## 2025-07-02 NOTE — DISCHARGE INSTR - COC
Continuity of Care Form    Patient Name: Mary Silverio   :  1950  MRN:  [id number]    Admit date:  2025  Discharge date:  ***    Code Status Order: Full Code   Advance Directives:     Admitting Physician:  Aviva Holcomb MD  PCP: Kaylene Goodman APRN - CNP    Discharging Nurse: ***  Discharging Hospital Unit/Room#: 0532/0532-01  Discharging Unit Phone Number: ***    Emergency Contact:   Extended Emergency Contact Information  Primary Emergency Contact: Ollie Silverio  Address: [address] [place] [address] [place] [address] [facility]  Home Phone: [phone]  Mobile Phone: [phone]  Relation: Spouse   needed? No  Secondary Emergency Contact: Monico Silverio  Home Phone: [phone]  Relation: Child   needed? No    Past Surgical History:  Past Surgical History:   Procedure Laterality Date    ABLATION OF DYSRHYTHMIC FOCUS      afib    CHOLECYSTECTOMY, OPEN      COLONOSCOPY      HAND SURGERY  2023    HIP SURGERY Left 2023    LEFT KNEE INTRA ARTICULAR INJECTION SITE CONFIRMED BY FLUOROSCOPY performed by Leif Deluna MD at [facility] OR    HIP SURGERY Bilateral 2024    BILATERAL KNEE INTRA ARTICULAR INJECTION SITE CONFIRMED BY FLUOROSCOPY performed by Patricio Reaves MD at [facility] OR    HYSTERECTOMY, VAGINAL      KNEE ARTHROCENTESIS Left 2023    LEFT KNEE INTRA ARTICULAR INJECTION SITE CONFIRMED BY FLUOROSCOPY performed by Leif Deluna MD at [facility] OR    PACEMAKER PLACEMENT      PAIN MANAGEMENT PROCEDURE Left 2023    LEFT KNEE INTRA ARTICULAR INJECTION SITE CONFIRMED BY FLUOROSCOPY performed by Leif Deluna MD at [facility] OR    STIMULATOR SURGERY      SPINAL    STIMULATOR SURGERY N/A 2024    REMOVAL OF PERCUTANEOUS SPINAL CORD STIMULATOR LEAD AND BATTERY performed by Patricio Reaves MD at [facility] OR    WRIST FRACTURE SURGERY Right 2023    RIGHT OPEN REDUCTION AND INTERNAL FIXATION WRIST performed by Franki Garcia

## 2025-07-02 NOTE — PLAN OF CARE
Problem: Chronic Conditions and Co-morbidities  Goal: Patient's chronic conditions and co-morbidity symptoms are monitored and maintained or improved  7/2/2025 1318 by Echo Wiggins RN  Outcome: Progressing  7/1/2025 2323 by Renee Jama RN  Outcome: Progressing     Problem: Discharge Planning  Goal: Discharge to home or other facility with appropriate resources  7/2/2025 1318 by Echo Wiggins RN  Outcome: Progressing  7/1/2025 2323 by Renee Jama RN  Outcome: Progressing     Problem: Pain  Goal: Verbalizes/displays adequate comfort level or baseline comfort level  7/2/2025 1318 by Echo Wiggins RN  Outcome: Progressing  7/1/2025 2323 by Renee Jama RN  Outcome: Progressing     Problem: ABCDS Injury Assessment  Goal: Absence of physical injury  7/2/2025 1318 by Echo Wiggins RN  Outcome: Progressing  7/1/2025 2323 by Renee Jama RN  Outcome: Progressing     Problem: Safety - Adult  Goal: Free from fall injury  7/2/2025 1318 by Echo Wiggins RN  Outcome: Progressing  7/1/2025 2323 by Renee Jama RN  Outcome: Progressing

## 2025-07-02 NOTE — DISCHARGE SUMMARY
Hospital Medicine Discharge Summary    Patient: Mary Silverio   : 1950     Hospital:  [facility]  Admit Date: 2025   Discharge Date:   ***  Disposition:  {Dispo Location:89999}   Code status:  {Code Status:79287}  Condition at Discharge: {Discharge Condition:01329}  Primary Care Provider: Kaylene Goodman APRN - CNP    Admitting Provider: Aviva Holcomb MD  Discharge Provider: DEMI Orr CNP     Discharge Diagnoses:      Active Hospital Problems    Diagnosis     Generalized weakness [R53.1]     NELIA (acute kidney injury) [N17.9]     Anemia of chronic renal failure, stage 3b (HCC) [N18.32, D63.1]     Chronic HFrEF (heart failure with reduced ejection fraction) ([facility]) [I50.22]        Presenting Admission History:      ***     Assessment/Plan:      ***    Physical Exam Performed:      /63   Pulse 63   Temp 97.3 °F (36.3 °C) (Oral)   Resp 14   Ht 1.702 m (5' 7.01\")   Wt 113.4 kg (250 lb)   SpO2 94%   BMI 39.15 kg/m²       General appearance:  No apparent distress, appears stated age and cooperative.  Respiratory:  Normal respiratory effort.   Cardiovascular:  Regular rate and rhythm.  Abdomen:  Soft, non-tender, non-distended.  Musculoskeletal:  No edema  Neurologic:  Non-focal  Psychiatric:  Alert and oriented    Patient Discharge Instructions:      Follow up:    1.  Primary Care Provider Kaylene Goodman APRN - CNP in the next 1-2 weeks.      The patient was seen and examined on day of discharge and this discharge summary is in conjunction with any daily progress note from day of discharge. Time spent on discharge: 3*** minutes in the examination, evaluation, counseling and review of medications and discharge plan.    ------------------------------------------------------------------------------------------------------------------------------------------------------    Discharge Medications:   Current Discharge Medication List        START taking these

## 2025-07-02 NOTE — PROGRESS NOTES
[facility] Medicine Progress Note  V 5.17  I personally saw the patient and made/approved the management plan and take responsibility for the patient management. I have personally examined the patient independently. I have reviewed the patient's available data,including medical history and recent test results. Reviewed and discussed note as documented by SALIMA student.  I agree with the physical exam findings, assessment and plan. I personally performed a substantive portion of the visit including all aspects of the medical decision making.      Electronically signed by DEMI Acevedo CNP on 6/29/25 at 12:38 PM EDT      Date of Admission: 6/28/2025    Hospital Day: 2      Chief Admission Complaint:  toe pain    Subjective:  Patient reports she is feeling well and has no needs at this time.     Presenting Admission History:       \"75 y.o. female with past medical history significant for HFrEF with ICD in place, a-fib, hyperlipidemia, obesity, weakness .  Presented to [facility] with complaints of generalized weakness and pain that has been ongoing for the past couple of months.  States her legs gave out last night, and when she fell she twisted her toe.  This is why she initially reported to the ED.  There are no aggravating or alleviating symptoms.  No radiation.  Denies dyspnea, chest pain, n/v, headache.  Does report dizziness w/ standing, and feeling like she is in a \"fog.\"     While in the ED, she was found to have NELIA and hyperkalemia, which is why she is being admitted. \"    Assessment/Plan:      Acute kidney injury on CKD stage 3b.  Possibly due to diuretic use.  Baseline creat appears to be around 1.5-1.8.  On admission it was 3.2,and has improved to 2.4.  Nephrology has been consulted, fluids per their recs.  Avoid hypotension, nephrotoxics as able.  Monitor daily renal panel.       Hyperkalemia.  K was 5.8 on arrival.  Likely due to above.  Treated with D10, Ca gluc, IV insulin, 
      Hospital Medicine Progress Note  V 5.17        Date of Admission: 6/28/2025    Hospital Day: 3      Chief Admission Complaint:  toe pain    Subjective:  c/o chronic left shoulder pain    Presenting Admission History:       \"75 y.o. female with past medical history significant for HFrEF with ICD in place, a-fib, hyperlipidemia, obesity, weakness .  Presented to [facility] with complaints of generalized weakness and pain that has been ongoing for the past couple of months.  States her legs gave out last night, and when she fell she twisted her toe.  This is why she initially reported to the ED.  There are no aggravating or alleviating symptoms.  No radiation.  Denies dyspnea, chest pain, n/v, headache.  Does report dizziness w/ standing, and feeling like she is in a \"fog.\"     While in the ED, she was found to have NELIA and hyperkalemia, which is why she is being admitted. \"    Assessment/Plan:      Acute kidney injury on CKD stage 3b.  Possibly due to diuretic use.  Baseline creat appears to be around 1.5-1.8.  On admission it was 3.2,and has improved to 1.9.  Nephrology has been consulted, fluids per their recs.  Avoid hypotension, nephrotoxics as able.  Monitor daily renal panel.       Hyperkalemia.  K was 5.8 on arrival.  Likely due to above.  Treated with D10, Ca gluc, IV insulin, lokelma.  K normal today, continue to monitor.      Chronic HFrEF.  Non-ischemic in etiology, per review of cardiology note.  No evidence of exacerbation at this time.  Continue home regimen of carvedilol, entresto.  Holding spironolactone, torsemide in setting of NELIA.  Echo 6/2023: EF 25%; hypokinesis of apex, anteroseptum, anterior and apical walls; G2 DD.  Repeat ordered 6/28. Med[employer]onic ICD in place - requested this to be interrogated.  Pt home weight is 245lb.  Follows with [facility] cardiology as outpatient.  Monitor daily weights, I&O.   on admission, no peripheral edema, clear lungs, stable on room air.  
4 Eyes Skin Assessment     NAME:  Mary Silverio  YOB: 1950  MEDICAL RECORD NUMBER:  [id number]    The patient is being assessed for  Cath Lab Post-Op    I agree that at least one RN has performed a thorough Head to Toe Skin Assessment on the patient. ALL assessment sites listed below have been assessed.      Areas assessed by both nurses:    Head, Face, Ears, Shoulders, Back, Chest, Arms, Elbows, Hands, Sacrum. Buttock, Coccyx, Ischium, Legs. Feet and Heels, and Under Medical Devices   Scar on abd and R lumbar back, moisture redness in groin.        Does the Patient have a Wound? No noted wound(s)       Mehul Prevention initiated by RN: No  Wound Care Orders initiated by RN: No    Pressure Injury (Stage 1,2,3,4, Unstageable, DTI, NWPT, and Complex wounds) if present, place Wound referral order by RN under : No    New Ostomies, if present place, Ostomy referral order under : No     Nurse 1 eSignature: Electronically signed by Nuha Cota RN on 6/28/25 at 6:59 PM EDT    **SHARE this note so that the co-signing nurse can place an eSignature**    Nurse 2 eSignature: Electronically signed by López Brown RN on 6/28/25 at 7:19 PM EDT   
Nephrology Consult Note                                                                                                                                                                                                                                                                                                                                                               Office : [phone]     Fax :[phone]    Patient's Name: Mary Silverio  4:40 PM  6/30/2025    Reason for Consult:  NELIA/ CKD  Requesting Physician:  Kaylene Goodman APRN - CNP  Chief Complaint:  toe pain   Chief Complaint   Patient presents with    Toe Pain     Pt fell yesterday and injured right great toe.     Dizziness     For a couple months    Fatigue     Weakness and pain in hips, knees and shoulder       Assessment/Plan       # NELIA on CKD 3B   NELIA 2/2 subacute diarrhea, low oral intake and NSAIDS  Possibly also 2/2 relative hypotension   No evidence of pre-renal though high risk   Plan:  Gentle hydration: ok to stop now and recheck labs in AM   Recheck BMp in PM   Hold Torsemide, Jardiance   BMP and strict I./O  Discussed w/ attending     # HTN  controlled  Resume low dose Aldactone tomorrow     # Anemia  Of CKD  No PEDRITO    # Acid- base/ Electrolyte imbalance   Hyperkalemia 2/2 NELIA/ CKD  Plan:  Low K diet  Lokelma   Fluids   Monitor BMP     # HFrEF  Compensated  Holding Torsemide, Jardiance   Monitor Sx     History of Present Ilness:    Mary Silverio is a 75 y.o. female with   75 y.o. female with past medical history significant for HFrEF with ICD in place, a-fib, hyperlipidemia, obesity, weakness .  Presented to [facility] with complaints of generalized weakness and diffuse joint  pain that has been ongoing for the past couple of months.  States her legs gave out last night, and when she fell she twisted her toe.  Denies dyspnea, chest pain, n/v, headache.  Does report dizziness w/ standing.   She did have diarrhea for 
Nephrology Consult Note                                                                                                                                                                                                                                                                                                                                                               Office : [phone]     Fax :[phone]    Patient's Name: Mary Silverio  8:57 AM  7/2/2025    Reason for Consult:  NELIA/ CKD  Requesting Physician:  Kaylene Goodman APRN - CNP  Chief Complaint:  toe pain   Chief Complaint   Patient presents with    Toe Pain     Pt fell yesterday and injured right great toe.     Dizziness     For a couple months    Fatigue     Weakness and pain in hips, knees and shoulder       Assessment/Plan       # NELIA on CKD 3B   NELIA 2/2 subacute diarrhea, low oral intake and NSAIDS  Possibly also 2/2 relative hypotension   No evidence of pre-renal though high risk   Aortic aneurysm , possible need for CTA- I will need with IM/ vascular team   Plan:  Can be discharged from renal perspective , f/u in 2 weeks   Ok to resume Jardiance and later: Torsemide   BMP and strict I./O  Discussed w/ attending   If not to do CTA-give NS 75 cc for 5 hours before and after CT       # HTN  controlled  Resume low dose Aldactone tomorrow     # Anemia  Of CKD  No PEDRITO    # Acid- base/ Electrolyte imbalance   Hyperkalemia 2/2 NELIA/ CKD  Plan:  Low K diet  Lokelma   Fluids   Monitor BMP     # HFrEF  Compensated  Holding Torsemide, Jardiance   Monitor Sx     History of Present Ilness:    Mary Silverio is a 75 y.o. female with   75 y.o. female with past medical history significant for HFrEF with ICD in place, a-fib, hyperlipidemia, obesity, weakness .  Presented to [facility] with complaints of generalized weakness and diffuse joint  pain that has been ongoing for the past couple of months.  States her legs gave out last night, 
Nephrology Consult Note                                                                                                                                                                                                                                                                                                                                                               Office : [phone]     Fax :[phone]    Patient's Name: Mary Silverio  9:19 PM  7/1/2025    Reason for Consult:  NELIA/ CKD  Requesting Physician:  Kaylene Goodman APRN - CNP  Chief Complaint:  toe pain   Chief Complaint   Patient presents with    Toe Pain     Pt fell yesterday and injured right great toe.     Dizziness     For a couple months    Fatigue     Weakness and pain in hips, knees and shoulder       Assessment/Plan       # NELIA on CKD 3B   NELIA 2/2 subacute diarrhea, low oral intake and NSAIDS  Possibly also 2/2 relative hypotension   No evidence of pre-renal though high risk   Plan:  Can be discharged from renal perspective , f/u in 2 weeks   Ok to resume Jardiance and later: Torsemide   BMP and strict I./O  Discussed w/ attending     # HTN  controlled  Resume low dose Aldactone tomorrow     # Anemia  Of CKD  No PEDRITO    # Acid- base/ Electrolyte imbalance   Hyperkalemia 2/2 NELIA/ CKD  Plan:  Low K diet  Lokelma   Fluids   Monitor BMP     # HFrEF  Compensated  Holding Torsemide, Jardiance   Monitor Sx     History of Present Ilness:    Mary Silverio is a 75 y.o. female with   75 y.o. female with past medical history significant for HFrEF with ICD in place, a-fib, hyperlipidemia, obesity, weakness .  Presented to [facility] with complaints of generalized weakness and diffuse joint  pain that has been ongoing for the past couple of months.  States her legs gave out last night, and when she fell she twisted her toe.  Denies dyspnea, chest pain, n/v, headache.  Does report dizziness w/ standing.   She did have diarrhea for 
Nephrology Consult Note                                                                                                                                                                                                                                                                                                                                                               Office : [phone]     Fax :[phone]    Patient's Name: Mary Silverio  8:52 AM  6/29/2025    Reason for Consult:  NELIA/ CKD  Requesting Physician:  Kaylene Goodman APRN - CNP  Chief Complaint:  toe pain   Chief Complaint   Patient presents with    Toe Pain     Pt fell yesterday and injured right great toe.     Dizziness     For a couple months    Fatigue     Weakness and pain in hips, knees and shoulder       Assessment/Plan       # NELIA on CKD 3B   NELIA 2/2 subacute diarrhea, low oral intake and NSAIDS  Possibly also 2/2 relative hypotension   No evidence of pre-renal though high risk   Plan:  Gentle hydration (avoid diuresis today) : LR 75 cc/ hr 8 hours   Recheck BMp in PM   Hold Torsemide, Jardiance   BMP and strict I./O  Discussed w/ attending     # HTN  soft  Hold Torsemide, jardaince    # Anemia  Of CKD  No PEDRITO    # Acid- base/ Electrolyte imbalance   Hyperkalemia 2/2 NELIA/ CKD  Plan:  Low K diet  Lokelma   Fluids   Monitor BMP     # HFrEF  Compensated  Holding Torsemide, Jardiance   Monitor Sx     History of Present Ilness:    Mary Silverio is a 75 y.o. female with   75 y.o. female with past medical history significant for HFrEF with ICD in place, a-fib, hyperlipidemia, obesity, weakness .  Presented to [facility] with complaints of generalized weakness and diffuse joint  pain that has been ongoing for the past couple of months.  States her legs gave out last night, and when she fell she twisted her toe.  Denies dyspnea, chest pain, n/v, headache.  Does report dizziness w/ standing.   She did have diarrhea for a few 
Nutrition Assessment     Type and Reason for Visit: Initial, LOS    Nutrition Recommendations/Plan:   Continue ISIS (3-4 g/day) diet.   Encourage PO intake as tolerated.   Monitor nutrition adequacy, pertinent labs, bowel habits, wt changes, and clinical progress.      Malnutrition Assessment:  Malnutrition Status: No malnutrition    Nutrition Assessment:  Pt visited for LOS. Admitted w/ generalized weakness but found to have NELIA on CKD stage 3b. PMH of CHF, A-fib, and HLD. Pt reports her appetite is good currently but was \"so-so\" PTA and has been for years. Currently on a ISIS (3-4 g/day) diet w/ PO intakes of mainly %. Pt denies issues chewing or swallowing. Denies unintentional wt loss or gain from her reported UBW of 246 lbs. EMR similarly suggests UBW around 242-250 lbs. Pt denies N/V/D/C. Denies nutrition-related questions at this time. Will continue to monitor.    Estimated Daily Nutrient Needs:  Energy (kcal):  1,928-2,155 (17-19 kcal/kg CBW) Weight Used for Energy Requirements: Current     Protein (g):  37-49 (0.6-0.8 g/kg IBW) Weight Used for Protein Requirements: Ideal        Fluid (ml/day):  < 2,000 mL per CHF recommendations Method Used for Fluid Requirements: Other    Nutrition Related Findings:   Last BM documented 6/30. Potassium: 5.2. No edema documented. Wound Type: None    Current Nutrition Therapies:    ADULT DIET; Regular; No Added Salt (3-4 gm)    Anthropometric Measures:  Height: 170.2 cm (5' 7.01\")  Current Body Wt: 113.4 kg (250 lb)   BMI: 39.1        Nutrition Diagnosis:   No nutrition diagnosis at this time    Nutrition Interventions:   Food and/or Nutrient Delivery: Continue Current Diet  Nutrition Education/Counseling: No recommendation at this time  Coordination of Nutrition Care: Continue to monitor while inpatient       Goals:  Goals: Maintain adequate nutrition status, prior to discharge  Type of Goal: New goal       Nutrition Monitoring and Evaluation:   Behavioral-Environmental 
Physical Therapy  Facility/Department: [facility] C5 - MED SURG/ORTHO  Physical Therapy Initial Assessment    Name: Mary Silverio  : 1950  MRN: [id number]  Date of Service: 2025    Discharge Recommendations:  24 hour supervision or assist, Outpatient PT   PT Equipment Recommendations  Equipment Needed: No  Other: pt owns RW, do not anticipate any additional DME needs at this time.      Patient Diagnosis(es): The primary encounter diagnosis was NELIA (acute kidney injury). Diagnoses of Hyperkalemia, General weakness, Frequent falls, and Acute on chronic systolic congestive heart failure (HCC) were also pertinent to this visit.  Past Medical History:  has a past medical history of Arthritis, Atrial fibrillation (HCC), CHF (congestive heart failure) (HCC), Congenital heart disease, COPD (chronic obstructive pulmonary disease) (HCC), GERD (gastroesophageal reflux disease), Heart disease, Hypertension, Irritable bowel syndrome, Obesity, On home oxygen therapy, and Restless legs syndrome.  Past Surgical History:  has a past surgical history that includes pacemaker placement; Hysterectomy, vaginal; Stimulator Surgery; Cholecystectomy, open; hip surgery (Left, 2023); Pain management procedure (Left, 2023); Knee Arthrocentesis (Left, 2023); Colonoscopy; ablation of dysrhythmic focus; Wrist fracture surgery (Right, 2023); hip surgery (Bilateral, 2024); Stimulator Surgery (N/A, 2024); and Hand surgery (2023).    Assessment  Body Structures, Functions, Activity Limitations Requiring Skilled Therapeutic Intervention: Decreased functional mobility ;Decreased ADL status;Decreased strength;Decreased endurance  Assessment: Pt seen in conjunction with OT to maximize pt safety and mobility and safely assess pt maximal level of mobility.  Pt [facility] to [facility] with primary diagnosis of generalized weakness.  PTA pt lived at home with spouse with 1 BONIFACIO, reports MOD IND with mobility with RW.  
Pt awake in room. Gets up with standby assist and walker. Is alert and oriented. Tolerates a regular diet. Left AC piv intact and saline locked. Paced on telemetry. VSS. Denies any needs. Assessment completed. Updated on plan of care and answered all questions at this time. Call light in reach, will continue to monitor.   
treat    Restrictions  Restrictions/Precautions  Restrictions/Precautions: Fall Risk, General Precautions  Position Activity Restriction  Other Position/Activity Restrictions: IV, telemetry    Subjective  General  Chart Reviewed: Yes  Patient assessed for rehabilitation services?: Yes  Family / Caregiver Present: No  Referring Practitioner: Kristine Arredondo APRN - CNP  Diagnosis: falls, generalized weakness  Subjective  Subjective: c/o Ishmael shoulder pain Left>Right, chronic has been to out-pt therapy  General Comment  Comments: RN cleared pt for OT eval; pt up in bathroom with supervision agreeable to therapy     Social/Functional History  Social/Functional History  Lives With: Spouse  Type of Home: House  Home Layout: One level  Home Access: Stairs to enter without rails  Entrance Stairs - Number of Steps: 1  Bathroom Shower/Tub: Walk-in shower, Shower chair without back  Bathroom Toilet: Handicap height  Bathroom Equipment: Grab bars in shower, Grab bars around toilet  Home Equipment: Walker - Rolling, Walker - 4-Wheeled, Reacher, Oxygen (2 L O 2 at night)  Has the patient had two or more falls in the past year or any fall with injury in the past year?: Yes (2 in last 6 weeks, 3 total in the last year with Right toe fx)  Receives Help From: Family  Prior Level of Assist for ADLs: Independent  Homemaking Responsibilities: Yes (shares with spouse)  Meal Prep Responsibility: Secondary  Laundry Responsibility: Secondary  Cleaning Responsibility: Secondary  Prior Level of Assist for Ambulation: Independent household ambulator, with or without device (uses RW lately inside & 4 WW outside)  Prior Level of Assist for Transfers: Independent  Active : Yes  Mode of Transportation: Car  Occupation: Retired  Type of Occupation:   Leisure & Hobbies: read, thi painting,    Objective  Temp: 98.2 °F (36.8 °C)  Pulse: 59  Heart Rate Source: Monitor  Respirations: 16  SpO2: 95 %  O2 Device: None (Room 
significance as documented above.     Recent Labs     06/28/25  1242   WBC 10.8   HGB 11.1*   HCT 33.5*        Recent Labs     06/29/25  0519 06/30/25  0515 07/01/25  0508    144 142   K 4.8 4.7 4.8    110 109   CO2 25 24 22   BUN 46* 37* 23*   CREATININE 2.4* 1.9* 1.6*   CALCIUM 9.1 9.0 9.3   MG 2.64* 2.58* 2.37     Recent Labs     06/28/25  1242 06/28/25  1330 06/30/25  0515   PROBNP  --   --  226   TROPHS 17* 15*  --      No results for input(s): \"LABA1C\" in the last 72 hours.  Recent Labs     06/28/25  1242 06/29/25  0519   AST 16 13*   ALT 11 9*   BILIDIR  --  0.2   BILITOT 0.4 0.4   ALKPHOS 96 76     No results for input(s): \"INR\", \"LACTA\", \"TSH\" in the last 72 hours.    Urine Cultures: No results found for: \"LABURIN\"  Blood Cultures: No results found for: \"BC\"  No results found for: \"BLOODCULT2\"  Organism: No results found for: \"ORG\"      Miguel Ramirez, APRN - CNP

## 2025-07-03 ENCOUNTER — TELEPHONE (OUTPATIENT)
Dept: FAMILY MEDICINE CLINIC | Age: 75
End: 2025-07-03

## 2025-07-03 NOTE — TELEPHONE ENCOUNTER
Care Transitions Initial Follow Up Call    Outreach made within 2 business days of discharge: Yes    Patient: Mary Silverio Patient : 1950   MRN: [id number]  Reason for Admission: NELIA  Discharge Date: 25       Spoke with: RESHMA/RODNEY for Mary to return call to schedule appt    Discharge department/facility: [facility]    TCM Interactive Patient Contact:  Was patient able to fill all prescriptions:   Was patient instructed to bring all medications to the follow-up visit:   Is patient taking all medications as directed in the discharge summary?   Does patient understand their discharge instructions:   Does patient have questions or concerns that need addressed prior to 7-14 day follow up office visit:     Additional needs identified to be addressed with provider               Scheduled appointment with PCP within 7-14 days    Follow Up  Future Appointments   Date Time Provider Department Center   2025  3:45 PM uLciano Heredia MD AFL NEPH [facility]   2025  1:00 PM SCHEDULE, WIN DEVICE CHECK [facility]   2025  1:00 PM Chloe Benito APRN - CNP [facility]   2025 10:15 AM Swayer Syed MD AND ORTHO [facility]   2025  1:20 PM Patricio Reaves MD AFL TSP AND [facility] [place]   2025  1:00 PM Marilynn Rodgers APRN - CNP [facility]   2025 10:30 AM Patricio Reaves MD AFL TSP AND [facility] [place]   2025 10:30 AM MHA CT VCT [facility] CT [facility]   2025 10:30 AM [facility]       Leslie Nath MA

## 2025-07-07 ENCOUNTER — TELEPHONE (OUTPATIENT)
Dept: FAMILY MEDICINE CLINIC | Age: 75
End: 2025-07-07

## 2025-07-07 NOTE — TELEPHONE ENCOUNTER
Care Transitions Initial Follow Up Call    Outreach made within 2 business days of discharge: Yes    Patient: Mary Silverio Patient : 1950   MRN: [id number]  Reason for Admission:   Discharge Date: 25       Spoke with: patient    Discharge department/facility:     [facility] Interactive Patient Contact:  Was patient able to fill all prescriptions: Yes  Was patient instructed to bring all medications to the follow-up visit: Yes  Is patient taking all medications as directed in the discharge summary? Yes  Does patient understand their discharge instructions: Yes   Does patient have questions or concerns that need addressed prior to 7-14 day follow up office visit: no    Additional needs identified to be addressed with provider  No needs identified             Scheduled appointment with PCP within 7-14 days    Follow Up  Future Appointments   Date Time Provider Department Center   2025  2:30 PM Kaylene Goodman APRN - CNP [facility] [place] ECC DEP   2025  3:45 PM Luciano Heredia MD AFL NEPH [place]   2025  1:00 PM SCHEDULE, LINDA DEVICE CHECK [facility]   2025  1:00 PM Chloe Benito APRN - CNP [facility]   2025 10:15 AM Sawyer Syed MD AND ORTHO [facility]   2025  1:20 PM Patricio Reaves MD AFL TSP AND [facility] [place]   2025  1:00 PM Marilynn Rodgers APRN - CNP [facility]   2025 10:30 AM Patricio Reaves MD AFL TSP AND AFL [place]   2025 10:30 AM MHA CT VCT MHAZ CT [facility]   2025 10:30 AM [facility]       Dasha Martínez MA

## 2025-07-11 ENCOUNTER — OFFICE VISIT (OUTPATIENT)
Dept: FAMILY MEDICINE CLINIC | Age: 75
End: 2025-07-11

## 2025-07-11 VITALS
HEIGHT: 67 IN | WEIGHT: 249 LBS | BODY MASS INDEX: 39.08 KG/M2 | OXYGEN SATURATION: 97 % | HEART RATE: 83 BPM | DIASTOLIC BLOOD PRESSURE: 60 MMHG | SYSTOLIC BLOOD PRESSURE: 118 MMHG

## 2025-07-11 DIAGNOSIS — Z09 HOSPITAL DISCHARGE FOLLOW-UP: Primary | ICD-10-CM

## 2025-07-11 DIAGNOSIS — N18.31 STAGE 3A CHRONIC KIDNEY DISEASE (HCC): ICD-10-CM

## 2025-07-11 DIAGNOSIS — I50.9 ACUTE DECOMPENSATED HEART FAILURE (HCC): ICD-10-CM

## 2025-07-11 LAB
ANION GAP SERPL CALCULATED.3IONS-SCNC: 10 MMOL/L (ref 3–16)
BUN SERPL-MCNC: 23 MG/DL (ref 7–20)
CALCIUM SERPL-MCNC: 9 MG/DL (ref 8.3–10.6)
CHLORIDE SERPL-SCNC: 105 MMOL/L (ref 99–110)
CO2 SERPL-SCNC: 23 MMOL/L (ref 21–32)
CREAT SERPL-MCNC: 1.7 MG/DL (ref 0.6–1.2)
GFR SERPLBLD CREATININE-BSD FMLA CKD-EPI: 31 ML/MIN/{1.73_M2}
GLUCOSE SERPL-MCNC: 107 MG/DL (ref 70–99)
POTASSIUM SERPL-SCNC: 4.8 MMOL/L (ref 3.5–5.1)
SODIUM SERPL-SCNC: 138 MMOL/L (ref 136–145)

## 2025-07-11 RX ORDER — TORSEMIDE 20 MG/1
20 TABLET ORAL
Qty: 36 TABLET | Refills: 0 | Status: SHIPPED | OUTPATIENT
Start: 2025-07-11 | End: 2025-10-09

## 2025-07-11 NOTE — PROGRESS NOTES
Post-Discharge Transitional Care Management Services or Hospital Follow Up      Mary Silverio   YOB: 1950    Date of Office Visit:  7/11/2025  Date of Hospital Admission: 6/28/25  Date of Hospital Discharge: 7/2/25  Readmission Risk Score(high >=14%. Medium >=10%):Readmission Risk Score: 16.5      Care management risk score Rising risk (score 2-5) and Complex Care (Scores >=6): No Risk Score On File     Non face to face  following discharge, date last encounter closed (first attempt may have been earlier): 07/07/2025 07/07/2025    Call initiated 2 business days of discharge: Yes     Patient Active Problem List   Diagnosis    Allergic rhinitis    Chronic pain of left knee    Astigmatism    Backache    Cervicalgia    Costochondritis    Cyst of eyelid    Depression    Esophagitis    Flexor hallucis longus tendinitis    Essential hypertension    Hyperlipidemia    Hematuria    Lateral epicondylitis    Left upper quadrant abdominal pain    Mild intermittent asthma    Motion sickness    Obesity    Nicotine dependence    Other seborrheic keratosis    Overweight    Pain in thoracic spine    Palpitations    Persistent insomnia    Senile lentigo    Presbyopia    Symptomatic menopausal or female climacteric states    Status post hysterectomy    Subacromial bursitis    Former smoker    Acute decompensated heart failure (HCC)    COPD (chronic obstructive pulmonary disease) ([facility])    PAF (paroxysmal atrial fibrillation) ([facility])    Pulmonary vascular congestion    Elevated brain natriuretic peptide (BNP) level    Premature atrial contraction    Acute on chronic systolic heart failure ([facility])    Hypokalemia    ICD (implantable cardioverter-defibrillator), dual, in situ    SBO (small bowel obstruction) ([facility])    Type 2 diabetes mellitus    Mild episode of recurrent major depressive disorder    Primary osteoarthritis of left knee    Hypotension    Atrial fibrillation with RVR ([facility])    Chronic pain of both knees    Opioid

## 2025-07-18 ENCOUNTER — TELEMEDICINE (OUTPATIENT)
Dept: FAMILY MEDICINE CLINIC | Age: 75
End: 2025-07-18
Payer: MEDICARE

## 2025-07-18 DIAGNOSIS — E04.1 THYROID NODULE: Primary | ICD-10-CM

## 2025-07-18 PROCEDURE — 1036F TOBACCO NON-USER: CPT | Performed by: NURSE PRACTITIONER

## 2025-07-18 PROCEDURE — 1111F DSCHRG MED/CURRENT MED MERGE: CPT | Performed by: NURSE PRACTITIONER

## 2025-07-18 PROCEDURE — G8399 PT W/DXA RESULTS DOCUMENT: HCPCS | Performed by: NURSE PRACTITIONER

## 2025-07-18 PROCEDURE — 99212 OFFICE O/P EST SF 10 MIN: CPT | Performed by: NURSE PRACTITIONER

## 2025-07-18 PROCEDURE — 1090F PRES/ABSN URINE INCON ASSESS: CPT | Performed by: NURSE PRACTITIONER

## 2025-07-18 PROCEDURE — 1123F ACP DISCUSS/DSCN MKR DOCD: CPT | Performed by: NURSE PRACTITIONER

## 2025-07-18 PROCEDURE — G8428 CUR MEDS NOT DOCUMENT: HCPCS | Performed by: NURSE PRACTITIONER

## 2025-07-18 PROCEDURE — G8417 CALC BMI ABV UP PARAM F/U: HCPCS | Performed by: NURSE PRACTITIONER

## 2025-07-18 NOTE — PROGRESS NOTES
Mary Silverio (:  1950) is a 75 y.o. female,Established patient, here for evaluation of the following chief complaint(s): No chief complaint on file.      ASSESSMENT/PLAN:  1. Thyroid nodule  -     US THYROID; Future    Aware of ct scan results   Will add ultrasound to evaluate area seen on CT scan  We will talk when utrasound results are back to determine if any additonal evaluation or referrals are required  Pt is agreeable to this plan has central scheduling number   No follow-ups on file.    SUBJECTIVE/OBJECTIVE:      [INSTRUCTIONS:  \"[x]\" Indicates a positive item  \"[]\" Indicates a negative item  -- DELETE ALL ITEMS NOT EXAMINED]    Constitutional: [x] Appears well-developed and well-nourished [x] No apparent distress      [] Abnormal -     Mental status: [x] Alert and awake  [x] Oriented to person/place/time [x] Able to follow commands    [] Abnormal -     Eyes:   EOM    [x]  Normal    [] Abnormal -   Sclera  [x]  Normal    [] Abnormal -          Discharge [x]  None visible   [] Abnormal -     HENT: [x] Normocephalic, atraumatic  [] Abnormal -   [x] Mouth/Throat: Mucous membranes are moist    External Ears [x] Normal  [] Abnormal -    Neck: [x] No visualized mass [] Abnormal -     Pulmonary/Chest: [x] Respiratory effort normal   [x] No visualized signs of difficulty breathing or respiratory distress        [] Abnormal -      Musculoskeletal:   [x] Normal gait with no signs of ataxia         [x] Normal range of motion of neck        [] Abnormal -     Neurological:        [x] No Facial Asymmetry (Cranial nerve 7 motor function) (limited exam due to video visit)          [x] No gaze palsy        [] Abnormal -          Skin:        [x] No significant exanthematous lesions or discoloration noted on facial skin         [] Abnormal -            Psychiatric:       [x] Normal Affect [] Abnormal -        [x] No Hallucinations    Other pertinent observable physical exam findings:-    Electronically signed by

## 2025-07-21 ENCOUNTER — OFFICE VISIT (OUTPATIENT)
Dept: CARDIOLOGY CLINIC | Age: 75
End: 2025-07-21
Payer: MEDICARE

## 2025-07-21 ENCOUNTER — TELEPHONE (OUTPATIENT)
Dept: CARDIOLOGY CLINIC | Age: 75
End: 2025-07-21

## 2025-07-21 ENCOUNTER — CLINICAL SUPPORT (OUTPATIENT)
Dept: CARDIOLOGY CLINIC | Age: 75
End: 2025-07-21

## 2025-07-21 ENCOUNTER — HOSPITAL ENCOUNTER (OUTPATIENT)
Dept: ULTRASOUND IMAGING | Age: 75
Discharge: HOME | End: 2025-07-21
Payer: MEDICARE

## 2025-07-21 VITALS
OXYGEN SATURATION: 91 % | SYSTOLIC BLOOD PRESSURE: 134 MMHG | WEIGHT: 255.95 LBS | HEIGHT: 67 IN | DIASTOLIC BLOOD PRESSURE: 74 MMHG | HEART RATE: 67 BPM | BODY MASS INDEX: 40.17 KG/M2

## 2025-07-21 DIAGNOSIS — I49.1 PREMATURE ATRIAL CONTRACTION: ICD-10-CM

## 2025-07-21 DIAGNOSIS — I10 ESSENTIAL HYPERTENSION: ICD-10-CM

## 2025-07-21 DIAGNOSIS — R06.02 SOB (SHORTNESS OF BREATH): ICD-10-CM

## 2025-07-21 DIAGNOSIS — Z95.810 ICD (IMPLANTABLE CARDIOVERTER-DEFIBRILLATOR), DUAL, IN SITU: ICD-10-CM

## 2025-07-21 DIAGNOSIS — Z79.899 MEDICATION MANAGEMENT: ICD-10-CM

## 2025-07-21 DIAGNOSIS — E04.1 THYROID NODULE: ICD-10-CM

## 2025-07-21 DIAGNOSIS — Z95.810 ICD (IMPLANTABLE CARDIOVERTER-DEFIBRILLATOR), DUAL, IN SITU: Primary | ICD-10-CM

## 2025-07-21 DIAGNOSIS — I42.8 NICM (NONISCHEMIC CARDIOMYOPATHY) (HCC): ICD-10-CM

## 2025-07-21 DIAGNOSIS — I48.91 ATRIAL FIBRILLATION WITH RVR (HCC): ICD-10-CM

## 2025-07-21 DIAGNOSIS — I42.8 NICM (NONISCHEMIC CARDIOMYOPATHY) (HCC): Primary | ICD-10-CM

## 2025-07-21 DIAGNOSIS — I48.0 PAF (PAROXYSMAL ATRIAL FIBRILLATION) (HCC): Primary | ICD-10-CM

## 2025-07-21 PROCEDURE — G2211 COMPLEX E/M VISIT ADD ON: HCPCS | Performed by: NURSE PRACTITIONER

## 2025-07-21 PROCEDURE — 3075F SYST BP GE 130 - 139MM HG: CPT | Performed by: NURSE PRACTITIONER

## 2025-07-21 PROCEDURE — 1123F ACP DISCUSS/DSCN MKR DOCD: CPT | Performed by: NURSE PRACTITIONER

## 2025-07-21 PROCEDURE — 3078F DIAST BP <80 MM HG: CPT | Performed by: NURSE PRACTITIONER

## 2025-07-21 PROCEDURE — 76536 US EXAM OF HEAD AND NECK: CPT

## 2025-07-21 PROCEDURE — 93000 ELECTROCARDIOGRAM COMPLETE: CPT | Performed by: NURSE PRACTITIONER

## 2025-07-21 PROCEDURE — 1160F RVW MEDS BY RX/DR IN RCRD: CPT | Performed by: NURSE PRACTITIONER

## 2025-07-21 PROCEDURE — 1036F TOBACCO NON-USER: CPT | Performed by: NURSE PRACTITIONER

## 2025-07-21 PROCEDURE — 1111F DSCHRG MED/CURRENT MED MERGE: CPT | Performed by: NURSE PRACTITIONER

## 2025-07-21 PROCEDURE — 1090F PRES/ABSN URINE INCON ASSESS: CPT | Performed by: NURSE PRACTITIONER

## 2025-07-21 PROCEDURE — G8427 DOCREV CUR MEDS BY ELIG CLIN: HCPCS | Performed by: NURSE PRACTITIONER

## 2025-07-21 PROCEDURE — G8399 PT W/DXA RESULTS DOCUMENT: HCPCS | Performed by: NURSE PRACTITIONER

## 2025-07-21 PROCEDURE — 99215 OFFICE O/P EST HI 40 MIN: CPT | Performed by: NURSE PRACTITIONER

## 2025-07-21 PROCEDURE — G8417 CALC BMI ABV UP PARAM F/U: HCPCS | Performed by: NURSE PRACTITIONER

## 2025-07-21 PROCEDURE — 1159F MED LIST DOCD IN RCRD: CPT | Performed by: NURSE PRACTITIONER

## 2025-07-21 RX ORDER — BENZONATATE 100 MG/1
200 CAPSULE ORAL 3 TIMES DAILY PRN
Qty: 42 CAPSULE | Refills: 0 | Status: SHIPPED | OUTPATIENT
Start: 2025-07-21 | End: 2025-07-28

## 2025-07-21 NOTE — TELEPHONE ENCOUNTER
MURJ- RB    Please review updated Optivol status in [facility]UR[facility]. Saw NPAM in clinic today. S/p recent hospital d/c. Thanks.

## 2025-07-21 NOTE — PATIENT INSTRUCTIONS
NO changes today. I will review thyroid ultrasound and let you know if amiodarone is safe to continue.     Remote device transmissions every three months     Tessalon perles for cough. If cough does not improve within  the week, discuss with lung doctor or PCP.

## 2025-07-21 NOTE — PROGRESS NOTES
[facility]   Electrophysiology Outpatient Note              Date:  July 21, 2025  Patient name: Mary Silverio  YOB: 1950    Primary Care physician: Kaylene Goodman APRN - CNP    HISTORY OF PRESENT ILLNESS: The patient is a 75 y.o.  female with a history of AF, nonischemic cardiomyopathy, systolic CHF, HLD, and COPD.   In 11/2022, patient was admitted with shortness of breath and CHF.  Patient with previous cardiac history and had recently moved to [place] from [place].  Echo showed an EF of 25%.  In 6/2023, patient was admitted with symptomatic atrial fibrillation.  Patient was urgently cardioverted in the emergency room.  She was started on low-dose amiodarone at the time.  In 6/2025, patient was admitted with NELIA and hyperkalemia felt possibly due to diuretic use.  Workup revealed a stable ascending aortic aneurysm and a thyroid nodule.  TSH was normal.  A thyroid ultrasound was ordered by her PCP.  This was done earlier today.  Her PCP also instructed her to stop the amiodarone.  Patient reports that she did not stop the amiodarone.  Today she is being seen for AF and NICM. EKG shows SR with a HR of 67.  She has not been feeling well.  She has had a cough for about a week.  She also has multiple orthopedic complaints and complains of bilateral lower extremity weakness.  Denies chest pain, palpitations or dizziness.    Device check shows:   Brand: [employer]  Mode: AAIR>DDDR  Normal function   81.9% AP  <0.1%    Arrhythmias: none  Battery life 22.8 years  RA impedance 456 ohms   RV impedance 532 ohms   RA threshold 0.625 V @ 0.40 ms  RV threshold 0.500 V @ 0.40 ms    Past Medical History:   has a past medical history of Arthritis, Atrial fibrillation (HCC), CHF (congestive heart failure) (HCC), Congenital heart disease, COPD (chronic obstructive pulmonary disease) (HCC), GERD (gastroesophageal reflux disease), Heart disease, Hypertension, Irritable bowel syndrome,

## 2025-07-22 PROCEDURE — 93297 REM INTERROG DEV EVAL ICPMS: CPT | Performed by: NURSE PRACTITIONER

## 2025-07-22 NOTE — TELEPHONE ENCOUNTER
Pt returned call.  Pt will get labs this week.  Please order.  Pt is taking torsemide (DEMADEX) 20 MG tablet  Take 1 tablet by mouth Every Monday, Wednesday, and Friday.

## 2025-07-22 NOTE — TELEPHONE ENCOUNTER
\    REviewed. Let's have her get a repeat BMP and BNP this week. Likely will need to increase the water pill but need to check labs 1st.     Please verify if she is taking the furosemide or the torsemide. She should not be on both and both are on her med list.   Thanks

## 2025-07-27 DIAGNOSIS — F51.01 PRIMARY INSOMNIA: ICD-10-CM

## 2025-07-28 ENCOUNTER — TELEPHONE (OUTPATIENT)
Dept: CARDIOLOGY CLINIC | Age: 75
End: 2025-07-28

## 2025-07-28 DIAGNOSIS — Z79.899 ON AMIODARONE THERAPY: Primary | ICD-10-CM

## 2025-07-28 DIAGNOSIS — E04.2 MULTINODULAR GOITER: ICD-10-CM

## 2025-07-28 RX ORDER — HYDROXYZINE HYDROCHLORIDE 25 MG/1
25 TABLET, FILM COATED ORAL NIGHTLY
Qty: 90 TABLET | Refills: 0 | Status: SHIPPED | OUTPATIENT
Start: 2025-07-28 | End: 2025-10-26

## 2025-07-28 NOTE — TELEPHONE ENCOUNTER
Thyroid ultrasound results reviewed. Showed multinodular goiter.     Please refer to endocrinology given amiodarone use.  Unclear if this would be okay for her to continue or not.  Would like them to weigh in. Thanks.     Chloe Benito, DEMI-CNP  [facility]  [phone]

## 2025-07-28 NOTE — TELEPHONE ENCOUNTER
Pt returned call. MSG relayed. Pt said she would like to continue taking the amiodarone. The phone number to endocrinology was provided.

## 2025-07-29 NOTE — TELEPHONE ENCOUNTER
Pt called to schedule with endocrinology.  Pt states they do not treat what she was referred for. Please advise what pt should do next.  Pt can be reached at [phone]

## 2025-08-04 ENCOUNTER — OFFICE VISIT (OUTPATIENT)
Dept: ORTHOPEDIC SURGERY | Age: 75
End: 2025-08-04
Payer: MEDICARE

## 2025-08-04 VITALS — OXYGEN SATURATION: 94 % | HEART RATE: 78 BPM | HEIGHT: 67 IN | WEIGHT: 255.95 LBS | BODY MASS INDEX: 40.17 KG/M2

## 2025-08-04 DIAGNOSIS — M79.671 RIGHT FOOT PAIN: Primary | ICD-10-CM

## 2025-08-04 DIAGNOSIS — S92.351D CLOSED DISPLACED FRACTURE OF FIFTH METATARSAL BONE OF RIGHT FOOT WITH ROUTINE HEALING, SUBSEQUENT ENCOUNTER: ICD-10-CM

## 2025-08-04 PROCEDURE — 1036F TOBACCO NON-USER: CPT | Performed by: ORTHOPAEDIC SURGERY

## 2025-08-04 PROCEDURE — G8417 CALC BMI ABV UP PARAM F/U: HCPCS | Performed by: ORTHOPAEDIC SURGERY

## 2025-08-04 PROCEDURE — 1090F PRES/ABSN URINE INCON ASSESS: CPT | Performed by: ORTHOPAEDIC SURGERY

## 2025-08-04 PROCEDURE — G8399 PT W/DXA RESULTS DOCUMENT: HCPCS | Performed by: ORTHOPAEDIC SURGERY

## 2025-08-04 PROCEDURE — G8428 CUR MEDS NOT DOCUMENT: HCPCS | Performed by: ORTHOPAEDIC SURGERY

## 2025-08-04 PROCEDURE — 99212 OFFICE O/P EST SF 10 MIN: CPT | Performed by: ORTHOPAEDIC SURGERY

## 2025-08-04 PROCEDURE — 1123F ACP DISCUSS/DSCN MKR DOCD: CPT | Performed by: ORTHOPAEDIC SURGERY

## 2025-08-07 ENCOUNTER — APPOINTMENT (OUTPATIENT)
Dept: GENERAL RADIOLOGY | Age: 75
End: 2025-08-07
Attending: ANESTHESIOLOGY
Payer: MEDICARE

## 2025-08-07 ENCOUNTER — HOSPITAL ENCOUNTER (OUTPATIENT)
Age: 75
Setting detail: OUTPATIENT SURGERY
Discharge: HOME | End: 2025-08-07
Attending: ANESTHESIOLOGY | Admitting: ANESTHESIOLOGY
Payer: MEDICARE

## 2025-08-07 VITALS
BODY MASS INDEX: 39.08 KG/M2 | DIASTOLIC BLOOD PRESSURE: 55 MMHG | RESPIRATION RATE: 17 BRPM | WEIGHT: 249 LBS | HEIGHT: 67 IN | HEART RATE: 55 BPM | SYSTOLIC BLOOD PRESSURE: 105 MMHG | TEMPERATURE: 97.4 F | OXYGEN SATURATION: 92 %

## 2025-08-07 LAB
GLUCOSE BLD-MCNC: 93 MG/DL (ref 70–99)
PERFORMED ON: NORMAL

## 2025-08-07 PROCEDURE — 6360000002 HC RX W HCPCS: Performed by: ANESTHESIOLOGY

## 2025-08-07 PROCEDURE — 7100000010 HC PHASE II RECOVERY - FIRST 15 MIN: Performed by: ANESTHESIOLOGY

## 2025-08-07 PROCEDURE — 3600000002 HC SURGERY LEVEL 2 BASE: Performed by: ANESTHESIOLOGY

## 2025-08-07 PROCEDURE — 64635 DESTROY LUMB/SAC FACET JNT: CPT | Performed by: ANESTHESIOLOGY

## 2025-08-07 PROCEDURE — 7100000011 HC PHASE II RECOVERY - ADDTL 15 MIN: Performed by: ANESTHESIOLOGY

## 2025-08-07 PROCEDURE — 3600000012 HC SURGERY LEVEL 2 ADDTL 15MIN: Performed by: ANESTHESIOLOGY

## 2025-08-07 PROCEDURE — 2709999900 HC NON-CHARGEABLE SUPPLY: Performed by: ANESTHESIOLOGY

## 2025-08-07 PROCEDURE — 64636 DESTROY L/S FACET JNT ADDL: CPT | Performed by: ANESTHESIOLOGY

## 2025-08-07 RX ORDER — LIDOCAINE HYDROCHLORIDE 10 MG/ML
INJECTION, SOLUTION EPIDURAL; INFILTRATION; INTRACAUDAL; PERINEURAL PRN
Status: DISCONTINUED | OUTPATIENT
Start: 2025-08-07 | End: 2025-08-07 | Stop reason: ALTCHOICE

## 2025-08-07 RX ORDER — LIDOCAINE HYDROCHLORIDE 20 MG/ML
INJECTION, SOLUTION INFILTRATION; PERINEURAL PRN
Status: DISCONTINUED | OUTPATIENT
Start: 2025-08-07 | End: 2025-08-07 | Stop reason: ALTCHOICE

## 2025-08-07 ASSESSMENT — PAIN - FUNCTIONAL ASSESSMENT: PAIN_FUNCTIONAL_ASSESSMENT: 0-10

## 2025-08-07 ASSESSMENT — PAIN DESCRIPTION - DESCRIPTORS: DESCRIPTORS: ACHING;SHARP

## 2025-08-07 ASSESSMENT — PAIN SCALES - GENERAL: PAINLEVEL_OUTOF10: 2

## 2025-08-15 DIAGNOSIS — I48.0 PAF (PAROXYSMAL ATRIAL FIBRILLATION) (HCC): ICD-10-CM

## 2025-08-15 DIAGNOSIS — M79.672 LEFT FOOT PAIN: ICD-10-CM

## 2025-08-15 DIAGNOSIS — G25.81 RESTLESS LEGS: ICD-10-CM

## 2025-08-15 RX ORDER — SPIRONOLACTONE 50 MG/1
50 TABLET, FILM COATED ORAL DAILY
Qty: 90 TABLET | Refills: 0 | Status: SHIPPED | OUTPATIENT
Start: 2025-08-15

## 2025-08-15 RX ORDER — GABAPENTIN 300 MG/1
CAPSULE ORAL
Qty: 180 CAPSULE | Refills: 0 | Status: SHIPPED | OUTPATIENT
Start: 2025-08-15 | End: 2026-01-15

## 2025-08-15 RX ORDER — ROPINIROLE 1 MG/1
1 TABLET, FILM COATED ORAL 3 TIMES DAILY
Qty: 90 TABLET | Refills: 0 | Status: SHIPPED | OUTPATIENT
Start: 2025-08-15

## 2025-08-15 RX ORDER — APIXABAN 5 MG/1
5 TABLET, FILM COATED ORAL 2 TIMES DAILY
Qty: 180 TABLET | Refills: 3 | Status: SHIPPED | OUTPATIENT
Start: 2025-08-15

## 2025-09-01 ENCOUNTER — HOSPITAL ENCOUNTER (EMERGENCY)
Age: 75
Discharge: HOME | End: 2025-09-01
Payer: MEDICARE

## 2025-09-01 ENCOUNTER — APPOINTMENT (OUTPATIENT)
Dept: GENERAL RADIOLOGY | Age: 75
End: 2025-09-01
Payer: MEDICARE

## 2025-09-01 VITALS
RESPIRATION RATE: 18 BRPM | BODY MASS INDEX: 38.45 KG/M2 | SYSTOLIC BLOOD PRESSURE: 119 MMHG | TEMPERATURE: 97.8 F | HEIGHT: 67 IN | DIASTOLIC BLOOD PRESSURE: 98 MMHG | HEART RATE: 78 BPM | WEIGHT: 245 LBS | OXYGEN SATURATION: 95 %

## 2025-09-01 DIAGNOSIS — Z23 NEED FOR TETANUS BOOSTER: ICD-10-CM

## 2025-09-01 DIAGNOSIS — S91.114A LACERATION OF LESSER TOE OF RIGHT FOOT WITHOUT FOREIGN BODY PRESENT OR DAMAGE TO NAIL, INITIAL ENCOUNTER: Primary | ICD-10-CM

## 2025-09-01 PROCEDURE — 90471 IMMUNIZATION ADMIN: CPT | Performed by: NURSE PRACTITIONER

## 2025-09-01 PROCEDURE — 6370000000 HC RX 637 (ALT 250 FOR IP): Performed by: NURSE PRACTITIONER

## 2025-09-01 PROCEDURE — 90715 TDAP VACCINE 7 YRS/> IM: CPT | Performed by: NURSE PRACTITIONER

## 2025-09-01 PROCEDURE — 73660 X-RAY EXAM OF TOE(S): CPT

## 2025-09-01 PROCEDURE — 6360000002 HC RX W HCPCS: Performed by: NURSE PRACTITIONER

## 2025-09-01 PROCEDURE — 99284 EMERGENCY DEPT VISIT MOD MDM: CPT

## 2025-09-01 PROCEDURE — 12001 RPR S/N/AX/GEN/TRNK 2.5CM/<: CPT

## 2025-09-01 RX ORDER — CEPHALEXIN 500 MG/1
500 CAPSULE ORAL 4 TIMES DAILY
Qty: 40 CAPSULE | Refills: 0 | Status: SHIPPED | OUTPATIENT
Start: 2025-09-01 | End: 2025-09-11

## 2025-09-01 RX ADMIN — CEPHALEXIN 500 MG: 250 CAPSULE ORAL at 13:38

## 2025-09-01 RX ADMIN — TETANUS TOXOID, REDUCED DIPHTHERIA TOXOID AND ACELLULAR PERTUSSIS VACCINE, ADSORBED 0.5 ML: 5; 2.5; 8; 8; 2.5 SUSPENSION INTRAMUSCULAR at 12:54

## 2025-09-01 ASSESSMENT — PAIN SCALES - GENERAL: PAINLEVEL_OUTOF10: 7

## 2025-09-01 ASSESSMENT — PAIN - FUNCTIONAL ASSESSMENT: PAIN_FUNCTIONAL_ASSESSMENT: 0-10

## (undated) DEVICE — 3M™ STERI-STRIP™ REINFORCED ADHESIVE SKIN CLOSURES, R1547, 1/2 IN X 4 IN (12 MM X 100 MM), 6 STRIPS/ENVELOPE: Brand: 3M™ STERI-STRIP™

## (undated) DEVICE — SYRINGE IRRIG 60ML SFT PLIABLE BLB EZ TO GRP 1 HND USE W/

## (undated) DEVICE — UNIVERSAL BLOCK TRAY: Brand: MEDLINE INDUSTRIES, INC.

## (undated) DEVICE — NEPTUNE E-SEP SMOKE EVACUATION PENCIL, COATED, 70MM BLADE, PUSH BUTTON SWITCH: Brand: NEPTUNE E-SEP

## (undated) DEVICE — SPONGE GZ 16 PLY WVN COT 4INX4IN STERIL

## (undated) DEVICE — CRADLE POS PRONE 24 X 5 X 3 IN ARM N COMPR NO CVR FOAM DISP

## (undated) DEVICE — SOLUTION IRRIG 500ML 0.9% SOD CHLO USP POUR PLAS BTL

## (undated) DEVICE — 3M™ STERI-STRIP™ COMPOUND BENZOIN TINCTURE 40 BAGS/CARTON 4 CARTONS/CASE C1544: Brand: 3M™ STERI-STRIP™

## (undated) DEVICE — GLOVE SURG SZ 65 THK91MIL LTX FREE SYN POLYISOPRENE

## (undated) DEVICE — Z INACTIVE USE 2855128 SPONGE GZ 16 PLY WVN COT 4INX4IN  HHH

## (undated) DEVICE — GLOVE SURG SZ 75 L12IN FNGR THK94MIL STD WHT LTX FREE

## (undated) DEVICE — 3M™ TEGADERM™ TRANSPARENT FILM DRESSING FRAME STYLE, 1626W, 4 IN X 4-3/4 IN (10 CM X 12 CM), 50/CT 4CT/CASE: Brand: 3M™ TEGADERM™

## (undated) DEVICE — APPLICATOR PREP 26ML 0.7% IOD POVACRYLEX 74% ISO ALC ST

## (undated) DEVICE — GLOVE SURG SZ 65 L12IN FNGR THK79MIL GRN LTX FREE

## (undated) DEVICE — ALCOHOL RUBBING 16OZ 70% ISO

## (undated) DEVICE — Device

## (undated) DEVICE — ELECTRODE PT RET AD L9FT HI MOIST COND ADH HYDRGEL CORDED

## (undated) DEVICE — BANDAGE COMPR W4INXL15FT BGE E SGL LAYERED CLP CLSR

## (undated) DEVICE — TOWEL OR BLUEE 16X26IN ST 8 PACK ORB08 16X26ORTWL

## (undated) DEVICE — APPLICATOR MEDICATED 26 CC SOLUTION HI LT ORNG CHLORAPREP

## (undated) DEVICE — INTENDED USE FOR SURGICAL MARKING ON INTACT SKIN, ALSO PROVIDES A PERMANENT METHOD OF IDENTIFYING OBJECTS IN THE OPERATING ROOM: Brand: WRITESITE® PLUS MINI PREP RESISTANT MARKER

## (undated) DEVICE — DRAPE C ARM W46XL120IN XLN

## (undated) DEVICE — KIRSCHNER WIRE
Type: IMPLANTABLE DEVICE | Site: WRIST | Status: NON-FUNCTIONAL
Brand: VARIAX
Removed: 2023-11-06

## (undated) DEVICE — SUTURE VCRL + SZ 3-0 L18IN ABSRB UD SH 1/2 CIR TAPERCUT NDL VCP864D

## (undated) DEVICE — SUTURE VICRYL + SZ 2-0 L18IN ABSRB UD CT1 L36MM 1/2 CIR VCP839D

## (undated) DEVICE — HAND AND ELBOW: Brand: MEDLINE INDUSTRIES, INC.

## (undated) DEVICE — DRESSING,GAUZE,XEROFORM,CURAD,1"X8",ST: Brand: CURAD

## (undated) DEVICE — PADDING,UNDERCAST,COTTON, 4"X4YD STERILE: Brand: MEDLINE

## (undated) DEVICE — GOWN SIRUS NONREIN LG W/TWL: Brand: MEDLINE INDUSTRIES, INC.

## (undated) DEVICE — 1010 S-DRAPE TOWEL DRAPE 10/BX: Brand: STERI-DRAPE™

## (undated) DEVICE — 4-PORT MANIFOLD: Brand: NEPTUNE 2

## (undated) DEVICE — GLOVE ORANGE PI 7   MSG9070

## (undated) DEVICE — DRILL, AO, SCALED: Brand: VARIAX

## (undated) DEVICE — SURGICAL PROCEDURE PACK IV U-BAR

## (undated) DEVICE — PENCIL ES CRD L10FT HND SWCHING ROCK SWCH W/ EDGE COAT BLDE

## (undated) DEVICE — STRIP,CLOSURE,WOUND,MEDI-STRIP,1/2X4: Brand: MEDLINE

## (undated) DEVICE — BANDAGE COMPR W4INXL12FT E DISP ESMARCH EVEN

## (undated) DEVICE — GLOVE SURG SZ 8 CRM LTX FREE POLYISOPRENE POLYMER BEAD ANTI

## (undated) DEVICE — TUBING SUCT 10FR MAL ALUM SHFT FN CAP VENT UNIV CONN W/ OBT

## (undated) DEVICE — MINOR SET UP PACK: Brand: MEDLINE INDUSTRIES, INC.

## (undated) DEVICE — GLOVE ORTHO 8   MSG9480

## (undated) DEVICE — STERILE POLYISOPRENE POWDER-FREE SURGICAL GLOVES: Brand: PROTEXIS

## (undated) DEVICE — UNTHREADED GUIDE WIRE: Brand: FIXOS

## (undated) DEVICE — GLOVE SURG SZ 6 L12IN FNGR THK79MIL GRN LTX FREE

## (undated) DEVICE — TUBING, SUCTION, 3/16" X 12', STRAIGHT: Brand: MEDLINE

## (undated) DEVICE — SUTURE MONOCRYL + SZ 4-0 L18IN ABSRB UD L19MM PS-2 3/8 CIR MCP496G

## (undated) DEVICE — Device: Brand: PILLOW, GENTLETOUCH, 7 INCH RT

## (undated) DEVICE — TOWEL SURG W17XL27IN BLU COT STD PREWASHED DELINTED 4 PER STRL PK

## (undated) DEVICE — MERCY HEALTH WEST TURNOVER: Brand: MEDLINE INDUSTRIES, INC.

## (undated) DEVICE — GAUZE,SPONGE,4"X4",16PLY,STRL,LF,10/TRAY: Brand: MEDLINE